# Patient Record
Sex: MALE | Race: WHITE | NOT HISPANIC OR LATINO | Employment: FULL TIME | URBAN - METROPOLITAN AREA
[De-identification: names, ages, dates, MRNs, and addresses within clinical notes are randomized per-mention and may not be internally consistent; named-entity substitution may affect disease eponyms.]

---

## 2017-01-03 RX ORDER — LISINOPRIL 40 MG/1
TABLET ORAL
Qty: 14 TABLET | Refills: 0 | Status: SHIPPED | OUTPATIENT
Start: 2017-01-03 | End: 2017-01-17 | Stop reason: SDUPTHER

## 2017-01-10 ENCOUNTER — OFFICE VISIT (OUTPATIENT)
Dept: FAMILY MEDICINE CLINIC | Facility: CLINIC | Age: 47
End: 2017-01-10

## 2017-01-10 VITALS
HEART RATE: 89 BPM | TEMPERATURE: 98.3 F | SYSTOLIC BLOOD PRESSURE: 96 MMHG | HEIGHT: 70 IN | DIASTOLIC BLOOD PRESSURE: 67 MMHG | WEIGHT: 236 LBS | BODY MASS INDEX: 33.79 KG/M2

## 2017-01-10 DIAGNOSIS — R73.9 HYPERGLYCEMIA: Primary | ICD-10-CM

## 2017-01-10 DIAGNOSIS — I10 ESSENTIAL HYPERTENSION: ICD-10-CM

## 2017-01-10 DIAGNOSIS — N40.0 BENIGN PROSTATIC HYPERPLASIA, PRESENCE OF LOWER URINARY TRACT SYMPTOMS UNSPECIFIED, UNSPECIFIED MORPHOLOGY: ICD-10-CM

## 2017-01-10 DIAGNOSIS — R81 GLYCOSURIA: ICD-10-CM

## 2017-01-10 DIAGNOSIS — F32.9 REACTIVE DEPRESSION: ICD-10-CM

## 2017-01-10 PROCEDURE — 99213 OFFICE O/P EST LOW 20 MIN: CPT | Performed by: FAMILY MEDICINE

## 2017-01-10 RX ORDER — FLUOXETINE HYDROCHLORIDE 40 MG/1
40 CAPSULE ORAL DAILY
COMMUNITY
End: 2018-07-20 | Stop reason: ALTCHOICE

## 2017-01-10 NOTE — PROGRESS NOTES
Chief Complaint   Patient presents with   • Diabetes     was diagnosed from an exam he had at work       Subjective.../HPI  Patient present today with glucose of 240 and +2 glycosuria in urine. Had gatoraid before test was done. Father with dm.  No polyuria / polydyspnea / polyphagia    I have reviewed the patient's medical history in detail and updated the computerized patient record.    Family History   Problem Relation Age of Onset   • Diabetes Father    • Diabetes Brother        Social History     Social History   • Marital status:      Spouse name: N/A   • Number of children: N/A   • Years of education: N/A     Occupational History   • Not on file.     Social History Main Topics   • Smoking status: Current Every Day Smoker   • Smokeless tobacco: Not on file   • Alcohol use Not on file   • Drug use: Not on file   • Sexual activity: Not on file     Other Topics Concern   • Not on file     Social History Narrative       Review of Systems:   Review of Systems   Constitutional: Negative for chills, fatigue, fever and unexpected weight change.   HENT: Negative for ear pain, hearing loss, sinus pressure, sore throat and tinnitus.    Eyes: Negative for pain, discharge and redness.   Respiratory: Negative for cough, shortness of breath and wheezing.    Cardiovascular: Negative for chest pain, palpitations and leg swelling.   Gastrointestinal: Negative for abdominal pain, constipation, diarrhea and nausea.   Endocrine: Negative for cold intolerance and heat intolerance.   Genitourinary: Negative for difficulty urinating, flank pain and urgency.   Musculoskeletal: Negative for back pain, joint swelling and myalgias.   Skin: Negative for rash and wound.   Allergic/Immunologic: Negative for environmental allergies and food allergies.   Neurological: Negative for dizziness, seizures, numbness and headaches.   Hematological: Negative for adenopathy. Does not bruise/bleed easily.   Psychiatric/Behavioral: Negative for  "decreased concentration, dysphoric mood and sleep disturbance. The patient is not nervous/anxious.    All other systems reviewed and are negative.      Objective:  Vital Signs     Vitals:    01/10/17 1127   BP: 96/67   BP Location: Right arm   Patient Position: Sitting   Pulse: 89   Temp: 98.3 °F (36.8 °C)   TempSrc: Oral   Weight: 236 lb (107 kg)   Height: 70\" (177.8 cm)     Physical Exam   Constitutional: He is oriented to person, place, and time. He appears well-developed and well-nourished.   HENT:   Head: Normocephalic.   Eyes: Pupils are equal, round, and reactive to light.   Neck: Normal range of motion.   Cardiovascular: Normal rate, regular rhythm and normal heart sounds.    Pulmonary/Chest: Effort normal and breath sounds normal.   Abdominal: Soft.   Musculoskeletal: Normal range of motion.   Neurological: He is alert and oriented to person, place, and time.   Skin: Skin is warm and dry.        Results Review:      REVIEWED AND DISCUSSED LAB RESULTS WITH PATIENT and REVIEWED AND DISCUSSED CLINICAL RESULTS WITH PATIENT                          Current Outpatient Prescriptions:   •  FLUoxetine (PROzac) 40 MG capsule, Take 40 mg by mouth Daily., Disp: , Rfl:   •  lisinopril (PRINIVIL,ZESTRIL) 40 MG tablet, TAKE ONE TABLET BY MOUTH ONCE A DAY, Disp: 14 tablet, Rfl: 0    Procedures    Assessment/Plan     Diagnoses and all orders for this visit:    Hyperglycemia  -     Comprehensive Metabolic Panel  -     Hemoglobin A1c  -     Lipid Panel With LDL / HDL Ratio    Glycosuria  -     Comprehensive Metabolic Panel  -     Hemoglobin A1c  -     Lipid Panel With LDL / HDL Ratio    Benign prostatic hyperplasia, presence of lower urinary tract symptoms unspecified, unspecified morphology  -     PSA    Essential hypertension  -     Lipid Panel With LDL / HDL Ratio    Reactive depression  -     CBC & Differential  -     Thyroid Panel With TSH    Other orders  -     FLUoxetine (PROzac) 40 MG capsule; Take 40 mg by mouth " Daily.         Woodrow Dash Jr., MD  01/10/17  1:08 PM

## 2017-01-10 NOTE — MR AVS SNAPSHOT
Simon NUÑEZ Lorraine   1/10/2017 11:30 AM   Office Visit    Dept Phone:  [phone]   Encounter #:  [id number]    Provider:  Woodrow Dash Jr., MD   Department:  [facility]                Your Full Care Plan              Today's Medication Changes          These changes are accurate as of: 1/10/17  1:11 PM.  If you have any questions, ask your nurse or doctor.               Medication(s)that have changed:     FLUoxetine 40 MG capsule   Commonly known as:  PROzac   Take 40 mg by mouth Daily.   What changed:  Another medication with the same name was removed. Continue taking this medication, and follow the directions you see here.   Changed by:  Woodrow Dash Jr., MD         Stop taking medication(s)listed here:     FLEXERIL 10 MG tablet   Generic drug:  cyclobenzaprine   Stopped by:  Woodrow Dash Jr., MD                      Your Updated Medication List          This list is accurate as of: 1/10/17  1:11 PM.  Always use your most recent med list.                FLUoxetine 40 MG capsule   Commonly known as:  PROzac       lisinopril 40 MG tablet   Commonly known as:  PRINIVIL,ZESTRIL   TAKE ONE TABLET BY MOUTH ONCE A DAY               We Performed the Following     CBC & Differential     Comprehensive Metabolic Panel     Hemoglobin A1c     Lipid Panel With LDL / HDL Ratio     PSA     Thyroid Panel With TSH       You Were Diagnosed With        Codes Comments    Hyperglycemia    -  Primary ICD-10-CM: R73.9  ICD-9-CM: 790.29     Glycosuria     ICD-10-CM: R81  ICD-9-CM: 791.5     Benign prostatic hyperplasia, presence of lower urinary tract symptoms unspecified, unspecified morphology     ICD-10-CM: N40.0  ICD-9-CM: 600.00     Essential hypertension     ICD-10-CM: I10  ICD-9-CM: 401.9     Reactive depression     ICD-10-CM: F32.9  ICD-9-CM: 300.4       Instructions     None    Patient Instructions History      Upcoming Appointments     Visit  "Type Date Time Department    ACUTE           1/10/2017 11:30 AM AMANDA BARKER SUZANNE      [employer] Signup     [facility] [employer] allows you to send messages to your doctor, view your test results, renew your prescriptions, schedule appointments, and more. To sign up, go to [employer] and click on the Sign Up Now link in the New User? box. Enter your [employer] Activation Code exactly as it appears below along with the last four digits of your Social Security Number and your Date of Birth () to complete the sign-up process. If you do not sign up before the expiration date, you must request a new code.    [employer] Activation Code: [email or web address]  Expires: 2017  1:11 PM    If you have questions, you can email [email or web address] or call [phone] to talk to our [employer] staff. Remember, [employer] is NOT to be used for urgent needs. For medical emergencies, dial 911.               Other Info from Your Visit           Allergies     No Known Allergies      Reason for Visit     Diabetes was diagnosed from an exam he had at work      Vital Signs     Blood Pressure Pulse Temperature Height Weight Body Mass Index    96/67 (BP Location: Right arm, Patient Position: Sitting) 89 98.3 °F (36.8 °C) (Oral) 70\" (177.8 cm) 236 lb (107 kg) 33.86 kg/m2    Smoking Status                   Current Every Day Smoker           Problems and Diagnoses Noted     Hyperglycemia    -  Primary    Sugar in urine        Benign enlargement of prostate        High blood pressure        Reactive depression            "

## 2017-01-12 ENCOUNTER — TELEPHONE (OUTPATIENT)
Dept: FAMILY MEDICINE CLINIC | Facility: CLINIC | Age: 47
End: 2017-01-12

## 2017-01-12 DIAGNOSIS — E11.9 DIABETES MELLITUS WITHOUT COMPLICATION (HCC): Primary | ICD-10-CM

## 2017-01-12 DIAGNOSIS — Z79.899 ENCOUNTER FOR LONG-TERM (CURRENT) USE OF MEDICATIONS: ICD-10-CM

## 2017-01-12 LAB
ALBUMIN SERPL-MCNC: 3.9 G/DL (ref 3.5–5.2)
ALBUMIN/GLOB SERPL: 1.3 G/DL
ALP SERPL-CCNC: 147 U/L (ref 39–117)
ALT SERPL-CCNC: 60 U/L (ref 1–41)
AST SERPL-CCNC: 74 U/L (ref 1–40)
BASOPHILS # BLD AUTO: 0.02 10*3/MM3 (ref 0–0.2)
BASOPHILS NFR BLD AUTO: 0.4 % (ref 0–1.5)
BILIRUB SERPL-MCNC: 1.3 MG/DL (ref 0.1–1.2)
BUN SERPL-MCNC: 9 MG/DL (ref 6–20)
BUN/CREAT SERPL: 10.6 (ref 7–25)
CALCIUM SERPL-MCNC: 8.8 MG/DL (ref 8.6–10.5)
CHLORIDE SERPL-SCNC: 103 MMOL/L (ref 98–107)
CHOLEST SERPL-MCNC: 139 MG/DL (ref 0–200)
CO2 SERPL-SCNC: 25.9 MMOL/L (ref 22–29)
CREAT SERPL-MCNC: 0.85 MG/DL (ref 0.76–1.27)
EOSINOPHIL # BLD AUTO: 0.12 10*3/MM3 (ref 0–0.7)
EOSINOPHIL NFR BLD AUTO: 2.6 % (ref 0.3–6.2)
ERYTHROCYTE [DISTWIDTH] IN BLOOD BY AUTOMATED COUNT: 13.8 % (ref 11.5–14.5)
FT4I SERPL CALC-MCNC: 1.6 (ref 1.2–4.9)
GLOBULIN SER CALC-MCNC: 2.9 GM/DL
GLUCOSE SERPL-MCNC: 136 MG/DL (ref 65–99)
HBA1C MFR BLD: 6.04 % (ref 4.8–5.6)
HCT VFR BLD AUTO: 46.9 % (ref 40.4–52.2)
HDLC SERPL-MCNC: 31 MG/DL (ref 40–60)
HGB BLD-MCNC: 16 G/DL (ref 13.7–17.6)
IMM GRANULOCYTES # BLD: 0.02 10*3/MM3 (ref 0–0.03)
IMM GRANULOCYTES NFR BLD: 0.4 % (ref 0–0.5)
LDLC SERPL CALC-MCNC: 83 MG/DL (ref 0–100)
LDLC/HDLC SERPL: 2.68 {RATIO}
LYMPHOCYTES # BLD AUTO: 1.35 10*3/MM3 (ref 0.9–4.8)
LYMPHOCYTES NFR BLD AUTO: 29.7 % (ref 19.6–45.3)
MCH RBC QN AUTO: 34.3 PG (ref 27–32.7)
MCHC RBC AUTO-ENTMCNC: 34.1 G/DL (ref 32.6–36.4)
MCV RBC AUTO: 100.4 FL (ref 79.8–96.2)
MONOCYTES # BLD AUTO: 0.44 10*3/MM3 (ref 0.2–1.2)
MONOCYTES NFR BLD AUTO: 9.7 % (ref 5–12)
NEUTROPHILS # BLD AUTO: 2.6 10*3/MM3 (ref 1.9–8.1)
NEUTROPHILS NFR BLD AUTO: 57.2 % (ref 42.7–76)
PLATELET # BLD AUTO: 80 10*3/MM3 (ref 140–500)
POTASSIUM SERPL-SCNC: 4.5 MMOL/L (ref 3.5–5.2)
PROT SERPL-MCNC: 6.8 G/DL (ref 6–8.5)
PSA SERPL-MCNC: 0.28 NG/ML (ref 0–4)
RBC # BLD AUTO: 4.67 10*6/MM3 (ref 4.6–6)
SODIUM SERPL-SCNC: 143 MMOL/L (ref 136–145)
T3RU NFR SERPL: 29 % (ref 24–39)
T4 SERPL-MCNC: 5.6 UG/DL (ref 4.5–12)
TRIGL SERPL-MCNC: 125 MG/DL (ref 0–150)
TSH SERPL DL<=0.005 MIU/L-ACNC: 2.73 UIU/ML (ref 0.45–4.5)
VLDLC SERPL CALC-MCNC: 25 MG/DL (ref 5–40)
WBC # BLD AUTO: 4.55 10*3/MM3 (ref 4.5–10.7)

## 2017-01-12 NOTE — TELEPHONE ENCOUNTER
Pt needs to have a treatment plan for his diabetes for a doctor for work, [phone]. Pt needs this done and sent to the doctor that does his work physical states that forms are here in office

## 2017-01-17 RX ORDER — FLUOXETINE HYDROCHLORIDE 20 MG/1
CAPSULE ORAL
Qty: 90 CAPSULE | Refills: 1 | Status: SHIPPED | OUTPATIENT
Start: 2017-01-17 | End: 2017-08-18 | Stop reason: SDUPTHER

## 2017-01-17 RX ORDER — LISINOPRIL 40 MG/1
TABLET ORAL
Qty: 90 TABLET | Refills: 1 | Status: SHIPPED | OUTPATIENT
Start: 2017-01-17 | End: 2017-08-18 | Stop reason: SDUPTHER

## 2017-02-01 ENCOUNTER — RESULTS ENCOUNTER (OUTPATIENT)
Dept: FAMILY MEDICINE CLINIC | Facility: CLINIC | Age: 47
End: 2017-02-01

## 2017-02-01 DIAGNOSIS — Z79.899 ENCOUNTER FOR LONG-TERM (CURRENT) USE OF MEDICATIONS: ICD-10-CM

## 2017-02-01 DIAGNOSIS — E11.9 DIABETES MELLITUS WITHOUT COMPLICATION (HCC): ICD-10-CM

## 2017-06-30 ENCOUNTER — OFFICE VISIT (OUTPATIENT)
Dept: FAMILY MEDICINE CLINIC | Facility: CLINIC | Age: 47
End: 2017-06-30

## 2017-06-30 VITALS
OXYGEN SATURATION: 98 % | SYSTOLIC BLOOD PRESSURE: 126 MMHG | WEIGHT: 226 LBS | BODY MASS INDEX: 32.35 KG/M2 | DIASTOLIC BLOOD PRESSURE: 84 MMHG | TEMPERATURE: 97.8 F | HEIGHT: 70 IN | HEART RATE: 72 BPM

## 2017-06-30 DIAGNOSIS — E11.9 TYPE 2 DIABETES MELLITUS WITHOUT COMPLICATION, WITHOUT LONG-TERM CURRENT USE OF INSULIN (HCC): Primary | ICD-10-CM

## 2017-06-30 PROCEDURE — 99213 OFFICE O/P EST LOW 20 MIN: CPT | Performed by: FAMILY MEDICINE

## 2017-06-30 RX ORDER — LANCETS 28 GAUGE
1 EACH MISCELLANEOUS DAILY
Qty: 100 EACH | Refills: 12 | Status: SHIPPED | OUTPATIENT
Start: 2017-06-30

## 2017-06-30 RX ORDER — SILDENAFIL CITRATE 20 MG/1
20 TABLET ORAL TAKE AS DIRECTED
Qty: 60 TABLET | Refills: 5 | Status: SHIPPED | OUTPATIENT
Start: 2017-06-30 | End: 2018-07-27

## 2017-06-30 NOTE — PROGRESS NOTES
"  Chief Complaint   Patient presents with   • Hyperglycemia     follow up ,pt is concerned about blood sugar        Subjective.../HPI  Patient present today with dm and working on dot    I have reviewed the patient's medical history in detail and updated the computerized patient record.    Family History   Problem Relation Age of Onset   • Diabetes Father    • Diabetes Brother        Social History     Social History   • Marital status:      Spouse name: N/A   • Number of children: N/A   • Years of education: N/A     Occupational History   • Not on file.     Social History Main Topics   • Smoking status: Current Every Day Smoker   • Smokeless tobacco: Not on file   • Alcohol use Not on file   • Drug use: Not on file   • Sexual activity: Not on file     Other Topics Concern   • Not on file     Social History Narrative       Review of Systems:   Review of Systems   Eyes: Negative.    Respiratory: Negative.    Cardiovascular: Negative.    Gastrointestinal: Negative.    Endocrine: Negative.    Genitourinary: Negative.    Skin: Negative.    Allergic/Immunologic: Positive for environmental allergies.   Neurological: Negative.    Hematological: Negative.    Psychiatric/Behavioral: Negative.        Objective:  Vital Signs     Vitals:    06/30/17 1013   BP: 126/84   BP Location: Left arm   Patient Position: Sitting   Pulse: 72   Temp: 97.8 °F (36.6 °C)   TempSrc: Oral   SpO2: 98%   Weight: 226 lb (103 kg)   Height: 70\" (177.8 cm)     Physical Exam   Constitutional: He is oriented to person, place, and time. He appears well-developed and well-nourished.   HENT:   Head: Normocephalic.   Eyes: Pupils are equal, round, and reactive to light.   Neck: Normal range of motion.   Cardiovascular: Normal rate, regular rhythm and normal heart sounds.    Pulmonary/Chest: Effort normal.   Abdominal: Soft.   Musculoskeletal: Normal range of motion.   Neurological: He is alert and oriented to person, place, and time.   Skin: Skin is " warm and dry.        Results Review:      REVIEWED AND DISCUSSED CLINICAL RESULTS WITH PATIENT                          Current Outpatient Prescriptions:   •  FLUoxetine (PROzac) 20 MG capsule, TAKE ONE CAPSULE BY MOUTH DAILY, Disp: 90 capsule, Rfl: 1  •  FLUoxetine (PROzac) 40 MG capsule, Take 40 mg by mouth Daily., Disp: , Rfl:   •  lisinopril (PRINIVIL,ZESTRIL) 40 MG tablet, TAKE ONE TABLET BY MOUTH DAILY, Disp: 90 tablet, Rfl: 1  •  glucose blood test strip, 1 each by Other route Daily. Use as instructed, Disp: 100 each, Rfl: 12  •  Lancets (FREESTYLE) lancets, 1 each by Other route Daily., Disp: 100 each, Rfl: 12  •  sildenafil (REVATIO) 20 MG tablet, Take 1 tablet by mouth Take As Directed. 1-5 tabs before active, Disp: 60 tablet, Rfl: 5    Procedures    Assessment/Plan     Diagnoses and all orders for this visit:    Type 2 diabetes mellitus without complication, without long-term current use of insulin    Other orders  -     sildenafil (REVATIO) 20 MG tablet; Take 1 tablet by mouth Take As Directed. 1-5 tabs before active       freestyle monitor and check glucose qd    Woodrow Dash Jr., MD  06/30/17  12:19 PM

## 2017-07-01 LAB
ALBUMIN SERPL-MCNC: 4.3 G/DL (ref 3.5–5.2)
ALBUMIN/GLOB SERPL: 1.6 G/DL
ALP SERPL-CCNC: 110 U/L (ref 39–117)
ALT SERPL-CCNC: 50 U/L (ref 1–41)
AST SERPL-CCNC: 75 U/L (ref 1–40)
BILIRUB SERPL-MCNC: 3 MG/DL (ref 0.1–1.2)
BUN SERPL-MCNC: 10 MG/DL (ref 6–20)
BUN/CREAT SERPL: 13.3 (ref 7–25)
CALCIUM SERPL-MCNC: 8.5 MG/DL (ref 8.6–10.5)
CHLORIDE SERPL-SCNC: 97 MMOL/L (ref 98–107)
CO2 SERPL-SCNC: 23 MMOL/L (ref 22–29)
CREAT SERPL-MCNC: 0.75 MG/DL (ref 0.76–1.27)
GLOBULIN SER CALC-MCNC: 2.7 GM/DL
GLUCOSE SERPL-MCNC: 131 MG/DL (ref 65–99)
HBA1C MFR BLD: 5.9 % (ref 4.8–5.6)
POTASSIUM SERPL-SCNC: 4.1 MMOL/L (ref 3.5–5.2)
PROT SERPL-MCNC: 7 G/DL (ref 6–8.5)
SODIUM SERPL-SCNC: 135 MMOL/L (ref 136–145)

## 2017-08-18 RX ORDER — FLUOXETINE HYDROCHLORIDE 20 MG/1
20 CAPSULE ORAL DAILY
Qty: 90 CAPSULE | Refills: 1 | Status: SHIPPED | OUTPATIENT
Start: 2017-08-18 | End: 2018-07-20

## 2017-08-18 RX ORDER — LISINOPRIL 40 MG/1
40 TABLET ORAL DAILY
Qty: 90 TABLET | Refills: 1 | Status: SHIPPED | OUTPATIENT
Start: 2017-08-18 | End: 2018-07-20

## 2018-03-08 ENCOUNTER — APPOINTMENT (OUTPATIENT)
Dept: GENERAL RADIOLOGY | Facility: HOSPITAL | Age: 48
End: 2018-03-08

## 2018-03-08 ENCOUNTER — HOSPITAL ENCOUNTER (EMERGENCY)
Facility: HOSPITAL | Age: 48
Discharge: HOME | End: 2018-03-08
Attending: EMERGENCY MEDICINE | Admitting: EMERGENCY MEDICINE

## 2018-03-08 VITALS
RESPIRATION RATE: 14 BRPM | SYSTOLIC BLOOD PRESSURE: 131 MMHG | BODY MASS INDEX: 32.58 KG/M2 | OXYGEN SATURATION: 96 % | DIASTOLIC BLOOD PRESSURE: 82 MMHG | HEART RATE: 76 BPM | TEMPERATURE: 97.7 F | HEIGHT: 69 IN | WEIGHT: 220 LBS

## 2018-03-08 DIAGNOSIS — R07.9 CHEST PAIN, UNSPECIFIED TYPE: Primary | ICD-10-CM

## 2018-03-08 LAB
ALBUMIN SERPL-MCNC: 3.7 G/DL (ref 3.5–5.2)
ALBUMIN/GLOB SERPL: 1.1 G/DL
ALP SERPL-CCNC: 145 U/L (ref 39–117)
ALT SERPL W P-5'-P-CCNC: 38 U/L (ref 1–41)
ANION GAP SERPL CALCULATED.3IONS-SCNC: 10.9 MMOL/L
AST SERPL-CCNC: 54 U/L (ref 1–40)
BASOPHILS # BLD AUTO: 0.04 10*3/MM3 (ref 0–0.2)
BASOPHILS NFR BLD AUTO: 0.7 % (ref 0–1.5)
BILIRUB SERPL-MCNC: 2.4 MG/DL (ref 0.1–1.2)
BUN BLD-MCNC: 8 MG/DL (ref 6–20)
BUN/CREAT SERPL: 10.3 (ref 7–25)
CALCIUM SPEC-SCNC: 9.2 MG/DL (ref 8.6–10.5)
CHLORIDE SERPL-SCNC: 99 MMOL/L (ref 98–107)
CO2 SERPL-SCNC: 27.1 MMOL/L (ref 22–29)
CREAT BLD-MCNC: 0.78 MG/DL (ref 0.76–1.27)
DEPRECATED RDW RBC AUTO: 47.1 FL (ref 37–54)
EOSINOPHIL # BLD AUTO: 0.15 10*3/MM3 (ref 0–0.7)
EOSINOPHIL NFR BLD AUTO: 2.7 % (ref 0.3–6.2)
ERYTHROCYTE [DISTWIDTH] IN BLOOD BY AUTOMATED COUNT: 13.7 % (ref 11.5–14.5)
GFR SERPL CREATININE-BSD FRML MDRD: 107 ML/MIN/1.73
GLOBULIN UR ELPH-MCNC: 3.5 GM/DL
GLUCOSE BLD-MCNC: 184 MG/DL (ref 65–99)
HCT VFR BLD AUTO: 44.7 % (ref 40.4–52.2)
HGB BLD-MCNC: 15.9 G/DL (ref 13.7–17.6)
HOLD SPECIMEN: NORMAL
IMM GRANULOCYTES # BLD: 0 10*3/MM3 (ref 0–0.03)
IMM GRANULOCYTES NFR BLD: 0 % (ref 0–0.5)
LYMPHOCYTES # BLD AUTO: 1.4 10*3/MM3 (ref 0.9–4.8)
LYMPHOCYTES NFR BLD AUTO: 25.4 % (ref 19.6–45.3)
MCH RBC QN AUTO: 33.3 PG (ref 27–32.7)
MCHC RBC AUTO-ENTMCNC: 35.6 G/DL (ref 32.6–36.4)
MCV RBC AUTO: 93.5 FL (ref 79.8–96.2)
MONOCYTES # BLD AUTO: 0.6 10*3/MM3 (ref 0.2–1.2)
MONOCYTES NFR BLD AUTO: 10.9 % (ref 5–12)
NEUTROPHILS # BLD AUTO: 3.33 10*3/MM3 (ref 1.9–8.1)
NEUTROPHILS NFR BLD AUTO: 60.3 % (ref 42.7–76)
PLATELET # BLD AUTO: 68 10*3/MM3 (ref 140–500)
PMV BLD AUTO: 11.3 FL (ref 6–12)
POTASSIUM BLD-SCNC: 4.2 MMOL/L (ref 3.5–5.2)
PROT SERPL-MCNC: 7.2 G/DL (ref 6–8.5)
RBC # BLD AUTO: 4.78 10*6/MM3 (ref 4.6–6)
SODIUM BLD-SCNC: 137 MMOL/L (ref 136–145)
TROPONIN T SERPL-MCNC: <0.01 NG/ML (ref 0–0.03)
TROPONIN T SERPL-MCNC: <0.01 NG/ML (ref 0–0.03)
WBC NRBC COR # BLD: 5.52 10*3/MM3 (ref 4.5–10.7)
WHOLE BLOOD HOLD SPECIMEN: NORMAL

## 2018-03-08 PROCEDURE — 36415 COLL VENOUS BLD VENIPUNCTURE: CPT | Performed by: EMERGENCY MEDICINE

## 2018-03-08 PROCEDURE — 71046 X-RAY EXAM CHEST 2 VIEWS: CPT

## 2018-03-08 PROCEDURE — 93010 ELECTROCARDIOGRAM REPORT: CPT | Performed by: INTERNAL MEDICINE

## 2018-03-08 PROCEDURE — 93005 ELECTROCARDIOGRAM TRACING: CPT

## 2018-03-08 PROCEDURE — 84484 ASSAY OF TROPONIN QUANT: CPT | Performed by: NURSE PRACTITIONER

## 2018-03-08 PROCEDURE — 84484 ASSAY OF TROPONIN QUANT: CPT | Performed by: EMERGENCY MEDICINE

## 2018-03-08 PROCEDURE — 80053 COMPREHEN METABOLIC PANEL: CPT | Performed by: EMERGENCY MEDICINE

## 2018-03-08 PROCEDURE — 85025 COMPLETE CBC W/AUTO DIFF WBC: CPT | Performed by: EMERGENCY MEDICINE

## 2018-03-08 PROCEDURE — 99284 EMERGENCY DEPT VISIT MOD MDM: CPT

## 2018-03-08 RX ORDER — FAMOTIDINE 20 MG/1
20 TABLET, FILM COATED ORAL 2 TIMES DAILY
Qty: 24 TABLET | Refills: 0 | Status: SHIPPED | OUTPATIENT
Start: 2018-03-08 | End: 2018-03-08 | Stop reason: HOSPADM

## 2018-03-08 RX ORDER — SODIUM CHLORIDE 0.9 % (FLUSH) 0.9 %
10 SYRINGE (ML) INJECTION AS NEEDED
Status: DISCONTINUED | OUTPATIENT
Start: 2018-03-08 | End: 2018-03-08 | Stop reason: HOSPADM

## 2018-03-08 NOTE — DISCHARGE INSTRUCTIONS
Pt instructions:  Rest  Follow up with Primary Care Doctor for further management and to have your blood pressure rechecked.   Return to ER with pain, swelling, numbness/tingling, fever, chills, weakness, nausea, vomiting, diarrhea, abdominal pain, back pain, urinary concerns, chest pain, shortness of breath, dizziness, headache, worsening of symptoms or other concerns.

## 2018-03-08 NOTE — ED PROVIDER NOTES
EMERGENCY DEPARTMENT ENCOUNTER    CHIEF COMPLAINT  Chief Complaint: chest pain  History given by: patient  History limited by: N/A   Room Number: 18/18  PMD: No Known Provider      HPI:  Pt is a 47 y.o. male who states that he has hx of HTN and diet controlled diabetes. He presents with intermittent nonradiating left chest pain (described as sharp and squeezing) that started about 1 week ago. It lasts for 1 to 2 minutes at a time, resolves spontaneously, and occurs several times a day. It has no aggravating and no alleviating factors. He denies nausea, vomiting, sweating, dyspnea, weakness, dizziness, fevers, chills, abd pain, pain and difficulty with urination, BLE swelling, recent travel, personal hx of heart disease/hyperlipidemia, and family hx of heart disease. He reports that he is a  (not ), smokes 0.5ppd, and drinks approximately 90oz of caffeineated soda daily. Pt has no other complaints at this time.     Duration: started about 1 week ago  Timing: intermittent   Location: left chest  Radiation: none  Quality: sharp and squeezing  Intensity/Severity: moderate  Progression: unchanged  Associated Symptoms: none  Aggravating Factors: none  Alleviating Factors: none  Previous Episodes: none  Treatment before arrival: none mentioned     PAST MEDICAL HISTORY  Active Ambulatory Problems     Diagnosis Date Noted   • No Active Ambulatory Problems     Resolved Ambulatory Problems     Diagnosis Date Noted   • No Resolved Ambulatory Problems     Past Medical History:   Diagnosis Date   • Back pain    • Hypertension    • Rash        PAST SURGICAL HISTORY  History reviewed. No pertinent surgical history.    FAMILY HISTORY  Family History   Problem Relation Age of Onset   • Diabetes Father    • Diabetes Brother        SOCIAL HISTORY  Social History     Social History   • Marital status:      Spouse name: N/A   • Number of children: N/A   • Years of education: N/A  "    Occupational History   • Not on file.     Social History Main Topics   • Smoking status: Current Every Day Smoker     Packs/day: 0.50     Types: Cigarettes   • Smokeless tobacco: Not on file   • Alcohol use Yes      Comment: \"probably every other day\"   • Drug use: Not on file   • Sexual activity: Not on file     Other Topics Concern   • Not on file     Social History Narrative   • No narrative on file         ALLERGIES  Review of patient's allergies indicates no known allergies.    REVIEW OF SYSTEMS  Review of Systems   Constitutional: Negative for chills and fever.   HENT: Negative for congestion, rhinorrhea and sore throat.    Eyes: Negative for pain.   Respiratory: Negative for cough and shortness of breath.    Cardiovascular: Positive for chest pain (left chest pain). Negative for palpitations.   Gastrointestinal: Negative for abdominal pain, diarrhea, nausea and vomiting.   Endocrine: Negative.    Genitourinary: Negative for difficulty urinating.   Musculoskeletal: Negative for myalgias.   Skin: Negative.    Neurological: Negative for speech difficulty, weakness, numbness and headaches.   Psychiatric/Behavioral: Negative.    All other systems reviewed and are negative.      PHYSICAL EXAM  ED Triage Vitals   Temp Heart Rate Resp BP SpO2   03/08/18 1055 03/08/18 1055 03/08/18 1055 03/08/18 1055 03/08/18 1055   97.7 °F (36.5 °C) 74 16 122/85 98 % WNL      Temp src Heart Rate Source Patient Position BP Location FiO2 (%)   03/08/18 1055 -- 03/08/18 1055 03/08/18 1055 --   Tympanic  Sitting Right arm        Physical Exam   Constitutional: He is oriented to person, place, and time and well-developed, well-nourished, and in no distress. No distress.   HENT:   Head: Normocephalic.   Mouth/Throat: Oropharynx is clear and moist and mucous membranes are normal.   Eyes: Pupils are equal, round, and reactive to light.   Neck: Normal range of motion.   Cardiovascular: Normal rate, regular rhythm and normal heart sounds.  "   Pulmonary/Chest: Effort normal and breath sounds normal. No respiratory distress. He has no wheezes. He exhibits no tenderness.   Abdominal: Soft. Bowel sounds are normal. There is no tenderness. There is no rebound and no guarding.   Musculoskeletal: Normal range of motion. He exhibits no edema.   Neurological: He is alert and oriented to person, place, and time. He has normal motor skills and normal sensation.   Skin: Skin is warm and dry. No rash noted.   Psychiatric: Mood, memory, affect and judgment normal.   Nursing note and vitals reviewed.      LAB RESULTS  Recent Results (from the past 24 hour(s))   Comprehensive Metabolic Panel    Collection Time: 03/08/18 11:07 AM   Result Value Ref Range    Glucose 184 (H) 65 - 99 mg/dL    BUN 8 6 - 20 mg/dL    Creatinine 0.78 0.76 - 1.27 mg/dL    Sodium 137 136 - 145 mmol/L    Potassium 4.2 3.5 - 5.2 mmol/L    Chloride 99 98 - 107 mmol/L    CO2 27.1 22.0 - 29.0 mmol/L    Calcium 9.2 8.6 - 10.5 mg/dL    Total Protein 7.2 6.0 - 8.5 g/dL    Albumin 3.70 3.50 - 5.20 g/dL    ALT (SGPT) 38 1 - 41 U/L    AST (SGOT) 54 (H) 1 - 40 U/L    Alkaline Phosphatase 145 (H) 39 - 117 U/L    Total Bilirubin 2.4 (H) 0.1 - 1.2 mg/dL    eGFR Non African Amer 107 >60 mL/min/1.73    Globulin 3.5 gm/dL    A/G Ratio 1.1 g/dL    BUN/Creatinine Ratio 10.3 7.0 - 25.0    Anion Gap 10.9 mmol/L   Troponin    Collection Time: 03/08/18 11:07 AM   Result Value Ref Range    Troponin T <0.010 0.000 - 0.030 ng/mL   Light Blue Top    Collection Time: 03/08/18 11:07 AM   Result Value Ref Range    Extra Tube hold for add-on    Gold Top - SST    Collection Time: 03/08/18 11:07 AM   Result Value Ref Range    Extra Tube Hold for add-ons.    CBC Auto Differential    Collection Time: 03/08/18 11:07 AM   Result Value Ref Range    WBC 5.52 4.50 - 10.70 10*3/mm3    RBC 4.78 4.60 - 6.00 10*6/mm3    Hemoglobin 15.9 13.7 - 17.6 g/dL    Hematocrit 44.7 40.4 - 52.2 %    MCV 93.5 79.8 - 96.2 fL    MCH 33.3 (H) 27.0 - 32.7  pg    MCHC 35.6 32.6 - 36.4 g/dL    RDW 13.7 11.5 - 14.5 %    RDW-SD 47.1 37.0 - 54.0 fl    MPV 11.3 6.0 - 12.0 fL    Platelets 68 (L) 140 - 500 10*3/mm3    Neutrophil % 60.3 42.7 - 76.0 %    Lymphocyte % 25.4 19.6 - 45.3 %    Monocyte % 10.9 5.0 - 12.0 %    Eosinophil % 2.7 0.3 - 6.2 %    Basophil % 0.7 0.0 - 1.5 %    Immature Grans % 0.0 0.0 - 0.5 %    Neutrophils, Absolute 3.33 1.90 - 8.10 10*3/mm3    Lymphocytes, Absolute 1.40 0.90 - 4.80 10*3/mm3    Monocytes, Absolute 0.60 0.20 - 1.20 10*3/mm3    Eosinophils, Absolute 0.15 0.00 - 0.70 10*3/mm3    Basophils, Absolute 0.04 0.00 - 0.20 10*3/mm3    Immature Grans, Absolute 0.00 0.00 - 0.03 10*3/mm3   Troponin    Collection Time: 03/08/18  1:26 PM   Result Value Ref Range    Troponin T <0.010 0.000 - 0.030 ng/mL       I ordered the above labs and reviewed the results    RADIOLOGY         XR Chest 2 View (Preliminary result) Result time: 03/08/18 12:35:23     Preliminary result by Interface, [employer] Results [place] In (03/08/18 12:35:23)     Impression:     Atelectasis/infiltrate involving the right lung base which  is more prominent as compared to the examination of 07/05/2013 with no  evidence of consolidation or of effusion.           Narrative:     2 VIEWS CHEST     HISTORY: Chest pain.     FINDINGS:  Two views of the chest demonstrate the heart to be within  normal limits in size. There is atelectasis/infiltrate appreciated at  the right middle lobe which is increased versus the prior examination.  There is mild elevation of the right hemidiaphragm, unchanged. There is  no evidence of consolidation or of effusion.          I ordered the above noted radiological studies and reviewed the images on the PACS system.         EKG    EKG was interpreted by Dr. Alexis. See Dr Alexis's note for EKG interpretation.       MEDICAL RECORD REVIEW    PROCEDURES      PROGRESS AND CONSULTS    1125- Ordered CXR, blood work, troponin, and EKG for further evaluation.     1219-  "Reviewed pt's history and workup with Dr. Alexis.  After a bedside evaluation; Dr Alexis agrees with the plan of care.     1316- Initial troponin is negative. Ordered repeat troponin for further evaluation.     1413- Rechecked pt. He is resting comfortably and is in no acute distress. Discussed with pt about all pertinent results including stable EKG findings, negative troponin x2, normal WBC count, and CXR findings (no obvious acute process). Instructed to f/u with referred cardiologist closely for recheck and for further testing/treatment as needed. RTER warnings given. Pt understands and agrees with plan. Addressed all questions.        COURSE & MEDICAL DECISION MAKING  Pertinent Labs and Imaging studies that were ordered and reviewed are noted above.  Results were reviewed/discussed with the patient and they were also made aware of online assess.   Pt also made aware that some labs, such as cultures, will not be resulted during ER visit and follow up with PMD is necessary.     MEDICATIONS GIVEN IN ER  Medications   sodium chloride 0.9 % flush 10 mL (not administered)       /84  Pulse 68  Temp 97.7 °F (36.5 °C) (Tympanic)   Resp 14  Ht 175.3 cm (69\")  Wt 99.8 kg (220 lb)  SpO2 95%  BMI 32.49 kg/m2    Discussed all results and noted any abnormalities with patient.  Discussed absoute need to recheck abnormalities with referred cardiologist.     Reviewed implications of results, diagnosis, meds, responsibility to follow up, warning signs and symptoms of possible worsening, potential complications and reasons to return to ER with patient    Discussed plan for discharge, as there is no emergent indication for admission.  Pt is agreeable and understands need for follow up and repeat testing.  Pt is aware that discharge does not mean that nothing is wrong but it indicates no emergency is present and they must continue care with referred cardiologist.  Pt is discharged with instructions to follow up with " primary care doctor to have their blood pressure rechecked.       DIAGNOSIS  Final diagnoses:   Chest pain, unspecified type       FOLLOW UP   Raad Pérez MD  [address]  [place] [address]  [phone]            I personally reviewed the past medical history, past surgical history, social history, family history, current medications and allergies as they appear in this chart.  The scribe's note accurately reflects the work and decisions made by me.         Documentation assistance provided by princess Breen for ANTOINETTE Interiano.  Information recorded by the scribe was done at my direction and has been verified and validated by me.     Rosario Breen  03/08/18 1412       ANTOINETTE Webb  03/08/18 2633

## 2018-03-08 NOTE — ED PROVIDER NOTES
Pt is a 47 y.o. Male reporting to the ED complaining of intermittent L sided chest pain onset 6 days ago. He reports his pain lasts for seconds before resolving. He reports that his episodes are gradually becoming more frequent. Pt denies cough, fever, or a hx of heart problems. On exam, mild tenderness to L sternal border, HRRR, lungs CTAB, abd benign, no pedal edema.     EKG           EKG time: 1031  Rhythm/Rate: nsr, 65  P waves and [place]: normal  QRS, axis: normal   ST and T waves: normal     Interpreted Contemporaneously by me, independently viewed  No prior records for comparison    Plan: Acquire labs and imaging    I supervised care provided by the midlevel provider.    We have discussed this patient's history, physical exam, and treatment plan.   I have reviewed the note and personally saw and examined the patient and agree with the plan of care.    Documentation assistance provided by princess Mojica for Dr. Alexis.  Information recorded by the scribe was done at my direction and has been verified and validated by me.       Herbert Mojica  03/08/18 9680       Derrick Alexis MD  03/08/18 6390

## 2018-03-08 NOTE — ED TRIAGE NOTES
Pt c/o chest pain that has worsened over the last three days. Pain described as squeezing left side of chest. Pt has hx of HTN but no other heart problems.

## 2018-07-27 ENCOUNTER — OFFICE VISIT (OUTPATIENT)
Dept: INTERNAL MEDICINE | Facility: CLINIC | Age: 48
End: 2018-07-27

## 2018-07-27 VITALS
BODY MASS INDEX: 33.83 KG/M2 | OXYGEN SATURATION: 97 % | HEIGHT: 69 IN | HEART RATE: 80 BPM | SYSTOLIC BLOOD PRESSURE: 118 MMHG | DIASTOLIC BLOOD PRESSURE: 82 MMHG | WEIGHT: 228.4 LBS

## 2018-07-27 DIAGNOSIS — I10 ESSENTIAL HYPERTENSION: Primary | ICD-10-CM

## 2018-07-27 DIAGNOSIS — R73.9 ELEVATED SERUM GLUCOSE: ICD-10-CM

## 2018-07-27 DIAGNOSIS — F41.9 ANXIETY: ICD-10-CM

## 2018-07-27 LAB
ALBUMIN SERPL-MCNC: 3.9 G/DL (ref 3.5–5.2)
ALBUMIN/GLOB SERPL: 1.1 G/DL
ALP SERPL-CCNC: 161 U/L (ref 39–117)
ALT SERPL-CCNC: 66 U/L (ref 1–41)
AST SERPL-CCNC: 70 U/L (ref 1–40)
BILIRUB SERPL-MCNC: 3.5 MG/DL (ref 0.1–1.2)
BUN SERPL-MCNC: 9 MG/DL (ref 6–20)
BUN/CREAT SERPL: 11.8 (ref 7–25)
CALCIUM SERPL-MCNC: 8.7 MG/DL (ref 8.6–10.5)
CHLORIDE SERPL-SCNC: 98 MMOL/L (ref 98–107)
CO2 SERPL-SCNC: 24.9 MMOL/L (ref 22–29)
CREAT SERPL-MCNC: 0.76 MG/DL (ref 0.76–1.27)
GLOBULIN SER CALC-MCNC: 3.6 GM/DL
GLUCOSE SERPL-MCNC: 222 MG/DL (ref 65–99)
HBA1C MFR BLD: 8.94 % (ref 4.8–5.6)
POTASSIUM SERPL-SCNC: 4.1 MMOL/L (ref 3.5–5.2)
PROT SERPL-MCNC: 7.5 G/DL (ref 6–8.5)
SODIUM SERPL-SCNC: 138 MMOL/L (ref 136–145)

## 2018-07-27 PROCEDURE — 99214 OFFICE O/P EST MOD 30 MIN: CPT | Performed by: FAMILY MEDICINE

## 2018-07-27 RX ORDER — LISINOPRIL 40 MG/1
40 TABLET ORAL DAILY
Qty: 30 TABLET | Refills: 0 | Status: SHIPPED | OUTPATIENT
Start: 2018-07-27 | End: 2018-09-12 | Stop reason: SDUPTHER

## 2018-07-27 RX ORDER — FLUOXETINE HYDROCHLORIDE 20 MG/1
20 CAPSULE ORAL DAILY
Qty: 30 CAPSULE | Refills: 6 | Status: SHIPPED | OUTPATIENT
Start: 2018-07-27 | End: 2018-12-19 | Stop reason: SDUPTHER

## 2018-07-27 NOTE — PROGRESS NOTES
Subjective   Simon Peters is a 47 y.o. male.     Chief Complaint   Patient presents with   • New Patient Visit   • Hypertension   • Diabetes   • Anxiety         History of Present Illness     Patient presents today for new patient.  Patient states that she got past medical history for essential hypertension.  He notes that he's currently taking lisinopril 40 mg daily.  Denies any side effects of medication.  Patient states that he was out of his medication for almost 3 months, and stated that when he went to the urgent care last week he had his blood pressure medication refilled.  At today's office visit patient's blood pressures 118/82.  He notes that the lisinopril 40 mg seems to work well for him.    Patient Notes that he has underlying anxiety.  Patient states that he takes Prozac 20 mg daily.  Patient notes that medication seems to work well for him.  He notes that when he was not taken medication for 3 months when he was out of his medication, he notices a significant difference.    When patient went to the urgent care, was found to have an elevated serum glucose in the 200s.  The patient was believed to have underlying type 2 diabetes.  Patient notes that he's never had diagnosis of diabetes type 2 before.  I discussed with patient at today's office visit that we will check a hemoglobin A1c.    The following portions of the patient's history were reviewed and updated as appropriate: allergies, current medications, past family history, past medical history, past social history, past surgical history and problem list.    Review of Systems   Constitutional: Negative for chills and fever.   HENT: Negative for congestion, rhinorrhea, sinus pain and sore throat.    Eyes: Negative for photophobia and visual disturbance.   Respiratory: Negative for cough, chest tightness and shortness of breath.    Cardiovascular: Negative for chest pain and palpitations.   Gastrointestinal: Negative for diarrhea, nausea and  vomiting.   Genitourinary: Negative for dysuria, frequency and urgency.   Skin: Negative for rash and wound.   Neurological: Negative for dizziness and syncope.   Psychiatric/Behavioral: Negative for behavioral problems and confusion.       Objective   Physical Exam   Constitutional: He is oriented to person, place, and time. He appears well-developed and well-nourished.   HENT:   Head: Normocephalic and atraumatic.   Right Ear: External ear normal.   Left Ear: External ear normal.   Mouth/Throat: Oropharynx is clear and moist.   Eyes: EOM are normal.   Neck: Normal range of motion. Neck supple.   Cardiovascular: Normal rate, regular rhythm and normal heart sounds.    Pulmonary/Chest: Effort normal and breath sounds normal. No respiratory distress.   Musculoskeletal: Normal range of motion.   Lymphadenopathy:     He has no cervical adenopathy.   Neurological: He is alert and oriented to person, place, and time.   Skin: Skin is warm.   Psychiatric: He has a normal mood and affect. His behavior is normal.   Nursing note and vitals reviewed.      Assessment/Plan   Simon was seen today for new patient visit, hypertension, diabetes and anxiety.    Diagnoses and all orders for this visit:    Essential hypertension  -     Comprehensive Metabolic Panel  -     Hemoglobin A1c  -     lisinopril (PRINIVIL,ZESTRIL) 40 MG tablet; Take 1 tablet by mouth Daily.    Anxiety  -     Comprehensive Metabolic Panel  -     FLUoxetine (PROzac) 20 MG capsule; Take 1 capsule by mouth Daily.    Elevated serum glucose  -     Hemoglobin A1c          No Follow-up on file.    Dictated utilizing Dragon Voice Recognition Software

## 2018-07-30 ENCOUNTER — TELEPHONE (OUTPATIENT)
Dept: INTERNAL MEDICINE | Facility: CLINIC | Age: 48
End: 2018-07-30

## 2018-07-30 DIAGNOSIS — R79.89 ELEVATED LFTS: Primary | ICD-10-CM

## 2018-07-30 NOTE — TELEPHONE ENCOUNTER
----- Message from Shadi Barrett MD sent at 7/30/2018  1:06 PM EDT -----  Please inform the patient of the following abnormal results.  Patients glucose elevated, and has hba1c of 8.94.  Bring patient back in for office visit this week to give dm2 education and start patient on new medication.   Liver enzymes elevated.  Recheck CMP in 2 weeks.

## 2018-07-30 NOTE — PROGRESS NOTES
Please inform the patient of the following abnormal results.  Patients glucose elevated, and has hba1c of 8.94.  Bring patient back in for office visit this week to give dm2 education and start patient on new medication.   Liver enzymes elevated.  Recheck CMP in 2 weeks.

## 2018-07-30 NOTE — TELEPHONE ENCOUNTER
LVM- patient notified. Patient advised to contact office if they have any questions. Patient advised to contact office to set up lab appointment for two weeks. Lab order put into [facility] per Dr. Barrett. Patient has an appointment scheduled for 8/2/18.

## 2018-08-02 ENCOUNTER — OFFICE VISIT (OUTPATIENT)
Dept: INTERNAL MEDICINE | Facility: CLINIC | Age: 48
End: 2018-08-02

## 2018-08-02 VITALS
SYSTOLIC BLOOD PRESSURE: 124 MMHG | OXYGEN SATURATION: 96 % | HEART RATE: 85 BPM | HEIGHT: 69 IN | BODY MASS INDEX: 33.47 KG/M2 | WEIGHT: 226 LBS | DIASTOLIC BLOOD PRESSURE: 84 MMHG

## 2018-08-02 DIAGNOSIS — E11.9 TYPE 2 DIABETES MELLITUS WITHOUT COMPLICATION, WITHOUT LONG-TERM CURRENT USE OF INSULIN (HCC): Primary | ICD-10-CM

## 2018-08-02 PROCEDURE — 99213 OFFICE O/P EST LOW 20 MIN: CPT | Performed by: FAMILY MEDICINE

## 2018-08-09 NOTE — PROGRESS NOTES
Subjective   Simon Peters is a 47 y.o. male.     Chief Complaint   Patient presents with   • Diabetes         History of Present Illness     Patient presents today for follow up on his labs. Patient hba1c was 8.94. Patient has never been diagnosed with dm2 before. Patient does work as a . Patient notes that his diet isnt the best and notes that he eats only twice a day. Patient states that he does not eat healthy options.     The following portions of the patient's history were reviewed and updated as appropriate: allergies, current medications, past family history, past medical history, past social history, past surgical history and problem list.    Review of Systems   Constitutional: Negative for chills and fever.   HENT: Negative for congestion, rhinorrhea, sinus pain and sore throat.    Eyes: Negative for photophobia and visual disturbance.   Respiratory: Negative for cough, chest tightness and shortness of breath.    Cardiovascular: Negative for chest pain and palpitations.   Gastrointestinal: Negative for diarrhea, nausea and vomiting.   Genitourinary: Negative for dysuria, frequency and urgency.   Skin: Negative for rash and wound.   Neurological: Negative for dizziness and syncope.   Psychiatric/Behavioral: Negative for behavioral problems and confusion.       Objective   Physical Exam   Constitutional: He is oriented to person, place, and time. He appears well-developed and well-nourished.   HENT:   Head: Normocephalic and atraumatic.   Right Ear: External ear normal.   Left Ear: External ear normal.   Mouth/Throat: Oropharynx is clear and moist.   Eyes: EOM are normal.   Neck: Normal range of motion. Neck supple.   Cardiovascular: Normal rate, regular rhythm and normal heart sounds.    Pulmonary/Chest: Effort normal and breath sounds normal. No respiratory distress.   Musculoskeletal: Normal range of motion.   Lymphadenopathy:     He has no cervical adenopathy.   Neurological: He is alert and  oriented to person, place, and time.   Skin: Skin is warm.   Psychiatric: He has a normal mood and affect. His behavior is normal.   Nursing note and vitals reviewed.      Assessment/Plan   Simon was seen today for diabetes.    Diagnoses and all orders for this visit:    Type 2 diabetes mellitus without complication, without long-term current use of insulin (CMS/[facility])  -     SITagliptin-MetFORMIN HCl ER (JANUMET XR) 100-1000 MG tablet; Take 1 tablet by mouth Daily.  -     Ertugliflozin L-PyroglutamicAc (STEGLATRO) 5 MG tablet; Take 1 tablet by mouth Every Morning.  -     Ambulatory Referral to Diabetic Education  -     Counseled patient on diet and exercise.           No Follow-up on file.    Dictated utilizing Dragon Voice Recognition Software

## 2018-08-10 ENCOUNTER — RESULTS ENCOUNTER (OUTPATIENT)
Dept: INTERNAL MEDICINE | Facility: CLINIC | Age: 48
End: 2018-08-10

## 2018-08-10 DIAGNOSIS — R79.89 ELEVATED LFTS: ICD-10-CM

## 2018-09-12 DIAGNOSIS — I10 ESSENTIAL HYPERTENSION: ICD-10-CM

## 2018-09-12 RX ORDER — LISINOPRIL 40 MG/1
TABLET ORAL
Qty: 90 TABLET | Refills: 1 | Status: SHIPPED | OUTPATIENT
Start: 2018-09-12 | End: 2019-06-12 | Stop reason: SDUPTHER

## 2018-09-25 ENCOUNTER — OFFICE VISIT (OUTPATIENT)
Dept: INTERNAL MEDICINE | Facility: CLINIC | Age: 48
End: 2018-09-25

## 2018-09-25 VITALS
OXYGEN SATURATION: 98 % | HEART RATE: 68 BPM | SYSTOLIC BLOOD PRESSURE: 120 MMHG | BODY MASS INDEX: 31.8 KG/M2 | HEIGHT: 69 IN | WEIGHT: 214.7 LBS | DIASTOLIC BLOOD PRESSURE: 80 MMHG

## 2018-09-25 DIAGNOSIS — E11.9 TYPE 2 DIABETES MELLITUS WITHOUT COMPLICATION, WITHOUT LONG-TERM CURRENT USE OF INSULIN (HCC): ICD-10-CM

## 2018-09-25 DIAGNOSIS — I10 ESSENTIAL HYPERTENSION: Primary | ICD-10-CM

## 2018-09-25 LAB — GLUCOSE BLDC GLUCOMTR-MCNC: 118 MG/DL (ref 70–130)

## 2018-09-25 PROCEDURE — 82962 GLUCOSE BLOOD TEST: CPT | Performed by: FAMILY MEDICINE

## 2018-09-25 PROCEDURE — 99214 OFFICE O/P EST MOD 30 MIN: CPT | Performed by: FAMILY MEDICINE

## 2018-09-25 NOTE — PROGRESS NOTES
Subjective   Simon Peters is a 47 y.o. male.     Chief Complaint   Patient presents with   • Hypertension   • Diabetes         History of Present Illness     Patient notes that he is following up on his HTN. Patient bp is 120/80. Patient is currently on lisinopril 40mg daily. Patient denies any side effects of the medicine.    Patient notes that he is currently taking janumet 100-1000mg daily and steglatro 5mg daily. Patient states that the medication he has not been taking it appropriately no for no reason. Patient is trying to eat healthier. He states that he has been alternating the medications every day.     The following portions of the patient's history were reviewed and updated as appropriate: allergies, current medications, past family history, past medical history, past social history, past surgical history and problem list.    Review of Systems   Constitutional: Negative for chills and fever.   HENT: Negative for congestion, rhinorrhea, sinus pain and sore throat.    Eyes: Negative for photophobia and visual disturbance.   Respiratory: Negative for cough, chest tightness and shortness of breath.    Cardiovascular: Negative for chest pain and palpitations.   Gastrointestinal: Negative for diarrhea, nausea and vomiting.   Genitourinary: Negative for dysuria, frequency and urgency.   Skin: Negative for rash and wound.   Neurological: Negative for dizziness and syncope.   Psychiatric/Behavioral: Negative for behavioral problems and confusion.       Objective   Physical Exam   Constitutional: He is oriented to person, place, and time. He appears well-developed and well-nourished.   HENT:   Head: Normocephalic and atraumatic.   Right Ear: External ear normal.   Left Ear: External ear normal.   Mouth/Throat: Oropharynx is clear and moist.   Eyes: EOM are normal.   Neck: Normal range of motion. Neck supple.   Cardiovascular: Normal rate, regular rhythm and normal heart sounds.    Pulmonary/Chest: Effort normal  and breath sounds normal. No respiratory distress.   Musculoskeletal: Normal range of motion.   Lymphadenopathy:     He has no cervical adenopathy.   Neurological: He is alert and oriented to person, place, and time.   Skin: Skin is warm.   Psychiatric: He has a normal mood and affect. His behavior is normal.   Nursing note and vitals reviewed.      Assessment/Plan   Simon was seen today for hypertension and diabetes.    Diagnoses and all orders for this visit:    Essential hypertension  -     Comprehensive Metabolic Panel  -     Continue lisinopril.     Type 2 diabetes mellitus without complication, without long-term current use of insulin (CMS/[facility])  -     Microalbumin / Creatinine Urine Ratio - Urine, Clean Catch  -     Hemoglobin A1c  -     Comprehensive Metabolic Panel  -     POCT Glucose  -     Continue steglatro 5mg and janumet xr 100-1000mg.           No Follow-up on file.    Dictated utilizing Dragon Voice Recognition Software

## 2018-09-26 LAB
ALBUMIN SERPL-MCNC: 3.9 G/DL (ref 3.5–5.2)
ALBUMIN/CREAT UR: 2.5 MG/G CREAT (ref 0–30)
ALBUMIN/GLOB SERPL: 1.3 G/DL
ALP SERPL-CCNC: 138 U/L (ref 39–117)
ALT SERPL-CCNC: 69 U/L (ref 1–41)
AST SERPL-CCNC: 67 U/L (ref 1–40)
BILIRUB SERPL-MCNC: 1.8 MG/DL (ref 0.1–1.2)
BUN SERPL-MCNC: 11 MG/DL (ref 6–20)
BUN/CREAT SERPL: 15.7 (ref 7–25)
CALCIUM SERPL-MCNC: 8.9 MG/DL (ref 8.6–10.5)
CHLORIDE SERPL-SCNC: 102 MMOL/L (ref 98–107)
CO2 SERPL-SCNC: 24.1 MMOL/L (ref 22–29)
CREAT SERPL-MCNC: 0.7 MG/DL (ref 0.76–1.27)
CREAT UR-MCNC: 145.6 MG/DL
GLOBULIN SER CALC-MCNC: 2.9 GM/DL
GLUCOSE SERPL-MCNC: 114 MG/DL (ref 65–99)
HBA1C MFR BLD: 5.73 % (ref 4.8–5.6)
MICROALBUMIN UR-MCNC: 3.6 UG/ML
POTASSIUM SERPL-SCNC: 4 MMOL/L (ref 3.5–5.2)
PROT SERPL-MCNC: 6.8 G/DL (ref 6–8.5)
SODIUM SERPL-SCNC: 140 MMOL/L (ref 136–145)

## 2018-09-28 ENCOUNTER — TELEPHONE (OUTPATIENT)
Dept: INTERNAL MEDICINE | Facility: CLINIC | Age: 48
End: 2018-09-28

## 2018-09-28 NOTE — TELEPHONE ENCOUNTER
----- Message from Shadi Barrett MD sent at 9/26/2018  3:54 PM EDT -----  Please inform the patient of the following abnormal results.  Continue current medication regimen.

## 2018-12-19 DIAGNOSIS — F41.9 ANXIETY: ICD-10-CM

## 2018-12-19 DIAGNOSIS — E11.9 TYPE 2 DIABETES MELLITUS WITHOUT COMPLICATION, WITHOUT LONG-TERM CURRENT USE OF INSULIN (HCC): ICD-10-CM

## 2018-12-19 RX ORDER — FLUOXETINE HYDROCHLORIDE 20 MG/1
20 CAPSULE ORAL DAILY
Qty: 30 CAPSULE | Refills: 5 | Status: SHIPPED | OUTPATIENT
Start: 2018-12-19 | End: 2019-06-12 | Stop reason: SDUPTHER

## 2019-04-18 DIAGNOSIS — I10 ESSENTIAL HYPERTENSION: ICD-10-CM

## 2019-04-18 RX ORDER — LISINOPRIL 40 MG/1
TABLET ORAL
Qty: 30 TABLET | Refills: 0 | OUTPATIENT
Start: 2019-04-18

## 2019-06-10 DIAGNOSIS — I10 ESSENTIAL HYPERTENSION: ICD-10-CM

## 2019-06-10 RX ORDER — LISINOPRIL 40 MG/1
TABLET ORAL
Qty: 30 TABLET | Refills: 0 | OUTPATIENT
Start: 2019-06-10

## 2019-06-12 DIAGNOSIS — F41.9 ANXIETY: ICD-10-CM

## 2019-06-12 DIAGNOSIS — I10 ESSENTIAL HYPERTENSION: ICD-10-CM

## 2019-06-12 RX ORDER — FLUOXETINE HYDROCHLORIDE 20 MG/1
20 CAPSULE ORAL DAILY
Qty: 90 CAPSULE | Refills: 0 | Status: SHIPPED | OUTPATIENT
Start: 2019-06-12

## 2019-06-12 RX ORDER — LISINOPRIL 40 MG/1
40 TABLET ORAL DAILY
Qty: 90 TABLET | Refills: 0 | Status: SHIPPED | OUTPATIENT
Start: 2019-06-12

## 2019-07-08 ENCOUNTER — HOSPITAL ENCOUNTER (EMERGENCY)
Facility: HOSPITAL | Age: 49
Discharge: HOME | End: 2019-07-08
Attending: EMERGENCY MEDICINE | Admitting: EMERGENCY MEDICINE

## 2019-07-08 VITALS
SYSTOLIC BLOOD PRESSURE: 142 MMHG | WEIGHT: 220 LBS | HEART RATE: 115 BPM | HEIGHT: 70 IN | OXYGEN SATURATION: 96 % | RESPIRATION RATE: 20 BRPM | DIASTOLIC BLOOD PRESSURE: 97 MMHG | TEMPERATURE: 97.5 F | BODY MASS INDEX: 31.5 KG/M2

## 2019-07-08 DIAGNOSIS — L02.212 ABSCESS OF BACK: Primary | ICD-10-CM

## 2019-07-08 PROCEDURE — 99282 EMERGENCY DEPT VISIT SF MDM: CPT

## 2019-07-08 RX ORDER — SULFAMETHOXAZOLE AND TRIMETHOPRIM 800; 160 MG/1; MG/1
1 TABLET ORAL 2 TIMES DAILY
Qty: 14 TABLET | Refills: 0 | Status: SHIPPED | OUTPATIENT
Start: 2019-07-08 | End: 2020-11-03

## 2019-07-08 RX ORDER — LIDOCAINE HYDROCHLORIDE AND EPINEPHRINE 10; 10 MG/ML; UG/ML
10 INJECTION, SOLUTION INFILTRATION; PERINEURAL ONCE
Status: DISCONTINUED | OUTPATIENT
Start: 2019-07-08 | End: 2019-07-08 | Stop reason: HOSPADM

## 2019-07-08 NOTE — ED PROVIDER NOTES
"EMERGENCY DEPARTMENT ENCOUNTER    Room Number:  01/01  PCP: Shadi Barrett MD  Historian: Pt  History Limited By: None      HPI  Chief Complaint: Abscess   Context: Simon Peters is a 48 y.o. male who presents to the ED c/o an abscess to his left upper back that developed 1 week ago and worsened 2 days ago. He c/o increasing pain at the abscess site. Pt states his wife tried to squeeze the abscess but it only seemed to worsen it. Pt has no other complaints at this time and denies fever. Pt reports a h/o abscesses. He states he is \"borderline\" Type II diabetic but denies being on medication.       Location: left upper back  Character: painful abscess  Duration: 1 week  Severity: moderate   Progression: worsening   Aggravating Factors: palpation of area        PAST MEDICAL HISTORY  Active Ambulatory Problems     Diagnosis Date Noted   • Hypertension 07/27/2018   • Anxiety 07/27/2018   • Elevated serum glucose 07/27/2018   • Diabetes mellitus (CMS/HCC) 08/02/2018     Resolved Ambulatory Problems     Diagnosis Date Noted   • No Resolved Ambulatory Problems     Past Medical History:   Diagnosis Date   • Anxiety    • Back pain    • Diabetes mellitus (CMS/HCC)    • Hypertension    • Rash          PAST SURGICAL HISTORY  No past surgical history on file.      FAMILY HISTORY  Family History   Problem Relation Age of Onset   • Diabetes Father    • Heart disease Father    • Hypertension Father    • Diabetes type II Father    • Stroke Father    • Diabetes Brother          SOCIAL HISTORY  Social History     Socioeconomic History   • Marital status:      Spouse name: Sara   • Number of children: 0   • Years of education: Not on file   • Highest education level: Not on file   Occupational History     Employer: [employer]   Tobacco Use   • Smoking status: Current Every Day Smoker     Packs/day: 0.50     Types: Cigarettes   • Smokeless tobacco: Never Used   • Tobacco comment: since age 16   Substance and " "Sexual Activity   • Alcohol use: Yes     Comment: \"probably every other day\"   • Drug use: No   • Sexual activity: No         ALLERGIES  Patient has no known allergies.        REVIEW OF SYSTEMS  Review of Systems   Constitutional: Negative for fever.   Respiratory: Negative for shortness of breath.    Cardiovascular: Negative for chest pain.   Skin: Positive for wound (abscess to left upper back).            PHYSICAL EXAM  ED Triage Vitals [07/08/19 0844]   Temp Heart Rate Resp BP SpO2   97.5 °F (36.4 °C) 115 20 -- 96 %      Temp src Heart Rate Source Patient Position BP Location FiO2 (%)   Tympanic Monitor -- -- --       Physical Exam   Constitutional: No distress.   HENT:   Head: Normocephalic and atraumatic.   Eyes: EOM are normal.   Neck: Normal range of motion.   Pulmonary/Chest: No respiratory distress.   Abdominal: There is no tenderness.   Musculoskeletal: He exhibits no edema.   Neurological: He is alert.   Skin: Skin is warm and dry.   Upper back-  2 lower areas of abscess that are not fluctuant or tender  1 superior moderate sized abscess that feels tense with surrounding cellulitis, it is tender to palpation   Nursing note and vitals reviewed.      PROCEDURES  Incision & Drainage  Date/Time: 7/8/2019 9:18 AM  Performed by: Eran Muniz MD  Authorized by: Eran Muniz MD     Consent:     Consent obtained:  Verbal    Consent given by:  Patient    Risks discussed:  Bleeding, incomplete drainage and pain  Location:     Type:  Abscess    Location:  Trunk    Trunk location:  Back  Pre-procedure details:     Skin preparation:  Betadine  Anesthesia (see MAR for exact dosages):     Anesthesia method:  Local infiltration    Local anesthetic:  Lidocaine 1% WITH epi  Procedure type:     Complexity:  Complex (2 abscesses present)  Procedure details:     Needle aspiration: no      Incision types:  Single straight    Incision depth:  Subcutaneous    Scalpel blade:  11    Wound management:  Probed and " deloculated    Drainage:  Bloody and purulent    Drainage amount:  Scant    Wound treatment:  Wound left open    Packing materials:  None  Post-procedure details:     Patient tolerance of procedure:  Tolerated well, no immediate complications  Comments:      Lower abscess had no drainage, superior abscess was probed and had small amount of purulent drainage             MEDICATIONS GIVEN IN ER  Medications   lidocaine-EPINEPHrine (XYLOCAINE W/EPI) 1 %-1:758469 injection 10 mL (not administered)             PROGRESS AND CONSULTS  ED Course as of Jul 08 0930 Mon Jul 08, 2019 0926 9:26 AM  Patient here for abscess to left upper back.  Has three areas, one had no fluctuance. Attempted I and D of the lower one and had no purulence.  Upper area had small amount of purulence but was not very fluctuant.  Not enough pus to culture. Will start on Bactrim.  Told he may need to return for further drainage.  Warm compresses.  Referral to surgeon.  [SL]      ED Course User Index  [SL] Eran Muniz MD   9:07 AM  Lidocaine and wound culture ordered for I&D.     9:20 AM  Rechecked pt who is resting in NAD. I&D performed. There was not enough drainage to obtain a wound culture. See procedure note. Plan to discharge pt with abx. Advised pt to use warm compresses and return to ER or urgent care center is abscess worsens. Pt understands and agrees with the plan, all questions answered.        MEDICAL DECISION MAKING      MDM  Number of Diagnoses or Management Options     Amount and/or Complexity of Data Reviewed  Clinical lab tests: ordered and reviewed  Decide to obtain previous medical records or to obtain history from someone other than the patient: yes  Review and summarize past medical records: yes               DIAGNOSIS  Final diagnoses:   Abscess of back           DISPOSITION  DISCHARGE    Patient discharged in stable condition.    Reviewed implications of results, diagnosis, meds, responsibility to follow up, warning  signs and symptoms of possible worsening, potential complications and reasons to return to ER.    Patient/Family voiced understanding of above instructions.    Discussed plan for discharge, as there is no emergent indication for admission. Patient referred to primary care provider for BP management due to today's BP. Pt/family is agreeable and understands need for follow up and repeat testing.  Pt is aware that discharge does not mean that nothing is wrong but it indicates no emergency is present that requires admission and they must continue care with follow-up as given below or physician of their choice.     FOLLOW-UP  Shadi Barrett MD  [address] [facility] [address]  [facility] [id number] [phone]    Schedule an appointment as soon as possible for a visit       Charles Pizarro MD  [address] [facility] [address]  [facility] [id number] [phone]    Schedule an appointment as soon as possible for a visit            Medication List      New Prescriptions    sulfamethoxazole-trimethoprim 800-160 MG per tablet  Commonly known as:  BACTRIM DS,SEPTRA DS  Take 1 tablet by mouth 2 (Two) Times a Day.              Latest Documented Vital Signs:  As of 9:30 AM  BP- 142/97 HR- 115 Temp- 97.5 °F (36.4 °C) (Tympanic) O2 sat- 96%        --  Documentation assistance provided by princess Aleman for Dr. Cristy MD.  Information recorded by the scribe was done at my direction and has been verified and validated by me.                   Gemma Aleman  07/08/19 1609       Eran Muniz MD  07/08/19 6429

## 2019-09-15 DIAGNOSIS — I10 ESSENTIAL HYPERTENSION: ICD-10-CM

## 2019-09-16 RX ORDER — LISINOPRIL 40 MG/1
TABLET ORAL
Qty: 90 TABLET | Refills: 0 | OUTPATIENT
Start: 2019-09-16

## 2020-11-03 ENCOUNTER — OFFICE VISIT (OUTPATIENT)
Dept: GASTROENTEROLOGY | Facility: CLINIC | Age: 50
End: 2020-11-03

## 2020-11-03 ENCOUNTER — TELEPHONE (OUTPATIENT)
Dept: GASTROENTEROLOGY | Facility: CLINIC | Age: 50
End: 2020-11-03

## 2020-11-03 VITALS — BODY MASS INDEX: 29.03 KG/M2 | HEIGHT: 70 IN | WEIGHT: 202.8 LBS

## 2020-11-03 DIAGNOSIS — R74.8 ELEVATED LIVER ENZYMES: Primary | ICD-10-CM

## 2020-11-03 DIAGNOSIS — Z12.11 ENCOUNTER FOR SCREENING FOR MALIGNANT NEOPLASM OF COLON: ICD-10-CM

## 2020-11-03 PROCEDURE — 99204 OFFICE O/P NEW MOD 45 MIN: CPT | Performed by: INTERNAL MEDICINE

## 2020-11-03 RX ORDER — SODIUM CHLORIDE, SODIUM LACTATE, POTASSIUM CHLORIDE, CALCIUM CHLORIDE 600; 310; 30; 20 MG/100ML; MG/100ML; MG/100ML; MG/100ML
30 INJECTION, SOLUTION INTRAVENOUS CONTINUOUS
Status: CANCELLED | OUTPATIENT
Start: 2020-12-10

## 2020-11-03 NOTE — PROGRESS NOTES
Chief Complaint   Patient presents with   • ABn LFT's     Subjective   HPI  Simon Peters is a 49 y.o. male who presents for new patient evaluation.    He has been referred for evaluation of elevated liver enzymes.  Unfortunately I do not have any records from his PCP available for review.    He recently underwent u/s at [facility] about 1 mos ago.  He thinks he was told he had some fluid around his liver.     He has hx of heavy EtOH use, up until mid October was drinking 1/2 pint of bourbon plus 6+ beers most days of the week.  He has been abstinent since that time.  No EtOH withdrawal symptoms.  He generally feels better since stopping EtOH.  At one point felt he was a little jaundiced but this has resolved.  No prior EGD or colonoscopy.           Past Medical History:   Diagnosis Date   • Abnormal LFTs    • Anxiety    • Back pain    • Diabetes mellitus (CMS/HCC)    • Hypertension    • Rash        Current Outpatient Medications:   •  FLUoxetine (PROzac) 20 MG capsule, Take 1 capsule by mouth Daily., Disp: 90 capsule, Rfl: 0  •  glucose blood test strip, 1 each by Other route Daily. Use as instructed, Disp: 100 each, Rfl: 12  •  Lancets (FREESTYLE) lancets, 1 each by Other route Daily., Disp: 100 each, Rfl: 12  •  lisinopril (PRINIVIL,ZESTRIL) 40 MG tablet, Take 1 tablet by mouth Daily., Disp: 90 tablet, Rfl: 0  •  Ertugliflozin L-PyroglutamicAc (STEGLATRO) 5 MG tablet, Take 1 tablet by mouth Every Morning., Disp: 30 tablet, Rfl: 6  •  SITagliptin-MetFORMIN HCl ER (JANUMET XR) 100-1000 MG tablet, Take 1 tablet by mouth Daily., Disp: 30 tablet, Rfl: 6  No Known Allergies     Social History     Socioeconomic History   • Marital status:      Spouse name: Sara   • Number of children: 0   • Years of education: Not on file   • Highest education level: Not on file   Occupational History     Employer: [employer]   Tobacco Use   • Smoking status: Current Every Day Smoker     Packs/day: 0.25      Types: Cigarettes     Start date: 1985   • Smokeless tobacco: Never Used   Substance and Sexual Activity   • Alcohol use: Not Currently     Comment: daily          sober since 10/19/2020   • Drug use: No   • Sexual activity: Never     Family History   Problem Relation Age of Onset   • Diabetes Father    • Heart disease Father    • Hypertension Father    • Diabetes type II Father    • Stroke Father    • Diabetes Brother      Review of Systems   Constitutional: Positive for fatigue.   Skin: Positive for color change.   All other systems reviewed and are negative.    Objective   There were no vitals filed for this visit.  Physical Exam  Vitals signs reviewed.   Constitutional:       Appearance: He is well-developed.   HENT:      Head: Normocephalic and atraumatic.   Eyes:      General: No scleral icterus.     Conjunctiva/sclera: Conjunctivae normal.   Neck:      Musculoskeletal: Normal range of motion and neck supple.      Thyroid: No thyromegaly.   Cardiovascular:      Rate and Rhythm: Normal rate and regular rhythm.      Heart sounds: No murmur. No friction rub.   Pulmonary:      Effort: Pulmonary effort is normal. No respiratory distress.      Breath sounds: Normal breath sounds. No wheezing or rales.   Chest:      Chest wall: No tenderness.   Abdominal:      General: Bowel sounds are normal. There is no distension.      Palpations: Abdomen is soft. There is no mass.      Tenderness: There is no abdominal tenderness. There is no guarding or rebound.      Hernia: No hernia is present.   Lymphadenopathy:      Cervical: No cervical adenopathy.   Skin:     General: Skin is warm and dry.      Findings: No rash.   Neurological:      Mental Status: He is alert and oriented to person, place, and time.   Psychiatric:         Behavior: Behavior normal.         Thought Content: Thought content normal.         Judgment: Judgment normal.       Assessment/Plan   Assessment:     1. Elevated liver enzymes    2. Encounter for  screening for malignant neoplasm of colon      Plan:   I suspect this gentleman has had an element of alcoholic hepatitis can certainly cannot rule out some underlying chronic liver disease given his significant alcohol history.  I am going to update his labs today and check meld score.  I have requested the ultrasound report from [facility].  He will be scheduled for EGD to assess for possible esophageal varices or other signs of portal hypertension.  We will perform average rescreening colonoscopy at the same time.  I encouraged him to continue with complete abstinence of alcohol going forward.          Rah Latif M.D.  [facility]  [address] [place] [address]  Office: [phone]

## 2020-11-04 LAB
ALBUMIN SERPL-MCNC: 3 G/DL (ref 3.5–5.2)
ALBUMIN/GLOB SERPL: 0.9 G/DL
ALP SERPL-CCNC: 386 U/L (ref 39–117)
ALT SERPL-CCNC: 48 U/L (ref 1–41)
AST SERPL-CCNC: 61 U/L (ref 1–40)
BASOPHILS # BLD AUTO: ABNORMAL 10*3/UL
BASOPHILS # BLD MANUAL: 0.09 10*3/MM3 (ref 0–0.2)
BASOPHILS NFR BLD MANUAL: 2 % (ref 0–1.5)
BILIRUB SERPL-MCNC: 3.8 MG/DL (ref 0–1.2)
BUN SERPL-MCNC: 5 MG/DL (ref 6–20)
BUN/CREAT SERPL: 6.9 (ref 7–25)
CALCIUM SERPL-MCNC: 8.3 MG/DL (ref 8.6–10.5)
CHLORIDE SERPL-SCNC: 100 MMOL/L (ref 98–107)
CO2 SERPL-SCNC: 26.4 MMOL/L (ref 22–29)
CREAT SERPL-MCNC: 0.72 MG/DL (ref 0.76–1.27)
DIFFERENTIAL COMMENT: ABNORMAL
EOSINOPHIL # BLD AUTO: ABNORMAL 10*3/UL
EOSINOPHIL # BLD MANUAL: 0.04 10*3/MM3 (ref 0–0.4)
EOSINOPHIL NFR BLD AUTO: ABNORMAL %
EOSINOPHIL NFR BLD MANUAL: 1 % (ref 0.3–6.2)
ERYTHROCYTE [DISTWIDTH] IN BLOOD BY AUTOMATED COUNT: 14.2 % (ref 12.3–15.4)
GLOBULIN SER CALC-MCNC: 3.3 GM/DL
GLUCOSE SERPL-MCNC: 373 MG/DL (ref 65–99)
HCT VFR BLD AUTO: 40.8 % (ref 37.5–51)
HGB BLD-MCNC: 14.7 G/DL (ref 13–17.7)
INR PPP: 1.45 (ref 0.9–1.1)
LYMPHOCYTES # BLD AUTO: ABNORMAL 10*3/UL
LYMPHOCYTES # BLD MANUAL: 0.52 10*3/MM3 (ref 0.7–3.1)
LYMPHOCYTES NFR BLD AUTO: ABNORMAL %
LYMPHOCYTES NFR BLD MANUAL: 12.2 % (ref 19.6–45.3)
MCH RBC QN AUTO: 33.9 PG (ref 26.6–33)
MCHC RBC AUTO-ENTMCNC: 36 G/DL (ref 31.5–35.7)
MCV RBC AUTO: 94.2 FL (ref 79–97)
MONOCYTES # BLD MANUAL: 0.22 10*3/MM3 (ref 0.1–0.9)
MONOCYTES NFR BLD AUTO: ABNORMAL %
MONOCYTES NFR BLD MANUAL: 5.1 % (ref 5–12)
NEUTROPHILS # BLD MANUAL: 3.4 10*3/MM3 (ref 1.7–7)
NEUTROPHILS NFR BLD AUTO: ABNORMAL %
NEUTROPHILS NFR BLD MANUAL: 79.6 % (ref 42.7–76)
PLATELET # BLD AUTO: 47 10*3/MM3 (ref 140–450)
PLATELET BLD QL SMEAR: ABNORMAL
POTASSIUM SERPL-SCNC: 4 MMOL/L (ref 3.5–5.2)
PROT SERPL-MCNC: 6.3 G/DL (ref 6–8.5)
PROTHROMBIN TIME: 17.4 SECONDS (ref 11.7–14.2)
RBC # BLD AUTO: 4.33 10*6/MM3 (ref 4.14–5.8)
RBC MORPH BLD: ABNORMAL
SODIUM SERPL-SCNC: 134 MMOL/L (ref 136–145)
WBC # BLD AUTO: 4.27 10*3/MM3 (ref 3.4–10.8)

## 2020-11-11 ENCOUNTER — TELEPHONE (OUTPATIENT)
Dept: GASTROENTEROLOGY | Facility: CLINIC | Age: 50
End: 2020-11-11

## 2020-11-11 DIAGNOSIS — R74.8 ELEVATED LIVER ENZYMES: Primary | ICD-10-CM

## 2020-11-11 NOTE — TELEPHONE ENCOUNTER
Call to pt.  Advise per Dr Latif note.  Verb understanding.     Lab appt scheduled for 12/7 @ 1pm.  Order placed for cbc - message to DR Latif.

## 2020-11-11 NOTE — TELEPHONE ENCOUNTER
----- Message from Rah Latif MD sent at 11/11/2020  3:43 PM EST -----  Labs do suggest he probably has an element of cirrhosis.  His platelets are low, please have him come in for CBC 3 days prior to his endoscopy next month

## 2020-11-11 NOTE — PROGRESS NOTES
Labs do suggest he probably has an element of cirrhosis.  His platelets are low, please have him come in for CBC 3 days prior to his endoscopy next month

## 2020-11-19 ENCOUNTER — TRANSCRIBE ORDERS (OUTPATIENT)
Dept: SLEEP MEDICINE | Facility: HOSPITAL | Age: 50
End: 2020-11-19

## 2020-11-19 DIAGNOSIS — Z01.818 PRE-OP EXAMINATION: Primary | ICD-10-CM

## 2020-11-30 ENCOUNTER — RESULTS ENCOUNTER (OUTPATIENT)
Dept: GASTROENTEROLOGY | Facility: CLINIC | Age: 50
End: 2020-11-30

## 2020-11-30 DIAGNOSIS — R74.8 ELEVATED LIVER ENZYMES: ICD-10-CM

## 2020-12-08 ENCOUNTER — LAB (OUTPATIENT)
Dept: GASTROENTEROLOGY | Facility: CLINIC | Age: 50
End: 2020-12-08

## 2020-12-08 ENCOUNTER — LAB (OUTPATIENT)
Dept: LAB | Facility: HOSPITAL | Age: 50
End: 2020-12-08

## 2020-12-08 DIAGNOSIS — Z01.818 PRE-OP EXAMINATION: ICD-10-CM

## 2020-12-08 LAB
BASOPHILS # BLD AUTO: ABNORMAL 10*3/UL
BASOPHILS # BLD MANUAL: 0.03 10*3/MM3 (ref 0–0.2)
BASOPHILS NFR BLD MANUAL: 1 % (ref 0–1.5)
DIFFERENTIAL COMMENT: ABNORMAL
EOSINOPHIL # BLD AUTO: ABNORMAL 10*3/UL
EOSINOPHIL # BLD MANUAL: 0.03 10*3/MM3 (ref 0–0.4)
EOSINOPHIL NFR BLD AUTO: ABNORMAL %
EOSINOPHIL NFR BLD MANUAL: 1 % (ref 0.3–6.2)
ERYTHROCYTE [DISTWIDTH] IN BLOOD BY AUTOMATED COUNT: 14.4 % (ref 12.3–15.4)
HCT VFR BLD AUTO: 42.5 % (ref 37.5–51)
HGB BLD-MCNC: 15.1 G/DL (ref 13–17.7)
LYMPHOCYTES # BLD AUTO: ABNORMAL 10*3/UL
LYMPHOCYTES # BLD MANUAL: 0.59 10*3/MM3 (ref 0.7–3.1)
LYMPHOCYTES NFR BLD AUTO: ABNORMAL %
LYMPHOCYTES NFR BLD MANUAL: 21 % (ref 19.6–45.3)
MCH RBC QN AUTO: 34.5 PG (ref 26.6–33)
MCHC RBC AUTO-ENTMCNC: 35.5 G/DL (ref 31.5–35.7)
MCV RBC AUTO: 97 FL (ref 79–97)
MONOCYTES # BLD MANUAL: 0.56 10*3/MM3 (ref 0.1–0.9)
MONOCYTES NFR BLD AUTO: ABNORMAL %
MONOCYTES NFR BLD MANUAL: 20 % (ref 5–12)
NEUTROPHILS # BLD MANUAL: 1.61 10*3/MM3 (ref 1.7–7)
NEUTROPHILS NFR BLD AUTO: ABNORMAL %
NEUTROPHILS NFR BLD MANUAL: 57 % (ref 42.7–76)
PLATELET # BLD AUTO: 35 10*3/MM3 (ref 140–450)
PLATELET BLD QL SMEAR: ABNORMAL
RBC # BLD AUTO: 4.38 10*6/MM3 (ref 4.14–5.8)
RBC MORPH BLD: ABNORMAL
WBC # BLD AUTO: 2.82 10*3/MM3 (ref 3.4–10.8)

## 2020-12-08 PROCEDURE — C9803 HOPD COVID-19 SPEC COLLECT: HCPCS

## 2020-12-08 PROCEDURE — U0004 COV-19 TEST NON-CDC HGH THRU: HCPCS

## 2020-12-09 ENCOUNTER — TELEPHONE (OUTPATIENT)
Dept: GASTROENTEROLOGY | Facility: CLINIC | Age: 50
End: 2020-12-09

## 2020-12-09 LAB — SARS-COV-2 RNA RESP QL NAA+PROBE: DETECTED

## 2020-12-09 NOTE — PROGRESS NOTES
Very low platelets.  Will need to delay his scheduled EGD tomorrow and try to arrange for pre-procedure medication to increase his platelets.

## 2020-12-09 NOTE — TELEPHONE ENCOUNTER
spoke with pt rescheduled due to platlet values per Dr Latif.  rescheduled from 12-10 to 1-11 arrive at 0900 am edel colin mailed pt new paper work

## 2020-12-10 ENCOUNTER — TELEPHONE (OUTPATIENT)
Dept: GASTROENTEROLOGY | Facility: CLINIC | Age: 50
End: 2020-12-10

## 2020-12-10 NOTE — TELEPHONE ENCOUNTER
----- Message from Rah Latif MD sent at 12/9/2020 12:18 PM EST -----  Very low platelets.  Will need to delay his scheduled EGD tomorrow and try to arrange for pre-procedure medication to increase his platelets.

## 2021-01-06 ENCOUNTER — TELEPHONE (OUTPATIENT)
Dept: GASTROENTEROLOGY | Facility: CLINIC | Age: 51
End: 2021-01-06

## 2021-01-06 NOTE — TELEPHONE ENCOUNTER
returned pt call to cancel/ reschedule colonoscopy on 1-11 no answer lm on vm put in depot with dixie

## 2021-01-25 NOTE — TELEPHONE ENCOUNTER
colonoscopy reschedule  Received: 3 days ago  Message Contents   Shannan Valdez, Cuca Rep sent to [facility] Referral Coordinators             To previous message please call wife ( Sara ) [phone]      Returned pt's wife call no answer lm on

## 2021-01-27 ENCOUNTER — TELEPHONE (OUTPATIENT)
Dept: GASTROENTEROLOGY | Facility: CLINIC | Age: 51
End: 2021-01-27

## 2021-02-11 ENCOUNTER — TELEPHONE (OUTPATIENT)
Dept: GASTROENTEROLOGY | Facility: CLINIC | Age: 51
End: 2021-02-11

## 2021-02-11 NOTE — TELEPHONE ENCOUNTER
----- Message from Cuca Wilder sent at 2/11/2021 10:12 AM EST -----  Regarding: reschedule procedure  Contact: [phone]  Pt needs to reschedule his procedure

## 2021-02-22 ENCOUNTER — TELEPHONE (OUTPATIENT)
Dept: GASTROENTEROLOGY | Facility: CLINIC | Age: 51
End: 2021-02-22

## 2021-02-22 NOTE — TELEPHONE ENCOUNTER
----- Message from Rah Latif MD sent at 2/18/2021  7:50 AM EST -----  Regarding: Labs  Can we bring Mr Peters in for CBC, CMP and PT INR.  If his platelets are still low we will need to try to get Doptolet prior to EGD      ----- Message -----  From: Interface, Reflab Results In  Sent: 12/8/2020   8:08 PM EST  To: Rah Latif MD

## 2021-06-18 ENCOUNTER — TELEPHONE (OUTPATIENT)
Dept: GASTROENTEROLOGY | Facility: CLINIC | Age: 51
End: 2021-06-18

## 2021-06-18 NOTE — TELEPHONE ENCOUNTER
----- Message from Rah Latif MD sent at 11/4/2020 12:14 PM EST -----  Please request recent liver u/s report from [facility]. Thanks

## 2021-06-18 NOTE — TELEPHONE ENCOUNTER
Called [facility]. Spoke with Collette. Requested abdominal u/s results.   She states she will fax the results today.

## 2023-05-18 ENCOUNTER — TRANSCRIBE ORDERS (OUTPATIENT)
Dept: ADMINISTRATIVE | Facility: HOSPITAL | Age: 53
End: 2023-05-18
Payer: COMMERCIAL

## 2023-05-19 ENCOUNTER — TRANSCRIBE ORDERS (OUTPATIENT)
Dept: ADMINISTRATIVE | Facility: HOSPITAL | Age: 53
End: 2023-05-19
Payer: COMMERCIAL

## 2023-05-19 DIAGNOSIS — K74.60 CIRRHOSIS OF LIVER WITH ASCITES, UNSPECIFIED HEPATIC CIRRHOSIS TYPE: ICD-10-CM

## 2023-05-19 DIAGNOSIS — R18.8 CIRRHOSIS OF LIVER WITH ASCITES, UNSPECIFIED HEPATIC CIRRHOSIS TYPE: ICD-10-CM

## 2023-05-19 DIAGNOSIS — R18.8 PELVIC ASCITES: ICD-10-CM

## 2023-05-19 DIAGNOSIS — K74.60 HEPATIC CIRRHOSIS, UNSPECIFIED HEPATIC CIRRHOSIS TYPE, UNSPECIFIED WHETHER ASCITES PRESENT: ICD-10-CM

## 2023-05-19 DIAGNOSIS — K74.69 FLORID CIRRHOSIS: Primary | ICD-10-CM

## 2023-05-23 ENCOUNTER — HOSPITAL ENCOUNTER (OUTPATIENT)
Dept: INTERVENTIONAL RADIOLOGY/VASCULAR | Facility: HOSPITAL | Age: 53
Discharge: HOME | End: 2023-05-23
Payer: COMMERCIAL

## 2023-05-23 ENCOUNTER — LAB (OUTPATIENT)
Dept: LAB | Facility: HOSPITAL | Age: 53
End: 2023-05-23
Payer: COMMERCIAL

## 2023-05-23 DIAGNOSIS — K74.60 CIRRHOSIS OF LIVER WITH ASCITES, UNSPECIFIED HEPATIC CIRRHOSIS TYPE: ICD-10-CM

## 2023-05-23 DIAGNOSIS — R18.8 CIRRHOSIS OF LIVER WITH ASCITES, UNSPECIFIED HEPATIC CIRRHOSIS TYPE: ICD-10-CM

## 2023-05-23 DIAGNOSIS — K74.60 HEPATIC CIRRHOSIS, UNSPECIFIED HEPATIC CIRRHOSIS TYPE, UNSPECIFIED WHETHER ASCITES PRESENT: ICD-10-CM

## 2023-05-23 LAB
APPEARANCE FLD: ABNORMAL
APTT PPP: 35.7 SECONDS (ref 24.2–34.2)
COLOR FLD: YELLOW
INR PPP: 1.46 (ref 0.86–1.15)
LYMPHOCYTES NFR FLD MANUAL: 5 %
MACROPHAGE FLUID: 81 %
MONOCYTES NFR FLD: 14 %
NUC CELL # FLD: 347 /MM3
PLATELET # BLD AUTO: 69 10*3/MM3 (ref 140–450)
PROTHROMBIN TIME: 17.7 SECONDS (ref 11.8–14.9)
RBC # FLD AUTO: 2000 /MM3

## 2023-05-23 PROCEDURE — 85730 THROMBOPLASTIN TIME PARTIAL: CPT | Performed by: INTERNAL MEDICINE

## 2023-05-23 PROCEDURE — 85049 AUTOMATED PLATELET COUNT: CPT | Performed by: INTERNAL MEDICINE

## 2023-05-23 PROCEDURE — 85610 PROTHROMBIN TIME: CPT | Performed by: INTERNAL MEDICINE

## 2023-05-23 PROCEDURE — C1729 CATH, DRAINAGE: HCPCS

## 2023-05-23 PROCEDURE — 89051 BODY FLUID CELL COUNT: CPT | Performed by: INTERNAL MEDICINE

## 2023-05-23 PROCEDURE — 76942 ECHO GUIDE FOR BIOPSY: CPT

## 2023-05-23 RX ORDER — LIDOCAINE HYDROCHLORIDE 20 MG/ML
20 INJECTION, SOLUTION INFILTRATION; PERINEURAL ONCE
Status: COMPLETED | OUTPATIENT
Start: 2023-05-23 | End: 2023-05-23

## 2023-05-23 RX ADMIN — SODIUM BICARBONATE 1 MEQ: 84 INJECTION, SOLUTION INTRAVENOUS at 13:20

## 2023-05-23 RX ADMIN — LIDOCAINE HYDROCHLORIDE 9 ML: 20 INJECTION, SOLUTION INFILTRATION; PERINEURAL at 13:20

## 2023-05-30 ENCOUNTER — HOSPITAL ENCOUNTER (OUTPATIENT)
Dept: ULTRASOUND IMAGING | Facility: HOSPITAL | Age: 53
Discharge: HOME | End: 2023-05-30

## 2023-05-30 ENCOUNTER — HOSPITAL ENCOUNTER (OUTPATIENT)
Dept: INTERVENTIONAL RADIOLOGY/VASCULAR | Facility: HOSPITAL | Age: 53
Discharge: HOME | End: 2023-05-30

## 2023-05-30 VITALS — DIASTOLIC BLOOD PRESSURE: 79 MMHG | SYSTOLIC BLOOD PRESSURE: 153 MMHG | HEART RATE: 85 BPM | OXYGEN SATURATION: 100 %

## 2023-05-30 DIAGNOSIS — K74.60 HEPATIC CIRRHOSIS, UNSPECIFIED HEPATIC CIRRHOSIS TYPE, UNSPECIFIED WHETHER ASCITES PRESENT: ICD-10-CM

## 2023-05-30 DIAGNOSIS — R18.8 CIRRHOSIS OF LIVER WITH ASCITES, UNSPECIFIED HEPATIC CIRRHOSIS TYPE: ICD-10-CM

## 2023-05-30 DIAGNOSIS — K74.60 CIRRHOSIS OF LIVER WITH ASCITES, UNSPECIFIED HEPATIC CIRRHOSIS TYPE: ICD-10-CM

## 2023-05-30 PROCEDURE — 76705 ECHO EXAM OF ABDOMEN: CPT

## 2023-06-19 NOTE — TELEPHONE ENCOUNTER
----- Message from Paty Smith CMA sent at 1/26/2021  4:24 PM EST -----  Regarding: Calling to re/schedule  Contact: [phone]  Had tested positive in December and wants to schedule scope     
Returned pt call to reschedule no answer lm on vm   
without difficulty

## 2023-07-24 ENCOUNTER — TRANSCRIBE ORDERS (OUTPATIENT)
Dept: ADMINISTRATIVE | Facility: HOSPITAL | Age: 53
End: 2023-07-24
Payer: COMMERCIAL

## 2023-07-24 ENCOUNTER — HOSPITAL ENCOUNTER (OUTPATIENT)
Dept: INTERVENTIONAL RADIOLOGY/VASCULAR | Facility: HOSPITAL | Age: 53
Discharge: HOME | End: 2023-07-24
Payer: COMMERCIAL

## 2023-07-24 ENCOUNTER — LAB (OUTPATIENT)
Dept: LAB | Facility: HOSPITAL | Age: 53
End: 2023-07-24
Payer: COMMERCIAL

## 2023-07-24 VITALS
DIASTOLIC BLOOD PRESSURE: 91 MMHG | HEART RATE: 84 BPM | SYSTOLIC BLOOD PRESSURE: 152 MMHG | OXYGEN SATURATION: 98 % | RESPIRATION RATE: 20 BRPM

## 2023-07-24 LAB
APPEARANCE FLD: CLEAR
APTT PPP: 25.5 SECONDS (ref 24.2–34.2)
COLOR FLD: YELLOW
INR PPP: 1.4 (ref 0.86–1.15)
LYMPHOCYTES NFR FLD MANUAL: 23 %
MACROPHAGE FLUID: 23 %
MESOTHL CELL NFR FLD MANUAL: 23 %
MONOCYTES NFR FLD: 23 %
NEUTROPHILS NFR FLD MANUAL: 8 %
NUC CELL # FLD: 123 /MM3
PLATELET # BLD AUTO: 92 10*3/MM3 (ref 140–450)
PROTHROMBIN TIME: 17.2 SECONDS (ref 11.8–14.9)
RBC # FLD AUTO: <2000 /MM3

## 2023-07-24 PROCEDURE — 85610 PROTHROMBIN TIME: CPT | Performed by: INTERNAL MEDICINE

## 2023-07-24 PROCEDURE — 89051 BODY FLUID CELL COUNT: CPT | Performed by: INTERNAL MEDICINE

## 2023-07-24 PROCEDURE — 85730 THROMBOPLASTIN TIME PARTIAL: CPT | Performed by: INTERNAL MEDICINE

## 2023-07-24 PROCEDURE — C1729 CATH, DRAINAGE: HCPCS

## 2023-07-24 PROCEDURE — 76942 ECHO GUIDE FOR BIOPSY: CPT

## 2023-07-24 PROCEDURE — 85049 AUTOMATED PLATELET COUNT: CPT | Performed by: INTERNAL MEDICINE

## 2023-07-24 PROCEDURE — 25010000002 ALBUMIN HUMAN 25% PER 50 ML: Performed by: INTERNAL MEDICINE

## 2023-07-24 PROCEDURE — P9047 ALBUMIN (HUMAN), 25%, 50ML: HCPCS | Performed by: INTERNAL MEDICINE

## 2023-07-24 RX ORDER — ALBUMIN (HUMAN) 12.5 G/50ML
25 SOLUTION INTRAVENOUS ONCE
Status: COMPLETED | OUTPATIENT
Start: 2023-07-24 | End: 2023-07-24

## 2023-07-24 RX ORDER — LIDOCAINE HYDROCHLORIDE 20 MG/ML
20 INJECTION, SOLUTION INFILTRATION; PERINEURAL ONCE
Status: COMPLETED | OUTPATIENT
Start: 2023-07-24 | End: 2023-07-24

## 2023-07-24 RX ORDER — ALBUMIN (HUMAN) 12.5 G/50ML
12.5 SOLUTION INTRAVENOUS ONCE
Status: COMPLETED | OUTPATIENT
Start: 2023-07-24 | End: 2023-07-24

## 2023-07-24 RX ORDER — LIDOCAINE HYDROCHLORIDE 20 MG/ML
INJECTION, SOLUTION INFILTRATION; PERINEURAL
Status: COMPLETED
Start: 2023-07-24 | End: 2023-07-24

## 2023-07-24 RX ADMIN — ALBUMIN HUMAN 25 G: 0.25 SOLUTION INTRAVENOUS at 11:38

## 2023-07-24 RX ADMIN — SODIUM BICARBONATE 1 MEQ: 84 INJECTION, SOLUTION INTRAVENOUS at 10:15

## 2023-07-24 RX ADMIN — ALBUMIN HUMAN 12.5 G: 0.25 SOLUTION INTRAVENOUS at 12:10

## 2023-07-24 RX ADMIN — LIDOCAINE HYDROCHLORIDE 15 ML: 20 INJECTION, SOLUTION INFILTRATION; PERINEURAL at 10:15

## 2023-07-29 ENCOUNTER — HOSPITAL ENCOUNTER (EMERGENCY)
Facility: HOSPITAL | Age: 53
Discharge: OTHER INSTITUTION | End: 2023-07-29
Attending: EMERGENCY MEDICINE
Payer: COMMERCIAL

## 2023-07-29 ENCOUNTER — APPOINTMENT (OUTPATIENT)
Dept: GENERAL RADIOLOGY | Facility: HOSPITAL | Age: 53
End: 2023-07-29
Payer: COMMERCIAL

## 2023-07-29 VITALS
SYSTOLIC BLOOD PRESSURE: 164 MMHG | DIASTOLIC BLOOD PRESSURE: 93 MMHG | RESPIRATION RATE: 16 BRPM | HEART RATE: 92 BPM | HEIGHT: 69 IN | BODY MASS INDEX: 30.04 KG/M2 | OXYGEN SATURATION: 99 % | WEIGHT: 202.82 LBS | TEMPERATURE: 98.6 F

## 2023-07-29 DIAGNOSIS — R73.9 HYPERGLYCEMIA: Primary | ICD-10-CM

## 2023-07-29 DIAGNOSIS — K72.10 END STAGE LIVER DISEASE: ICD-10-CM

## 2023-07-29 LAB
ABO GROUP BLD: NORMAL
ABO GROUP BLD: NORMAL
ACETONE BLD QL: ABNORMAL
ALBUMIN SERPL-MCNC: 2.4 G/DL (ref 3.5–5.2)
ALBUMIN/GLOB SERPL: 0.8 G/DL
ALP SERPL-CCNC: 311 U/L (ref 39–117)
ALT SERPL W P-5'-P-CCNC: 21 U/L (ref 1–41)
AMMONIA BLD-SCNC: 64 UMOL/L (ref 16–60)
ANION GAP SERPL CALCULATED.3IONS-SCNC: 11.1 MMOL/L (ref 5–15)
AST SERPL-CCNC: 59 U/L (ref 1–40)
BACTERIA UR QL AUTO: ABNORMAL /HPF
BASE EXCESS BLDV CALC-SCNC: -3.7 MMOL/L (ref -2–2)
BASOPHILS # BLD AUTO: 0.06 10*3/MM3 (ref 0–0.2)
BASOPHILS NFR BLD AUTO: 0.9 % (ref 0–1.5)
BDY SITE: ABNORMAL
BILIRUB SERPL-MCNC: 3 MG/DL (ref 0–1.2)
BILIRUB UR QL STRIP: NEGATIVE
BLD GP AB SCN SERPL QL: NEGATIVE
BUN SERPL-MCNC: 8 MG/DL (ref 6–20)
BUN/CREAT SERPL: 4.6 (ref 7–25)
CA-I BLDA-SCNC: 1.06 MMOL/L (ref 1.13–1.32)
CALCIUM SPEC-SCNC: 7.8 MG/DL (ref 8.6–10.5)
CHLORIDE BLDV-SCNC: 94 MMOL/L (ref 98–106)
CHLORIDE SERPL-SCNC: 93 MMOL/L (ref 98–107)
CLARITY UR: CLEAR
CO2 SERPL-SCNC: 20.9 MMOL/L (ref 22–29)
COHGB MFR BLD: 3.9 % (ref 0–1.5)
COLOR UR: YELLOW
CREAT SERPL-MCNC: 1.73 MG/DL (ref 0.76–1.27)
DEPRECATED RDW RBC AUTO: 55.2 FL (ref 37–54)
EGFRCR SERPLBLD CKD-EPI 2021: 46.9 ML/MIN/1.73
EOSINOPHIL # BLD AUTO: 0.17 10*3/MM3 (ref 0–0.4)
EOSINOPHIL NFR BLD AUTO: 2.6 % (ref 0.3–6.2)
ERYTHROCYTE [DISTWIDTH] IN BLOOD BY AUTOMATED COUNT: 16.5 % (ref 12.3–15.4)
FHHB: 29.9 % (ref 0–5)
GLOBULIN UR ELPH-MCNC: 3 GM/DL
GLUCOSE BLDA-MCNC: ABNORMAL MG/DL
GLUCOSE BLDC GLUCOMTR-MCNC: 449 MG/DL (ref 70–99)
GLUCOSE BLDC GLUCOMTR-MCNC: 479 MG/DL (ref 70–99)
GLUCOSE BLDC GLUCOMTR-MCNC: >600 MG/DL (ref 70–99)
GLUCOSE SERPL-MCNC: 932 MG/DL (ref 65–99)
GLUCOSE UR STRIP-MCNC: ABNORMAL MG/DL
HCO3 BLDV-SCNC: 22.2 MMOL/L (ref 22–26)
HCT VFR BLD AUTO: 29.9 % (ref 37.5–51)
HGB BLD-MCNC: 10.4 G/DL (ref 13–17.7)
HGB BLDA-MCNC: 11.2 G/DL (ref 13.8–16.4)
HGB UR QL STRIP.AUTO: ABNORMAL
HYALINE CASTS UR QL AUTO: ABNORMAL /LPF
IMM GRANULOCYTES # BLD AUTO: 0.04 10*3/MM3 (ref 0–0.05)
IMM GRANULOCYTES NFR BLD AUTO: 0.6 % (ref 0–0.5)
INHALED O2 CONCENTRATION: 21 %
INR PPP: 1.91 (ref 0.86–1.15)
KETONES UR QL STRIP: NEGATIVE
LACTATE BLDA-SCNC: 4.01 MMOL/L (ref 0.5–2)
LEUKOCYTE ESTERASE UR QL STRIP.AUTO: NEGATIVE
LYMPHOCYTES # BLD AUTO: 0.62 10*3/MM3 (ref 0.7–3.1)
LYMPHOCYTES NFR BLD AUTO: 9.6 % (ref 19.6–45.3)
MAGNESIUM SERPL-MCNC: 1.8 MG/DL (ref 1.6–2.6)
MCH RBC QN AUTO: 31.8 PG (ref 26.6–33)
MCHC RBC AUTO-ENTMCNC: 34.8 G/DL (ref 31.5–35.7)
MCV RBC AUTO: 91.4 FL (ref 79–97)
METHGB BLD QL: 0.1 % (ref 0–1.5)
MODALITY: ABNORMAL
MONOCYTES # BLD AUTO: 0.53 10*3/MM3 (ref 0.1–0.9)
MONOCYTES NFR BLD AUTO: 8.2 % (ref 5–12)
NEUTROPHILS NFR BLD AUTO: 5.02 10*3/MM3 (ref 1.7–7)
NEUTROPHILS NFR BLD AUTO: 78.1 % (ref 42.7–76)
NITRITE UR QL STRIP: NEGATIVE
NRBC BLD AUTO-RTO: 0 /100 WBC (ref 0–0.2)
OSMOLALITY SERPL: 320 MOSM/KG (ref 275–300)
OXYHGB MFR BLDV: 66.1 % (ref 94–99)
PCO2 BLDV: 43.3 MM HG (ref 41–51)
PH BLDV: 7.33 PH UNITS (ref 7.31–7.41)
PH UR STRIP.AUTO: 6 [PH] (ref 5–8)
PHOSPHATE SERPL-MCNC: 2.4 MG/DL (ref 2.5–4.5)
PLATELET # BLD AUTO: 72 10*3/MM3 (ref 140–450)
PMV BLD AUTO: 10.4 FL (ref 6–12)
PO2 BLDV: 32.5 MM HG (ref 35–42)
POTASSIUM BLDV-SCNC: 3.6 MMOL/L (ref 3.5–5)
POTASSIUM SERPL-SCNC: 4.1 MMOL/L (ref 3.5–5.2)
PROT SERPL-MCNC: 5.4 G/DL (ref 6–8.5)
PROT UR QL STRIP: ABNORMAL
PROTHROMBIN TIME: 21.7 SECONDS (ref 11.8–14.9)
RBC # BLD AUTO: 3.27 10*6/MM3 (ref 4.14–5.8)
RBC # UR STRIP: ABNORMAL /HPF
REF LAB TEST METHOD: ABNORMAL
RH BLD: NEGATIVE
RH BLD: NEGATIVE
SAO2 % BLDCOV: 68.9 % (ref 45–75)
SODIUM BLDV-SCNC: 126.9 MMOL/L (ref 136–146)
SODIUM SERPL-SCNC: 125 MMOL/L (ref 136–145)
SP GR UR STRIP: 1.02 (ref 1–1.03)
SQUAMOUS #/AREA URNS HPF: ABNORMAL /HPF
T&S EXPIRATION DATE: NORMAL
TROPONIN T SERPL HS-MCNC: 102 NG/L
UROBILINOGEN UR QL STRIP: ABNORMAL
WBC # UR STRIP: ABNORMAL /HPF
WBC NRBC COR # BLD: 6.44 10*3/MM3 (ref 3.4–10.8)
WHOLE BLOOD HOLD COAG: NORMAL

## 2023-07-29 PROCEDURE — 82140 ASSAY OF AMMONIA: CPT | Performed by: PHYSICIAN ASSISTANT

## 2023-07-29 PROCEDURE — 71045 X-RAY EXAM CHEST 1 VIEW: CPT

## 2023-07-29 PROCEDURE — 86900 BLOOD TYPING SEROLOGIC ABO: CPT

## 2023-07-29 PROCEDURE — 96361 HYDRATE IV INFUSION ADD-ON: CPT

## 2023-07-29 PROCEDURE — 81001 URINALYSIS AUTO W/SCOPE: CPT | Performed by: PHYSICIAN ASSISTANT

## 2023-07-29 PROCEDURE — 82805 BLOOD GASES W/O2 SATURATION: CPT | Performed by: PHYSICIAN ASSISTANT

## 2023-07-29 PROCEDURE — 86850 RBC ANTIBODY SCREEN: CPT | Performed by: PHYSICIAN ASSISTANT

## 2023-07-29 PROCEDURE — 93005 ELECTROCARDIOGRAM TRACING: CPT | Performed by: PHYSICIAN ASSISTANT

## 2023-07-29 PROCEDURE — 85025 COMPLETE CBC W/AUTO DIFF WBC: CPT | Performed by: PHYSICIAN ASSISTANT

## 2023-07-29 PROCEDURE — 86901 BLOOD TYPING SEROLOGIC RH(D): CPT

## 2023-07-29 PROCEDURE — 83930 ASSAY OF BLOOD OSMOLALITY: CPT | Performed by: PHYSICIAN ASSISTANT

## 2023-07-29 PROCEDURE — 80053 COMPREHEN METABOLIC PANEL: CPT | Performed by: PHYSICIAN ASSISTANT

## 2023-07-29 PROCEDURE — 96365 THER/PROPH/DIAG IV INF INIT: CPT

## 2023-07-29 PROCEDURE — 85610 PROTHROMBIN TIME: CPT | Performed by: PHYSICIAN ASSISTANT

## 2023-07-29 PROCEDURE — 82009 KETONE BODYS QUAL: CPT | Performed by: PHYSICIAN ASSISTANT

## 2023-07-29 PROCEDURE — 82820 HEMOGLOBIN-OXYGEN AFFINITY: CPT | Performed by: PHYSICIAN ASSISTANT

## 2023-07-29 PROCEDURE — 73130 X-RAY EXAM OF HAND: CPT

## 2023-07-29 PROCEDURE — 82948 REAGENT STRIP/BLOOD GLUCOSE: CPT

## 2023-07-29 PROCEDURE — 36415 COLL VENOUS BLD VENIPUNCTURE: CPT

## 2023-07-29 PROCEDURE — 86901 BLOOD TYPING SEROLOGIC RH(D): CPT | Performed by: PHYSICIAN ASSISTANT

## 2023-07-29 PROCEDURE — 83735 ASSAY OF MAGNESIUM: CPT | Performed by: PHYSICIAN ASSISTANT

## 2023-07-29 PROCEDURE — 99284 EMERGENCY DEPT VISIT MOD MDM: CPT

## 2023-07-29 PROCEDURE — 84100 ASSAY OF PHOSPHORUS: CPT | Performed by: PHYSICIAN ASSISTANT

## 2023-07-29 PROCEDURE — 86900 BLOOD TYPING SEROLOGIC ABO: CPT | Performed by: PHYSICIAN ASSISTANT

## 2023-07-29 PROCEDURE — 96366 THER/PROPH/DIAG IV INF ADDON: CPT

## 2023-07-29 PROCEDURE — 84484 ASSAY OF TROPONIN QUANT: CPT | Performed by: PHYSICIAN ASSISTANT

## 2023-07-29 RX ORDER — DEXTROSE MONOHYDRATE 25 G/50ML
10-50 INJECTION, SOLUTION INTRAVENOUS
Status: DISCONTINUED | OUTPATIENT
Start: 2023-07-29 | End: 2023-07-29 | Stop reason: HOSPADM

## 2023-07-29 RX ORDER — SODIUM CHLORIDE 9 MG/ML
250 INJECTION, SOLUTION INTRAVENOUS CONTINUOUS PRN
Status: DISCONTINUED | OUTPATIENT
Start: 2023-07-29 | End: 2023-07-29 | Stop reason: HOSPADM

## 2023-07-29 RX ORDER — DEXTROSE AND SODIUM CHLORIDE 5; .9 G/100ML; G/100ML
150 INJECTION, SOLUTION INTRAVENOUS CONTINUOUS PRN
Status: DISCONTINUED | OUTPATIENT
Start: 2023-07-29 | End: 2023-07-29 | Stop reason: HOSPADM

## 2023-07-29 RX ORDER — SODIUM CHLORIDE 0.9 % (FLUSH) 0.9 %
10 SYRINGE (ML) INJECTION AS NEEDED
Status: DISCONTINUED | OUTPATIENT
Start: 2023-07-29 | End: 2023-07-29 | Stop reason: HOSPADM

## 2023-07-29 RX ORDER — SODIUM CHLORIDE 450 MG/100ML
250 INJECTION, SOLUTION INTRAVENOUS CONTINUOUS PRN
Status: DISCONTINUED | OUTPATIENT
Start: 2023-07-29 | End: 2023-07-29 | Stop reason: HOSPADM

## 2023-07-29 RX ORDER — DEXTROSE AND SODIUM CHLORIDE 5; .45 G/100ML; G/100ML
150 INJECTION, SOLUTION INTRAVENOUS CONTINUOUS PRN
Status: DISCONTINUED | OUTPATIENT
Start: 2023-07-29 | End: 2023-07-29 | Stop reason: HOSPADM

## 2023-07-29 RX ORDER — DEXTROSE MONOHYDRATE, SODIUM CHLORIDE, AND POTASSIUM CHLORIDE 50; 2.98; 4.5 G/1000ML; G/1000ML; G/1000ML
150 INJECTION, SOLUTION INTRAVENOUS CONTINUOUS PRN
Status: DISCONTINUED | OUTPATIENT
Start: 2023-07-29 | End: 2023-07-29 | Stop reason: HOSPADM

## 2023-07-29 RX ORDER — SODIUM CHLORIDE AND POTASSIUM CHLORIDE 150; 450 MG/100ML; MG/100ML
250 INJECTION, SOLUTION INTRAVENOUS CONTINUOUS PRN
Status: DISCONTINUED | OUTPATIENT
Start: 2023-07-29 | End: 2023-07-29 | Stop reason: HOSPADM

## 2023-07-29 RX ORDER — SODIUM CHLORIDE AND POTASSIUM CHLORIDE 150; 900 MG/100ML; MG/100ML
250 INJECTION, SOLUTION INTRAVENOUS CONTINUOUS PRN
Status: DISCONTINUED | OUTPATIENT
Start: 2023-07-29 | End: 2023-07-29 | Stop reason: HOSPADM

## 2023-07-29 RX ORDER — SODIUM CHLORIDE 9 MG/ML
40 INJECTION, SOLUTION INTRAVENOUS AS NEEDED
Status: DISCONTINUED | OUTPATIENT
Start: 2023-07-29 | End: 2023-07-29 | Stop reason: HOSPADM

## 2023-07-29 RX ORDER — NICOTINE POLACRILEX 4 MG
15 LOZENGE BUCCAL
Status: DISCONTINUED | OUTPATIENT
Start: 2023-07-29 | End: 2023-07-29 | Stop reason: HOSPADM

## 2023-07-29 RX ORDER — DEXTROSE MONOHYDRATE, SODIUM CHLORIDE, AND POTASSIUM CHLORIDE 50; 1.49; 9 G/1000ML; G/1000ML; G/1000ML
150 INJECTION, SOLUTION INTRAVENOUS CONTINUOUS PRN
Status: DISCONTINUED | OUTPATIENT
Start: 2023-07-29 | End: 2023-07-29 | Stop reason: HOSPADM

## 2023-07-29 RX ORDER — SODIUM CHLORIDE 0.9 % (FLUSH) 0.9 %
10 SYRINGE (ML) INJECTION EVERY 12 HOURS SCHEDULED
Status: DISCONTINUED | OUTPATIENT
Start: 2023-07-29 | End: 2023-07-29 | Stop reason: HOSPADM

## 2023-07-29 RX ORDER — DEXTROSE MONOHYDRATE, SODIUM CHLORIDE, AND POTASSIUM CHLORIDE 50; 1.49; 4.5 G/1000ML; G/1000ML; G/1000ML
150 INJECTION, SOLUTION INTRAVENOUS CONTINUOUS PRN
Status: DISCONTINUED | OUTPATIENT
Start: 2023-07-29 | End: 2023-07-29 | Stop reason: HOSPADM

## 2023-07-29 RX ORDER — DEXTROSE MONOHYDRATE, SODIUM CHLORIDE, AND POTASSIUM CHLORIDE 50; 2.98; 9 G/1000ML; G/1000ML; G/1000ML
150 INJECTION, SOLUTION INTRAVENOUS CONTINUOUS PRN
Status: DISCONTINUED | OUTPATIENT
Start: 2023-07-29 | End: 2023-07-29 | Stop reason: HOSPADM

## 2023-07-29 RX ORDER — SODIUM CHLORIDE AND POTASSIUM CHLORIDE 300; 900 MG/100ML; MG/100ML
250 INJECTION, SOLUTION INTRAVENOUS CONTINUOUS PRN
Status: DISCONTINUED | OUTPATIENT
Start: 2023-07-29 | End: 2023-07-29 | Stop reason: HOSPADM

## 2023-07-29 RX ADMIN — SODIUM CHLORIDE 1000 ML/HR: 9 INJECTION, SOLUTION INTRAVENOUS at 13:38

## 2023-07-29 RX ADMIN — INSULIN HUMAN 10.8 UNITS/HR: 1 INJECTION, SOLUTION INTRAVENOUS at 16:08

## 2023-07-29 NOTE — ED PROVIDER NOTES
"Time: 12:10 PM EDT  Date of encounter:  7/29/2023  Independent Historian/Clinical History and Information was obtained by:   Patient    History is limited by: N/A    Chief Complaint: abnormal lab      History of Present Illness:  Patient is a 52 y.o. year old male who presents to the emergency department for evaluation of abnormal lab. States he had labs drawn here recently and sent to . States got a call from  saying he needs a transfusion. States he had a paracentesis last week. Has hx of TIPS procedure at .     The patient reports that he has been feeling very weak and fatigued.  States that he has not been checking his blood sugar, not been taking his insulin, not been taking any of his medications that he supposed be on for the past month.  States he is unsure why he has not been taking any of his medications but states that he was not doing his insulin because \"his diabetes doctor told him that he is supposed to take 5 daggone shots\".  He denies any fever, ingestion, sore throat, cough, shortness breath, abdominal pain, dysuria.  States he has been urinating more than normal.    HPI    Patient Care Team  Primary Care Provider: Provider, No Known    Past Medical History:     No Known Allergies  Past Medical History:   Diagnosis Date    Abnormal LFTs     Anxiety     Back pain     Diabetes mellitus     Hypertension     Rash      History reviewed. No pertinent surgical history.  Family History   Problem Relation Age of Onset    Diabetes Father     Heart disease Father     Hypertension Father     Diabetes type II Father     Stroke Father     Diabetes Brother        Home Medications:  Prior to Admission medications    Medication Sig Start Date End Date Taking? Authorizing Provider   Ertugliflozin L-PyroglutamicAc (STEGLATRO) 5 MG tablet Take 1 tablet by mouth Every Morning. 8/2/18   Shadi Barrett MD   FLUoxetine (PROzac) 20 MG capsule Take 1 capsule by mouth Daily. 6/12/19   Shadi Barrett MD   glucose " "blood test strip 1 each by Other route Daily. Use as instructed 6/30/17   Woodrow Dash Jr., MD   Lancets (FREESTYLE) lancets 1 each by Other route Daily. 6/30/17   Woodrow Dash Jr., MD   lisinopril (PRINIVIL,ZESTRIL) 40 MG tablet Take 1 tablet by mouth Daily. 6/12/19   Shadi Barrett MD   SITagliptin-MetFORMIN HCl ER (JANUMET XR) 100-1000 MG tablet Take 1 tablet by mouth Daily. 8/2/18   Shadi Barrett MD        Social History:   Social History     Tobacco Use    Smoking status: Every Day     Packs/day: 0.25     Types: Cigarettes     Start date: 1985    Smokeless tobacco: Never   Substance Use Topics    Alcohol use: Not Currently     Comment: daily          sober since 10/19/2020    Drug use: No         Review of Systems:  Review of Systems   Constitutional:  Positive for fatigue. Negative for chills and fever.   HENT:  Negative for congestion and sore throat.    Respiratory:  Negative for cough and shortness of breath.    Cardiovascular:  Positive for leg swelling. Negative for chest pain and palpitations.   Gastrointestinal:  Positive for abdominal distention. Negative for abdominal pain, diarrhea, nausea and vomiting.   Genitourinary:  Positive for frequency. Negative for dysuria.   Musculoskeletal:  Positive for arthralgias (right hand).   Neurological:  Positive for weakness. Negative for headaches.   All other systems reviewed and are negative.     Physical Exam:  /93   Pulse 112   Temp 97.9 °F (36.6 °C) (Oral)   Resp 16   Ht 176.5 cm (69.49\")   Wt 92 kg (202 lb 13.2 oz)   SpO2 99%   BMI 29.53 kg/m²     Physical Exam  Vitals and nursing note reviewed.   Constitutional:       Appearance: He is ill-appearing. He is not toxic-appearing.   HENT:      Head: Normocephalic.      Nose: Nose normal.      Mouth/Throat:      Mouth: Mucous membranes are moist.   Eyes:      Conjunctiva/sclera: Conjunctivae normal.   Cardiovascular:      Rate and Rhythm: Normal rate and regular rhythm. "   Pulmonary:      Effort: Pulmonary effort is normal.   Abdominal:      General: There is distension.      Tenderness: There is no abdominal tenderness. There is no guarding.   Musculoskeletal:         General: Normal range of motion.      Cervical back: Normal range of motion.      Right lower leg: Edema present.      Left lower leg: Edema present.   Skin:     General: Skin is warm and dry.      Capillary Refill: Capillary refill takes less than 2 seconds.      Coloration: Skin is pale.   Neurological:      Mental Status: He is alert.                Procedures:  Procedures      Medical Decision Making:      Comorbidities that affect care:    Hypertension, diabetes, cirrhosis    External Notes reviewed:    Previous Admission Note: Chart reviewed and the patient had a ultrasound paracentesis performed yesterday for hepatic cirrhosis with gastroenterology.  Last hospital admission was 5-23 for decompensation of cirrhosis of liver and end-stage liver disease.  The patient got a telephone call from [facility] to review labs stating that on 7/24/2023 his hemoglobin was 6.7.  The patient advised him that he would try to be seen this weekend and they recommended him going to the emergency department.      The following orders were placed and all results were independently analyzed by me:  Orders Placed This Encounter   Procedures    XR Hand 3+ View Left    XR Chest 1 View    Comprehensive Metabolic Panel    Protime-INR    CBC Auto Differential    Ammonia    VBG with Co-Ox and Electrolytes    Magnesium    Phosphorus    Acetone    Osmolality, Serum    High Sensitivity Troponin T    Urinalysis With Microscopic If Indicated (No Culture) - Urine, Clean Catch    Lactic Acid, Plasma    High Sensitivity Troponin T 2Hr    Glucose, Random    NPO Diet NPO Type: Strict NPO    Vital Signs    Strict Intake & Output    Daily Weights    Continuous Pulse Oximetry    Saline Lock & Maintain IV Access    Corrected Serum Sodium = Measured  Sodium + [1.6 x ((Glucose - 100)/100)]    Do NOT Discontinue Insulin Infusion Until 2 Hours After First Dose of Basal SQ Insulin    Prior to Initiating Glucommander™, Ensure All Prior Insulin Orders Are Discontinued    Do Not Start Insulin Infusion if Potassium < 3.3    Use a Dedicated Line for Insulin Infusion (If Possible).  May Use a Carrier Fluid of NS at KVO Rate if Insulin Rate is Insufficient to Maintain IV Patency.  Prime IV Line With Insulin Infusion    Glucommander Must Be Discontinued if Insulin Infusion is Discontinued.  If Insulin Infusion is Restarted, Previous Glucommander Settings Must Be Discontinued and Re-Entered From New Order    Once [facility] Meets Resolution Criteria - Call Provider for Transition Orders From IV to SQ Insulin    Utilize the Start Meal Feature / Meal Bolus Feature in Glucommander if Patient Starts a Diet or Bolus Tube Feedings    Notify Provider - Insulin Infusion    RN to Release PRN POC Glucose Orders Per Glucommander    RN to Order STAT Glucose For Any POC Glucose <10 or >600    If Insulin Infusion is Paused - Follow Glucommander Instructions    Inpatient Diabetes Educator Consult    IP General Consult (Use specialty-specific consult if known)    Oxygen Therapy- Nasal Cannula; Titrate 1-6 LPM Per SpO2; 90 - 95%    POC Glucose PRN    POC Glucose Once    ECG 12 Lead Electrolyte Imbalance    Type & Screen    ABO RH Specimen Verification    Insert Peripheral IV x2    CBC & Differential    Extra Tubes    Light Blue Top       Medications Given in the Emergency Department:  Medications   sodium chloride 0.9 % flush 10 mL (has no administration in time range)   sodium chloride 0.9 % flush 10 mL (has no administration in time range)   sodium chloride 0.9 % infusion 40 mL (has no administration in time range)   dextrose (GLUTOSE) oral gel 15 g (has no administration in time range)   dextrose (D50W) (25 g/50 mL) IV injection 10-50 mL (has no administration in time range)   glucagon  (GLUCAGEN) injection 1 mg (has no administration in time range)   sodium chloride 0.9 % bolus (0 mL/hr Intravenous Stopped 7/29/23 1532)   sodium chloride 0.9 % infusion (has no administration in time range)   sodium chloride 0.9 % with KCl 20 mEq/L infusion (has no administration in time range)   sodium chloride 0.9 % with KCl 40 mEq/L infusion (has no administration in time range)   dextrose 5 % and sodium chloride 0.9 % infusion (has no administration in time range)   dextrose 5 % and sodium chloride 0.9 % with KCl 20 mEq/L infusion (has no administration in time range)   dextrose 5 % and sodium chloride 0.9 % with KCl 40 mEq/L infusion (has no administration in time range)   sodium chloride 0.45 % infusion (has no administration in time range)   sodium chloride 0.45 % with KCl 20 mEq/L infusion (has no administration in time range)   sodium chloride 0.45 % 1,000 mL with potassium chloride 40 mEq infusion (has no administration in time range)   dextrose 5 % and sodium chloride 0.45 % infusion (has no administration in time range)   dextrose 5 % and sodium chloride 0.45 % with KCl 20 mEq/L infusion (has no administration in time range)   dextrose 5 % and sodium chloride 0.45 % with KCl 40 mEq/L infusion (has no administration in time range)   insulin regular 1 unit/mL in 0.9% sodium chloride (Glucommander) (10.8 Units/hr Intravenous New Bag 7/29/23 1608)   Potassium Replacement - Follow Nurse / BPA Driven Protocol (has no administration in time range)   Magnesium Standard Dose Replacement - Follow Nurse / BPA Driven Protocol (has no administration in time range)   Phosphorus Replacement - Follow Nurse / BPA Driven Protocol (has no administration in time range)   Calcium Replacement - Follow Nurse / BPA Driven Protocol (has no administration in time range)        ED Course:    ED Course as of 07/29/23 1645   Sat Jul 29, 2023   1358 Hemoglobin is 10.4 today.  Transfusion not indicated at this time. [KM]      ED  Course User Index  [KM] Giselle Wolff PA-C       Labs:    Lab Results (last 24 hours)       Procedure Component Value Units Date/Time    CBC & Differential [[id number]]  (Abnormal) Collected: 07/29/23 1233    Specimen: Blood Updated: 07/29/23 1725    Narrative:      The following orders were created for panel order CBC & Differential.  Procedure                               Abnormality         Status                     ---------                               -----------         ------                     CBC Auto Differential[[id number]]        Abnormal            Final result               Scan Slide[[id number]]                                                                    Please view results for these tests on the individual orders.    Comprehensive Metabolic Panel [[id number]]  (Abnormal) Collected: 07/29/23 1233    Specimen: Blood Updated: 07/29/23 1323     Glucose 932 mg/dL      BUN 8 mg/dL      Creatinine 1.73 mg/dL      Sodium 125 mmol/L      Potassium 4.1 mmol/L      Comment: Specimen hemolyzed.  Results may be affected.        Chloride 93 mmol/L      CO2 20.9 mmol/L      Calcium 7.8 mg/dL      Total Protein 5.4 g/dL      Albumin 2.4 g/dL      ALT (SGPT) 21 U/L      Comment: Specimen hemolyzed.  Results may be affected.        AST (SGOT) 59 U/L      Comment: Specimen hemolyzed.  Results may be affected.        Alkaline Phosphatase 311 U/L      Total Bilirubin 3.0 mg/dL      Globulin 3.0 gm/dL      A/G Ratio 0.8 g/dL      BUN/Creatinine Ratio 4.6     Anion Gap 11.1 mmol/L      eGFR 46.9 mL/min/1.73     Narrative:      GFR Normal >60  Chronic Kidney Disease <60  Kidney Failure <15      Protime-INR [[id number]]  (Abnormal) Collected: 07/29/23 1233    Specimen: Blood Updated: 07/29/23 1255     Protime 21.7 Seconds      INR 1.91    Narrative:      Suggested Therapeutic Ranges For Oral Anticoagulant Therapy:  Level of Therapy                      INR Target Range  Standard Dose                             2.0-3.0  High Dose                                2.5-3.5  Patients not receiving anticoagulant  Therapy Normal Range                     0.86-1.15    Magnesium [[id number]]  (Normal) Collected: 07/29/23 1233    Specimen: Blood Updated: 07/29/23 1341     Magnesium 1.8 mg/dL     Phosphorus [[id number]]  (Abnormal) Collected: 07/29/23 1233    Specimen: Blood Updated: 07/29/23 1341     Phosphorus 2.4 mg/dL     CBC Auto Differential [[id number]]  (Abnormal) Collected: 07/29/23 1321    Specimen: Blood Updated: 07/29/23 1335     WBC 6.44 10*3/mm3      RBC 3.27 10*6/mm3      Hemoglobin 10.4 g/dL      Hematocrit 29.9 %      MCV 91.4 fL      MCH 31.8 pg      MCHC 34.8 g/dL      RDW 16.5 %      RDW-SD 55.2 fl      MPV 10.4 fL      Platelets 72 10*3/mm3      Neutrophil % 78.1 %      Lymphocyte % 9.6 %      Monocyte % 8.2 %      Eosinophil % 2.6 %      Basophil % 0.9 %      Immature Grans % 0.6 %      Neutrophils, Absolute 5.02 10*3/mm3      Lymphocytes, Absolute 0.62 10*3/mm3      Monocytes, Absolute 0.53 10*3/mm3      Eosinophils, Absolute 0.17 10*3/mm3      Basophils, Absolute 0.06 10*3/mm3      Immature Grans, Absolute 0.04 10*3/mm3      nRBC 0.0 /100 WBC     Ammonia [[id number]]  (Abnormal) Collected: 07/29/23 1341    Specimen: Blood from Arm, Right Updated: 07/29/23 1412     Ammonia 64 umol/L     VBG with Co-Ox and Electrolytes [[id number]]  (Abnormal) Collected: 07/29/23 1341    Specimen: Venous Blood from Arm, Right Updated: 07/29/23 1355     pH, Venous 7.327 pH Units      pCO2, Venous 43.3 mm Hg      pO2, Venous 32.5 mm Hg      HCO3, Venous 22.2 mmol/L      Base Excess, Venous -3.7 mmol/L      O2 Saturation, Venous 68.9 %      Hemoglobin, Blood Gas 11.2 g/dL      Carboxyhemoglobin 3.9 %      Methemoglobin 0.10 %      Oxyhemoglobin 66.1 %      FHHB 29.9 %      Site Venous     Modality Room Air     FIO2 21 %      Sodium, Venous 126.9 mmol/L      Potassium, Venous 3.6 mmol/L      Ionized Calcium, Arterial 1.06 mmol/L       Chloride, Venous  94 mmol/L      Glucose, Arterial --     Comment: Out of reportable range        Lactate, Arterial 4.01 mmol/L     Acetone [[id number]]  (Abnormal) Collected: 07/29/23 1341    Specimen: Blood from Arm, Right Updated: 07/29/23 1419     Acetone Small    Osmolality, Serum [[id number]] Collected: 07/29/23 1341    Specimen: Blood from Arm, Right Updated: 07/29/23 1348    High Sensitivity Troponin T [[id number]]  (Abnormal) Collected: 07/29/23 1341    Specimen: Blood from Arm, Right Updated: 07/29/23 1528     HS Troponin T 102 ng/L     Narrative:      High Sensitive Troponin T Reference Range:  <10.0 ng/L- Negative Female for AMI  <15.0 ng/L- Negative Male for AMI  >=10 - Abnormal Female indicating possible myocardial injury.  >=15 - Abnormal Male indicating possible myocardial injury.   Clinicians would have to utilize clinical acumen, EKG, Troponin, and serial changes to determine if it is an Acute Myocardial Infarction or myocardial injury due to an underlying chronic condition.         POC Glucose Once [[id number]]  (Abnormal) Collected: 07/29/23 1604    Specimen: Blood Updated: 07/29/23 1607     Glucose >600 mg/dL      Comment: Serial Number: [id number]:  [id number]                Imaging:    XR Hand 3+ View Left    Result Date: 7/29/2023  PROCEDURE: XR HAND 3+ VW LEFT  COMPARISON: None  INDICATIONS: fall PAIN IN RIGHT PINKY FINGER  FINDINGS:  BONES:  SOFT TISSUES: Negative.  No visible soft tissue swelling.  EFFUSION: None visible.  OTHER: Negative.        Normal examination for a patient of this age.       LACEY ADDISON MD       Electronically Signed and Approved By: LACEY ADDISON MD on 7/29/2023 at 12:29             XR Chest 1 View    Result Date: 7/29/2023  PROCEDURE: XR CHEST 1 VW  COMPARISON: None  INDICATIONS: WEAKNESS  FINDINGS:   The lungs are well-expanded. The heart and pulmonary vasculature are within normal limits. No pleural effusions are identified. There are no active  appearing infiltrates.  IMPRESSION: No active disease.  LACEY ADDISON MD       Electronically Signed and Approved By: LACEY ADDISON MD on 7/29/2023 at 13:58                Differential Diagnosis and Discussion:    Cirrhosis, ESLD, ascites, SBP, anemia  Metabolic: Differential diagnosis includes but is not limited to hypertension, hyperglycemia, hyperkalemia, hypocalcemia, metabolic acidosis, hypokalemia, hypoglycemia, malnutrition, hypothyroidism, hyperthyroidism, and adrenal insufficiency.     All labs were reviewed and interpreted by me.  EKG was interpreted by supervising attending.    MDM     Amount and/or Complexity of Data Reviewed  Clinical lab tests: reviewed  Tests in the radiology section of CPT®: reviewed  Tests in the medicine section of CPT®: reviewed  Decide to obtain previous medical records or to obtain history from someone other than the patient: yes         Critical Care Note: Total Critical Care time of 47 minutes. Total critical care time documented does not include time spent on separately billed procedures for services of nurses or physician assistants. I personally saw and examined the patient. I have reviewed all diagnostic interpretations and treatment plans as written. I was present for the key portions of any procedures performed and the inclusive time noted in any critical care statement. Critical care time includes patient management by me, time spent at the patients bedside,  time to review lab and imaging results, discussing patient care, documentation in the medical record, and time spent with family or caregiver.    Patient Care Considerations:    CT ABDOMEN AND PELVIS: I considered ordering a CT scan of the abdomen and pelvis however no significant abdominal tenderness at this time and transfer to  where he will have continuity of care.       Consultants/Shared Management Plan:    Consult with Dr. Vieira with [facility] transfer.  In agreement with patient transfer  for hyperglycemia and in stage liver disease.  Patient currently on insulin drip.  He is not encephalopathic at this time.    Accepted to [facility] in [place] by Dr. Vieira. Waiting for bed after discharges today.     Social Determinants of Health:    Patient is independent, reliable, and has access to care.       Disposition and Care Coordination:    CMP resulted with a glucose of 932, creatinine is 1.73 which is at patient's baseline, albumin 2.4, total protein 5.4, AST 59, alk phos 30 total bili is 3, BUN to creatinine ratio 4.6  Does not appear to be in DKA.  VBG is not acidotic, no anion gap.  Acetone is small positive.  Ammonia is minimally elevated but he is not encephalopathic.  INR elevated 1.91, increased from 1.4  5 days ago  EKG shows sinus rhythm with a rate of 88 with a long QT at 432.  LVH by voltage.  Trop 102, likely renal, denies chest pain, no ischemic findings on EKG.     CXR and hand XR unremarkable, no acute fracture, no acute cardiopulmonary abnormality    --> Placed on insulin drip protocol    MELD-Na 24      Transferred: Through independent evaluation of the patient's history, physical, and imperical data, the patient meets criteria to be transferred to another hospital for evaluation/admission.        Final diagnoses:   Hyperglycemia   End stage liver disease        ED Disposition       ED Disposition   Transfer to Another Facility     Condition   --    Comment   --               This medical record created using voice recognition software.             Giselle Wolff PA-C  07/29/23 8534

## 2023-07-30 LAB — QT INTERVAL: 432 MS

## 2023-11-13 ENCOUNTER — HOSPITAL ENCOUNTER (OUTPATIENT)
Dept: INTERVENTIONAL RADIOLOGY/VASCULAR | Facility: HOSPITAL | Age: 53
Discharge: HOME | End: 2023-11-13
Payer: COMMERCIAL

## 2023-11-13 ENCOUNTER — LAB (OUTPATIENT)
Dept: LAB | Facility: HOSPITAL | Age: 53
End: 2023-11-13
Payer: COMMERCIAL

## 2023-11-13 VITALS
SYSTOLIC BLOOD PRESSURE: 154 MMHG | RESPIRATION RATE: 16 BRPM | OXYGEN SATURATION: 98 % | HEART RATE: 92 BPM | DIASTOLIC BLOOD PRESSURE: 87 MMHG

## 2023-11-13 LAB
APPEARANCE FLD: CLEAR
APTT PPP: 31.6 SECONDS (ref 24.2–34.2)
COLOR FLD: YELLOW
INR PPP: 1.28 (ref 0.86–1.15)
LYMPHOCYTES NFR FLD MANUAL: 23 %
MACROPHAGE FLUID: 53 %
MONOCYTES NFR FLD: 15 %
NEUTROPHILS NFR FLD MANUAL: 9 %
NUC CELL # FLD: 194 /MM3
PLATELET # BLD AUTO: 85 10*3/MM3 (ref 140–450)
PROTHROMBIN TIME: 16.3 SECONDS (ref 11.8–14.9)
RBC # FLD AUTO: <2000 /MM3

## 2023-11-13 PROCEDURE — 25010000002 ALBUMIN HUMAN 25% PER 50 ML: Performed by: INTERNAL MEDICINE

## 2023-11-13 PROCEDURE — 85049 AUTOMATED PLATELET COUNT: CPT | Performed by: INTERNAL MEDICINE

## 2023-11-13 PROCEDURE — 85730 THROMBOPLASTIN TIME PARTIAL: CPT | Performed by: INTERNAL MEDICINE

## 2023-11-13 PROCEDURE — 89051 BODY FLUID CELL COUNT: CPT | Performed by: INTERNAL MEDICINE

## 2023-11-13 PROCEDURE — 76942 ECHO GUIDE FOR BIOPSY: CPT

## 2023-11-13 PROCEDURE — P9047 ALBUMIN (HUMAN), 25%, 50ML: HCPCS | Performed by: INTERNAL MEDICINE

## 2023-11-13 PROCEDURE — 85610 PROTHROMBIN TIME: CPT | Performed by: INTERNAL MEDICINE

## 2023-11-13 RX ORDER — ALBUMIN (HUMAN) 12.5 G/50ML
25 SOLUTION INTRAVENOUS ONCE
Status: COMPLETED | OUTPATIENT
Start: 2023-11-13 | End: 2023-11-13

## 2023-11-13 RX ORDER — LIDOCAINE HYDROCHLORIDE 20 MG/ML
10 INJECTION, SOLUTION INFILTRATION; PERINEURAL ONCE
Status: COMPLETED | OUTPATIENT
Start: 2023-11-13 | End: 2023-11-13

## 2023-11-13 RX ADMIN — SODIUM BICARBONATE 1 ML: 84 INJECTION, SOLUTION INTRAVENOUS at 11:33

## 2023-11-13 RX ADMIN — LIDOCAINE HYDROCHLORIDE 10 ML: 20 INJECTION, SOLUTION INFILTRATION; PERINEURAL at 11:33

## 2023-11-13 RX ADMIN — ALBUMIN (HUMAN) 25 G: 0.25 INJECTION, SOLUTION INTRAVENOUS at 12:10

## 2023-11-13 NOTE — NURSING NOTE
Infusion complete. IV discontinued. Right lateral abdominal dressing clean, dry, and intact. Site soft and nontender. No visible drainage. Vitals stable. Denies pain/discomfort. Patient escorted to gift shop entrance ambulatory accompanied by RN.

## 2024-01-01 ENCOUNTER — HOSPITAL ENCOUNTER (EMERGENCY)
Facility: HOSPITAL | Age: 54
Discharge: HOME | End: 2024-01-01
Attending: EMERGENCY MEDICINE | Admitting: EMERGENCY MEDICINE
Payer: COMMERCIAL

## 2024-01-01 ENCOUNTER — APPOINTMENT (OUTPATIENT)
Dept: GENERAL RADIOLOGY | Facility: HOSPITAL | Age: 54
End: 2024-01-01
Payer: COMMERCIAL

## 2024-01-01 ENCOUNTER — APPOINTMENT (OUTPATIENT)
Dept: INTERVENTIONAL RADIOLOGY/VASCULAR | Facility: HOSPITAL | Age: 54
End: 2024-01-01
Payer: COMMERCIAL

## 2024-01-01 VITALS
HEART RATE: 92 BPM | SYSTOLIC BLOOD PRESSURE: 154 MMHG | WEIGHT: 232.14 LBS | BODY MASS INDEX: 34.38 KG/M2 | TEMPERATURE: 98.1 F | DIASTOLIC BLOOD PRESSURE: 90 MMHG | OXYGEN SATURATION: 94 % | RESPIRATION RATE: 20 BRPM | HEIGHT: 69 IN

## 2024-01-01 DIAGNOSIS — E11.65 UNCONTROLLED TYPE 2 DIABETES MELLITUS WITH HYPERGLYCEMIA: ICD-10-CM

## 2024-01-01 DIAGNOSIS — R60.1 ANASARCA: ICD-10-CM

## 2024-01-01 DIAGNOSIS — K70.31 ALCOHOLIC CIRRHOSIS OF LIVER WITH ASCITES: Primary | ICD-10-CM

## 2024-01-01 LAB
ALBUMIN SERPL-MCNC: 2.1 G/DL (ref 3.5–5.2)
ALBUMIN/GLOB SERPL: 0.7 G/DL
ALP SERPL-CCNC: 317 U/L (ref 39–117)
ALT SERPL W P-5'-P-CCNC: 15 U/L (ref 1–41)
ANION GAP SERPL CALCULATED.3IONS-SCNC: 9.4 MMOL/L (ref 5–15)
APPEARANCE FLD: CLEAR
AST SERPL-CCNC: 29 U/L (ref 1–40)
BACTERIA UR QL AUTO: ABNORMAL /HPF
BASOPHILS # BLD AUTO: 0.07 10*3/MM3 (ref 0–0.2)
BASOPHILS NFR BLD AUTO: 1.2 % (ref 0–1.5)
BILIRUB SERPL-MCNC: 2.1 MG/DL (ref 0–1.2)
BILIRUB UR QL STRIP: NEGATIVE
BUN SERPL-MCNC: 20 MG/DL (ref 6–20)
BUN/CREAT SERPL: 10.3 (ref 7–25)
CALCIUM SPEC-SCNC: 7.9 MG/DL (ref 8.6–10.5)
CHLORIDE SERPL-SCNC: 98 MMOL/L (ref 98–107)
CLARITY UR: CLEAR
CO2 SERPL-SCNC: 21.6 MMOL/L (ref 22–29)
COLOR FLD: YELLOW
COLOR UR: YELLOW
CREAT SERPL-MCNC: 1.94 MG/DL (ref 0.76–1.27)
DEPRECATED RDW RBC AUTO: 50.8 FL (ref 37–54)
EGFRCR SERPLBLD CKD-EPI 2021: 40.6 ML/MIN/1.73
EOSINOPHIL # BLD AUTO: 0.16 10*3/MM3 (ref 0–0.4)
EOSINOPHIL NFR BLD AUTO: 2.8 % (ref 0.3–6.2)
ERYTHROCYTE [DISTWIDTH] IN BLOOD BY AUTOMATED COUNT: 14.8 % (ref 12.3–15.4)
GLOBULIN UR ELPH-MCNC: 2.9 GM/DL
GLUCOSE BLDC GLUCOMTR-MCNC: 416 MG/DL (ref 70–99)
GLUCOSE BLDC GLUCOMTR-MCNC: 507 MG/DL (ref 70–99)
GLUCOSE BLDC GLUCOMTR-MCNC: 527 MG/DL (ref 70–99)
GLUCOSE BLDC GLUCOMTR-MCNC: >600 MG/DL (ref 70–99)
GLUCOSE SERPL-MCNC: 721 MG/DL (ref 65–99)
GLUCOSE UR STRIP-MCNC: ABNORMAL MG/DL
HCT VFR BLD AUTO: 28.1 % (ref 37.5–51)
HGB BLD-MCNC: 9.5 G/DL (ref 13–17.7)
HGB UR QL STRIP.AUTO: ABNORMAL
HOLD SPECIMEN: NORMAL
HYALINE CASTS UR QL AUTO: ABNORMAL /LPF
IMM GRANULOCYTES # BLD AUTO: 0.02 10*3/MM3 (ref 0–0.05)
IMM GRANULOCYTES NFR BLD AUTO: 0.3 % (ref 0–0.5)
INR PPP: 1.28 (ref 0.86–1.15)
KETONES UR QL STRIP: NEGATIVE
LEUKOCYTE ESTERASE UR QL STRIP.AUTO: ABNORMAL
LIPASE SERPL-CCNC: 74 U/L (ref 13–60)
LYMPHOCYTES # BLD AUTO: 0.82 10*3/MM3 (ref 0.7–3.1)
LYMPHOCYTES NFR BLD AUTO: 14.1 % (ref 19.6–45.3)
LYMPHOCYTES NFR FLD MANUAL: 34 %
MACROPHAGE FLUID: 53 %
MCH RBC QN AUTO: 31.6 PG (ref 26.6–33)
MCHC RBC AUTO-ENTMCNC: 33.8 G/DL (ref 31.5–35.7)
MCV RBC AUTO: 93.4 FL (ref 79–97)
MESOTHL CELL NFR FLD MANUAL: 3 %
MONOCYTES # BLD AUTO: 0.52 10*3/MM3 (ref 0.1–0.9)
MONOCYTES NFR BLD AUTO: 9 % (ref 5–12)
MONOCYTES NFR FLD: 6 %
NEUTROPHILS NFR BLD AUTO: 4.21 10*3/MM3 (ref 1.7–7)
NEUTROPHILS NFR BLD AUTO: 72.6 % (ref 42.7–76)
NEUTROPHILS NFR FLD MANUAL: 4 %
NITRITE UR QL STRIP: NEGATIVE
NRBC BLD AUTO-RTO: 0 /100 WBC (ref 0–0.2)
NT-PROBNP SERPL-MCNC: 2204 PG/ML (ref 0–900)
NUC CELL # FLD: 141 /MM3
PH UR STRIP.AUTO: 6 [PH] (ref 5–8)
PLATELET # BLD AUTO: 93 10*3/MM3 (ref 140–450)
PMV BLD AUTO: 10.6 FL (ref 6–12)
POTASSIUM SERPL-SCNC: 4.3 MMOL/L (ref 3.5–5.2)
PROT SERPL-MCNC: 5 G/DL (ref 6–8.5)
PROT UR QL STRIP: ABNORMAL
PROTHROMBIN TIME: 16.3 SECONDS (ref 11.8–14.9)
RBC # BLD AUTO: 3.01 10*6/MM3 (ref 4.14–5.8)
RBC # FLD AUTO: <2000 /MM3
RBC # UR STRIP: ABNORMAL /HPF
REF LAB TEST METHOD: ABNORMAL
SODIUM SERPL-SCNC: 129 MMOL/L (ref 136–145)
SP GR UR STRIP: 1.02 (ref 1–1.03)
SQUAMOUS #/AREA URNS HPF: ABNORMAL /HPF
UROBILINOGEN UR QL STRIP: ABNORMAL
WBC # UR STRIP: ABNORMAL /HPF
WBC NRBC COR # BLD AUTO: 5.8 10*3/MM3 (ref 3.4–10.8)
YEAST URNS QL MICRO: ABNORMAL /HPF

## 2024-01-01 PROCEDURE — 96375 TX/PRO/DX INJ NEW DRUG ADDON: CPT

## 2024-01-01 PROCEDURE — 87015 SPECIMEN INFECT AGNT CONCNTJ: CPT | Performed by: EMERGENCY MEDICINE

## 2024-01-01 PROCEDURE — 25010000002 ONDANSETRON PER 1 MG: Performed by: EMERGENCY MEDICINE

## 2024-01-01 PROCEDURE — 63710000001 INSULIN LISPRO (HUMAN) PER 5 UNITS: Performed by: EMERGENCY MEDICINE

## 2024-01-01 PROCEDURE — 80053 COMPREHEN METABOLIC PANEL: CPT

## 2024-01-01 PROCEDURE — 81001 URINALYSIS AUTO W/SCOPE: CPT

## 2024-01-01 PROCEDURE — 82948 REAGENT STRIP/BLOOD GLUCOSE: CPT

## 2024-01-01 PROCEDURE — 25010000002 MORPHINE PER 10 MG: Performed by: EMERGENCY MEDICINE

## 2024-01-01 PROCEDURE — 83690 ASSAY OF LIPASE: CPT

## 2024-01-01 PROCEDURE — 71045 X-RAY EXAM CHEST 1 VIEW: CPT

## 2024-01-01 PROCEDURE — 99284 EMERGENCY DEPT VISIT MOD MDM: CPT

## 2024-01-01 PROCEDURE — 89051 BODY FLUID CELL COUNT: CPT | Performed by: EMERGENCY MEDICINE

## 2024-01-01 PROCEDURE — 85025 COMPLETE CBC W/AUTO DIFF WBC: CPT

## 2024-01-01 PROCEDURE — 87075 CULTR BACTERIA EXCEPT BLOOD: CPT | Performed by: EMERGENCY MEDICINE

## 2024-01-01 PROCEDURE — 76942 ECHO GUIDE FOR BIOPSY: CPT

## 2024-01-01 PROCEDURE — 96374 THER/PROPH/DIAG INJ IV PUSH: CPT

## 2024-01-01 PROCEDURE — 83880 ASSAY OF NATRIURETIC PEPTIDE: CPT

## 2024-01-01 PROCEDURE — 87070 CULTURE OTHR SPECIMN AEROBIC: CPT | Performed by: EMERGENCY MEDICINE

## 2024-01-01 PROCEDURE — 87205 SMEAR GRAM STAIN: CPT | Performed by: EMERGENCY MEDICINE

## 2024-01-01 PROCEDURE — 85610 PROTHROMBIN TIME: CPT | Performed by: EMERGENCY MEDICINE

## 2024-01-01 RX ORDER — FUROSEMIDE 40 MG/1
40 TABLET ORAL DAILY
COMMUNITY
Start: 2023-08-05 | End: 2024-08-04

## 2024-01-01 RX ORDER — ONDANSETRON 2 MG/ML
4 INJECTION INTRAMUSCULAR; INTRAVENOUS ONCE
Status: COMPLETED | OUTPATIENT
Start: 2024-01-01 | End: 2024-01-01

## 2024-01-01 RX ORDER — POTASSIUM CHLORIDE 750 MG/1
10 CAPSULE, EXTENDED RELEASE ORAL
COMMUNITY
Start: 2023-12-29

## 2024-01-01 RX ORDER — LACTULOSE 10 G/15ML
20 SOLUTION ORAL 3 TIMES DAILY
COMMUNITY
Start: 2023-09-07

## 2024-01-01 RX ORDER — LIDOCAINE HYDROCHLORIDE 20 MG/ML
20 INJECTION, SOLUTION INFILTRATION; PERINEURAL ONCE
Qty: 20 ML | Refills: 0 | Status: COMPLETED | OUTPATIENT
Start: 2024-01-01 | End: 2024-01-01

## 2024-01-01 RX ORDER — INSULIN LISPRO 100 [IU]/ML
10 INJECTION, SOLUTION INTRAVENOUS; SUBCUTANEOUS ONCE
Status: COMPLETED | OUTPATIENT
Start: 2024-01-01 | End: 2024-01-01

## 2024-01-01 RX ORDER — ALBUMIN (HUMAN) 12.5 G/50ML
12.5 SOLUTION INTRAVENOUS ONCE
Status: DISCONTINUED | OUTPATIENT
Start: 2024-01-01 | End: 2024-01-01

## 2024-01-01 RX ORDER — SODIUM CHLORIDE 0.9 % (FLUSH) 0.9 %
10 SYRINGE (ML) INJECTION AS NEEDED
Status: DISCONTINUED | OUTPATIENT
Start: 2024-01-01 | End: 2024-01-01 | Stop reason: HOSPADM

## 2024-01-01 RX ADMIN — INSULIN LISPRO 10 UNITS: 100 INJECTION, SOLUTION INTRAVENOUS; SUBCUTANEOUS at 15:04

## 2024-01-01 RX ADMIN — MORPHINE SULFATE 4 MG: 4 INJECTION, SOLUTION INTRAMUSCULAR; INTRAVENOUS at 13:38

## 2024-01-01 RX ADMIN — LIDOCAINE HYDROCHLORIDE 15 ML: 20 INJECTION, SOLUTION INFILTRATION; PERINEURAL at 13:59

## 2024-01-01 RX ADMIN — ONDANSETRON 4 MG: 2 INJECTION INTRAMUSCULAR; INTRAVENOUS at 13:32

## 2024-01-01 RX ADMIN — SODIUM BICARBONATE 1 ML: 84 INJECTION INTRAVENOUS at 13:59

## 2024-01-01 NOTE — ED PROVIDER NOTES
Time: 12:31 PM EST  Date of encounter:  1/1/2024  Independent Historian/Clinical History and Information was obtained by:   Patient  Chief Complaint: Testicular swelling and abdominal distention    History is limited by: N/A    History of Present Illness:  Patient is a 53 y.o. year old male who presents to the emergency department for evaluation of testicular swelling and abdominal distention.  Patient notes that he has a long history of cirrhosis.  The patient states he typically would get paracentesis about once a week before having a TIPS procedure last January.  He states now he typically gets paracentesis about once a month.  The patient's last paracentesis was November.  Patient notes he has had increasing abdominal distention since that time of testicular swelling.  Patient also notes some shortness of breath and leg swelling.  The patient admits to being noncompliant with his diuretics as well as his diabetic medication.    HPI    Patient Care Team  Primary Care Provider: Provider, No Known    Past Medical History:     No Known Allergies  Past Medical History:   Diagnosis Date    Abnormal LFTs     Anxiety     Back pain     Diabetes mellitus     Hypertension     Kidney failure     Liver failure     Rash      Past Surgical History:   Procedure Laterality Date    TIPS PROCEDURE       Family History   Problem Relation Age of Onset    Diabetes Father     Heart disease Father     Hypertension Father     Diabetes type II Father     Stroke Father     Diabetes Brother        Home Medications:  Prior to Admission medications    Medication Sig Start Date End Date Taking? Authorizing Provider   furosemide (LASIX) 40 MG tablet Take 1 tablet by mouth Daily. 8/5/23 8/4/24 Yes Yareli Sanchez MD   lactulose (CHRONULAC) 10 GM/15ML solution Take 30 mL by mouth 3 (Three) Times a Day. Pt doesn't take as ordered 9/7/23  Yes Yareli Sanchez MD   potassium chloride (MICRO-K) 10 MEQ CR capsule Take 1 capsule by mouth.  12/29/23  Yes Provider, MD Yareli   Ertugliflozin L-PyroglutamicAc (STEGLATRO) 5 MG tablet Take 1 tablet by mouth Every Morning. 8/2/18   Shadi Barrett MD   FLUoxetine (PROzac) 20 MG capsule Take 1 capsule by mouth Daily. 6/12/19   Shadi Barrett MD   glucose blood test strip 1 each by Other route Daily. Use as instructed 6/30/17   Woodrow Dash Jr., MD   Lancets (FREESTYLE) lancets 1 each by Other route Daily. 6/30/17   Woodrow Dash Jr., MD   lisinopril (PRINIVIL,ZESTRIL) 40 MG tablet Take 1 tablet by mouth Daily. 6/12/19   Shadi Barrett MD   SITagliptin-MetFORMIN HCl ER (JANUMET XR) 100-1000 MG tablet Take 1 tablet by mouth Daily. 8/2/18   Shadi Barrett MD        Social History:   Social History     Tobacco Use    Smoking status: Every Day     Packs/day: .5     Types: Cigarettes     Start date: 1985    Smokeless tobacco: Never   Substance Use Topics    Alcohol use: Not Currently     Comment: daily          sober since 10/19/2020    Drug use: No         Review of Systems:  Review of Systems   Constitutional:  Negative for chills, diaphoresis and fever.   HENT:  Negative for congestion, postnasal drip, rhinorrhea and sore throat.    Eyes:  Negative for photophobia.   Respiratory:  Negative for cough, chest tightness and shortness of breath.    Cardiovascular:  Positive for leg swelling. Negative for chest pain and palpitations.   Gastrointestinal:  Positive for abdominal distention. Negative for abdominal pain, diarrhea, nausea and vomiting.   Genitourinary:  Positive for scrotal swelling. Negative for difficulty urinating, dysuria, flank pain, frequency, hematuria and urgency.   Musculoskeletal:  Negative for neck pain and neck stiffness.   Skin:  Negative for pallor and rash.   Neurological:  Negative for dizziness, syncope, weakness, numbness and headaches.   Hematological:  Negative for adenopathy. Does not bruise/bleed easily.   Psychiatric/Behavioral: Negative.       "    Physical Exam:  /90 (BP Location: Left arm)   Pulse 91   Temp 98.1 °F (36.7 °C) (Oral)   Resp 20   Ht 175.3 cm (69\")   Wt 105 kg (232 lb 2.3 oz)   SpO2 98%   BMI 34.28 kg/m²     Physical Exam  Vitals and nursing note reviewed.   Constitutional:       General: He is not in acute distress.     Appearance: Normal appearance. He is not ill-appearing, toxic-appearing or diaphoretic.   HENT:      Head: Normocephalic and atraumatic.      Mouth/Throat:      Mouth: Mucous membranes are moist.   Eyes:      Pupils: Pupils are equal, round, and reactive to light.   Cardiovascular:      Rate and Rhythm: Normal rate and regular rhythm.      Pulses: Normal pulses.           Carotid pulses are 2+ on the right side and 2+ on the left side.       Radial pulses are 2+ on the right side and 2+ on the left side.        Femoral pulses are 2+ on the right side and 2+ on the left side.       Popliteal pulses are 2+ on the right side and 2+ on the left side.        Dorsalis pedis pulses are 2+ on the right side and 2+ on the left side.        Posterior tibial pulses are 2+ on the right side and 2+ on the left side.      Heart sounds: Normal heart sounds. No murmur heard.  Pulmonary:      Effort: Pulmonary effort is normal. No accessory muscle usage, respiratory distress or retractions.      Breath sounds: Normal breath sounds. No wheezing, rhonchi or rales.   Abdominal:      General: Abdomen is flat. There is distension.      Palpations: Abdomen is soft. There is fluid wave. There is no mass or pulsatile mass.      Tenderness: There is no abdominal tenderness. There is no right CVA tenderness, left CVA tenderness, guarding or rebound.      Comments: No rigidity   Genitourinary:     Testes:         Right: Swelling present.         Left: Swelling present.   Musculoskeletal:         General: No swelling, tenderness or deformity.      Cervical back: Neck supple. No tenderness.      Right lower leg: No edema.      Left lower " leg: No edema.   Skin:     General: Skin is warm and dry.      Capillary Refill: Capillary refill takes less than 2 seconds.      Coloration: Skin is not jaundiced or pale.      Findings: No erythema or rash.   Neurological:      General: No focal deficit present.      Mental Status: He is alert and oriented to person, place, and time. Mental status is at baseline.      Cranial Nerves: Cranial nerves 2-12 are intact. No cranial nerve deficit.      Sensory: Sensation is intact. No sensory deficit.      Motor: Motor function is intact. No weakness or pronator drift.      Coordination: Coordination is intact. Coordination normal.   Psychiatric:         Mood and Affect: Mood normal.         Behavior: Behavior normal.                  Procedures:  Procedures      Medical Decision Making:      Comorbidities that affect care:    Hypertension, cirrhosis, diabetes    External Notes reviewed:    None      The following orders were placed and all results were independently analyzed by me:  Orders Placed This Encounter   Procedures    Body Fluid Culture - Body Fluid, Peritoneum    Anaerobic Culture - Body Fluid, Peritoneum    XR Chest 1 View    US Paracentesis    Comprehensive Metabolic Panel    Lipase    Urinalysis With Microscopic If Indicated (No Culture) - Urine, Clean Catch    BNP    CBC Auto Differential    Protime-INR    Body Fluid Cell Count With Differential - Body Fluid, Peritoneum    Body fluid cell count - Body Fluid, Peritoneum    Urinalysis, Microscopic Only - Urine, Clean Catch    Body fluid differential - Body Fluid, Peritoneum    Obtain Informed Consent    POC Glucose Q1H    POC Glucose Once    POC Glucose Once    Insert peripheral IV    CBC & Differential    Extra Tubes    Gold Top - SST       Medications Given in the Emergency Department:  Medications   sodium chloride 0.9 % flush 10 mL (has no administration in time range)   morphine injection 4 mg (4 mg Intravenous Given 1/1/24 4573)   ondansetron (ZOFRAN)  injection 4 mg (4 mg Intravenous Given 1/1/24 1332)   lidocaine (XYLOCAINE) 2% injection 20 mL (15 mL Injection Given 1/1/24 1359)   sodium bicarbonate injection 8.4% 10 mL (1 mL Injection Given 1/1/24 1359)   Insulin Lispro (humaLOG) injection 10 Units (10 Units Subcutaneous Given 1/1/24 1504)        ED Course:         Labs:    Lab Results (last 24 hours)       Procedure Component Value Units Date/Time    CBC & Differential [[id number]]  (Abnormal) Collected: 01/01/24 1302    Specimen: Blood from Arm, Left Updated: 01/01/24 1318    Narrative:      The following orders were created for panel order CBC & Differential.  Procedure                               Abnormality         Status                     ---------                               -----------         ------                     CBC Auto Differential[[id number]]        Abnormal            Final result                 Please view results for these tests on the individual orders.    Comprehensive Metabolic Panel [[id number]]  (Abnormal) Collected: 01/01/24 1302    Specimen: Blood from Arm, Left Updated: 01/01/24 1352     Glucose 721 mg/dL      BUN 20 mg/dL      Creatinine 1.94 mg/dL      Sodium 129 mmol/L      Potassium 4.3 mmol/L      Chloride 98 mmol/L      CO2 21.6 mmol/L      Calcium 7.9 mg/dL      Total Protein 5.0 g/dL      Albumin 2.1 g/dL      ALT (SGPT) 15 U/L      AST (SGOT) 29 U/L      Alkaline Phosphatase 317 U/L      Total Bilirubin 2.1 mg/dL      Globulin 2.9 gm/dL      A/G Ratio 0.7 g/dL      BUN/Creatinine Ratio 10.3     Anion Gap 9.4 mmol/L      eGFR 40.6 mL/min/1.73     Narrative:      GFR Normal >60  Chronic Kidney Disease <60  Kidney Failure <15      Lipase [[id number]]  (Abnormal) Collected: 01/01/24 1302    Specimen: Blood from Arm, Left Updated: 01/01/24 1339     Lipase 74 U/L     BNP [[id number]]  (Abnormal) Collected: 01/01/24 1302    Specimen: Blood from Arm, Left Updated: 01/01/24 1337     proBNP 2,204.0 pg/mL     Narrative:      This  assay is used as an aid in the diagnosis of individuals suspected of having heart failure. It can be used as an aid in the diagnosis of acute decompensated heart failure (ADHF) in patients presenting with signs and symptoms of ADHF to the emergency department (ED). In addition, NT-proBNP of <300 pg/mL indicates ADHF is not likely.    Age Range Result Interpretation  NT-proBNP Concentration (pg/mL:      <50             Positive            >450                   Gray                 300-450                    Negative             <300    50-75           Positive            >900                  Gray                300-900                  Negative            <300      >75             Positive            >1800                  Gray                300-1800                  Negative            <300    CBC Auto Differential [[id number]]  (Abnormal) Collected: 01/01/24 1302    Specimen: Blood from Arm, Left Updated: 01/01/24 1318     WBC 5.80 10*3/mm3      RBC 3.01 10*6/mm3      Hemoglobin 9.5 g/dL      Hematocrit 28.1 %      MCV 93.4 fL      MCH 31.6 pg      MCHC 33.8 g/dL      RDW 14.8 %      RDW-SD 50.8 fl      MPV 10.6 fL      Platelets 93 10*3/mm3      Neutrophil % 72.6 %      Lymphocyte % 14.1 %      Monocyte % 9.0 %      Eosinophil % 2.8 %      Basophil % 1.2 %      Immature Grans % 0.3 %      Neutrophils, Absolute 4.21 10*3/mm3      Lymphocytes, Absolute 0.82 10*3/mm3      Monocytes, Absolute 0.52 10*3/mm3      Eosinophils, Absolute 0.16 10*3/mm3      Basophils, Absolute 0.07 10*3/mm3      Immature Grans, Absolute 0.02 10*3/mm3      nRBC 0.0 /100 WBC     Urinalysis With Microscopic If Indicated (No Culture) - Urine, Clean Catch [[id number]]  (Abnormal) Collected: 01/01/24 1303    Specimen: Urine, Clean Catch Updated: 01/01/24 1320     Color, UA Yellow     Appearance, UA Clear     pH, UA 6.0     Specific Gravity, UA 1.021     Glucose, UA >=1000 mg/dL (3+)     Ketones, UA Negative     Bilirubin, UA Negative     Blood,  UA Large (3+)     Protein,  mg/dL (2+)     Leuk Esterase, UA Trace     Nitrite, UA Negative     Urobilinogen, UA 1.0 E.U./dL    Protime-INR [[id number]]  (Abnormal) Collected: 01/01/24 1303    Specimen: Blood from Arm, Left Updated: 01/01/24 1328     Protime 16.3 Seconds      INR 1.28    Narrative:      Suggested Therapeutic Ranges For Oral Anticoagulant Therapy:  Level of Therapy                      INR Target Range  Standard Dose                            2.0-3.0  High Dose                                2.5-3.5  Patients not receiving anticoagulant  Therapy Normal Range                     0.86-1.15    Urinalysis, Microscopic Only - Urine, Clean Catch [[id number]]  (Abnormal) Collected: 01/01/24 1303    Specimen: Urine, Clean Catch Updated: 01/01/24 1332     RBC, UA 21-50 /HPF      WBC, UA 3-5 /HPF      Bacteria, UA 1+ /HPF      Squamous Epithelial Cells, UA 3-6 /HPF      Yeast, UA Small/1+ Budding Yeast /HPF      Hyaline Casts, UA 0-2 /LPF      Methodology Manual Light Microscopy    Body Fluid Cell Count With Differential - Body Fluid, Peritoneum [[id number]] Collected: 01/01/24 1403    Specimen: Body Fluid from Peritoneum Updated: 01/01/24 1539    Narrative:      The following orders were created for panel order Body Fluid Cell Count With Differential - Body Fluid, Peritoneum.  Procedure                               Abnormality         Status                     ---------                               -----------         ------                     Body fluid cell count - ...[[id number]]                      Final result               Body fluid differential ...[[id number]]                      Final result                 Please view results for these tests on the individual orders.    Body Fluid Culture - Body Fluid, Peritoneum [[id number]] Collected: 01/01/24 1403    Specimen: Body Fluid from Peritoneum Updated: 01/01/24 1803     Body Fluid Culture Abnormal Stain     Gram Stain Rare (1+) WBCs seen       Rare (1+) Gram negative bacilli    Anaerobic Culture - Body Fluid, Peritoneum [[id number]] Collected: 01/01/24 1403    Specimen: Body Fluid from Peritoneum Updated: 01/01/24 1441    Body fluid cell count - Body Fluid, Peritoneum [[id number]] Collected: 01/01/24 1403    Specimen: Body Fluid from Peritoneum Updated: 01/01/24 1447     Color, Fluid Yellow     Appearance, Fluid Clear     Nucleated Cells, Fluid 141 /mm3      RBC, Fluid <2,000 /mm3     Narrative:      No reference range established. Physician to interpret results with clinical findings.    Body fluid differential - Body Fluid, Peritoneum [[id number]] Collected: 01/01/24 1403    Specimen: Body Fluid from Peritoneum Updated: 01/01/24 1539     Neutrophils, Fluid 4 %      Lymphocytes, Fluid 34 %      Monocytes, Fluid 6 %      Mesothelial Cells, Fluid 3 %      Macrophage, Fluid 53 %     POC Glucose Once [[id number]]  (Abnormal) Collected: 01/01/24 1626    Specimen: Blood Updated: 01/01/24 1628     Glucose >600 mg/dL      Comment: Serial Number: [id number]:  [id number]       POC Glucose Once [[id number]]  (Abnormal) Collected: 01/01/24 1715    Specimen: Blood Updated: 01/01/24 1718     Glucose 527 mg/dL      Comment: Serial Number: [id number]:  [id number]                Imaging:    XR Chest 1 View    Result Date: 1/1/2024  PROCEDURE: XR CHEST 1 VW  COMPARISON: [facility], CR, XR CHEST 1 VW, 7/29/2023, 13:48.  INDICATIONS: SHORTNESS OF BREATH  FINDINGS:  The heart is normal in size.  The lungs are well-expanded and free of infiltrates.  Bony structures appear intact.       No active disease is seen.       KENNETH CLEMONS MD       Electronically Signed and Approved By: KENNETH CLEMONS MD on 1/01/2024 at 12:47                Differential Diagnosis and Discussion:    Edema: Based on the patient's history, signs, and symptoms, the differential diagnosis includes but is not limited to heart failure, kidney disease, liver disease, venous  insufficiency, lymphedema, pregnancy, medications, allergic reactions.    All labs were reviewed and interpreted by me.  All X-rays impressions were independently interpreted by me.  Ultrasound impression was interpreted by me.     MDM  Number of Diagnoses or Management Options  Alcoholic cirrhosis of liver with ascites  Anasarca  Uncontrolled type 2 diabetes mellitus with hyperglycemia  Diagnosis management comments: Patient's vital signs were stable in the emergency room.    The patient's CBC was reviewed and shows no abnormalities of critical concern.  Of note, there is no anemia requiring a blood transfusion and the platelet count is acceptable    Patient's chemistry demonstrates a glucose of 71, BUN 20, creatinine 1.94, sodium 129.  Patient's bicarb is 21.6.  Patient's total protein is 5.0.  The patient's albumin was 2.1.  The patient's alk phos was 317.  The patient's total bili is 2.1.  The patient had a normal anion gap.  The patient has a known diagnosis of cirrhosis.    Patient's urinalysis was contaminated with 3-6 Remus epithelial cells.  I feel the patient's urine is contaminated and does not have a urinary tract infection.    The patient had an ultrasound-guided paracentesis.  Fluid was drawn from the abdomen.  Subjectively, the patient states that he feels much better.  The preliminary cell count demonstrates no evidence of peritonitis.    The patient was given 10 units of Humalog for his hyperglycemia.  Do not feel the patient has DKA or nonketotic hyperosmolar syndrome.  Patient was not given IV fluids due to his fluid overload from his ascites.  The patient's blood glucose was monitored in the emergency room to his blood glucose was 416.    The patient admits to noncompliance.  Patient states he does not take any of his medication.  Had a long conversation with the patient in regards to his diuretics and insulin medication.  The patient voiced understanding.  At this time I do not feel that I can  change his medication if he is not compliant with his current regimen.  I requested that the patient take his medication appropriately and follow-up with his doctor for evaluation.    I have spoken with this patient.  I have explained the patient's condition, diagnosis and treatment plan based on the information available to me at this time.  I have answered the patient's questions and addressed any concerns.  The patient has a good understanding of the patient's diagnosis condition and treatment plan as can be expected at this point.  The vital signs have been stable.  The patient's condition is stable and appropriate for discharge from the emergency department.     Patient will pursue further outpatient evaluation with the primary care physician or other designated or consulting physicians as outlined in the discharge instruction.  The patient is agreeable to this plan of care and follow-up instructions have been explained in detail.  The patient has received these instructions in written format and has expressed understanding of the discharge instructions.  The patient is aware that any significant change in condition or worsening of symptoms should prompt immediate return to this or the closest emergency department or call 911       Amount and/or Complexity of Data Reviewed  Clinical lab tests: reviewed  Tests in the radiology section of CPT®: reviewed           Social Determinants of Health:    Patient is independent, reliable, and has access to care.       Disposition and Care Coordination:    Discharged: The patient is suitable and stable for discharge with no need for consideration of observation or admission.    I have explained discharge medications and the need for follow up with the patient/caretakers. This was also printed in the discharge instructions. Patient was discharged with the following medications and follow up:      Medication List      No changes were made to your prescriptions during this  visit.      Provider, No Known  [facility]  [place] [id number]    On 1/2/2024  Uncontrolled diabetes, ascites, anasarca       Final diagnoses:   Alcoholic cirrhosis of liver with ascites   Anasarca   Uncontrolled type 2 diabetes mellitus with hyperglycemia        ED Disposition       ED Disposition   Discharge    Condition   Stable    Comment   --               This medical record created using voice recognition software.             Ken Naik DO  01/02/24 [id number]

## 2024-01-02 NOTE — DISCHARGE INSTRUCTIONS
Please increase your Lasix from 40 mg once a day to 40 mg twice a day for the next 7 days.    Please start taking your diabetic medication as prescribed    Please return to emergency for intractable pain, intractable vomiting, fever, near passing out, passing out or any new symptoms you are concerned with

## 2024-01-05 LAB
BACTERIA FLD CULT: NORMAL
GRAM STN SPEC: NORMAL
GRAM STN SPEC: NORMAL

## 2024-01-06 ENCOUNTER — APPOINTMENT (OUTPATIENT)
Dept: GENERAL RADIOLOGY | Facility: HOSPITAL | Age: 54
End: 2024-01-06
Payer: COMMERCIAL

## 2024-01-06 ENCOUNTER — HOSPITAL ENCOUNTER (EMERGENCY)
Facility: HOSPITAL | Age: 54
Discharge: HOME | End: 2024-01-06
Attending: EMERGENCY MEDICINE
Payer: COMMERCIAL

## 2024-01-06 VITALS
DIASTOLIC BLOOD PRESSURE: 89 MMHG | BODY MASS INDEX: 33.2 KG/M2 | RESPIRATION RATE: 16 BRPM | OXYGEN SATURATION: 100 % | WEIGHT: 231.92 LBS | HEIGHT: 70 IN | SYSTOLIC BLOOD PRESSURE: 165 MMHG | TEMPERATURE: 98.3 F | HEART RATE: 70 BPM
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
## 2024-01-06 DIAGNOSIS — M25.552 HIP PAIN, LEFT: Primary | ICD-10-CM

## 2024-01-06 LAB — BACTERIA SPEC ANAEROBE CULT: NORMAL

## 2024-01-06 PROCEDURE — 73502 X-RAY EXAM HIP UNI 2-3 VIEWS: CPT

## 2024-01-06 PROCEDURE — 99283 EMERGENCY DEPT VISIT LOW MDM: CPT

## 2024-01-06 RX ORDER — TRAMADOL HYDROCHLORIDE 50 MG/1
50 TABLET ORAL ONCE
Status: COMPLETED | OUTPATIENT
Start: 2024-01-06 | End: 2024-01-06

## 2024-01-06 RX ORDER — LIDOCAINE 50 MG/G
1 PATCH TOPICAL EVERY 24 HOURS
Qty: 15 EACH | Refills: 0 | Status: SHIPPED | OUTPATIENT
Start: 2024-01-06

## 2024-01-06 RX ORDER — ACETAMINOPHEN 500 MG
1000 TABLET ORAL ONCE
Status: COMPLETED | OUTPATIENT
Start: 2024-01-06 | End: 2024-01-06

## 2024-01-06 RX ADMIN — ACETAMINOPHEN 1000 MG: 500 TABLET ORAL at 15:29

## 2024-01-06 RX ADMIN — TRAMADOL HYDROCHLORIDE 50 MG: 50 TABLET ORAL at 14:16

## 2024-01-06 NOTE — ED PROVIDER NOTES
Time: 2:11 PM EST  Date of encounter:  1/6/2024  Independent Historian/Clinical History and Information was obtained by:   Patient and Family    History is limited by: N/A    Chief Complaint   Patient presents with    Hip Pain     Left hip pain since a fall on Tuesday.         History of Present Illness:  Patient is a 53 y.o. year old male who presents to the emergency department for evaluation of left hip pain due to a fall that occurred 2 days ago.  Patient states he is not sure why he fell but denies dizziness or loss of balance.  Landed on concrete on the left hip.  Has a had a prior fracture to the left hip.  Denies previous hip replacement.  Has been taking Tylenol with no relief.  Denies hitting his head, LOC, nausea or vomiting.    Patient Care Team  Primary Care Provider: Provider, Cori Known    Past Medical History:     No Known Allergies  Past Medical History:   Diagnosis Date    Abnormal LFTs     Anxiety     Back pain     Diabetes mellitus     Hypertension     Kidney failure     Liver failure     Rash      Past Surgical History:   Procedure Laterality Date    TIPS PROCEDURE       Family History   Problem Relation Age of Onset    Diabetes Father     Heart disease Father     Hypertension Father     Diabetes type II Father     Stroke Father     Diabetes Brother        Home Medications:  Prior to Admission medications    Medication Sig Start Date End Date Taking? Authorizing Provider   Ertugliflozin L-PyroglutamicAc (STEGLATRO) 5 MG tablet Take 1 tablet by mouth Every Morning. 8/2/18   Shadi Barrett MD   FLUoxetine (PROzac) 20 MG capsule Take 1 capsule by mouth Daily. 6/12/19   Shadi Barrett MD   furosemide (LASIX) 40 MG tablet Take 1 tablet by mouth Daily. 8/5/23 8/4/24  Provider, MD Yareli   glucose blood test strip 1 each by Other route Daily. Use as instructed 6/30/17   Woodrow Dash Jr., MD   lactulose (CHRONULAC) 10 GM/15ML solution Take 30 mL by mouth 3 (Three) Times a Day. Pt doesn't  "take as ordered 9/7/23   Yareli Sanchez MD   Lancets (FREESTYLE) lancets 1 each by Other route Daily. 6/30/17   Woodrow Dash Jr., MD   lisinopril (PRINIVIL,ZESTRIL) 40 MG tablet Take 1 tablet by mouth Daily. 6/12/19   Shadi Barrett MD   potassium chloride (MICRO-K) 10 MEQ CR capsule Take 1 capsule by mouth. 12/29/23   Yareli Sanchez MD   SITagliptin-MetFORMIN HCl ER (JANUMET XR) 100-1000 MG tablet Take 1 tablet by mouth Daily. 8/2/18   Shadi Barrett MD        Social History:   Social History     Tobacco Use    Smoking status: Every Day     Packs/day: .5     Types: Cigarettes     Start date: 1985    Smokeless tobacco: Never   Substance Use Topics    Alcohol use: Not Currently     Comment: daily          sober since 10/19/2020    Drug use: No         Review of Systems:  Review of Systems   Constitutional: Negative.    HENT: Negative.     Eyes: Negative.    Respiratory: Negative.     Cardiovascular: Negative.    Gastrointestinal: Negative.    Endocrine: Negative.    Genitourinary: Negative.    Musculoskeletal:  Positive for arthralgias and gait problem.   Skin: Negative.  Negative for color change.   Allergic/Immunologic: Negative.    Hematological: Negative.    Psychiatric/Behavioral: Negative.          Physical Exam:  /89 (BP Location: Right arm, Patient Position: Sitting)   Pulse 70   Temp 98.3 °F (36.8 °C) (Oral)   Resp 16   Ht 177.8 cm (70\")   Wt 105 kg (231 lb 14.8 oz)   SpO2 100%   BMI 33.28 kg/m²         Physical Exam  Vitals and nursing note reviewed.   Constitutional:       Appearance: Normal appearance.   HENT:      Head: Normocephalic and atraumatic.      Nose: Nose normal.      Mouth/Throat:      Mouth: Mucous membranes are moist.   Eyes:      Extraocular Movements: Extraocular movements intact.      Conjunctiva/sclera: Conjunctivae normal.      Pupils: Pupils are equal, round, and reactive to light.   Cardiovascular:      Rate and Rhythm: Normal rate and regular " rhythm.      Heart sounds: Normal heart sounds.   Pulmonary:      Effort: Pulmonary effort is normal.      Breath sounds: Normal breath sounds.   Musculoskeletal:         General: Tenderness present. No signs of injury. Normal range of motion.      Cervical back: Normal range of motion and neck supple.   Skin:     General: Skin is warm and dry.      Findings: No bruising or erythema.   Neurological:      General: No focal deficit present.      Mental Status: He is alert and oriented to person, place, and time.   Psychiatric:         Mood and Affect: Mood normal.         Behavior: Behavior normal.                Procedures:  Procedures      Medical Decision Making:      Comorbidities that affect care:    Diabetes, Hypertension    External Notes reviewed:    Previous ED Note: [facility] ED visit from January 1, 2024 for alcoholic cirrhosis      The following orders were placed and all results were independently analyzed by me:  Orders Placed This Encounter   Procedures    XR Hip With or Without Pelvis 2 - 3 View Left       Medications Given in the Emergency Department:  Medications   acetaminophen (TYLENOL) tablet 1,000 mg (has no administration in time range)   traMADol (ULTRAM) tablet 50 mg (50 mg Oral Given 1/6/24 1416)        ED Course:    The patient was initially evaluated in the triage area where orders were placed. The patient was later dispositioned by Raymond Peters PA-C.      The patient was advised to stay for completion of workup which includes but is not limited to communication of labs and radiological results, reassessment and plan. The patient was advised that leaving prior to disposition by a provider could result in critical findings that are not communicated to the patient.     ED Course as of 01/06/24 1527   Sat Jan 06, 2024   1526 Wife is asking for tylenol prior to DC.  [AJ]      ED Course User Index  [AJ] Raymond Peters PA-C       Labs:    Lab Results (last 24 hours)       ** No results  found for the last 24 hours. **             Imaging:    XR Hip With or Without Pelvis 2 - 3 View Left    Result Date: 1/6/2024  PROCEDURE: XR HIP W OR WO PELVIS 2-3 VIEW LEFT  COMPARISON: None  INDICATIONS: LEFT HIP PAIN POST FALL X 2 DAYS AGO  FINDINGS:  No acute fracture or dislocation is identified.  Hip joints appear intact.         No acute fracture or dislocation is identified of the left hip.      VERA GUNN MD       Electronically Signed and Approved By: VERA GUNN MD on 1/06/2024 at 14:59                Differential Diagnosis and Discussion:      Joint Pain: Differential diagnosis includes but is not limited to polyarticular arthritis, gout, tendinitis, hemarthrosis, septic arthritis, rheumatoid arthritis, bursitis, degenerative joint disease, joint effusion, autoimmune disorder, trauma, and occult neoplasm.    All X-rays impressions were independently interpreted by me.    MDM     Amount and/or Complexity of Data Reviewed  Tests in the radiology section of CPT®: reviewed                 Patient Care Considerations:    NARCOTICS: I considered prescribing opiate pain medication as an outpatient, however x-ray negative for fractures or dislocations, conservative treatment warranted.  I was considering prescribing patient NSAIDs however he has CKD and wife states that they are following up to see if he needs dialysis.  Patient also has liver disease due to alcohol.  He can take 2000 mg of Tylenol per day per wife.  Patient was not asking for discussed with patient that if he feels he needs narcotics for pain he needs to follow-up with his PCP.    Consultants/Shared Management Plan:    None    Social Determinants of Health:    Patient has presented with family members who are responsible, reliable and will ensure follow up care.      Disposition and Care Coordination:    Discharged: The patient is suitable and stable for discharge with no need for consideration of observation or admission.    I have  explained the patient´s condition, diagnoses and treatment plan based on the information available to me at this time. I have answered questions and addressed any concerns. The patient has a good  understanding of the patient´s diagnosis, condition, and treatment plan as can be expected at this point. The vital signs have been stable. The patient´s condition is stable and appropriate for discharge from the emergency department.      The patient will pursue further outpatient evaluation with the primary care physician or other designated or consulting physician as outlined in the discharge instructions. They are agreeable to this plan of care and follow-up instructions have been explained in detail. The patient has received these instructions in written format and have expressed an understanding of the discharge instructions. The patient is aware that any significant change in condition or worsening of symptoms should prompt an immediate return to this or the closest emergency department or call to 911.  I have explained discharge medications and the need for follow up with the patient/caretakers. This was also printed in the discharge instructions. Patient was discharged with the following medications and follow up:      Medication List        New Prescriptions      lidocaine 5 %  Commonly known as: LIDODERM  Place 1 patch on the skin as directed by provider Daily. Remove & Discard patch within 12 hours or as directed by MD               Where to Get Your Medications        These medications were sent to [id number] - [place], [place] - [address] AT [facility] - [phone]  - [phone] FX  [address] [place] [address]      Phone: [phone]   lidocaine 5 %      Provider, No Known  [facility]  [place] [address]             Final diagnoses:   Hip pain, left   Fall in home, initial encounter        ED Disposition       ED Disposition    Discharge    Condition   Stable    Comment   --               This medical record created using voice recognition software.             Raymond Peters PA-C  01/06/24 1522       Raymond Peters PA-C  01/06/24 1522

## 2024-01-06 NOTE — DISCHARGE INSTRUCTIONS
Your x-ray was negative for any fractures or dislocations  Please follow-up with your PCP if you need narcotics for pain management.  I have sent lidocaine patches to the pharmacy, continue Tylenol as needed

## 2024-03-04 ENCOUNTER — HOSPITAL ENCOUNTER (INPATIENT)
Facility: HOSPITAL | Age: 54
LOS: 2 days | Discharge: HOME | DRG: 074 | End: 2024-03-11
Attending: EMERGENCY MEDICINE | Admitting: INTERNAL MEDICINE
Payer: COMMERCIAL

## 2024-03-04 ENCOUNTER — APPOINTMENT (OUTPATIENT)
Dept: CT IMAGING | Facility: HOSPITAL | Age: 54
DRG: 074 | End: 2024-03-04
Payer: COMMERCIAL

## 2024-03-04 ENCOUNTER — APPOINTMENT (OUTPATIENT)
Dept: GENERAL RADIOLOGY | Facility: HOSPITAL | Age: 54
DRG: 074 | End: 2024-03-04
Payer: COMMERCIAL

## 2024-03-04 DIAGNOSIS — K74.60 CIRRHOSIS OF LIVER WITHOUT ASCITES, UNSPECIFIED HEPATIC CIRRHOSIS TYPE: ICD-10-CM

## 2024-03-04 DIAGNOSIS — Z78.9 DECREASED ACTIVITIES OF DAILY LIVING (ADL): ICD-10-CM

## 2024-03-04 DIAGNOSIS — R53.1 WEAKNESS GENERALIZED: Primary | ICD-10-CM

## 2024-03-04 DIAGNOSIS — E11.9 TYPE 2 DIABETES MELLITUS WITHOUT COMPLICATION, WITHOUT LONG-TERM CURRENT USE OF INSULIN: ICD-10-CM

## 2024-03-04 DIAGNOSIS — R26.2 DIFFICULTY IN WALKING: ICD-10-CM

## 2024-03-04 LAB
ALBUMIN SERPL-MCNC: 1.9 G/DL (ref 3.5–5.2)
ALBUMIN/GLOB SERPL: 0.7 G/DL
ALP SERPL-CCNC: 351 U/L (ref 39–117)
ALT SERPL W P-5'-P-CCNC: 22 U/L (ref 1–41)
AMMONIA BLD-SCNC: 64 UMOL/L (ref 16–60)
ANION GAP SERPL CALCULATED.3IONS-SCNC: 10.2 MMOL/L (ref 5–15)
AST SERPL-CCNC: 47 U/L (ref 1–40)
BACTERIA UR QL AUTO: ABNORMAL /HPF
BASE EXCESS BLDV CALC-SCNC: -2.1 MMOL/L (ref -2–2)
BASOPHILS # BLD AUTO: 0.07 10*3/MM3 (ref 0–0.2)
BASOPHILS NFR BLD AUTO: 1 % (ref 0–1.5)
BDY SITE: ABNORMAL
BILIRUB SERPL-MCNC: 1.9 MG/DL (ref 0–1.2)
BILIRUB UR QL STRIP: NEGATIVE
BUN SERPL-MCNC: 24 MG/DL (ref 6–20)
BUN/CREAT SERPL: 13 (ref 7–25)
CA-I BLDA-SCNC: 1.1 MMOL/L (ref 1.13–1.32)
CALCIUM SPEC-SCNC: 7.8 MG/DL (ref 8.6–10.5)
CHLORIDE BLDV-SCNC: 102 MMOL/L (ref 98–106)
CHLORIDE SERPL-SCNC: 99 MMOL/L (ref 98–107)
CLARITY UR: CLEAR
CO2 SERPL-SCNC: 21.8 MMOL/L (ref 22–29)
COHGB MFR BLD: 3.1 % (ref 0–1.5)
COLOR UR: YELLOW
CREAT SERPL-MCNC: 1.85 MG/DL (ref 0.76–1.27)
DEPRECATED RDW RBC AUTO: 45.9 FL (ref 37–54)
EGFRCR SERPLBLD CKD-EPI 2021: 43 ML/MIN/1.73
EOSINOPHIL # BLD AUTO: 0.37 10*3/MM3 (ref 0–0.4)
EOSINOPHIL NFR BLD AUTO: 5.4 % (ref 0.3–6.2)
ERYTHROCYTE [DISTWIDTH] IN BLOOD BY AUTOMATED COUNT: 15.3 % (ref 12.3–15.4)
FHHB: 29.9 % (ref 0–5)
GEN 5 2HR TROPONIN T REFLEX: 164 NG/L
GLOBULIN UR ELPH-MCNC: 2.8 GM/DL
GLUCOSE BLDA-MCNC: 649 MG/DL (ref 70–99)
GLUCOSE BLDC GLUCOMTR-MCNC: 325 MG/DL (ref 70–99)
GLUCOSE BLDC GLUCOMTR-MCNC: 356 MG/DL (ref 70–99)
GLUCOSE BLDC GLUCOMTR-MCNC: 396 MG/DL (ref 70–99)
GLUCOSE BLDC GLUCOMTR-MCNC: 404 MG/DL (ref 70–99)
GLUCOSE BLDC GLUCOMTR-MCNC: 424 MG/DL (ref 70–99)
GLUCOSE BLDC GLUCOMTR-MCNC: 480 MG/DL (ref 70–99)
GLUCOSE BLDC GLUCOMTR-MCNC: 482 MG/DL (ref 70–99)
GLUCOSE BLDC GLUCOMTR-MCNC: 488 MG/DL (ref 70–99)
GLUCOSE BLDC GLUCOMTR-MCNC: 490 MG/DL (ref 70–99)
GLUCOSE BLDC GLUCOMTR-MCNC: 530 MG/DL (ref 70–99)
GLUCOSE BLDC GLUCOMTR-MCNC: 581 MG/DL (ref 70–99)
GLUCOSE BLDC GLUCOMTR-MCNC: >600 MG/DL (ref 70–99)
GLUCOSE SERPL-MCNC: 682 MG/DL (ref 65–99)
GLUCOSE UR STRIP-MCNC: ABNORMAL MG/DL
HCO3 BLDV-SCNC: 21.6 MMOL/L (ref 22–26)
HCT VFR BLD AUTO: 27.9 % (ref 37.5–51)
HGB BLD-MCNC: 10 G/DL (ref 13–17.7)
HGB BLDA-MCNC: 11.2 G/DL (ref 13.8–16.4)
HGB UR QL STRIP.AUTO: ABNORMAL
HOLD SPECIMEN: NORMAL
HYALINE CASTS UR QL AUTO: ABNORMAL /LPF
IMM GRANULOCYTES # BLD AUTO: 0.03 10*3/MM3 (ref 0–0.05)
IMM GRANULOCYTES NFR BLD AUTO: 0.4 % (ref 0–0.5)
INHALED O2 CONCENTRATION: 21 %
KETONES UR QL STRIP: NEGATIVE
LACTATE BLDA-SCNC: 2.75 MMOL/L (ref 0.5–2)
LEUKOCYTE ESTERASE UR QL STRIP.AUTO: NEGATIVE
LYMPHOCYTES # BLD AUTO: 1.18 10*3/MM3 (ref 0.7–3.1)
LYMPHOCYTES NFR BLD AUTO: 17.3 % (ref 19.6–45.3)
MAGNESIUM SERPL-MCNC: 2 MG/DL (ref 1.6–2.6)
MCH RBC QN AUTO: 30 PG (ref 26.6–33)
MCHC RBC AUTO-ENTMCNC: 35.8 G/DL (ref 31.5–35.7)
MCV RBC AUTO: 83.8 FL (ref 79–97)
METHGB BLD QL: 0.3 % (ref 0–1.5)
MODALITY: ABNORMAL
MONOCYTES # BLD AUTO: 0.55 10*3/MM3 (ref 0.1–0.9)
MONOCYTES NFR BLD AUTO: 8.1 % (ref 5–12)
NEUTROPHILS NFR BLD AUTO: 4.62 10*3/MM3 (ref 1.7–7)
NEUTROPHILS NFR BLD AUTO: 67.8 % (ref 42.7–76)
NITRITE UR QL STRIP: NEGATIVE
NRBC BLD AUTO-RTO: 0 /100 WBC (ref 0–0.2)
OXYHGB MFR BLDV: 66.7 % (ref 94–99)
PCO2 BLDV: 33.3 MM HG (ref 41–51)
PH BLDV: 7.43 PH UNITS (ref 7.31–7.41)
PH UR STRIP.AUTO: 6 [PH] (ref 5–8)
PLATELET # BLD AUTO: 112 10*3/MM3 (ref 140–450)
PMV BLD AUTO: 10.7 FL (ref 6–12)
PO2 BLDV: 33.3 MM HG (ref 35–42)
POTASSIUM BLDV-SCNC: 3.1 MMOL/L (ref 3.5–5)
POTASSIUM SERPL-SCNC: 3.3 MMOL/L (ref 3.5–5.2)
PROT SERPL-MCNC: 4.7 G/DL (ref 6–8.5)
PROT UR QL STRIP: ABNORMAL
RBC # BLD AUTO: 3.33 10*6/MM3 (ref 4.14–5.8)
RBC # UR STRIP: ABNORMAL /HPF
REF LAB TEST METHOD: ABNORMAL
SAO2 % BLDCOV: 69 % (ref 45–75)
SODIUM BLDV-SCNC: 129.3 MMOL/L (ref 136–146)
SODIUM SERPL-SCNC: 131 MMOL/L (ref 136–145)
SP GR UR STRIP: 1.02 (ref 1–1.03)
SQUAMOUS #/AREA URNS HPF: ABNORMAL /HPF
TROPONIN T DELTA: -2 NG/L
TROPONIN T SERPL HS-MCNC: 166 NG/L
UROBILINOGEN UR QL STRIP: ABNORMAL
WBC # UR STRIP: ABNORMAL /HPF
WBC NRBC COR # BLD AUTO: 6.82 10*3/MM3 (ref 3.4–10.8)
WHOLE BLOOD HOLD COAG: NORMAL
WHOLE BLOOD HOLD SPECIMEN: NORMAL
YEAST URNS QL MICRO: ABNORMAL /HPF

## 2024-03-04 PROCEDURE — 82820 HEMOGLOBIN-OXYGEN AFFINITY: CPT | Performed by: EMERGENCY MEDICINE

## 2024-03-04 PROCEDURE — 99223 1ST HOSP IP/OBS HIGH 75: CPT | Performed by: INTERNAL MEDICINE

## 2024-03-04 PROCEDURE — 63710000001 INSULIN LISPRO (HUMAN) PER 5 UNITS: Performed by: INTERNAL MEDICINE

## 2024-03-04 PROCEDURE — 84484 ASSAY OF TROPONIN QUANT: CPT | Performed by: EMERGENCY MEDICINE

## 2024-03-04 PROCEDURE — G0378 HOSPITAL OBSERVATION PER HR: HCPCS

## 2024-03-04 PROCEDURE — 25810000003 SODIUM CHLORIDE 0.9 % SOLUTION: Performed by: EMERGENCY MEDICINE

## 2024-03-04 PROCEDURE — 99285 EMERGENCY DEPT VISIT HI MDM: CPT

## 2024-03-04 PROCEDURE — 63710000001 INSULIN LISPRO (HUMAN) PER 5 UNITS: Performed by: STUDENT IN AN ORGANIZED HEALTH CARE EDUCATION/TRAINING PROGRAM

## 2024-03-04 PROCEDURE — 82948 REAGENT STRIP/BLOOD GLUCOSE: CPT | Performed by: INTERNAL MEDICINE

## 2024-03-04 PROCEDURE — 63710000001 INSULIN REGULAR HUMAN PER 5 UNITS: Performed by: EMERGENCY MEDICINE

## 2024-03-04 PROCEDURE — 82948 REAGENT STRIP/BLOOD GLUCOSE: CPT

## 2024-03-04 PROCEDURE — 63710000001 INSULIN DETEMIR PER 5 UNITS: Performed by: INTERNAL MEDICINE

## 2024-03-04 PROCEDURE — 70450 CT HEAD/BRAIN W/O DYE: CPT

## 2024-03-04 PROCEDURE — 93005 ELECTROCARDIOGRAM TRACING: CPT | Performed by: EMERGENCY MEDICINE

## 2024-03-04 PROCEDURE — 81001 URINALYSIS AUTO W/SCOPE: CPT | Performed by: EMERGENCY MEDICINE

## 2024-03-04 PROCEDURE — 93010 ELECTROCARDIOGRAM REPORT: CPT | Performed by: INTERNAL MEDICINE

## 2024-03-04 PROCEDURE — 82948 REAGENT STRIP/BLOOD GLUCOSE: CPT | Performed by: EMERGENCY MEDICINE

## 2024-03-04 PROCEDURE — 82140 ASSAY OF AMMONIA: CPT | Performed by: EMERGENCY MEDICINE

## 2024-03-04 PROCEDURE — 83735 ASSAY OF MAGNESIUM: CPT | Performed by: EMERGENCY MEDICINE

## 2024-03-04 PROCEDURE — 36415 COLL VENOUS BLD VENIPUNCTURE: CPT

## 2024-03-04 PROCEDURE — 82805 BLOOD GASES W/O2 SATURATION: CPT | Performed by: EMERGENCY MEDICINE

## 2024-03-04 PROCEDURE — 82948 REAGENT STRIP/BLOOD GLUCOSE: CPT | Performed by: STUDENT IN AN ORGANIZED HEALTH CARE EDUCATION/TRAINING PROGRAM

## 2024-03-04 PROCEDURE — 25510000001 IOPAMIDOL PER 1 ML: Performed by: EMERGENCY MEDICINE

## 2024-03-04 PROCEDURE — 85025 COMPLETE CBC W/AUTO DIFF WBC: CPT | Performed by: EMERGENCY MEDICINE

## 2024-03-04 PROCEDURE — 80053 COMPREHEN METABOLIC PANEL: CPT | Performed by: EMERGENCY MEDICINE

## 2024-03-04 PROCEDURE — 74177 CT ABD & PELVIS W/CONTRAST: CPT

## 2024-03-04 PROCEDURE — 71045 X-RAY EXAM CHEST 1 VIEW: CPT

## 2024-03-04 RX ORDER — SODIUM CHLORIDE 9 MG/ML
40 INJECTION, SOLUTION INTRAVENOUS AS NEEDED
Status: DISCONTINUED | OUTPATIENT
Start: 2024-03-04 | End: 2024-03-11 | Stop reason: HOSPADM

## 2024-03-04 RX ORDER — GABAPENTIN 100 MG/1
100 CAPSULE ORAL 3 TIMES DAILY
Status: DISCONTINUED | OUTPATIENT
Start: 2024-03-04 | End: 2024-03-11 | Stop reason: HOSPADM

## 2024-03-04 RX ORDER — SODIUM CHLORIDE 0.9 % (FLUSH) 0.9 %
10 SYRINGE (ML) INJECTION EVERY 12 HOURS SCHEDULED
Status: DISCONTINUED | OUTPATIENT
Start: 2024-03-04 | End: 2024-03-11 | Stop reason: HOSPADM

## 2024-03-04 RX ORDER — INSULIN LISPRO 100 [IU]/ML
2-7 INJECTION, SOLUTION INTRAVENOUS; SUBCUTANEOUS
Status: DISCONTINUED | OUTPATIENT
Start: 2024-03-04 | End: 2024-03-11 | Stop reason: HOSPADM

## 2024-03-04 RX ORDER — OXYCODONE HYDROCHLORIDE 5 MG/1
5 TABLET ORAL EVERY 8 HOURS PRN
Status: DISPENSED | OUTPATIENT
Start: 2024-03-04 | End: 2024-03-11

## 2024-03-04 RX ORDER — FAMOTIDINE 20 MG/1
20 TABLET, FILM COATED ORAL
Status: DISCONTINUED | OUTPATIENT
Start: 2024-03-04 | End: 2024-03-11 | Stop reason: HOSPADM

## 2024-03-04 RX ORDER — INSULIN LISPRO 100 [IU]/ML
5 INJECTION, SOLUTION INTRAVENOUS; SUBCUTANEOUS ONCE
Status: COMPLETED | OUTPATIENT
Start: 2024-03-04 | End: 2024-03-04

## 2024-03-04 RX ORDER — IBUPROFEN 600 MG/1
1 TABLET ORAL
Status: DISCONTINUED | OUTPATIENT
Start: 2024-03-04 | End: 2024-03-11 | Stop reason: HOSPADM

## 2024-03-04 RX ORDER — SPIRONOLACTONE 25 MG/1
25 TABLET ORAL DAILY
Status: DISCONTINUED | OUTPATIENT
Start: 2024-03-04 | End: 2024-03-10

## 2024-03-04 RX ORDER — NITROGLYCERIN 0.4 MG/1
0.4 TABLET SUBLINGUAL
Status: DISCONTINUED | OUTPATIENT
Start: 2024-03-04 | End: 2024-03-05

## 2024-03-04 RX ORDER — SODIUM CHLORIDE 0.9 % (FLUSH) 0.9 %
10 SYRINGE (ML) INJECTION AS NEEDED
Status: DISCONTINUED | OUTPATIENT
Start: 2024-03-04 | End: 2024-03-11 | Stop reason: HOSPADM

## 2024-03-04 RX ORDER — FOLIC ACID 1 MG/1
1 TABLET ORAL DAILY
Status: DISCONTINUED | OUTPATIENT
Start: 2024-03-04 | End: 2024-03-11 | Stop reason: HOSPADM

## 2024-03-04 RX ORDER — FUROSEMIDE 40 MG/1
40 TABLET ORAL DAILY
Status: DISCONTINUED | OUTPATIENT
Start: 2024-03-04 | End: 2024-03-10

## 2024-03-04 RX ORDER — ONDANSETRON 2 MG/ML
4 INJECTION INTRAMUSCULAR; INTRAVENOUS EVERY 6 HOURS PRN
Status: DISCONTINUED | OUTPATIENT
Start: 2024-03-04 | End: 2024-03-11 | Stop reason: HOSPADM

## 2024-03-04 RX ORDER — ACETAMINOPHEN 325 MG/1
650 TABLET ORAL EVERY 6 HOURS PRN
Status: DISCONTINUED | OUTPATIENT
Start: 2024-03-04 | End: 2024-03-11 | Stop reason: HOSPADM

## 2024-03-04 RX ORDER — NALOXONE HCL 0.4 MG/ML
0.4 VIAL (ML) INJECTION
Status: DISCONTINUED | OUTPATIENT
Start: 2024-03-04 | End: 2024-03-11 | Stop reason: HOSPADM

## 2024-03-04 RX ORDER — POLYETHYLENE GLYCOL 3350 17 G/17G
17 POWDER, FOR SOLUTION ORAL DAILY PRN
Status: DISCONTINUED | OUTPATIENT
Start: 2024-03-04 | End: 2024-03-11 | Stop reason: HOSPADM

## 2024-03-04 RX ORDER — POTASSIUM CHLORIDE 750 MG/1
40 CAPSULE, EXTENDED RELEASE ORAL ONCE
Status: COMPLETED | OUTPATIENT
Start: 2024-03-04 | End: 2024-03-04

## 2024-03-04 RX ORDER — ALBUMIN (HUMAN) 12.5 G/50ML
12.5 SOLUTION INTRAVENOUS ONCE
Status: COMPLETED | OUTPATIENT
Start: 2024-03-05 | End: 2024-03-05

## 2024-03-04 RX ORDER — BISACODYL 10 MG
10 SUPPOSITORY, RECTAL RECTAL DAILY PRN
Status: DISCONTINUED | OUTPATIENT
Start: 2024-03-04 | End: 2024-03-11 | Stop reason: HOSPADM

## 2024-03-04 RX ORDER — DEXTROSE MONOHYDRATE 25 G/50ML
25 INJECTION, SOLUTION INTRAVENOUS
Status: DISCONTINUED | OUTPATIENT
Start: 2024-03-04 | End: 2024-03-11 | Stop reason: HOSPADM

## 2024-03-04 RX ORDER — ALUMINA, MAGNESIA, AND SIMETHICONE 2400; 2400; 240 MG/30ML; MG/30ML; MG/30ML
15 SUSPENSION ORAL EVERY 6 HOURS PRN
Status: DISCONTINUED | OUTPATIENT
Start: 2024-03-04 | End: 2024-03-11 | Stop reason: HOSPADM

## 2024-03-04 RX ORDER — FLUOXETINE HYDROCHLORIDE 20 MG/1
20 CAPSULE ORAL DAILY
Status: DISCONTINUED | OUTPATIENT
Start: 2024-03-04 | End: 2024-03-04

## 2024-03-04 RX ORDER — NICOTINE POLACRILEX 4 MG
15 LOZENGE BUCCAL
Status: DISCONTINUED | OUTPATIENT
Start: 2024-03-04 | End: 2024-03-11 | Stop reason: HOSPADM

## 2024-03-04 RX ORDER — AMOXICILLIN 250 MG
2 CAPSULE ORAL 2 TIMES DAILY PRN
Status: DISCONTINUED | OUTPATIENT
Start: 2024-03-04 | End: 2024-03-11 | Stop reason: HOSPADM

## 2024-03-04 RX ORDER — CALCIUM CARBONATE 500 MG/1
2 TABLET, CHEWABLE ORAL 3 TIMES DAILY PRN
Status: DISCONTINUED | OUTPATIENT
Start: 2024-03-04 | End: 2024-03-11 | Stop reason: HOSPADM

## 2024-03-04 RX ORDER — OXYCODONE HYDROCHLORIDE 5 MG/1
2.5 TABLET ORAL EVERY 8 HOURS PRN
Status: DISCONTINUED | OUTPATIENT
Start: 2024-03-04 | End: 2024-03-11 | Stop reason: HOSPADM

## 2024-03-04 RX ORDER — INSULIN LISPRO 100 [IU]/ML
10 INJECTION, SOLUTION INTRAVENOUS; SUBCUTANEOUS ONCE
Status: COMPLETED | OUTPATIENT
Start: 2024-03-04 | End: 2024-03-04

## 2024-03-04 RX ORDER — MULTIPLE VITAMINS W/ MINERALS TAB 9MG-400MCG
1 TAB ORAL DAILY
Status: DISCONTINUED | OUTPATIENT
Start: 2024-03-04 | End: 2024-03-11 | Stop reason: HOSPADM

## 2024-03-04 RX ORDER — NICOTINE 21 MG/24HR
1 PATCH, TRANSDERMAL 24 HOURS TRANSDERMAL
Status: DISCONTINUED | OUTPATIENT
Start: 2024-03-04 | End: 2024-03-11 | Stop reason: HOSPADM

## 2024-03-04 RX ORDER — CHOLECALCIFEROL (VITAMIN D3) 125 MCG
5 CAPSULE ORAL NIGHTLY PRN
Status: DISCONTINUED | OUTPATIENT
Start: 2024-03-04 | End: 2024-03-11 | Stop reason: HOSPADM

## 2024-03-04 RX ORDER — BISACODYL 5 MG/1
5 TABLET, DELAYED RELEASE ORAL DAILY PRN
Status: DISCONTINUED | OUTPATIENT
Start: 2024-03-04 | End: 2024-03-11 | Stop reason: HOSPADM

## 2024-03-04 RX ORDER — LACTULOSE 10 G/15ML
20 SOLUTION ORAL 3 TIMES DAILY
Status: DISCONTINUED | OUTPATIENT
Start: 2024-03-04 | End: 2024-03-04

## 2024-03-04 RX ORDER — LACTULOSE 10 G/15ML
10 SOLUTION ORAL 3 TIMES DAILY
Status: DISCONTINUED | OUTPATIENT
Start: 2024-03-04 | End: 2024-03-08

## 2024-03-04 RX ADMIN — INSULIN DETEMIR 10 UNITS: 100 INJECTION, SOLUTION SUBCUTANEOUS at 20:10

## 2024-03-04 RX ADMIN — Medication 100 MG: at 17:41

## 2024-03-04 RX ADMIN — Medication 10 ML: at 11:21

## 2024-03-04 RX ADMIN — FOLIC ACID 1 MG: 1 TABLET ORAL at 17:41

## 2024-03-04 RX ADMIN — FAMOTIDINE 20 MG: 20 TABLET ORAL at 17:21

## 2024-03-04 RX ADMIN — INSULIN LISPRO 10 UNITS: 100 INJECTION, SOLUTION INTRAVENOUS; SUBCUTANEOUS at 15:17

## 2024-03-04 RX ADMIN — INSULIN LISPRO 5 UNITS: 100 INJECTION, SOLUTION INTRAVENOUS; SUBCUTANEOUS at 17:22

## 2024-03-04 RX ADMIN — GABAPENTIN 100 MG: 100 CAPSULE ORAL at 11:07

## 2024-03-04 RX ADMIN — LACTULOSE 10 G: 20 SOLUTION ORAL at 15:17

## 2024-03-04 RX ADMIN — Medication 1 TABLET: at 17:21

## 2024-03-04 RX ADMIN — OXYCODONE 5 MG: 5 TABLET ORAL at 20:10

## 2024-03-04 RX ADMIN — Medication 10 ML: at 20:10

## 2024-03-04 RX ADMIN — SODIUM CHLORIDE 1000 ML: 9 INJECTION, SOLUTION INTRAVENOUS at 02:47

## 2024-03-04 RX ADMIN — GABAPENTIN 100 MG: 100 CAPSULE ORAL at 20:10

## 2024-03-04 RX ADMIN — INSULIN LISPRO 7 UNITS: 100 INJECTION, SOLUTION INTRAVENOUS; SUBCUTANEOUS at 12:31

## 2024-03-04 RX ADMIN — INSULIN LISPRO 5 UNITS: 100 INJECTION, SOLUTION INTRAVENOUS; SUBCUTANEOUS at 20:10

## 2024-03-04 RX ADMIN — POTASSIUM CHLORIDE 40 MEQ: 10 CAPSULE, COATED, EXTENDED RELEASE ORAL at 11:07

## 2024-03-04 RX ADMIN — Medication 5 MG: at 20:10

## 2024-03-04 RX ADMIN — RIFAXIMIN 550 MG: 550 TABLET ORAL at 20:10

## 2024-03-04 RX ADMIN — RIFAXIMIN 550 MG: 550 TABLET ORAL at 11:20

## 2024-03-04 RX ADMIN — LACTULOSE 10 G: 20 SOLUTION ORAL at 20:09

## 2024-03-04 RX ADMIN — FUROSEMIDE 40 MG: 40 TABLET ORAL at 11:07

## 2024-03-04 RX ADMIN — INSULIN LISPRO 5 UNITS: 100 INJECTION, SOLUTION INTRAVENOUS; SUBCUTANEOUS at 13:28

## 2024-03-04 RX ADMIN — INSULIN DETEMIR 10 UNITS: 100 INJECTION, SOLUTION SUBCUTANEOUS at 11:07

## 2024-03-04 RX ADMIN — LACTULOSE 10 G: 20 SOLUTION ORAL at 11:20

## 2024-03-04 RX ADMIN — INSULIN LISPRO 6 UNITS: 100 INJECTION, SOLUTION INTRAVENOUS; SUBCUTANEOUS at 17:21

## 2024-03-04 RX ADMIN — INSULIN HUMAN 7 UNITS: 100 INJECTION, SOLUTION PARENTERAL at 04:55

## 2024-03-04 RX ADMIN — SPIRONOLACTONE 25 MG: 25 TABLET ORAL at 11:07

## 2024-03-04 RX ADMIN — GABAPENTIN 100 MG: 100 CAPSULE ORAL at 15:17

## 2024-03-04 RX ADMIN — IOPAMIDOL 100 ML: 755 INJECTION, SOLUTION INTRAVENOUS at 04:43

## 2024-03-04 NOTE — ED PROVIDER NOTES
Time: 4:24 AM EST  Date of encounter:  3/4/2024  Independent Historian/Clinical History and Information was obtained by:   Patient    History is limited by: N/A    Chief Complaint: weakness      History of Present Illness:  Patient is a 53 y.o. year old male who presents to the emergency department for evaluation of Generalized weakness.  Patient stated he had multiple falls over the last 2 days due to generalized weakness.  He also reports worsening neuropathy in bilateral lower extremities.  He also has a history of cirrhosis.  Patient states has not been taking his medication as prescribed.  He especially has not been taking his insulin.  Denies any chest pain, shortness of breath, fever, chills, cough, congestion.  Does report diffuse abdominal pain.  He does not believe he hit his head with his frequent falls.    HPI    Patient Care Team  Primary Care Provider: Provider, Cori Known    Past Medical History:     No Known Allergies  Past Medical History:   Diagnosis Date    Abnormal LFTs     Anxiety     Back pain     Diabetes mellitus     Hypertension     Kidney failure     Liver failure     Rash      Past Surgical History:   Procedure Laterality Date    TIPS PROCEDURE       Family History   Problem Relation Age of Onset    Diabetes Father     Heart disease Father     Hypertension Father     Diabetes type II Father     Stroke Father     Diabetes Brother        Home Medications:  Prior to Admission medications    Medication Sig Start Date End Date Taking? Authorizing Provider   Ertugliflozin L-PyroglutamicAc (STEGLATRO) 5 MG tablet Take 1 tablet by mouth Every Morning. 8/2/18   Shadi Barrett MD   FLUoxetine (PROzac) 20 MG capsule Take 1 capsule by mouth Daily. 6/12/19   Shadi Barrett MD   furosemide (LASIX) 40 MG tablet Take 1 tablet by mouth Daily. 8/5/23 8/4/24  Provider, MD Yareli   glucose blood test strip 1 each by Other route Daily. Use as instructed 6/30/17   Woodrow Dash Jr., MD  "  lactulose (CHRONULAC) 10 GM/15ML solution Take 30 mL by mouth 3 (Three) Times a Day. Pt doesn't take as ordered 9/7/23   Yareli Sanchez MD   Lancets (FREESTYLE) lancets 1 each by Other route Daily. 6/30/17   Woodrow Dash Jr., MD   lidocaine (LIDODERM) 5 % Place 1 patch on the skin as directed by provider Daily. Remove & Discard patch within 12 hours or as directed by MD 1/6/24   Raymond Peters PA-C   lisinopril (PRINIVIL,ZESTRIL) 40 MG tablet Take 1 tablet by mouth Daily. 6/12/19   Shadi Barrett MD   potassium chloride (MICRO-K) 10 MEQ CR capsule Take 1 capsule by mouth. 12/29/23   ProviderYareli MD   SITagliptin-MetFORMIN HCl ER (JANUMET XR) 100-1000 MG tablet Take 1 tablet by mouth Daily. 8/2/18   Shadi Barrett MD        Social History:   Social History     Tobacco Use    Smoking status: Every Day     Current packs/day: 0.50     Average packs/day: 0.5 packs/day for 39.2 years (19.6 ttl pk-yrs)     Types: Cigarettes     Start date: 1985    Smokeless tobacco: Never   Substance Use Topics    Alcohol use: Not Currently     Comment: daily          sober since 10/19/2020    Drug use: No         Review of Systems:  Review of Systems   Constitutional:  Negative for chills and fever.   HENT:  Negative for congestion, ear pain and sore throat.    Eyes:  Negative for pain.   Respiratory:  Negative for cough, chest tightness and shortness of breath.    Cardiovascular:  Negative for chest pain.   Gastrointestinal:  Positive for abdominal pain. Negative for diarrhea, nausea and vomiting.   Genitourinary:  Negative for flank pain and hematuria.   Musculoskeletal:  Negative for joint swelling.   Skin:  Negative for pallor.   Neurological:  Positive for weakness. Negative for seizures and headaches.   All other systems reviewed and are negative.       Physical Exam:  /97   Pulse 87   Temp 98.7 °F (37.1 °C) (Oral)   Resp 12   Ht 175.3 cm (69\")   Wt 98.8 kg (217 lb 13 oz)   SpO2 100%  "  BMI 32.17 kg/m²     Physical Exam  Constitutional:       Appearance: Normal appearance.   HENT:      Head: Normocephalic and atraumatic.      Nose: Nose normal.      Mouth/Throat:      Mouth: Mucous membranes are moist.   Eyes:      Extraocular Movements: Extraocular movements intact.      Conjunctiva/sclera: Conjunctivae normal.      Pupils: Pupils are equal, round, and reactive to light.   Cardiovascular:      Rate and Rhythm: Normal rate and regular rhythm.      Pulses: Normal pulses.      Heart sounds: Normal heart sounds.   Pulmonary:      Effort: Pulmonary effort is normal.      Breath sounds: Normal breath sounds.   Abdominal:      General: There is no distension.      Palpations: Abdomen is soft.      Tenderness: There is abdominal tenderness.   Musculoskeletal:         General: Normal range of motion.      Cervical back: Normal range of motion.      Right lower leg: Edema present.      Left lower leg: Edema present.   Skin:     General: Skin is warm and dry.      Capillary Refill: Capillary refill takes less than 2 seconds.   Neurological:      General: No focal deficit present.      Mental Status: He is alert and oriented to person, place, and time. Mental status is at baseline.   Psychiatric:         Mood and Affect: Mood normal.         Behavior: Behavior normal.                  Procedures:  Procedures      Medical Decision Making:      Comorbidities that affect care:    cirrhosis, Chronic Kidney Disease, Diabetes, Hypertension, Smoking    External Notes reviewed:    Previous Clinic Note: Patient was seen by gastroenterologist on 11/30/2020 for elevated liver enzymes and encounter for screening for malignant neoplasm of colon      The following orders were placed and all results were independently analyzed by me:  Orders Placed This Encounter   Procedures    XR Chest 1 View    CT Head Without Contrast    CT Abdomen Pelvis With Contrast    [place] Draw    Comprehensive Metabolic Panel    Single High  Sensitivity Troponin T    Magnesium    Urinalysis With Microscopic If Indicated (No Culture) - Urine, Clean Catch    CBC Auto Differential    Ammonia    VBG with Co-Ox and Electrolytes    Urinalysis, Microscopic Only - Urine, Clean Catch    High Sensitivity Troponin T 2Hr    NPO Diet NPO Type: Strict NPO    Undress & Gown    Continuous Pulse Oximetry    Vital Signs    Orthostatic Blood Pressure    Ambulate patient    Code Status and Medical Interventions:    Inpatient Hospitalist Consult    Oxygen Therapy- Nasal Cannula; Titrate 1-6 LPM Per SpO2; 90 - 95%    POC Glucose Once    POC Glucose Once    POC Glucose STAT    POC Glucose Q1H    POC Glucose Once    POC Glucose Once    ECG 12 Lead ED Triage Standing Order; Weak / Dizzy / AMS    Insert Peripheral IV    Initiate Observation Status    Fall Precautions    CBC & Differential    Green Top (Gel)    Lavender Top    Gold Top - SST    Light Blue Top    Extra Tubes    Gray Top       Medications Given in the Emergency Department:  Medications   sodium chloride 0.9 % flush 10 mL (has no administration in time range)   sodium chloride 0.9 % bolus 1,000 mL (0 mL Intravenous Stopped 3/4/24 0637)   insulin regular (humuLIN R,novoLIN R) injection 7 Units (7 Units Intravenous Given 3/4/24 0455)   iopamidol (ISOVUE-370) 76 % injection 100 mL (100 mL Intravenous Given 3/4/24 0443)        ED Course:    ED Course as of 03/04/24 0702   Mon Mar 04, 2024   0429 ECG 12 Lead ED Triage Standing Order; Weak / Dizzy / AMS  .  Sinus rhythm with rate of 88.  No acute ST elevation.  Normal CO interval.  Prolonged QTc.  Nonspecific T wave abnormality.  EKG interpreted by me [LD]      ED Course User Index  [LD] Roseline Blair MD       Labs:    Lab Results (last 24 hours)       Procedure Component Value Units Date/Time    CBC & Differential [[id number]]  (Abnormal) Collected: 03/04/24 0229    Specimen: Blood from Arm, Right Updated: 03/04/24 0303    Narrative:      The following orders were  created for panel order CBC & Differential.  Procedure                               Abnormality         Status                     ---------                               -----------         ------                     CBC Auto Differential[[id number]]        Abnormal            Final result                 Please view results for these tests on the individual orders.    Comprehensive Metabolic Panel [[id number]]  (Abnormal) Collected: 03/04/24 0229    Specimen: Blood from Arm, Right Updated: 03/04/24 0331     Glucose 682 mg/dL      BUN 24 mg/dL      Creatinine 1.85 mg/dL      Sodium 131 mmol/L      Potassium 3.3 mmol/L      Comment: Slight hemolysis detected by analyzer. Result may be falsely elevated.        Chloride 99 mmol/L      CO2 21.8 mmol/L      Calcium 7.8 mg/dL      Total Protein 4.7 g/dL      Albumin 1.9 g/dL      ALT (SGPT) 22 U/L      AST (SGOT) 47 U/L      Alkaline Phosphatase 351 U/L      Total Bilirubin 1.9 mg/dL      Globulin 2.8 gm/dL      A/G Ratio 0.7 g/dL      BUN/Creatinine Ratio 13.0     Anion Gap 10.2 mmol/L      eGFR 43.0 mL/min/1.73     Narrative:      GFR Normal >60  Chronic Kidney Disease <60  Kidney Failure <15      Single High Sensitivity Troponin T [[id number]]  (Abnormal) Collected: 03/04/24 0229    Specimen: Blood from Arm, Right Updated: 03/04/24 0330     HS Troponin T 166 ng/L     Narrative:      High Sensitive Troponin T Reference Range:  <14.0 ng/L- Negative Female for AMI  <22.0 ng/L- Negative Male for AMI  >=14 - Abnormal Female indicating possible myocardial injury.  >=22 - Abnormal Male indicating possible myocardial injury.   Clinicians would have to utilize clinical acumen, EKG, Troponin, and serial changes to determine if it is an Acute Myocardial Infarction or myocardial injury due to an underlying chronic condition.         Magnesium [[id number]]  (Normal) Collected: 03/04/24 0229    Specimen: Blood from Arm, Right Updated: 03/04/24 0321     Magnesium 2.0 mg/dL     CBC Auto  Differential [[id number]]  (Abnormal) Collected: 03/04/24 0229    Specimen: Blood from Arm, Right Updated: 03/04/24 0303     WBC 6.82 10*3/mm3      RBC 3.33 10*6/mm3      Hemoglobin 10.0 g/dL      Hematocrit 27.9 %      MCV 83.8 fL      MCH 30.0 pg      MCHC 35.8 g/dL      RDW 15.3 %      RDW-SD 45.9 fl      MPV 10.7 fL      Platelets 112 10*3/mm3      Neutrophil % 67.8 %      Lymphocyte % 17.3 %      Monocyte % 8.1 %      Eosinophil % 5.4 %      Basophil % 1.0 %      Immature Grans % 0.4 %      Neutrophils, Absolute 4.62 10*3/mm3      Lymphocytes, Absolute 1.18 10*3/mm3      Monocytes, Absolute 0.55 10*3/mm3      Eosinophils, Absolute 0.37 10*3/mm3      Basophils, Absolute 0.07 10*3/mm3      Immature Grans, Absolute 0.03 10*3/mm3      nRBC 0.0 /100 WBC     POC Glucose Once [[id number]]  (Abnormal) Collected: 03/04/24 0233    Specimen: Blood Updated: 03/04/24 0235     Glucose >600 mg/dL      Comment: Serial Number: [id number]:  [id number]       Ammonia [[id number]]  (Abnormal) Collected: 03/04/24 0244    Specimen: Blood Updated: 03/04/24 0319     Ammonia 64 umol/L     VBG with Co-Ox and Electrolytes [[id number]]  (Abnormal) Collected: 03/04/24 0245    Specimen: Venous Blood Updated: 03/04/24 0308     pH, Venous 7.430 pH Units      pCO2, Venous 33.3 mm Hg      pO2, Venous 33.3 mm Hg      HCO3, Venous 21.6 mmol/L      Base Excess, Venous -2.1 mmol/L      O2 Saturation, Venous 69.0 %      Hemoglobin, Blood Gas 11.2 g/dL      Carboxyhemoglobin 3.1 %      Methemoglobin 0.30 %      Oxyhemoglobin 66.7 %      FHHB 29.9 %      Site Drawn by RN     Modality Room Air     FIO2 21 %      Sodium, Venous 129.3 mmol/L      Potassium, Venous 3.1 mmol/L      Ionized Calcium, Arterial 1.10 mmol/L      Chloride, Venous  102 mmol/L      Glucose, Arterial 649 mg/dL      Lactate, Arterial 2.75 mmol/L     Urinalysis With Microscopic If Indicated (No Culture) - Urine, Clean Catch [[id number]]  (Abnormal) Collected: 03/04/24 0640     Specimen: Urine, Clean Catch Updated: 03/04/24 0323     Color, UA Yellow     Appearance, UA Clear     pH, UA 6.0     Specific Gravity, UA 1.022     Glucose, UA >=1000 mg/dL (3+)     Ketones, UA Negative     Bilirubin, UA Negative     Blood, UA Large (3+)     Protein,  mg/dL (2+)     Leuk Esterase, UA Negative     Nitrite, UA Negative     Urobilinogen, UA 1.0 E.U./dL    Urinalysis, Microscopic Only - Urine, Clean Catch [[id number]]  (Abnormal) Collected: 03/04/24 0307    Specimen: Urine, Clean Catch Updated: 03/04/24 0336     RBC, UA 21-50 /HPF      WBC, UA 3-5 /HPF      Bacteria, UA None Seen /HPF      Squamous Epithelial Cells, UA 0-2 /HPF      Yeast, UA Small/1+ Budding Yeast /HPF      Hyaline Casts, UA None Seen /LPF      Methodology Manual Light Microscopy    POC Glucose Once [[id number]]  (Abnormal) Collected: 03/04/24 0453    Specimen: Blood Updated: 03/04/24 0615     Glucose 581 mg/dL      Comment: Serial Number: [id number]:  [id number]       High Sensitivity Troponin T 2Hr [[id number]]  (Abnormal) Collected: 03/04/24 0457    Specimen: Blood Updated: 03/04/24 0535     HS Troponin T 164 ng/L      Troponin T Delta -2 ng/L     Narrative:      High Sensitive Troponin T Reference Range:  <14.0 ng/L- Negative Female for AMI  <22.0 ng/L- Negative Male for AMI  >=14 - Abnormal Female indicating possible myocardial injury.  >=22 - Abnormal Male indicating possible myocardial injury.   Clinicians would have to utilize clinical acumen, EKG, Troponin, and serial changes to determine if it is an Acute Myocardial Infarction or myocardial injury due to an underlying chronic condition.         POC Glucose Once [[id number]]  (Abnormal) Collected: 03/04/24 0557    Specimen: Blood Updated: 03/04/24 0615     Glucose 424 mg/dL      Comment: Serial Number: [id number]:  [id number]                Imaging:    CT Abdomen Pelvis With Contrast    Result Date: 3/4/2024  PROCEDURE: CT ABDOMEN PELVIS W CONTRAST   COMPARISON: None  INDICATIONS: abdominal pain.  Chronic liver disease.  Confusion.  TECHNIQUE: After obtaining the patient's consent, CT images were created with non-ionic intravenous contrast material.   PROTOCOL:   Standard imaging protocol performed    RADIATION:   DLP: 1050.7 mGy*cm   Automated exposure control was utilized to minimize radiation dose. CONTRAST: 75 cc Isovue 370 I.V.  FINDINGS:  There is streak artifact from the patient's arms.  There is mild atelectasis in the lower lobes and lingula.  There is a trace amount of left pleural fluid.  There is gynecomastia bilaterally.  Generalized anasarca is present as well.  There is a moderate amount of ascites.  The aorta is normal in caliber.  Gallstone is present in the gallbladder.  No biliary obstruction.  The liver is frankly cirrhotic.  A TIPS is present.  Patency is difficult to assess due to timing of the contrast bolus.  No definite liver mass.  The solid abdominal organs otherwise appear within normal limits.  Splenorenal shunts in the left upper quadrant are noted compatible with portal hypertension.  Urinary bladder and prostate gland are within normal limits.  Large bowel appears normal without evidence of colitis.  The appendix is normal.  There is no small bowel obstruction.  Mildly thickened small bowel loops may reflect enteritis, although this could also be the sequela of chronic liver disease and ascites.  Stomach is grossly normal.  No adenopathy.        1. Cirrhosis and portal hypertension.  A TIPS is present. 2. Moderate ascites with generalized anasarca and a trace left pleural effusion. 3. Cholelithiasis without biliary obstruction. 4. Small bowel wall thickening without obstruction.  This may reflect enteritis or could be the sequela of chronic liver disease and ascites. 5. Normal large bowel and appendix. 6. Nonobstructed kidneys.      ADIS RICHARDSON MD       Electronically Signed and Approved By: ADIS RICHARDSON MD on 3/04/2024 at  5:14             CT Head Without Contrast    Result Date: 3/4/2024  PROCEDURE: CT HEAD WO CONTRAST  COMPARISON:  None INDICATIONS: headache and confusion.  PROTOCOL:   Standard imaging protocol performed    RADIATION:   DLP: 1144.2 mGy*cm   MA and/or KV was adjusted to minimize radiation dose.     TECHNIQUE: After obtaining the patient's consent, CT images were obtained without non-ionic intravenous contrast material.  FINDINGS:  There is generalized atrophy.  Ventricular size and configuration are within normal limits.  No acute infarct or hemorrhage is seen.  There are no masses.  There is no skull fracture.  Mild chronic mucosal thickening is present in the maxillary sinuses.       Generalized atrophy.  No acute findings.     ADIS RICHARDSON MD       Electronically Signed and Approved By: ADIS RICHARDSON MD on 3/04/2024 at 4:54             XR Chest 1 View    Result Date: 3/4/2024  PROCEDURE: XR CHEST 1 VW  COMPARISON: [facility], , XR CHEST 1 VW, 1/01/2024, 12:33.  INDICATIONS: Weak/Dizzy/AMS triage protocol  FINDINGS:  Cardiac and mediastinal contours are normal.  Lung volumes are low, but the lungs are clear.  Vascularity is normal.  No pneumothorax.  There is mild elevation of the right hemidiaphragm.       No acute cardiopulmonary findings.       ADIS RICHARDSON MD       Electronically Signed and Approved By: ADIS RICHARDSON MD on 3/04/2024 at 2:44                Differential Diagnosis and Discussion:    Weakness: Based on the patient's history, signs, and symptoms, the diffential diagnosis includes but is not limited to meningitis, stroke, sepsis, subarachnoid hemorrhage, intracranial bleeding, encephalitis, acute uti, dehydration, MS, myasthenia gravis, Guillan [place], migraine variant, neuromuscular disorders vertigo, electrolyte imbalance, and metabolic disorders.    All labs were reviewed and interpreted by me.  All X-rays impressions were independently interpreted by me.  EKG was interpreted  by me.  CT scan radiology impression was interpreted by me.    MDM  Number of Diagnoses or Management Options  Diagnosis management comments: Patient is afebrile nontoxic-appearing.  Vital signs are stable.  Patient reports generalized weakness.  Attempted get patient up to ambulate but he is too weak to stand.  He currently lives alone.  Blood glucose was elevated.  He was given insulin and IV fluids blood glucose improved.  Creatinine elevated to 1.85 this is similar to previous.  Troponin is also elevated.  With generalized weakness discussed patient with hospitalist and will admit for further care         Amount and/or Complexity of Data Reviewed  Decide to obtain previous medical records or to obtain history from someone other than the patient: yes                 Patient Care Considerations:    SEPSIS was considered but is NOT present in the emergency department as SIRS criteria is not present.      Consultants/Shared Management Plan:    Hospitalist: I have discussed the case with Dr Cm who agrees to accept the patient for admission.    Social Determinants of Health:    Patient is unable to carry out activities of daily life. Escalation of care is necessary.       Disposition and Care Coordination:    Admit:   Through independent evaluation of the patient's history, physical, and imperical data, the patient meets criteria for inpatient admission to the hospital.        Final diagnoses:   Weakness generalized   Cirrhosis of liver without ascites, unspecified hepatic cirrhosis type        ED Disposition       ED Disposition   Decision to Admit    Condition   --    Comment   Level of Care: Telemetry [5]   Diagnosis: Weakness [[id number]]   Admitting Physician: HORACIO GUDINO [[id number]]   Attending Physician: HORACIO GUDINO [[id number]]                 This medical record created using voice recognition software.             Roseline Blair MD  03/04/24 0702

## 2024-03-04 NOTE — ED NOTES
Ambulated patient and he has extreme difficulty ambulating without assistance and experiences a lot of pain

## 2024-03-04 NOTE — H&P
[facility]  Date: 3/4/2024   Patient Name: Simon Peters  : 1970  MRN: [id number]  Primary Care Physician:  Provider, No Known  Date of admission: 3/4/2024    Subjective   Subjective     Chief Complaint: Generalized weakness.  Legs giving out and frequent falls for past few days    HPI:    Simon Peters is a 53 y.o. male with past ministry of alcoholic cirrhosis s/p TIPS in May 23.  Patient has been seen at  transplant center on  and has been in [place] from  to  for hepatic encephalopathy, CKD 3B, diabetes mellitus with nephropathy seen by  endocrinology, noncompliant, hypertension, tobacco use disorder, pleural effusion and ascites, chronic scrotal swelling and chronic anemia thrombocytopenia due to cirrhosis.  Patient has been generally getting more weak with increasing numbness tingling in lower extremities.  Does have a history of neuropathy in bilateral lower extremities.  Patient has not taken any of his medications including insulin for some time.  Patient has fallen 3-4 times in the past few weeks.  There is no loss of consciousness.  His leg just gives out.  No major injury from the fall.  Apparently he has been kicked out by his mother due to drinking.  Per patient his last drink was on  last year.  Denies any chest pain, shortness of air, vomiting diarrhea or blood in the stools or black-colored stools.  No cough or phlegm.  Does have lower abdominal pain chronic in nature.  No urinary complaints.  Workup in the ED showed elevated blood pressure with good oxygen saturation on room air.  Delta troponin is -2.  Venous pH is 7.4.  Creatinine 1.8.  Baseline creatinine is 2-2.3.  No anion gap.  Blood glucose of 682.  Albumin of 1.9.  Ammonia of 64 with a total bili of 1.9.  Hemoglobin of 12.  Platelets 112.  UA shows RBCs and WBCs with no bacteria or ketones.  EKG is normal sinus rhythm.  Chest x-ray, CT head  negative.  CT abdomen pelvis shows gallstones with moderate ascites.  Because of uncontrolled hyperglycemia hospitalist has been called to admit him for further evaluation    Personal History     Past Medical History:  Past Medical History:   Diagnosis Date    Abnormal LFTs     Anxiety     Back pain     Diabetes mellitus     Hypertension     Kidney failure     Liver failure     Rash        Past Surgical History:  Past Surgical History:   Procedure Laterality Date    TIPS PROCEDURE         Family History:   family history includes Diabetes in his brother and father; Diabetes type II in his father; Heart disease in his father; Hypertension in his father; Stroke in his father.    Social History:    reports that he has been smoking cigarettes. He started smoking about 39 years ago. He has a 19.6 pack-year smoking history. He has never used smokeless tobacco. He reports that he does not currently use alcohol. He reports that he does not use drugs.    Home Medications:   Patient has not been taking his insulin or other medications for ascites.    Allergies:  No Known Allergies    Review of Systems   All systems were reviewed and negative except for: H&P    Objective   Objective     Vitals:   Temp:  [97.7 °F (36.5 °C)-98.7 °F (37.1 °C)] 98.1 °F (36.7 °C)  Heart Rate:  [82-89] 86  Resp:  [12-20] 20  BP: (140-171)/(87-97) 166/93    Physical Exam    Constitutional: Awake, alert, no acute distress   Eyes: Pupils equal, sclerae anicteric, no conjunctival injection   HENT: NCAT, mucous membranes moist   Neck: Supple, no thyromegaly, no lymphadenopathy, trachea midline   Respiratory: Clear to auscultation bilaterally, nonlabored respirations    Cardiovascular: RRR, no murmurs, rubs, or gallops, palpable pedal pulses bilaterally   Gastrointestinal: Positive bowel sounds, soft, nontender, nondistended   Musculoskeletal: +2 bilateral ankle edema, no clubbing or cyanosis to extremities   Psychiatric: Appropriate affect,  cooperative   Neurologic: Oriented x 3, strength symmetric in all extremities, patient has hard time raising both legs straight up, Cranial Nerves grossly intact to confrontation, speech clear.  Decreased sensation to touch bilateral lower extremities distally.   Skin: No rashes     Result Review    Result Review:  I have personally reviewed the results from the time of this admission to 3/4/2024 16:51 EST and agree with these findings:  [x]  Laboratory  []  Microbiology  [x]  Radiology  [x]  EKG/Telemetry normal sinus rhythm 88.  QT interval 0.56  []  Cardiology/Vascular   []  Pathology  [x]  Old records  [x]  Other: Medications      Assessment & Plan   Assessment / Plan     Assessment:  Frequent falls and generalized weakness.  Peripheral neuropathy likely diabetic and alcoholic causing above.  Noncompliance with medication including insulin.  Diabetes mellitus uncontrolled hyperglycemia due to above.  Hemoglobin A1c of 8.8% on January 31st.  Alcoholic cirrhosis s/p TIPS on May 2023.  Anasarca with ascites and chronic scrotal swelling.  CKD 3B.  Baseline creatinine 2-2.3.  Chronic anemia and thrombocytopenia from cirrhosis.  Hematuria and pyuria without bacteriuria.  Asymptomatic gallstones.  Hypoalbuminemia from liver disease.  Hypertension.  Tobacco use disorder.      Plan:   Admit to monitored bed.  Low-salt diet, fluid restriction high-protein, carb consistent diet.  Lactulose and Xifaxan.  Sliding-scale  and basal insulin.  Titrate to control blood sugars.  Lasix and Aldactone.  Nicotine patch.  Check INR.  Paracentesis in the morning by IR.  No need of CIWA protocol as patient has been sober since December last year.  Thiamine and folic acid.  Resume appropriate home medications.  Protonix.  Dietitian consulted  Diabetic nurse educator  PT OT.  Discussed with ED physician and nursing staff  Needs rehab placement.  Patient agreeable         DVT prophylaxis:  Mechanical DVT prophylaxis orders are present.   SCD    CODE STATUS:    Code Status (Patient has no pulse and is not breathing): CPR (Attempt to Resuscitate)  Medical Interventions (Patient has pulse or is breathing): Full Support      Admission Status:  I believe this patient meets inpatient status.    Part of this note may be an electronic transcription/translation of spoken language to printed text using the Dragon Dictation System.     Electronically signed by Kosta Sampson MD, 03/04/24, 4:51 PM EST.

## 2024-03-04 NOTE — SIGNIFICANT NOTE
Patient is a 53-year-old male past medical history of type 2 diabetes with poor medication adherence, CKD 3, hypertension, alcoholic cirrhosis with continued alcohol use who presented to the ER after his mother kicked him out of his home for drinking with complaints of generalized weakness.  He has been having multiple falls due to his weakness as well as pain in his legs that prompted him to come into the ER.  He has not been taking any of his meds either.  Upon arrival he was found to be hemodynamically stable but with significantly poor control of his diabetes with sugars in the 600s without an anion gap acidosis.  Ammonia mildly elevated.  Urinalysis remarkable for hematuria and troponin chronically elevated at 164.  Patient's abnormal labs are stable from prior levels and attempt was made to ambulate the patient however he was unable to ambulate and as result the hospitalist was contacted for admission.  Full H&P to follow.

## 2024-03-05 ENCOUNTER — APPOINTMENT (OUTPATIENT)
Dept: INTERVENTIONAL RADIOLOGY/VASCULAR | Facility: HOSPITAL | Age: 54
DRG: 074 | End: 2024-03-05
Payer: COMMERCIAL

## 2024-03-05 ENCOUNTER — APPOINTMENT (OUTPATIENT)
Dept: GENERAL RADIOLOGY | Facility: HOSPITAL | Age: 54
DRG: 074 | End: 2024-03-05
Payer: COMMERCIAL

## 2024-03-05 LAB
ALBUMIN SERPL-MCNC: 1.7 G/DL (ref 3.5–5.2)
ALBUMIN/GLOB SERPL: 0.7 G/DL
ALP SERPL-CCNC: 260 U/L (ref 39–117)
ALT SERPL W P-5'-P-CCNC: 21 U/L (ref 1–41)
ANION GAP SERPL CALCULATED.3IONS-SCNC: 7 MMOL/L (ref 5–15)
APTT PPP: 33.2 SECONDS (ref 24.2–34.2)
AST SERPL-CCNC: 46 U/L (ref 1–40)
BASOPHILS # BLD AUTO: 0.1 10*3/MM3 (ref 0–0.2)
BASOPHILS NFR BLD AUTO: 1.3 % (ref 0–1.5)
BILIRUB SERPL-MCNC: 1.4 MG/DL (ref 0–1.2)
BUN SERPL-MCNC: 23 MG/DL (ref 6–20)
BUN/CREAT SERPL: 12.3 (ref 7–25)
CALCIUM SPEC-SCNC: 7.5 MG/DL (ref 8.6–10.5)
CHLORIDE SERPL-SCNC: 106 MMOL/L (ref 98–107)
CO2 SERPL-SCNC: 21 MMOL/L (ref 22–29)
CREAT SERPL-MCNC: 1.87 MG/DL (ref 0.76–1.27)
DEPRECATED RDW RBC AUTO: 47.7 FL (ref 37–54)
EGFRCR SERPLBLD CKD-EPI 2021: 42.5 ML/MIN/1.73
EOSINOPHIL # BLD AUTO: 0.58 10*3/MM3 (ref 0–0.4)
EOSINOPHIL NFR BLD AUTO: 7.6 % (ref 0.3–6.2)
ERYTHROCYTE [DISTWIDTH] IN BLOOD BY AUTOMATED COUNT: 15.9 % (ref 12.3–15.4)
GLOBULIN UR ELPH-MCNC: 2.6 GM/DL
GLUCOSE BLDC GLUCOMTR-MCNC: 286 MG/DL (ref 70–99)
GLUCOSE BLDC GLUCOMTR-MCNC: 290 MG/DL (ref 70–99)
GLUCOSE BLDC GLUCOMTR-MCNC: 315 MG/DL (ref 70–99)
GLUCOSE BLDC GLUCOMTR-MCNC: 352 MG/DL (ref 70–99)
GLUCOSE SERPL-MCNC: 278 MG/DL (ref 65–99)
HCT VFR BLD AUTO: 26.4 % (ref 37.5–51)
HGB BLD-MCNC: 9.5 G/DL (ref 13–17.7)
IMM GRANULOCYTES # BLD AUTO: 0.03 10*3/MM3 (ref 0–0.05)
IMM GRANULOCYTES NFR BLD AUTO: 0.4 % (ref 0–0.5)
INR PPP: 1.34 (ref 0.86–1.15)
LDH SERPL-CCNC: 248 U/L (ref 135–225)
LYMPHOCYTES # BLD AUTO: 1.3 10*3/MM3 (ref 0.7–3.1)
LYMPHOCYTES NFR BLD AUTO: 17 % (ref 19.6–45.3)
MAGNESIUM SERPL-MCNC: 1.7 MG/DL (ref 1.6–2.6)
MCH RBC QN AUTO: 30.4 PG (ref 26.6–33)
MCHC RBC AUTO-ENTMCNC: 36 G/DL (ref 31.5–35.7)
MCV RBC AUTO: 84.6 FL (ref 79–97)
MONOCYTES # BLD AUTO: 0.63 10*3/MM3 (ref 0.1–0.9)
MONOCYTES NFR BLD AUTO: 8.2 % (ref 5–12)
NEUTROPHILS NFR BLD AUTO: 5.02 10*3/MM3 (ref 1.7–7)
NEUTROPHILS NFR BLD AUTO: 65.5 % (ref 42.7–76)
NRBC BLD AUTO-RTO: 0 /100 WBC (ref 0–0.2)
PHOSPHATE SERPL-MCNC: 2.3 MG/DL (ref 2.5–4.5)
PLATELET # BLD AUTO: 114 10*3/MM3 (ref 140–450)
PMV BLD AUTO: 10 FL (ref 6–12)
POTASSIUM SERPL-SCNC: 3.4 MMOL/L (ref 3.5–5.2)
PROT SERPL-MCNC: 4.3 G/DL (ref 6–8.5)
PROTHROMBIN TIME: 16.9 SECONDS (ref 11.8–14.9)
RBC # BLD AUTO: 3.12 10*6/MM3 (ref 4.14–5.8)
SODIUM SERPL-SCNC: 134 MMOL/L (ref 136–145)
WBC NRBC COR # BLD AUTO: 7.66 10*3/MM3 (ref 3.4–10.8)

## 2024-03-05 PROCEDURE — 83735 ASSAY OF MAGNESIUM: CPT | Performed by: STUDENT IN AN ORGANIZED HEALTH CARE EDUCATION/TRAINING PROGRAM

## 2024-03-05 PROCEDURE — P9047 ALBUMIN (HUMAN), 25%, 50ML: HCPCS | Performed by: INTERNAL MEDICINE

## 2024-03-05 PROCEDURE — 99233 SBSQ HOSP IP/OBS HIGH 50: CPT | Performed by: INTERNAL MEDICINE

## 2024-03-05 PROCEDURE — G0378 HOSPITAL OBSERVATION PER HR: HCPCS

## 2024-03-05 PROCEDURE — 85025 COMPLETE CBC W/AUTO DIFF WBC: CPT | Performed by: STUDENT IN AN ORGANIZED HEALTH CARE EDUCATION/TRAINING PROGRAM

## 2024-03-05 PROCEDURE — 80053 COMPREHEN METABOLIC PANEL: CPT | Performed by: INTERNAL MEDICINE

## 2024-03-05 PROCEDURE — 82948 REAGENT STRIP/BLOOD GLUCOSE: CPT | Performed by: STUDENT IN AN ORGANIZED HEALTH CARE EDUCATION/TRAINING PROGRAM

## 2024-03-05 PROCEDURE — 84100 ASSAY OF PHOSPHORUS: CPT | Performed by: INTERNAL MEDICINE

## 2024-03-05 PROCEDURE — 83615 LACTATE (LD) (LDH) ENZYME: CPT | Performed by: INTERNAL MEDICINE

## 2024-03-05 PROCEDURE — 85610 PROTHROMBIN TIME: CPT | Performed by: INTERNAL MEDICINE

## 2024-03-05 PROCEDURE — 85730 THROMBOPLASTIN TIME PARTIAL: CPT | Performed by: INTERNAL MEDICINE

## 2024-03-05 PROCEDURE — 97165 OT EVAL LOW COMPLEX 30 MIN: CPT

## 2024-03-05 PROCEDURE — 73560 X-RAY EXAM OF KNEE 1 OR 2: CPT

## 2024-03-05 PROCEDURE — 82948 REAGENT STRIP/BLOOD GLUCOSE: CPT

## 2024-03-05 PROCEDURE — 63710000001 INSULIN DETEMIR PER 5 UNITS: Performed by: INTERNAL MEDICINE

## 2024-03-05 PROCEDURE — 97161 PT EVAL LOW COMPLEX 20 MIN: CPT

## 2024-03-05 PROCEDURE — 63710000001 INSULIN LISPRO (HUMAN) PER 5 UNITS: Performed by: STUDENT IN AN ORGANIZED HEALTH CARE EDUCATION/TRAINING PROGRAM

## 2024-03-05 PROCEDURE — 25810000003 SODIUM CHLORIDE 0.9 % SOLUTION: Performed by: INTERNAL MEDICINE

## 2024-03-05 PROCEDURE — 76705 ECHO EXAM OF ABDOMEN: CPT

## 2024-03-05 PROCEDURE — 25010000002 ALBUMIN HUMAN 25% PER 50 ML: Performed by: INTERNAL MEDICINE

## 2024-03-05 RX ORDER — FENTANYL/ROPIVACAINE/NS/PF 2-625MCG/1
15 PLASTIC BAG, INJECTION (ML) EPIDURAL ONCE
Status: COMPLETED | OUTPATIENT
Start: 2024-03-05 | End: 2024-03-05

## 2024-03-05 RX ORDER — NADOLOL 20 MG/1
20 TABLET ORAL
Status: DISCONTINUED | OUTPATIENT
Start: 2024-03-05 | End: 2024-03-11 | Stop reason: HOSPADM

## 2024-03-05 RX ORDER — HYDROXYZINE PAMOATE 25 MG/1
25 CAPSULE ORAL 4 TIMES DAILY PRN
Status: DISCONTINUED | OUTPATIENT
Start: 2024-03-05 | End: 2024-03-11 | Stop reason: HOSPADM

## 2024-03-05 RX ADMIN — Medication 100 MG: at 08:15

## 2024-03-05 RX ADMIN — Medication 10 ML: at 20:58

## 2024-03-05 RX ADMIN — Medication 1 TABLET: at 08:15

## 2024-03-05 RX ADMIN — INSULIN LISPRO 4 UNITS: 100 INJECTION, SOLUTION INTRAVENOUS; SUBCUTANEOUS at 17:31

## 2024-03-05 RX ADMIN — INSULIN DETEMIR 15 UNITS: 100 INJECTION, SOLUTION SUBCUTANEOUS at 20:57

## 2024-03-05 RX ADMIN — Medication 5 MG: at 21:00

## 2024-03-05 RX ADMIN — RIFAXIMIN 550 MG: 550 TABLET ORAL at 20:57

## 2024-03-05 RX ADMIN — GABAPENTIN 100 MG: 100 CAPSULE ORAL at 08:15

## 2024-03-05 RX ADMIN — RIFAXIMIN 550 MG: 550 TABLET ORAL at 08:15

## 2024-03-05 RX ADMIN — INSULIN LISPRO 5 UNITS: 100 INJECTION, SOLUTION INTRAVENOUS; SUBCUTANEOUS at 20:57

## 2024-03-05 RX ADMIN — INSULIN DETEMIR 10 UNITS: 100 INJECTION, SOLUTION SUBCUTANEOUS at 08:15

## 2024-03-05 RX ADMIN — FUROSEMIDE 40 MG: 40 TABLET ORAL at 08:15

## 2024-03-05 RX ADMIN — GABAPENTIN 100 MG: 100 CAPSULE ORAL at 17:31

## 2024-03-05 RX ADMIN — ACETAMINOPHEN 650 MG: 325 TABLET ORAL at 07:54

## 2024-03-05 RX ADMIN — Medication 10 ML: at 08:16

## 2024-03-05 RX ADMIN — INSULIN LISPRO 4 UNITS: 100 INJECTION, SOLUTION INTRAVENOUS; SUBCUTANEOUS at 07:54

## 2024-03-05 RX ADMIN — DICLOFENAC SODIUM 2 G: 10 GEL TOPICAL at 17:32

## 2024-03-05 RX ADMIN — GABAPENTIN 100 MG: 100 CAPSULE ORAL at 20:57

## 2024-03-05 RX ADMIN — FAMOTIDINE 20 MG: 20 TABLET ORAL at 17:32

## 2024-03-05 RX ADMIN — POTASSIUM PHOSPHATE, MONOBASIC POTASSIUM PHOSPHATE, DIBASIC 15 MMOL: 224; 236 INJECTION, SOLUTION, CONCENTRATE INTRAVENOUS at 10:07

## 2024-03-05 RX ADMIN — NADOLOL 20 MG: 20 TABLET ORAL at 10:07

## 2024-03-05 RX ADMIN — SPIRONOLACTONE 25 MG: 25 TABLET ORAL at 08:15

## 2024-03-05 RX ADMIN — HYDROXYZINE PAMOATE 25 MG: 25 CAPSULE ORAL at 22:12

## 2024-03-05 RX ADMIN — LACTULOSE 10 G: 20 SOLUTION ORAL at 20:57

## 2024-03-05 RX ADMIN — LACTULOSE 10 G: 20 SOLUTION ORAL at 08:16

## 2024-03-05 RX ADMIN — FAMOTIDINE 20 MG: 20 TABLET ORAL at 07:54

## 2024-03-05 RX ADMIN — FOLIC ACID 1 MG: 1 TABLET ORAL at 08:15

## 2024-03-05 RX ADMIN — LACTULOSE 10 G: 20 SOLUTION ORAL at 17:31

## 2024-03-05 RX ADMIN — INSULIN LISPRO 6 UNITS: 100 INJECTION, SOLUTION INTRAVENOUS; SUBCUTANEOUS at 12:38

## 2024-03-05 RX ADMIN — ALBUMIN (HUMAN) 12.5 G: 0.25 INJECTION, SOLUTION INTRAVENOUS at 08:16

## 2024-03-05 RX ADMIN — DICLOFENAC SODIUM 2 G: 10 GEL TOPICAL at 20:58

## 2024-03-05 NOTE — CONSULTS
Met with patient at bedside. Patient adamant he has insurance. States he takes his insulin at home 1-2x a day. Discussed long term consequences of high blood sugars, but patient more interested in talking about insurance. Will continue to support and assist as needed during patient's hospital stay.

## 2024-03-05 NOTE — PROGRESS NOTES
[facility]   Hospitalist Progress Note  Date: 3/5/2024  Patient Name: Simon Peters  : 1970  MRN: [id number]  Date of admission: 3/4/2024      Subjective   Subjective     Chief Complaint: Generalized weakness.  Legs giving out and frequent falls for past few days    Summary:   Simon Peters is a 53 y.o. male with past ministry of alcoholic cirrhosis s/p TIPS in May 23.  Patient has been seen at  transplant center on  and has been in [place] from  to  for hepatic encephalopathy, CKD 3B, diabetes mellitus with nephropathy seen by  endocrinology, noncompliant, hypertension, tobacco use disorder, pleural effusion and ascites, chronic scrotal swelling and chronic anemia thrombocytopenia due to cirrhosis.  Patient has been generally getting more weak with increasing numbness tingling in lower extremities.  Does have a history of neuropathy in bilateral lower extremities.  Patient has not taken any of his medications including insulin for some time.  Patient has fallen 3-4 times in the past few weeks.  There is no loss of consciousness.  His leg just gives out.  No major injury from the fall.  Apparently he has been kicked out by his mother due to drinking.  Per patient his last drink was on  last year.  Denies any chest pain, shortness of air, vomiting diarrhea or blood in the stools or black-colored stools.  No cough or phlegm.  Does have lower abdominal pain chronic in nature.  No urinary complaints.  Workup in the ED showed elevated blood pressure with good oxygen saturation on room air.  Delta troponin is -2.  Venous pH is 7.4.  Creatinine 1.8.  Baseline creatinine is 2-2.3.  No anion gap.  Blood glucose of 682.  Albumin of 1.9.  Ammonia of 64 with a total bili of 1.9.  Hemoglobin of 12.  Platelets 112.  UA shows RBCs and WBCs with no bacteria or ketones.  EKG is normal sinus rhythm.  Chest x-ray, CT head negative.  CT abdomen pelvis shows gallstones with  moderate ascites.  Because of uncontrolled hyperglycemia hospitalist has been called to admit him for further evaluation  Patient went down for paracentesis by IR.  Ultrasound did not show much fluid.  Patient did not want paracentesis.  MELD score of 19 during this admission.    Interval Followup:   Blood pressure up.  Remains on room air.  Negative orthostatic vital signs.  No more falls while in the hospital.  Complaining of pain in bilateral knees for some time and wants some pain medication.  Wants medicine for itching as soon as possible.  Denies abdominal pain.  Weakness improving.    Review of Systems   All systems were reviewed and negative except for: Summary interval follow-up    Objective   Objective     Vitals:   Temp:  [97.9 °F (36.6 °C)-98.2 °F (36.8 °C)] 98.1 °F (36.7 °C)  Heart Rate:  [78-94] 78  Resp:  [20] 20  BP: (139-163)/(86-99) 146/86  Physical Exam       Constitutional: Awake, alert, no acute distress              Eyes: Pupils equal, sclerae anicteric, no conjunctival injection              HENT: NCAT, mucous membranes moist              Neck: Supple, no thyromegaly, no lymphadenopathy, trachea midline              Respiratory: Clear to auscultation bilaterally, nonlabored respirations               Cardiovascular: RRR, no murmurs, rubs, or gallops, palpable pedal pulses bilaterally              Gastrointestinal: Positive bowel sounds, soft, nontender, nondistended              Musculoskeletal: +2 bilateral ankle edema, no clubbing or cyanosis to extremities              Psychiatric: Appropriate affect, cooperative              Neurologic: Oriented x 3, strength symmetric in all extremities, patient has hard time raising both legs straight up, Cranial Nerves grossly intact to confrontation, speech clear.  Decreased sensation to touch bilateral lower extremities distally.              Skin: No rashes     Result Review    Result Review:  I have personally reviewed the results for the past 24  hours and agree with these findings:  [x]  Laboratory  [x]  Microbiology  [x]  Radiology  [x]  EKG/Telemetry   []  Cardiology/Vascular   []  Pathology  [x]  Old records  [x]  Other: Medications    Assessment & Plan   Assessment / Plan     Assessment:  Frequent falls and generalized weakness.  Improving  Peripheral neuropathy likely diabetic and alcoholic causing above.  Noncompliance with medication including insulin.  Diabetes mellitus uncontrolled hyperglycemia due to above.  Hemoglobin A1c of 8.8% on January 31st.  Alcoholic cirrhosis s/p TIPS on May 2023.  Anasarca with ascites and chronic scrotal swelling.  CKD 3B.  Baseline creatinine 2-2.3.  Stable  Chronic anemia and thrombocytopenia from cirrhosis.  Hematuria and pyuria without bacteriuria.  Asymptomatic gallstones.  Hypoalbuminemia from liver disease.  Hypertension.  Tobacco use disorder.      Plan:   X-ray of right knee  Topical Voltaren for both knees  Nadolol for elevated blood pressure  Low-salt diet, fluid restriction high-protein, carb consistent diet.  Lactulose and Xifaxan.  Ammonia level noted at 64  Sliding-scale  and basal insulin.  Titrate to control blood sugars.  Continue oral Lasix and Aldactone.  Nicotine patch.   INR.  Noted  DC paracentesis by IR..  Discussed with IR not enough fluid and patient refusing anyway as he is not feeling bloated or distended  No need of CIWA protocol as patient has been sober since December last year.  Thiamine and folic acid.  Check B12  Vistaril for itching.  Delta troponin -2  Protonix.  Dietitian consulted  Diabetic nurse educator consulted  PT OT.  Needs rehab placement.  Patient agreeable.  DC telemetry    Discussed plan with RN.  Discharge to rehab when bed available    DVT prophylaxis:  Mechanical DVT prophylaxis orders are present.        CODE STATUS:   Code Status (Patient has no pulse and is not breathing): CPR (Attempt to Resuscitate)  Medical Interventions (Patient has pulse or is breathing): Full  Support      Part of this note may be an electronic transcription/translation of spoken language to printed text using the Dragon Dictation System.     Electronically signed by Kosta Sampson MD, 03/05/24, 4:24 PM EST.

## 2024-03-05 NOTE — PLAN OF CARE
Goal Outcome Evaluation:  Plan of Care Reviewed With: patient        Progress: no change  Outcome Evaluation: Patient had reports of right hand cramping at the beginning of shift, medicated per mar. Patient noted improvement in pain and rested throughout the night with eyes closed and visible respirations. Call light within reach. No complaints at this time.

## 2024-03-05 NOTE — CASE MANAGEMENT/SOCIAL WORK
Discharge Planning Assessment  PERLITA Funk     Patient Name: Simon Peters  MRN: [id number]  Today's Date: 3/5/2024    Admit Date: 3/4/2024    Plan: Pt states he lives home alone. Pt reports he does have PCP and Insurance. Pt unable to remember PCP name at this time. Pt does not have his Ins. card at this time. Per RN, pharmacy states he does  is insulin. Pt states he is interested in InPt rehab at discharge. SW will continue to follow for needs. Possible need for PCP setup and InPt rehab if pt does have insurance.   Discharge Needs Assessment       Row Name 03/05/24 1157       Living Environment    People in Home alone    Current Living Arrangements home    Potentially Unsafe Housing Conditions none    In the past 12 months has the electric, gas, oil, or water company threatened to shut off services in your home? No    Primary Care Provided by self    Provides Primary Care For no one    Family Caregiver if Needed none    Quality of Family Relationships disruptive    Able to Return to Prior Arrangements yes       Resource/Environmental Concerns    Resource/Environmental Concerns none    Transportation Concerns none       Transportation Needs    In the past 12 months, has lack of transportation kept you from medical appointments or from getting medications? no    In the past 12 months, has lack of transportation kept you from meetings, work, or from getting things needed for daily living? No       Food Insecurity    Within the past 12 months, you worried that your food would run out before you got the money to buy more. Never true    Within the past 12 months, the food you bought just didn't last and you didn't have money to get more. Never true       Transition Planning    Patient/Family Anticipates Transition to home    Patient/Family Anticipated Services at Transition     Transportation Anticipated family or friend will provide       Discharge Needs Assessment    Equipment Currently Used at Home  walker, rolling;cane, straight;wheelchair    Concerns to be Addressed discharge planning    Anticipated Changes Related to Illness none    Equipment Needed After Discharge none    Discharge Facility/Level of Care Needs acute rehab;substance abuse facility;nursing facility, skilled    Discharge Coordination/Progress Pt states he lives home alone. Pt reports he does have PCP and Insurance. Pt unable to remember PCP name at this time. Pt does not have his Ins. card at this time. Per RN, pharmacy states he does  is insulin. Pt states he is interested in InPt rehab at discharge. SW will continue to follow for needs. Possible need for PCP setup and InPt rehab if pt does have insurance.                   Discharge Plan       Row Name 03/05/24 1200       Plan    Plan Pt states he lives home alone. Pt reports he does have PCP and Insurance. Pt unable to remember PCP name at this time. Pt does not have his Ins. card at this time. Per RN, pharmacy states he does  is insulin. Pt states he is interested in InPt rehab at discharge. SW will continue to follow for needs. Possible need for PCP setup and InPt rehab if pt does have insurance.                  Continued Care and Services - Admitted Since 3/4/2024    No active coordination exists for this encounter.          Demographic Summary       Row Name 03/05/24 1155       General Information    Admission Type observation    Arrived From emergency department    Referral Source admission list    Reason for Consult discharge planning    Preferred Language English       Contact Information    Permission Granted to Share Info With permission denied                   Functional Status       Row Name 03/05/24 1156       Functional Status    Usual Activity Tolerance good    Current Activity Tolerance good       Physical Activity    On average, how many days per week do you engage in moderate to strenuous exercise (like a brisk walk)? 0 days    On average, how many minutes  do you engage in exercise at this level? 0 min    Number of minutes of exercise per week 0       Assessment of Health Literacy    How often do you have someone help you read hospital materials? Never    How often do you have problems learning about your medical condition because of difficulty understanding written information? Never    How often do you have a problem understanding what is told to you about your medical condition? Never    How confident are you filling out medical forms by yourself? Quite a bit    Health Literacy Good       Functional Status, IADL    Medications independent    Meal Preparation independent    Housekeeping independent    Laundry independent    Shopping independent       Mental Status    General Appearance WDL WDL       Mental Status Summary    Recent Changes in Mental Status/Cognitive Functioning no changes       Employment/    Employment Status unemployed                   Psychosocial    No documentation.                  Abuse/Neglect    No documentation.                  Legal       Row Name 03/05/24 1156       Financial Resource Strain    How hard is it for you to pay for the very basics like food, housing, medical care, and heating? Somewhat       Financial/Legal    Source of Income social security    Application for Public Assistance not applied       Legal    Criminal Activity/Legal Involvement none                   Substance Abuse    No documentation.                  Patient Forms    No documentation.                     Jana Bang

## 2024-03-05 NOTE — PLAN OF CARE
Goal Outcome Evaluation:  Plan of Care Reviewed With: patient        Progress: improving  Outcome Evaluation: Pt presents to evaluation with deficits in LE awareness and difficulty with gait. Pt reports non compliance with medical managment for diabetes. Skilled services needed for addressing gait and strength deficits. Pt recommended discharge to IRF to address remaining deficits.      Anticipated Discharge Disposition (PT): inpatient rehabilitation facility

## 2024-03-05 NOTE — THERAPY EVALUATION
Acute Care - Physical Therapy Initial Evaluation   [facility]     Patient Name: Simon Peters  : 1970  MRN: [id number]  Today's Date: 3/5/2024      Visit Dx:     ICD-10-CM ICD-9-CM   1. Weakness generalized  R53.1 780.79   2. Cirrhosis of liver without ascites, unspecified hepatic cirrhosis type  K74.60 571.5   3. Difficulty in walking  R26.2 719.7     Patient Active Problem List   Diagnosis    Hypertension    Anxiety    Elevated serum glucose    Diabetes mellitus    Elevated liver enzymes    Encounter for screening for malignant neoplasm of colon    Weakness     Past Medical History:   Diagnosis Date    Abnormal LFTs     Anxiety     Back pain     Diabetes mellitus     Hypertension     Kidney failure     Liver failure     Rash      Past Surgical History:   Procedure Laterality Date    TIPS PROCEDURE       PT Assessment (Last 12 Hours)       PT Evaluation and Treatment       Row Name 24 1126          Physical Therapy Time and Intention    Subjective Information complains of;weakness;dizziness  -ITALO     Document Type evaluation  -ITALO     Mode of Treatment individual therapy;physical therapy  -ITALO     Patient Effort good  -ITALO       Row Name 24 1126          General Information    Patient Profile Reviewed yes  -ITALO     Patient Observations alert;cooperative;agree to therapy  -ITALO     Prior Level of Function independent:;all household mobility;community mobility  -ITALO     Equipment Currently Used at Home walker, rolling;cane, straight  walker at home, cane in community  -ITALO     Limitations/Impairments insensate body part  pt complains of diabetic neuropathy in LE  -ITALO     Benefits Reviewed patient:;improve function;increase independence;increase balance  -ITALO       Row Name 24 1126          Living Environment    Current Living Arrangements home  -ITALO     Home Accessibility stairs to enter home  -ITALO     People in Home alone  -ITALO     Primary Care Provided by self  -ITALO       Row Name 24 1126           Home Main Entrance    Number of Stairs, Main Entrance one  -ITALO       Row Name 03/05/24 1126          Home Use of Assistive/Adaptive Equipment    Equipment Currently Used at Home cane, straight;walker, rolling  -ITALO       Row Name 03/05/24 1126          Range of Motion (ROM)    Range of Motion ROM is WFL  -ITALO       Row Name 03/05/24 1126          Strength (Manual Muscle Testing)    Strength (Manual Muscle Testing) bilateral lower extremities  4/5 B for all muscles, decreased response time to resistance  -ITALO       Row Name 03/05/24 1126          Mobility    Extremity Weight-bearing Status --  FWB on B LE  -ITALO       Row Name 03/05/24 1126          Transfers    Transfers sit-stand transfer;stand-sit transfer  -ITALO     Maintains Weight-bearing Status (Transfers) able to maintain  -ITALO       Row Name 03/05/24 1126          Sit-Stand Transfer    Sit-Stand [place] (Transfers) standby assist  -ITALO     Assistive Device (Sit-Stand Transfers) walker, front-wheeled  -ITALO       Row Name 03/05/24 1126          Stand-Sit Transfer    Stand-Sit [place] (Transfers) standby assist  -ITALO     Assistive Device (Stand-Sit Transfers) walker, front-wheeled  -ITALO       Row Name 03/05/24 1126          Gait/Stairs (Locomotion)    Gait/Stairs Locomotion gait/ambulation assistive device  -ITALO     [place] Level (Gait) contact guard  -ITALO     Assistive Device (Gait) walker, front-wheeled  -ITALO     Patient was able to Ambulate yes  -ITALO     Distance in Feet (Gait) 250  -ITALO     Pattern (Gait) 3-point  -ITALO     Deviations/Abnormal Patterns (Gait) gait speed decreased;stride length decreased;base of support, narrow  -ITALO     Comment, (Gait/Stairs) --  -ITALO       Row Name 03/05/24 1126          Safety Issues, Functional Mobility    Safety Issues Affecting Function (Mobility) judgment;safety precaution awareness  -ITALO     Impairments Affecting Function (Mobility) endurance/activity tolerance;balance  -ITALO     Comment, Safety Issues/Impairments (Mobility)  Pt reports mild dizziness and light headedness part way through bout of therapy  -ITALO       Row Name 03/05/24 1126          Balance    Balance Assessment standing dynamic balance  -ITALO     Dynamic Standing Balance contact guard  -ITALO     Position/Device Used, Standing Balance supported;walker, front-wheeled  -ITALO       Row Name 03/05/24 1126          Plan of Care Review    Plan of Care Reviewed With patient  -ITALO     Progress improving  -ITALO     Outcome Evaluation Pt presents to evaluation with deficits in LE awareness and difficulty with gait. Pt reports non compliance with medical managment for diabetes. Skilled services needed for addressing gait and strength deficits. Pt recommended discharge to IRF to address remaining deficits.  -ITALO       Row Name 03/05/24 1126          Therapy Assessment/Plan (PT)    Patient/Family Therapy Goals Statement (PT) go home and be safe  -ITALO     Rehab Potential (PT) good, to achieve stated therapy goals  -ITALO     Criteria for Skilled Interventions Met (PT) meets criteria  -ITALO     Therapy Frequency (PT) daily  -ITALO     Predicted Duration of Therapy Intervention (PT) 10 days  -ITALO     Problem List (PT) problems related to;balance;strength;sensation  -ITALO     Activity Limitations Related to Problem List (PT) unable to ambulate safely  -ITALO       Row Name 03/05/24 1126          PT Evaluation Complexity    History, PT Evaluation Complexity no personal factors and/or comorbidities  -ITALO     Examination of Body Systems (PT Eval Complexity) 1-2 elements  -ITALO     Clinical Presentation (PT Evaluation Complexity) stable  -ITALO     Clinical Decision Making (PT Evaluation Complexity) low complexity  -ITALO     Overall Complexity (PT Evaluation Complexity) low complexity  -ITALO       Row Name 03/05/24 1126          Progress Summary (PT)    Progress Toward Functional Goals (PT) --  -ITALO       Row Name 03/05/24 1126          Therapy Plan Review/Discharge Plan (PT)    Therapy Plan Review (PT) evaluation/treatment  results reviewed  -ITALO       Row Name 03/05/24 1126          Physical Therapy Goals    Transfer Goal Selection (PT) transfer, PT goal 1  -ITALO     Gait Training Goal Selection (PT) gait training, PT goal 1  -ITALO     Strength Goal Selection (PT) strength, PT goal 1  -ITALO     Balance Goal Selection (PT) balance, PT goal 1  -ITALO       Row Name 03/05/24 1126          Transfer Goal 1 (PT)    Activity/Assistive Device (Transfer Goal 1, PT) sit-to-stand/stand-to-sit  -ITALO     [place] Level/Cues Needed (Transfer Goal 1, PT) independent  -ITALO     Time Frame (Transfer Goal 1, PT) 10 days  -ITALO     Progress/Outcome (Transfer Goal 1, PT) new goal  -ITALO       Row Name 03/05/24 1126          Gait Training Goal 1 (PT)    Activity/Assistive Device (Gait Training Goal 1, PT) gait (walking locomotion)  -ITALO     [place] Level (Gait Training Goal 1, PT) independent  -ITALO     Distance (Gait Training Goal 1, PT) 250  -ITALO     Time Frame (Gait Training Goal 1, PT) 10 days  -ITALO     Progress/Outcome (Gait Training Goal 1, PT) new goal  -ITALO       Row Name 03/05/24 1126          Strength Goal 1 (PT)    Strength Goal 1 (PT) B LE 4+/5  -ITALO     Time Frame (Strength Goal 1, PT) 10 days  -ITALO     Progress/Outcome (Strength Goal 1, PT) new goal  -ITALO       Row Name 03/05/24 1126          Balance Goal 1 (PT)    Activity/Assistive Device (Balance Goal 1, PT) walker, rolling  -ITALO     [place] Level/Cues Needed (Balance Goal 1, PT) independent  -ITALO     Time Frame (Balance Goal 1, PT) 10 days  -ITALO     Progress/Outcomes (Balance Goal 1, PT) new goal  -ITALO               User Key  (r) = Recorded By, (t) = Taken By, (c) = Cosigned By      Initials Name Provider Type    Aleksey Franks PT Physical Therapist                    Physical Therapy Education       Title: PT OT SLP Therapies (Done)       Topic: Physical Therapy (Done)       Point: Mobility training (Done)       Learning Progress Summary             Patient Acceptance, E,TB, VU by ITALO at 3/5/2024  1144                         Point: Precautions (Done)       Learning Progress Summary             Patient Acceptance, E,TB, VU by ITALO at 3/5/2024 1144                                         User Key       Initials Effective Dates Name Provider Type Discipline    ITALO 06/03/21 -  Aleksey Marino PT Physical Therapist PT                  PT Recommendation and Plan  Anticipated Discharge Disposition (PT): inpatient rehabilitation facility  Planned Therapy Interventions (PT): balance training, bed mobility training, gait training, home exercise program, transfer training, stair training, strengthening  Therapy Frequency (PT): daily  Plan of Care Reviewed With: patient  Progress: improving  Outcome Evaluation: Pt presents to evaluation with deficits in LE awareness and difficulty with gait. Pt reports non compliance with medical managment for diabetes. Skilled services needed for addressing gait and strength deficits. Pt recommended discharge to IRF to address remaining deficits.   Outcome Measures       Row Name 03/05/24 1100             How much help from another person do you currently need...    Turning from your back to your side while in flat bed without using bedrails? 3  -ITALO      Moving from lying on back to sitting on the side of a flat bed without bedrails? 3  -ITALO      Moving to and from a bed to a chair (including a wheelchair)? 2  -ITALO      Standing up from a chair using your arms (e.g., wheelchair, bedside chair)? 2  -ITALO      Climbing 3-5 steps with a railing? 2  -ITALO      To walk in hospital room? 3  -ITALO      AM-PAC 6 Clicks Score (PT) 15  -ITALO      Highest Level of Mobility Goal 4 --> Transfer to chair/commode  -ITALO                User Key  (r) = Recorded By, (t) = Taken By, (c) = Cosigned By      Initials Name Provider Type    Aleksey Franks PT Physical Therapist                     Time Calculation:    PT Charges       Row Name 03/05/24 1124             Time Calculation    PT Received On 03/05/24  -ITALO       PT Goal Re-Cert Due Date 03/14/24  -ITALO         Untimed Charges    PT Eval/Re-eval Minutes 30  -ITALO         Total Minutes    Untimed Charges Total Minutes 30  -ITALO       Total Minutes 30  -ITALO                User Key  (r) = Recorded By, (t) = Taken By, (c) = Cosigned By      Initials Name Provider Type    Aleksey Franks, PT Physical Therapist                  Therapy Charges for Today       Code Description Service Date Service Provider Modifiers Qty    57980877265 HC PT EVAL LOW COMPLEXITY 3 3/5/2024 Aleksey Marino, PT GP 1            PT G-Codes  AM-PAC 6 Clicks Score (PT): 15    Aleksey Marino, PT  3/5/2024

## 2024-03-05 NOTE — THERAPY EVALUATION
Patient Name: Simon Peters  : 1970    MRN: [id number]                              Today's Date: 3/5/2024       Admit Date: 3/4/2024    Visit Dx:     ICD-10-CM ICD-9-CM   1. Weakness generalized  R53.1 780.79   2. Cirrhosis of liver without ascites, unspecified hepatic cirrhosis type  K74.60 571.5   3. Difficulty in walking  R26.2 719.7   4. Decreased activities of daily living (ADL)  Z78.9 V49.89     Patient Active Problem List   Diagnosis    Hypertension    Anxiety    Elevated serum glucose    Diabetes mellitus    Elevated liver enzymes    Encounter for screening for malignant neoplasm of colon    Weakness     Past Medical History:   Diagnosis Date    Abnormal LFTs     Anxiety     Back pain     Diabetes mellitus     Hypertension     Kidney failure     Liver failure     Rash      Past Surgical History:   Procedure Laterality Date    TIPS PROCEDURE        General Information       Row Name 24 1436          OT Time and Intention    Document Type evaluation  -ES     Mode of Treatment individual therapy;occupational therapy  -ES       Row Name 24 1436          General Information    Patient Profile Reviewed yes  -ES     Prior Level of Function independent:;ADL's;all household mobility;community mobility  Patient independent with BADLs at baseline. RW or cane for functional mobility. Tub/shower, standing shower completion. [place] in stance. No home O2 use.  -ES     Existing Precautions/Restrictions fall  -ES     Barriers to Rehab none identified  -ES       Row Name 24 1436          Occupational Profile    Reason for Services/Referral (Occupational Profile) Patient is 53 yr old male admitted to [facility] on 3/4/2024 with reports of frequent falls. OT evaluation and treatment ordered d/t recent decline in ADLs/transfer ability and discharge planning recommendations. No previous OT services for current condition.  -ES       Row Name 24 1436          Living Environment    People  in Home alone  -ES       Row Name 03/05/24 1436          Cognition    Orientation Status (Cognition) oriented x 3  patient cooperative, agreeable to therapy evaluation.  -ES               User Key  (r) = Recorded By, (t) = Taken By, (c) = Cosigned By      Initials Name Provider Type    Ellen Rainey, OTR/L, CSRS Occupational Therapist                     Mobility/ADL's       Row Name 03/05/24 1439          Bed Mobility    Bed Mobility supine-sit;sit-supine  -ES     Supine-Sit [place] (Bed Mobility) supervision  -ES     Sit-Supine [place] (Bed Mobility) supervision  -ES       Row Name 03/05/24 1439          Transfers    Transfers sit-stand transfer;stand-sit transfer  -ES       Row Name 03/05/24 1439          Sit-Stand Transfer    Sit-Stand [place] (Transfers) standby assist  -ES     Assistive Device (Sit-Stand Transfers) walker, front-wheeled  -ES       Row Name 03/05/24 1439          Stand-Sit Transfer    Stand-Sit [place] (Transfers) standby assist  -ES     Assistive Device (Stand-Sit Transfers) walker, front-wheeled  -ES       Row Name 03/05/24 1439          Functional Mobility    Functional Mobility- Ind. Level standby assist  -ES     Functional Mobility- Device walker, front-wheeled  -ES       Row Name 03/05/24 1439          Activities of Daily Living    BADL Assessment/Intervention bathing;upper body dressing;lower body dressing;grooming;feeding;toileting  -ES       Row Name 03/05/24 1439          Bathing Assessment/Intervention    [place] Level (Bathing) bathing skills;set up  -ES       Row Name 03/05/24 1439          Upper Body Dressing Assessment/Training    [place] Level (Upper Body Dressing) upper body dressing skills;set up  -ES       Row Name 03/05/24 1439          Lower Body Dressing Assessment/Training    [place] Level (Lower Body Dressing) lower body dressing skills;set up  -ES       Row Name 03/05/24 1439          Grooming Assessment/Training    [place]  Level (Grooming) grooming skills;set up  -ES       Row Name 03/05/24 1439          Self-Feeding Assessment/Training    [place] Level (Feeding) feeding skills;set up  -ES       Row Name 03/05/24 1439          Toileting Assessment/Training    [place] Level (Toileting) toileting skills;standby assist  -ES               User Key  (r) = Recorded By, (t) = Taken By, (c) = Cosigned By      Initials Name Provider Type    Ellen Rainey OTR/L, CSRS Occupational Therapist                   Obj/Interventions       Row Name 03/05/24 1440          Range of Motion Comprehensive    General Range of Motion bilateral upper extremity ROM WNL  -ES       Row Name 03/05/24 1440          Strength Comprehensive (MMT)    Comment, General Manual Muscle Testing (MMT) Assessment BUEs 4/5  -ES       Row Name 03/05/24 1440          Motor Skills    Motor Skills functional endurance  -ES     Functional Endurance good  -ES       Row Name 03/05/24 1440          Balance    Balance Assessment sitting dynamic balance;standing dynamic balance  -ES     Dynamic Sitting Balance independent  -ES     Position, Sitting Balance unsupported  -ES     Dynamic Standing Balance standby assist  -ES     Position/Device Used, Standing Balance supported;walker, front-wheeled  -ES               User Key  (r) = Recorded By, (t) = Taken By, (c) = Cosigned By      Initials Name Provider Type    Ellen Rainey OTR/L, NAKUL Occupational Therapist                   Goals/Plan    No documentation.                  Clinical Impression       Row Name 03/05/24 1440          Plan of Care Review    Plan of Care Reviewed With patient  -ES     Outcome Evaluation Patient presents at or near baseline functional status at time of evaluation with no identified functional deficits that impede patient independence with activities of daily living.  No indicated need for skilled occupational therapy intervention in the acute care setting.  Occupational therapy will sign off  at this time.  -ES       Row Name 03/05/24 1440          Therapy Assessment/Plan (OT)    Criteria for Skilled Therapeutic Interventions Met (OT) no problems identified which require skilled intervention  -ES     Therapy Frequency (OT) evaluation only  -ES       Row Name 03/05/24 1440          Therapy Plan Review/Discharge Plan (OT)    Anticipated Discharge Disposition (OT) home  -ES               User Key  (r) = Recorded By, (t) = Taken By, (c) = Cosigned By      Initials Name Provider Type    Ellen Rainey, OTR/L, CSRS Occupational Therapist                   Outcome Measures       Row Name 03/05/24 1442          How much help from another is currently needed...    Putting on and taking off regular lower body clothing? 4  -ES     Bathing (including washing, rinsing, and drying) 4  -ES     Toileting (which includes using toilet bed pan or urinal) 4  -ES     Putting on and taking off regular upper body clothing 4  -ES     Taking care of personal grooming (such as brushing teeth) 4  -ES     Eating meals 4  -ES     AM-PAC 6 Clicks Score (OT) 24  -ES       Row Name 03/05/24 1100 03/05/24 0720       How much help from another person do you currently need...    Turning from your back to your side while in flat bed without using bedrails? 3  -ITALO 3  -HW    Moving from lying on back to sitting on the side of a flat bed without bedrails? 3  -ITALO 3  -HW    Moving to and from a bed to a chair (including a wheelchair)? 2  -ITALO 2  -HW    Standing up from a chair using your arms (e.g., wheelchair, bedside chair)? 2  -ITALO 2  -HW    Climbing 3-5 steps with a railing? 2  -ITALO 2  -HW    To walk in hospital room? 3  -ITALO 2  -HW    AM-PAC 6 Clicks Score (PT) 15  -ITALO 14  -HW    Highest Level of Mobility Goal 4 --> Transfer to chair/commode  -ITALO 4 --> Transfer to chair/commode  -HW      Row Name 03/05/24 1442          Functional Assessment    Outcome Measure Options AM-PAC 6 Clicks Daily Activity (OT)  -ES               User Key  (r) =  Recorded By, (t) = Taken By, (c) = Cosigned By      Initials Name Provider Type    Aleksey Franks PT Physical Therapist    Gertrudis Head RN Registered Nurse    Ellen Rainey OTR/L, CSRS Occupational Therapist                      OT Recommendation and Plan  Therapy Frequency (OT): evaluation only  Plan of Care Review  Plan of Care Reviewed With: patient  Outcome Evaluation: Patient presents at or near baseline functional status at time of evaluation with no identified functional deficits that impede patient independence with activities of daily living.  No indicated need for skilled occupational therapy intervention in the acute care setting.  Occupational therapy will sign off at this time.     Time Calculation:   Evaluation Complexity (OT)  Review Occupational Profile/Medical/Therapy History Complexity: brief/low complexity  Assessment, Occupational Performance/Identification of Deficit Complexity: 1-3 performance deficits  Clinical Decision Making Complexity (OT): problem focused assessment/low complexity  Overall Complexity of Evaluation (OT): low complexity     Time Calculation- OT       Row Name 03/05/24 1442             Time Calculation- OT    OT Received On 03/05/24  -ES         Untimed Charges    OT Eval/Re-eval Minutes 31  -ES         Total Minutes    Untimed Charges Total Minutes 31  -ES       Total Minutes 31  -ES                User Key  (r) = Recorded By, (t) = Taken By, (c) = Cosigned By      Initials Name Provider Type    Ellen Rainey OTR/L, CSRS Occupational Therapist                  Therapy Charges for Today       Code Description Service Date Service Provider Modifiers Qty    32759864930 HC OT EVAL LOW COMPLEXITY 3 3/5/2024 Ellen Suh OTR/L, NAKUL GO 1                 LASHELL Alicia/L, CSRS  3/5/2024

## 2024-03-06 LAB
ALBUMIN SERPL-MCNC: 1.6 G/DL (ref 3.5–5.2)
ALBUMIN/GLOB SERPL: 0.7 G/DL
ALP SERPL-CCNC: 254 U/L (ref 39–117)
ALT SERPL W P-5'-P-CCNC: 17 U/L (ref 1–41)
ANION GAP SERPL CALCULATED.3IONS-SCNC: 6.7 MMOL/L (ref 5–15)
AST SERPL-CCNC: 37 U/L (ref 1–40)
BASOPHILS # BLD AUTO: 0.1 10*3/MM3 (ref 0–0.2)
BASOPHILS NFR BLD AUTO: 1.1 % (ref 0–1.5)
BILIRUB SERPL-MCNC: 1.3 MG/DL (ref 0–1.2)
BUN SERPL-MCNC: 27 MG/DL (ref 6–20)
BUN/CREAT SERPL: 13.5 (ref 7–25)
CALCIUM SPEC-SCNC: 7.6 MG/DL (ref 8.6–10.5)
CHLORIDE SERPL-SCNC: 105 MMOL/L (ref 98–107)
CO2 SERPL-SCNC: 22.3 MMOL/L (ref 22–29)
CREAT SERPL-MCNC: 2 MG/DL (ref 0.76–1.27)
DEPRECATED RDW RBC AUTO: 48.9 FL (ref 37–54)
EGFRCR SERPLBLD CKD-EPI 2021: 39.2 ML/MIN/1.73
EOSINOPHIL # BLD AUTO: 0.62 10*3/MM3 (ref 0–0.4)
EOSINOPHIL NFR BLD AUTO: 6.9 % (ref 0.3–6.2)
ERYTHROCYTE [DISTWIDTH] IN BLOOD BY AUTOMATED COUNT: 15.7 % (ref 12.3–15.4)
GLOBULIN UR ELPH-MCNC: 2.4 GM/DL
GLUCOSE BLDC GLUCOMTR-MCNC: 270 MG/DL (ref 70–99)
GLUCOSE BLDC GLUCOMTR-MCNC: 295 MG/DL (ref 70–99)
GLUCOSE BLDC GLUCOMTR-MCNC: 318 MG/DL (ref 70–99)
GLUCOSE BLDC GLUCOMTR-MCNC: 345 MG/DL (ref 70–99)
GLUCOSE SERPL-MCNC: 332 MG/DL (ref 65–99)
HCT VFR BLD AUTO: 25.7 % (ref 37.5–51)
HGB BLD-MCNC: 9 G/DL (ref 13–17.7)
IMM GRANULOCYTES # BLD AUTO: 0.03 10*3/MM3 (ref 0–0.05)
IMM GRANULOCYTES NFR BLD AUTO: 0.3 % (ref 0–0.5)
LYMPHOCYTES # BLD AUTO: 1.34 10*3/MM3 (ref 0.7–3.1)
LYMPHOCYTES NFR BLD AUTO: 14.8 % (ref 19.6–45.3)
MAGNESIUM SERPL-MCNC: 1.7 MG/DL (ref 1.6–2.6)
MCH RBC QN AUTO: 29.9 PG (ref 26.6–33)
MCHC RBC AUTO-ENTMCNC: 35 G/DL (ref 31.5–35.7)
MCV RBC AUTO: 85.4 FL (ref 79–97)
MONOCYTES # BLD AUTO: 0.77 10*3/MM3 (ref 0.1–0.9)
MONOCYTES NFR BLD AUTO: 8.5 % (ref 5–12)
NEUTROPHILS NFR BLD AUTO: 6.19 10*3/MM3 (ref 1.7–7)
NEUTROPHILS NFR BLD AUTO: 68.4 % (ref 42.7–76)
NRBC BLD AUTO-RTO: 0 /100 WBC (ref 0–0.2)
PHOSPHATE SERPL-MCNC: 2.8 MG/DL (ref 2.5–4.5)
PLATELET # BLD AUTO: 111 10*3/MM3 (ref 140–450)
PMV BLD AUTO: 10.5 FL (ref 6–12)
POTASSIUM SERPL-SCNC: 3.6 MMOL/L (ref 3.5–5.2)
POTASSIUM SERPL-SCNC: 4.5 MMOL/L (ref 3.5–5.2)
PROT SERPL-MCNC: 4 G/DL (ref 6–8.5)
RBC # BLD AUTO: 3.01 10*6/MM3 (ref 4.14–5.8)
SODIUM SERPL-SCNC: 134 MMOL/L (ref 136–145)
WBC NRBC COR # BLD AUTO: 9.05 10*3/MM3 (ref 3.4–10.8)

## 2024-03-06 PROCEDURE — 63710000001 INSULIN LISPRO (HUMAN) PER 5 UNITS: Performed by: INTERNAL MEDICINE

## 2024-03-06 PROCEDURE — G0378 HOSPITAL OBSERVATION PER HR: HCPCS

## 2024-03-06 PROCEDURE — 85025 COMPLETE CBC W/AUTO DIFF WBC: CPT | Performed by: STUDENT IN AN ORGANIZED HEALTH CARE EDUCATION/TRAINING PROGRAM

## 2024-03-06 PROCEDURE — 97110 THERAPEUTIC EXERCISES: CPT

## 2024-03-06 PROCEDURE — 63710000001 INSULIN DETEMIR PER 5 UNITS: Performed by: INTERNAL MEDICINE

## 2024-03-06 PROCEDURE — 80053 COMPREHEN METABOLIC PANEL: CPT | Performed by: INTERNAL MEDICINE

## 2024-03-06 PROCEDURE — 82948 REAGENT STRIP/BLOOD GLUCOSE: CPT

## 2024-03-06 PROCEDURE — 84100 ASSAY OF PHOSPHORUS: CPT | Performed by: INTERNAL MEDICINE

## 2024-03-06 PROCEDURE — 97116 GAIT TRAINING THERAPY: CPT

## 2024-03-06 PROCEDURE — 84132 ASSAY OF SERUM POTASSIUM: CPT | Performed by: INTERNAL MEDICINE

## 2024-03-06 PROCEDURE — 83735 ASSAY OF MAGNESIUM: CPT | Performed by: STUDENT IN AN ORGANIZED HEALTH CARE EDUCATION/TRAINING PROGRAM

## 2024-03-06 PROCEDURE — 99233 SBSQ HOSP IP/OBS HIGH 50: CPT | Performed by: INTERNAL MEDICINE

## 2024-03-06 RX ORDER — POTASSIUM CHLORIDE 20 MEQ/1
40 TABLET, EXTENDED RELEASE ORAL EVERY 4 HOURS
Status: COMPLETED | OUTPATIENT
Start: 2024-03-06 | End: 2024-03-06

## 2024-03-06 RX ORDER — INSULIN LISPRO 100 [IU]/ML
5 INJECTION, SOLUTION INTRAVENOUS; SUBCUTANEOUS ONCE
Status: COMPLETED | OUTPATIENT
Start: 2024-03-06 | End: 2024-03-06

## 2024-03-06 RX ORDER — FLUOXETINE HYDROCHLORIDE 20 MG/1
40 CAPSULE ORAL DAILY
Status: DISCONTINUED | OUTPATIENT
Start: 2024-03-06 | End: 2024-03-11 | Stop reason: HOSPADM

## 2024-03-06 RX ADMIN — GABAPENTIN 100 MG: 100 CAPSULE ORAL at 16:31

## 2024-03-06 RX ADMIN — Medication 10 ML: at 08:44

## 2024-03-06 RX ADMIN — INSULIN DETEMIR 20 UNITS: 100 INJECTION, SOLUTION SUBCUTANEOUS at 20:46

## 2024-03-06 RX ADMIN — NADOLOL 20 MG: 20 TABLET ORAL at 08:44

## 2024-03-06 RX ADMIN — LACTULOSE 10 G: 20 SOLUTION ORAL at 20:46

## 2024-03-06 RX ADMIN — INSULIN DETEMIR 15 UNITS: 100 INJECTION, SOLUTION SUBCUTANEOUS at 08:44

## 2024-03-06 RX ADMIN — FUROSEMIDE 40 MG: 40 TABLET ORAL at 08:44

## 2024-03-06 RX ADMIN — SPIRONOLACTONE 25 MG: 25 TABLET ORAL at 08:44

## 2024-03-06 RX ADMIN — Medication 5 MG: at 20:51

## 2024-03-06 RX ADMIN — INSULIN LISPRO 4 UNITS: 100 INJECTION, SOLUTION INTRAVENOUS; SUBCUTANEOUS at 08:44

## 2024-03-06 RX ADMIN — INSULIN LISPRO 5 UNITS: 100 INJECTION, SOLUTION INTRAVENOUS; SUBCUTANEOUS at 17:40

## 2024-03-06 RX ADMIN — FAMOTIDINE 20 MG: 20 TABLET ORAL at 16:31

## 2024-03-06 RX ADMIN — FAMOTIDINE 20 MG: 20 TABLET ORAL at 06:36

## 2024-03-06 RX ADMIN — INSULIN LISPRO 4 UNITS: 100 INJECTION, SOLUTION INTRAVENOUS; SUBCUTANEOUS at 20:46

## 2024-03-06 RX ADMIN — LACTULOSE 10 G: 20 SOLUTION ORAL at 08:44

## 2024-03-06 RX ADMIN — GABAPENTIN 100 MG: 100 CAPSULE ORAL at 08:44

## 2024-03-06 RX ADMIN — HYDROXYZINE PAMOATE 25 MG: 25 CAPSULE ORAL at 16:31

## 2024-03-06 RX ADMIN — Medication 1 TABLET: at 08:44

## 2024-03-06 RX ADMIN — LACTULOSE 10 G: 20 SOLUTION ORAL at 16:31

## 2024-03-06 RX ADMIN — FLUOXETINE HYDROCHLORIDE 40 MG: 20 CAPSULE ORAL at 16:31

## 2024-03-06 RX ADMIN — INSULIN LISPRO 5 UNITS: 100 INJECTION, SOLUTION INTRAVENOUS; SUBCUTANEOUS at 17:39

## 2024-03-06 RX ADMIN — POTASSIUM CHLORIDE 40 MEQ: 1500 TABLET, EXTENDED RELEASE ORAL at 08:44

## 2024-03-06 RX ADMIN — DICLOFENAC SODIUM 2 G: 10 GEL TOPICAL at 20:46

## 2024-03-06 RX ADMIN — HYDROXYZINE PAMOATE 25 MG: 25 CAPSULE ORAL at 09:59

## 2024-03-06 RX ADMIN — DICLOFENAC SODIUM 2 G: 10 GEL TOPICAL at 08:51

## 2024-03-06 RX ADMIN — Medication 100 MG: at 08:44

## 2024-03-06 RX ADMIN — FOLIC ACID 1 MG: 1 TABLET ORAL at 08:44

## 2024-03-06 RX ADMIN — INSULIN LISPRO 4 UNITS: 100 INJECTION, SOLUTION INTRAVENOUS; SUBCUTANEOUS at 12:36

## 2024-03-06 RX ADMIN — Medication 10 ML: at 20:47

## 2024-03-06 RX ADMIN — RIFAXIMIN 550 MG: 550 TABLET ORAL at 20:46

## 2024-03-06 RX ADMIN — POTASSIUM CHLORIDE 40 MEQ: 1500 TABLET, EXTENDED RELEASE ORAL at 12:36

## 2024-03-06 RX ADMIN — RIFAXIMIN 550 MG: 550 TABLET ORAL at 08:44

## 2024-03-06 RX ADMIN — DICLOFENAC SODIUM 2 G: 10 GEL TOPICAL at 16:34

## 2024-03-06 RX ADMIN — GABAPENTIN 100 MG: 100 CAPSULE ORAL at 20:46

## 2024-03-06 NOTE — CONSULTS
"Nutrition Services    Patient Name: Simon Peters  YOB: 1970  MRN: [id number]  Admission date: 3/4/2024      CLINICAL NUTRITION ASSESSMENT      Reason for Assessment  Medical Nutrition Therapy     H&P:  Past Medical History:   Diagnosis Date    Abnormal LFTs     Anxiety     Back pain     Diabetes mellitus     Hypertension     Kidney failure     Liver failure     Rash         Current Problems:   Active Hospital Problems    Diagnosis     **Weakness         Nutrition/Diet History         Narrative   Patient has uncontrolled hyperglycemia due to insulin noncompliance. A1c 8.8% & . RD consulted for patient diabetes education. Nurse reports patient asking for snacks in between meals (peanut butter & crackers). Patient reports at home intake of mostly fast foods due to difficulty walking, numbness in legs, fear of falling, etc. Patient is hoping to go to rehab after d/c. Discussed eating higher protein/lower carb snacks. Did not discuss carb counting but asked patient if we could meet with him again and patient was very agreeable. Snacks will need to be taken to nurses station and given at snack times. Speaking with  to facilitate snacks accordingly.      Anthropometrics        Current Height, Weight Height: 175.3 cm (69\")  Weight: 95.1 kg (209 lb 10.5 oz)   Current BMI Body mass index is 30.96 kg/m².   BMI Classification Obese Class I   % .8%   Adjusted Body Weight (ABW)    Weight Hx  Wt Readings from Last 30 Encounters:   03/06/24 0600 95.1 kg (209 lb 10.5 oz)   03/05/24 0700 95.3 kg (210 lb 1.6 oz)   03/04/24 0220 98.8 kg (217 lb 13 oz)   01/06/24 1353 105 kg (231 lb 14.8 oz)   01/01/24 1124 105 kg (232 lb 2.3 oz)   07/29/23 1140 92 kg (202 lb 13.2 oz)   11/03/20 1437 92 kg (202 lb 12.8 oz)   07/08/19 0901 99.8 kg (220 lb)   09/25/18 0920 97.4 kg (214 lb 11.2 oz)   08/02/18 0802 103 kg (226 lb)   07/27/18 0804 104 kg (228 lb 6.4 oz)   03/08/18 1055 99.8 kg (220 lb)   06/30/17 " 1013 103 kg (226 lb)   01/10/17 1127 107 kg (236 lb)   05/06/16 1609 107 kg (236 lb)          Wt Change Observation Patient has lost 23# (9.6%) in 2 months     Estimated/Assessed Needs  Estimated Needs based on: Omega using CBW adjusted for weight loss (-300)        Energy Requirements 2021   EST Needs (kcal/day)        Protein Requirements 1.0-1.2g/kg   EST Daily Needs (g/day)  g/day        Fluid Requirements 20-25 ml/kg    Estimated Needs (mL/day) 2000 mL/day restriction      Labs/Medications         Pertinent Labs Reviewed.   Results from last 7 days   Lab Units 03/06/24  0516 03/05/24 0425 03/04/24 0245 03/04/24 0229   SODIUM mmol/L 134* 134*  --  131*   SODIUM, VENOUS mmol/L  --   --  129.3*  --    POTASSIUM mmol/L 3.6 3.4*  --  3.3*   POTASSIUM, VENOUS mmol/L  --   --  3.1*  --    CHLORIDE mmol/L 105 106  --  99   CHLORIDE, VENOUS mmol/L  --   --  102  --    CO2 mmol/L 22.3 21.0*  --  21.8*   BUN mg/dL 27* 23*  --  24*   CREATININE mg/dL 2.00* 1.87*  --  1.85*   CALCIUM mg/dL 7.6* 7.5*  --  7.8*   BILIRUBIN mg/dL 1.3* 1.4*  --  1.9*   ALK PHOS U/L 254* 260*  --  351*   ALT (SGPT) U/L 17 21  --  22   AST (SGOT) U/L 37 46*  --  47*   GLUCOSE mg/dL 332* 278*  --  682*   GLUCOSE, ARTERIAL mg/dL  --   --  649*  --      Results from last 7 days   Lab Units 03/06/24  0516 03/05/24 0425 03/04/24 0229 03/04/24 0229   MAGNESIUM mg/dL 1.7 1.7  --  2.0   PHOSPHORUS mg/dL 2.8 2.3*   < >  --    HEMOGLOBIN g/dL 9.0* 9.5*  --  10.0*   HEMATOCRIT % 25.7* 26.4*  --  27.9*    < > = values in this interval not displayed.     SARS-CoV-2, SRINIVASA   Date Value Ref Range Status   04/26/2022 Not Detected Not Detected Final     Lab Results   Component Value Date    HGBA1C 10.3 (H) 07/29/2023         Pertinent Medications Reviewed.     Malnutrition Severity Assessment              Nutrition Diagnosis         Nutrition Dx Problem 1 Limited adherence to diet modifications related to limited adherence to nutrition recommendations  as evidenced by  elevated glucose ( 295) & Hgb A1c (10.3)     Nutrition Intervention           Current Nutrition Orders & Evaluation of Intake       Current PO Diet Diet: Cardiac, Diabetic, High Protein, Fluid Restriction (240 mL/tray); Low Sodium (2g); Consistent Carbohydrate; 2000 mL/day; Fluid Consistency: Thin (IDDSI 0)   Supplement No active supplement orders           Nutrition Intervention/Prescription        Consistent carbohydrate diet with high protein, low carb snacks in between meals (cheese, yogurt, sf jello/pudding, etc)    F/u for NFPE if warranted        Medical Nutrition Therapy/Nutrition Education          Learner     Readiness Patient  Acceptance      Method     Response Explanation  Needs reinforcement     Monitor/Evaluation        Monitor Per protocol, I&O, Weight, Symptoms     Nutrition Discharge Plan         Continue 60g CHO meals      Electronically signed by:  Rachael Roca  03/06/24 11:02 EST

## 2024-03-06 NOTE — PLAN OF CARE
Goal Outcome Evaluation:              Outcome Evaluation: transfer from PCU this shift. patient is alert and oriented x4 and on room air. medicated per mar. blood glucose monitored. patient requested prn sleep and anxiety medication be given with night time medications. belongings listed in chart. no other issues noted at this time.

## 2024-03-06 NOTE — THERAPY TREATMENT NOTE
Acute Care - Physical Therapy Treatment Note   F[facility]     Patient Name: Simon Peters  : 1970  MRN: [id number]  Today's Date: 3/6/2024      Visit Dx:     ICD-10-CM ICD-9-CM   1. Weakness generalized  R53.1 780.79   2. Cirrhosis of liver without ascites, unspecified hepatic cirrhosis type  K74.60 571.5   3. Difficulty in walking  R26.2 719.7   4. Decreased activities of daily living (ADL)  Z78.9 V49.89     Patient Active Problem List   Diagnosis    Hypertension    Anxiety    Elevated serum glucose    Diabetes mellitus    Elevated liver enzymes    Encounter for screening for malignant neoplasm of colon    Weakness     Past Medical History:   Diagnosis Date    Abnormal LFTs     Anxiety     Back pain     Diabetes mellitus     Hypertension     Kidney failure     Liver failure     Rash      Past Surgical History:   Procedure Laterality Date    TIPS PROCEDURE       PT Assessment (Last 12 Hours)       PT Evaluation and Treatment       Row Name 24 1046          Physical Therapy Time and Intention    Subjective Information complains of;weakness;fatigue;pain  -DK     Document Type therapy note (daily note)  -DK     Mode of Treatment individual therapy;physical therapy  -DK     Patient Effort good  -DK     Symptoms Noted During/After Treatment fatigue;increased pain  -DK     Comment Pt requires repeated cues to perform exercises/keep walking during gait.  He was slightly grumpy during treatment.  -DK       Row Name 24 1046          Pain    Pretreatment Pain Rating 3/10  -DK     Posttreatment Pain Rating 3/10  -DK     Pain Location generalized  -DK     Pain Intervention(s) Repositioned;Ambulation/increased activity;Distraction;Therapeutic presence  -DK       Row Name 24 1046          Cognition    Affect/Mental Status (Cognition) confused;low arousal/lethargic;agitated  -DK     Behavioral Issues (Cognition) overwhelmed easily;difficulty managing stress  -DK     Orientation Status (Cognition) oriented  to;person;place;situation  -DK     Follows Commands (Cognition) physical/tactile prompts required;repetition of directions required;verbal cues/prompting required  -DK     Cognitive Function attention deficit  -DK     Attention Deficit (Cognition) minimal deficit;concentration;arousal/alertness;focused/sustained attention  -DK     Personal Safety Interventions gait belt;nonskid shoes/slippers when out of bed;supervised activity  -DK       Row Name 03/06/24 1046          Bed Mobility    Bed Mobility supine-sit-supine  -DK     Supine-Sit J[place] (Bed Mobility) standby assist  -DK     Sit-Supine [place] (Bed Mobility) standby assist  -DK     Supine-Sit-Supine [place] (Bed Mobility) standby assist  -DK     Bed Mobility, Safety Issues decreased use of arms for pushing/pulling;decreased use of legs for bridging/pushing  -DK     Assistive Device (Bed Mobility) bed rails  -DK       Row Name 03/06/24 1046          Transfers    Transfers sit-stand transfer;stand-sit transfer  -DK     Comment, (Transfers) Pt declined assistance, and appeared to struggle with sit to stand transfers.  -       Row Name 03/06/24 1046          Sit-Stand Transfer    Sit-Stand J[place] (Transfers) standby assist  -DK     Assistive Device (Sit-Stand Transfers) walker, front-wheeled  -DK       Row Name 03/06/24 1046          Stand-Sit Transfer    Stand-Sit [place] (Transfers) standby assist;1 person assist  -DK       Row Name 03/06/24 1046          Gait/Stairs (Locomotion)    Gait/Stairs Locomotion gait/ambulation independence;gait/ambulation assistive device;distance ambulated;gait pattern  -DK     J[place]uld Level (Gait) contact guard;1 person assist  -DK     Assistive Device (Gait) walker, front-wheeled  -DK     Distance in Feet (Gait) 200  -DK     Pattern (Gait) step-through  -DK     Deviations/Abnormal Patterns (Gait) javy decreased;festinating/shuffling;gait speed decreased;stride length decreased  -DK     Comment,  (Gait/Stairs) Pt ambulated on room air with a rolling walker. He tends to ambulate with a slow pace. Pt returned to bed on alert post treatment.  -       Row Name 03/06/24 1046          Safety Issues, Functional Mobility    Safety Issues Affecting Function (Mobility) ability to follow commands;awareness of need for assistance;impulsivity;judgment;safety precaution awareness  -     Impairments Affecting Function (Mobility) balance;cognition;endurance/activity tolerance;pain;strength  -     Cognitive Impairments, Mobility Safety/Performance attention;awareness, need for assistance;impulsivity;judgment;safety precaution awareness  -       Row Name 03/06/24 1046          Balance    Balance Assessment sitting static balance;sitting dynamic balance;standing static balance;standing dynamic balance  -     Static Sitting Balance standby assist  -     Dynamic Sitting Balance standby assist  -     Position, Sitting Balance unsupported;sitting edge of bed  -     Static Standing Balance standby assist;contact guard;1-person assist  -     Dynamic Standing Balance contact guard;1-person assist  -     Position/Device Used, Standing Balance walker, front-wheeled  -     Balance Interventions standing;static;dynamic;tandem gait  -       Row Name 03/06/24 1046          Motor Skills    Motor Skills --  therapeutic exercises  -     Therapeutic Exercise hip;knee;ankle  -       Row Name 03/06/24 1046          Hip (Therapeutic Exercise)    Hip (Therapeutic Exercise) AROM (active range of motion)  -     Hip AROM (Therapeutic Exercise) bilateral;flexion;extension;aBduction;aDduction;sitting;20 repititions  -       Row Name 03/06/24 1046          Knee (Therapeutic Exercise)    Knee (Therapeutic Exercise) AROM (active range of motion)  -     Knee AROM (Therapeutic Exercise) bilateral;flexion;extension;LAQ (long arc quad);sitting;20 repititions  -       Row Name 03/06/24 1046          Ankle (Therapeutic  Exercise)    Ankle (Therapeutic Exercise) AROM (active range of motion)  -     Ankle AROM (Therapeutic Exercise) bilateral;dorsiflexion;plantarflexion;sitting;20 repititions  -       Row Name 03/06/24 1046          Plan of Care Review    Plan of Care Reviewed With patient  -     Progress improving  -       Row Name 03/06/24 1046          Positioning and Restraints    Pre-Treatment Position in bed  -DK     Post Treatment Position bed  -DK     In Bed supine;call light within reach;encouraged to call for assist;exit alarm on;side rails up x2;legs elevated  -       Row Name 03/06/24 1046          Therapy Assessment/Plan (PT)    Rehab Potential (PT) good, to achieve stated therapy goals  -     Criteria for Skilled Interventions Met (PT) skilled treatment is necessary  -     Therapy Frequency (PT) daily  -     Problem List (PT) problems related to;balance;cognition;mobility;strength;pain;hearing;communication  -     Activity Limitations Related to Problem List (PT) unable to ambulate safely;unable to transfer safely  -       Row Name 03/06/24 1046          Progress Summary (PT)    Progress Toward Functional Goals (PT) progress toward functional goals is good  -               User Key  (r) = Recorded By, (t) = Taken By, (c) = Cosigned By      Initials Name Provider Type    Lauren Barron PTA Physical Therapist Assistant                    Physical Therapy Education       Title: PT OT SLP Therapies (Done)       Topic: Physical Therapy (Done)       Point: Mobility training (Done)       Learning Progress Summary             Patient Acceptance, E,TB, VU by ITALO at 3/5/2024 1144                         Point: Precautions (Done)       Learning Progress Summary             Patient Acceptance, E,TB, VU by ITALO at 3/5/2024 1144                                         User Key       Initials Effective Dates Name Provider Type Goran PUCKETT 06/03/21 -  Aleksey Marino, PT Physical Therapist PT                   PT Recommendation and Plan  Planned Therapy Interventions (PT): balance training, bed mobility training, gait training, home exercise program, strengthening, transfer training  Therapy Frequency (PT): daily  Progress Summary (PT)  Progress Toward Functional Goals (PT): progress toward functional goals is good  Plan of Care Reviewed With: patient  Progress: improving   Outcome Measures       Row Name 03/06/24 1046 03/05/24 1100          How much help from another person do you currently need...    Turning from your back to your side while in flat bed without using bedrails? 4  -DK 3  -ITALO     Moving from lying on back to sitting on the side of a flat bed without bedrails? 4  -DK 3  -ITALO     Moving to and from a bed to a chair (including a wheelchair)? 3  -DK 2  -ITALO     Standing up from a chair using your arms (e.g., wheelchair, bedside chair)? 3  -DK 2  -ITALO     Climbing 3-5 steps with a railing? 2  -DK 2  -ITALO     To walk in hospital room? 3  -DK 3  -ITALO     AM-PAC 6 Clicks Score (PT) 19  -DK 15  -ITALO     Highest Level of Mobility Goal 6 --> Walk 10 steps or more  -DK 4 --> Transfer to chair/commode  -ITALO        Functional Assessment    Outcome Measure Options AM-PAC 6 Clicks Basic Mobility (PT)  -DK --               User Key  (r) = Recorded By, (t) = Taken By, (c) = Cosigned By      Initials Name Provider Type    Lauren Barron, PTA Physical Therapist Assistant    Aleksey Franks, PT Physical Therapist                     Time Calculation:    PT Charges       Row Name 03/06/24 1052             Time Calculation    PT Received On 03/06/24  -DK      PT Goal Re-Cert Due Date 03/14/24  -DK         Timed Charges    89738 - PT Therapeutic Exercise Minutes 14  -DK      25360 - Gait Training Minutes  10  -DK      85139 - PT Therapeutic Activity Minutes 7  -DK         Total Minutes    Timed Charges Total Minutes 31  -DK       Total Minutes 31  -DK                User Key  (r) = Recorded By, (t) = Taken By, (c) = Cosigned  By      Initials Name Provider Type    Lauren Barron PTA Physical Therapist Assistant                  Therapy Charges for Today       Code Description Service Date Service Provider Modifiers Qty    32669642646 HC PT THER PROC EA 15 MIN 3/6/2024 Lauren Brizuela PTA GP 1    36375654486 HC GAIT TRAINING EA 15 MIN 3/6/2024 Lauren Brizuela PTA GP 1            PT G-Codes  Outcome Measure Options: AM-PAC 6 Clicks Basic Mobility (PT)  AM-PAC 6 Clicks Score (PT): 19  AM-PAC 6 Clicks Score (OT): 24    Lauren Brizuela PTA  3/6/2024

## 2024-03-06 NOTE — PROGRESS NOTES
[facility]   Hospitalist Progress Note  Date: 3/6/2024  Patient Name: Simon Peters  : 1970  MRN: [id number]  Date of admission: 3/4/2024      Subjective   Subjective     Chief Complaint: Generalized weakness.  Legs giving out and frequent falls for past few days    Summary:   Simon Peters is a 53 y.o. male with past ministry of alcoholic cirrhosis s/p TIPS in May 23.  Patient has been seen at  transplant center on  and has been in [facility] from  to  for hepatic encephalopathy, CKD 3B, diabetes mellitus with nephropathy seen by  endocrinology, noncompliant, hypertension, tobacco use disorder, pleural effusion and ascites, chronic scrotal swelling and chronic anemia thrombocytopenia due to cirrhosis.  Patient has been generally getting more weak with increasing numbness tingling in lower extremities.  Does have a history of neuropathy in bilateral lower extremities.  Patient has not taken any of his medications including insulin for some time.  Patient has fallen 3-4 times in the past few weeks.  There is no loss of consciousness.  His leg just gives out.  No major injury from the fall.  Apparently he has been kicked out by his mother due to drinking.  Per patient his last drink was on  last year.  Denies any chest pain, shortness of air, vomiting diarrhea or blood in the stools or black-colored stools.  No cough or phlegm.  Does have lower abdominal pain chronic in nature.  No urinary complaints.  Workup in the ED showed elevated blood pressure with good oxygen saturation on room air.  Delta troponin is -2.  Venous pH is 7.4.  Creatinine 1.8.  Baseline creatinine is 2-2.3.  No anion gap.  Blood glucose of 682.  Albumin of 1.9.  Ammonia of 64 with a total bili of 1.9.  Hemoglobin of 12.  Platelets 112.  UA shows RBCs and WBCs with no bacteria or ketones.  EKG is normal sinus rhythm.  Chest x-ray, CT head negative.  CT abdomen pelvis shows gallstones with  moderate ascites.  Because of uncontrolled hyperglycemia hospitalist has been called to admit him for further evaluation  Patient went down for paracentesis by IR.  Ultrasound did not show much fluid.  Patient did not want paracentesis.  MELD score of 19 during this admission.    Interval Followup:   Vital signs stable.  Remains on room air.  Blood sugars still up  Negative orthostatic vital signs.  No more falls while in the hospital.  Ambulating without much problem  Complaining of pain in bilateral knees for some time and wants some pain medication.  Right knee x-ray negative  Wants medicine for itching as soon as possible.  Has been started on Vistaril  Wants his Prozac to be resumed.  Denies abdominal pain.  Weakness improving.    Review of Systems   All systems were reviewed and negative except for: Summary interval follow-up    Objective   Objective     Vitals:   Temp:  [97.4 °F (36.3 °C)-98.6 °F (37 °C)] 97.4 °F (36.3 °C)  Heart Rate:  [58-73] 58  Resp:  [16-20] 17  BP: (121-151)/(73-87) 122/75  Physical Exam       Constitutional: Awake, alert, no acute distress              Eyes: Pupils equal, sclerae anicteric, no conjunctival injection              HENT: NCAT, mucous membranes moist              Neck: Supple, no thyromegaly, no lymphadenopathy, trachea midline              Respiratory: Clear to auscultation bilaterally, nonlabored respirations               Cardiovascular: RRR, no murmurs, rubs, or gallops, palpable pedal pulses bilaterally              Gastrointestinal: Positive bowel sounds, soft, nontender, nondistended              Musculoskeletal: +2 bilateral ankle edema, no clubbing or cyanosis to extremities              Psychiatric: Appropriate affect, cooperative              Neurologic: Oriented x 3, strength symmetric in all extremities, patient has hard time raising both legs straight up, Cranial Nerves grossly intact to confrontation, speech clear.  Decreased sensation to touch bilateral  lower extremities distally.              Skin: No rashes     Result Review    Result Review:  I have personally reviewed the results for the past 24 hours and agree with these findings:  [x]  Laboratory  [x]  Microbiology  [x]  Radiology  [x]  EKG/Telemetry   []  Cardiology/Vascular   []  Pathology  [x]  Old records  [x]  Other: Medications    Assessment & Plan   Assessment / Plan     Assessment:  Frequent falls and generalized weakness.  Improving  Peripheral neuropathy likely diabetic and alcoholic causing above.  Noncompliance with medication including insulin.  Diabetes mellitus uncontrolled hyperglycemia due to above.  Hemoglobin A1c of 8.8% on January 31st.  Alcoholic cirrhosis s/p TIPS on May 2023.  Anasarca with ascites and chronic scrotal swelling.  CKD 3B.  Baseline creatinine 2-2.3.  Stable  Chronic anemia and thrombocytopenia from cirrhosis.  Hematuria and pyuria without bacteriuria.  Asymptomatic gallstones.  Hypoalbuminemia from liver disease.  Hypertension.  Tobacco use disorder.      Plan:   X-ray of right knee noted negative  Topical Voltaren for both knees  Continue nadolol for elevated blood pressure  Low-salt diet,  high-protein, carb consistent diet.  Lactulose and Xifaxan.  Ammonia level noted at 64  Sliding-scale  and basal insulin.  Titrate to control blood sugars.  Continue oral Lasix and Aldactone.  Nicotine patch.   INR.  Noted  Discussed with IR not enough fluid on ultrasound for paracentesis and patient refusing anyway as he is not feeling bloated or distended.  Although CT scan abdomen showed moderate ascites  No need of CIWA protocol as patient has been sober since December last year.  Thiamine and folic acid.  Check B12  Vistaril for itching.  Started Prozac at 40 mg  Delta troponin -2  Protonix.  Dietitian consulted  Diabetic nurse educator consulted  PT OT.  Patient wants rehab placement.      Discussed plan with RN.  Discharge to rehab when bed available.  Patient has not provided  his health insurance information yet to start the process.  This was discussed with patient.    DVT prophylaxis:  Mechanical DVT prophylaxis orders are present.        CODE STATUS:   Code Status (Patient has no pulse and is not breathing): CPR (Attempt to Resuscitate)  Medical Interventions (Patient has pulse or is breathing): Full Support      Part of this note may be an electronic transcription/translation of spoken language to printed text using the Dragon Dictation System.       Electronically signed by Kosta Sampson MD, 03/06/24, 3:27 PM EST.

## 2024-03-06 NOTE — PLAN OF CARE
Goal Outcome Evaluation:  Plan of Care Reviewed With: patient        Progress: no change  Outcome Evaluation: denies pain/discomfort this shift. c/o not being medicated with home meds. made aware patient had no meds filled since november 2023 per med rec tech (gopal). md made aware of patient wanting prozac (per home regimen according to patient), dose to be given this shift. standby assist to brp, calls for assist when needing assistance, bed alert in use for safety. no new issues/needs noted at this time.

## 2024-03-07 LAB
ALBUMIN SERPL-MCNC: 1.6 G/DL (ref 3.5–5.2)
ANION GAP SERPL CALCULATED.3IONS-SCNC: 6.8 MMOL/L (ref 5–15)
BASOPHILS # BLD AUTO: 0.11 10*3/MM3 (ref 0–0.2)
BASOPHILS NFR BLD AUTO: 1.4 % (ref 0–1.5)
BUN SERPL-MCNC: 31 MG/DL (ref 6–20)
BUN/CREAT SERPL: 14.6 (ref 7–25)
CALCIUM SPEC-SCNC: 7.6 MG/DL (ref 8.6–10.5)
CHLORIDE SERPL-SCNC: 108 MMOL/L (ref 98–107)
CO2 SERPL-SCNC: 20.2 MMOL/L (ref 22–29)
CREAT SERPL-MCNC: 2.12 MG/DL (ref 0.76–1.27)
DEPRECATED RDW RBC AUTO: 50.4 FL (ref 37–54)
EGFRCR SERPLBLD CKD-EPI 2021: 36.5 ML/MIN/1.73
EOSINOPHIL # BLD AUTO: 0.68 10*3/MM3 (ref 0–0.4)
EOSINOPHIL NFR BLD AUTO: 8.4 % (ref 0.3–6.2)
ERYTHROCYTE [DISTWIDTH] IN BLOOD BY AUTOMATED COUNT: 15.9 % (ref 12.3–15.4)
GLUCOSE BLDC GLUCOMTR-MCNC: 172 MG/DL (ref 70–99)
GLUCOSE BLDC GLUCOMTR-MCNC: 194 MG/DL (ref 70–99)
GLUCOSE BLDC GLUCOMTR-MCNC: 202 MG/DL (ref 70–99)
GLUCOSE BLDC GLUCOMTR-MCNC: 243 MG/DL (ref 70–99)
GLUCOSE SERPL-MCNC: 265 MG/DL (ref 65–99)
HCT VFR BLD AUTO: 26.6 % (ref 37.5–51)
HGB BLD-MCNC: 9.1 G/DL (ref 13–17.7)
IMM GRANULOCYTES # BLD AUTO: 0.04 10*3/MM3 (ref 0–0.05)
IMM GRANULOCYTES NFR BLD AUTO: 0.5 % (ref 0–0.5)
LYMPHOCYTES # BLD AUTO: 1.34 10*3/MM3 (ref 0.7–3.1)
LYMPHOCYTES NFR BLD AUTO: 16.5 % (ref 19.6–45.3)
MAGNESIUM SERPL-MCNC: 1.7 MG/DL (ref 1.6–2.6)
MCH RBC QN AUTO: 29.9 PG (ref 26.6–33)
MCHC RBC AUTO-ENTMCNC: 34.2 G/DL (ref 31.5–35.7)
MCV RBC AUTO: 87.5 FL (ref 79–97)
MONOCYTES # BLD AUTO: 0.78 10*3/MM3 (ref 0.1–0.9)
MONOCYTES NFR BLD AUTO: 9.6 % (ref 5–12)
NEUTROPHILS NFR BLD AUTO: 5.17 10*3/MM3 (ref 1.7–7)
NEUTROPHILS NFR BLD AUTO: 63.6 % (ref 42.7–76)
NRBC BLD AUTO-RTO: 0 /100 WBC (ref 0–0.2)
PHOSPHATE SERPL-MCNC: 2.4 MG/DL (ref 2.5–4.5)
PLATELET # BLD AUTO: 106 10*3/MM3 (ref 140–450)
PMV BLD AUTO: 10.7 FL (ref 6–12)
POTASSIUM SERPL-SCNC: 4.2 MMOL/L (ref 3.5–5.2)
RBC # BLD AUTO: 3.04 10*6/MM3 (ref 4.14–5.8)
SODIUM SERPL-SCNC: 135 MMOL/L (ref 136–145)
WBC NRBC COR # BLD AUTO: 8.12 10*3/MM3 (ref 3.4–10.8)

## 2024-03-07 PROCEDURE — G0378 HOSPITAL OBSERVATION PER HR: HCPCS

## 2024-03-07 PROCEDURE — 99232 SBSQ HOSP IP/OBS MODERATE 35: CPT | Performed by: INTERNAL MEDICINE

## 2024-03-07 PROCEDURE — 97110 THERAPEUTIC EXERCISES: CPT

## 2024-03-07 PROCEDURE — 25010000002 ONDANSETRON PER 1 MG: Performed by: INTERNAL MEDICINE

## 2024-03-07 PROCEDURE — 83735 ASSAY OF MAGNESIUM: CPT | Performed by: INTERNAL MEDICINE

## 2024-03-07 PROCEDURE — 63710000001 INSULIN DETEMIR PER 5 UNITS: Performed by: INTERNAL MEDICINE

## 2024-03-07 PROCEDURE — 63710000001 INSULIN LISPRO (HUMAN) PER 5 UNITS: Performed by: INTERNAL MEDICINE

## 2024-03-07 PROCEDURE — 97116 GAIT TRAINING THERAPY: CPT

## 2024-03-07 PROCEDURE — 82948 REAGENT STRIP/BLOOD GLUCOSE: CPT

## 2024-03-07 PROCEDURE — 85025 COMPLETE CBC W/AUTO DIFF WBC: CPT | Performed by: INTERNAL MEDICINE

## 2024-03-07 PROCEDURE — 80069 RENAL FUNCTION PANEL: CPT | Performed by: INTERNAL MEDICINE

## 2024-03-07 RX ADMIN — FAMOTIDINE 20 MG: 20 TABLET ORAL at 08:44

## 2024-03-07 RX ADMIN — Medication 5 MG: at 20:15

## 2024-03-07 RX ADMIN — Medication 10 ML: at 20:16

## 2024-03-07 RX ADMIN — DICLOFENAC SODIUM 2 G: 10 GEL TOPICAL at 08:50

## 2024-03-07 RX ADMIN — INSULIN LISPRO 2 UNITS: 100 INJECTION, SOLUTION INTRAVENOUS; SUBCUTANEOUS at 20:15

## 2024-03-07 RX ADMIN — Medication 1 TABLET: at 08:44

## 2024-03-07 RX ADMIN — FAMOTIDINE 20 MG: 20 TABLET ORAL at 17:04

## 2024-03-07 RX ADMIN — HYDROXYZINE PAMOATE 25 MG: 25 CAPSULE ORAL at 17:04

## 2024-03-07 RX ADMIN — Medication 10 ML: at 08:44

## 2024-03-07 RX ADMIN — LACTULOSE 10 G: 20 SOLUTION ORAL at 17:04

## 2024-03-07 RX ADMIN — INSULIN LISPRO 3 UNITS: 100 INJECTION, SOLUTION INTRAVENOUS; SUBCUTANEOUS at 17:37

## 2024-03-07 RX ADMIN — GABAPENTIN 100 MG: 100 CAPSULE ORAL at 17:04

## 2024-03-07 RX ADMIN — INSULIN DETEMIR 20 UNITS: 100 INJECTION, SOLUTION SUBCUTANEOUS at 20:15

## 2024-03-07 RX ADMIN — INSULIN LISPRO 3 UNITS: 100 INJECTION, SOLUTION INTRAVENOUS; SUBCUTANEOUS at 08:45

## 2024-03-07 RX ADMIN — FUROSEMIDE 40 MG: 40 TABLET ORAL at 08:44

## 2024-03-07 RX ADMIN — SPIRONOLACTONE 25 MG: 25 TABLET ORAL at 08:44

## 2024-03-07 RX ADMIN — GABAPENTIN 100 MG: 100 CAPSULE ORAL at 20:15

## 2024-03-07 RX ADMIN — FLUOXETINE HYDROCHLORIDE 40 MG: 20 CAPSULE ORAL at 08:44

## 2024-03-07 RX ADMIN — RIFAXIMIN 550 MG: 550 TABLET ORAL at 20:15

## 2024-03-07 RX ADMIN — INSULIN DETEMIR 20 UNITS: 100 INJECTION, SOLUTION SUBCUTANEOUS at 08:45

## 2024-03-07 RX ADMIN — INSULIN LISPRO 2 UNITS: 100 INJECTION, SOLUTION INTRAVENOUS; SUBCUTANEOUS at 13:02

## 2024-03-07 RX ADMIN — FOLIC ACID 1 MG: 1 TABLET ORAL at 08:44

## 2024-03-07 RX ADMIN — NADOLOL 20 MG: 20 TABLET ORAL at 08:44

## 2024-03-07 RX ADMIN — LACTULOSE 10 G: 20 SOLUTION ORAL at 20:15

## 2024-03-07 RX ADMIN — GABAPENTIN 100 MG: 100 CAPSULE ORAL at 08:44

## 2024-03-07 RX ADMIN — DICLOFENAC SODIUM 2 G: 10 GEL TOPICAL at 20:16

## 2024-03-07 RX ADMIN — Medication 100 MG: at 08:44

## 2024-03-07 RX ADMIN — ONDANSETRON 4 MG: 2 INJECTION INTRAMUSCULAR; INTRAVENOUS at 20:29

## 2024-03-07 RX ADMIN — HYDROXYZINE PAMOATE 25 MG: 25 CAPSULE ORAL at 04:15

## 2024-03-07 RX ADMIN — LACTULOSE 10 G: 20 SOLUTION ORAL at 08:44

## 2024-03-07 RX ADMIN — RIFAXIMIN 550 MG: 550 TABLET ORAL at 08:44

## 2024-03-07 RX ADMIN — DICLOFENAC SODIUM 2 G: 10 GEL TOPICAL at 17:05

## 2024-03-07 NOTE — THERAPY TREATMENT NOTE
Acute Care - Physical Therapy Treatment Note   Funk     Patient Name: Simon Peters  : 1970  MRN: [id number]  Today's Date: 3/7/2024      Visit Dx:     ICD-10-CM ICD-9-CM   1. Weakness generalized  R53.1 780.79   2. Cirrhosis of liver without ascites, unspecified hepatic cirrhosis type  K74.60 571.5   3. Difficulty in walking  R26.2 719.7   4. Decreased activities of daily living (ADL)  Z78.9 V49.89     Patient Active Problem List   Diagnosis    Hypertension    Anxiety    Elevated serum glucose    Diabetes mellitus    Elevated liver enzymes    Encounter for screening for malignant neoplasm of colon    Weakness     Past Medical History:   Diagnosis Date    Abnormal LFTs     Anxiety     Back pain     Diabetes mellitus     Hypertension     Kidney failure     Liver failure     Rash      Past Surgical History:   Procedure Laterality Date    TIPS PROCEDURE       PT Assessment (Last 12 Hours)       PT Evaluation and Treatment       Row Name 24 1057          Physical Therapy Time and Intention    Subjective Information complains of;weakness;fatigue;pain  -DK     Document Type therapy note (daily note)  -DK     Mode of Treatment individual therapy;physical therapy  -DK     Patient Effort good  -DK     Symptoms Noted During/After Treatment fatigue;increased pain  -DK     Comment Pt was able to participate in exercises, transfers and gait for a longer distance.  He declined using a rolling walker, declines assistance with transfers.  -DK       Row Name 24 1057          Pain    Pretreatment Pain Rating 3/10  -DK     Posttreatment Pain Rating 3/10  -DK     Pain Location generalized  -DK     Pain Intervention(s) Repositioned;Ambulation/increased activity;Distraction;Therapeutic presence  -DK       Row Name 24 1057          Cognition    Affect/Mental Status (Cognition) agitated;confused  -DK     Behavioral Issues (Cognition) overwhelmed easily;difficulty managing stress  -DK     Orientation Status  (Cognition) oriented to;person;place;situation  -DK     Follows Commands (Cognition) physical/tactile prompts required;repetition of directions required;verbal cues/prompting required  -DK     Cognitive Function attention deficit  -DK     Attention Deficit (Cognition) minimal deficit;concentration;arousal/alertness;focused/sustained attention  -DK     Personal Safety Interventions gait belt;nonskid shoes/slippers when out of bed;supervised activity  -       Row Name 03/07/24 1057          Bed Mobility    Bed Mobility supine-sit-supine  -DK     Supine-Sit [place] (Bed Mobility) supervision  -DK     Sit-Supine [place] (Bed Mobility) supervision  -DK     Supine-Sit-Supine [place] (Bed Mobility) supervision  -DK     Bed Mobility, Safety Issues decreased use of arms for pushing/pulling;decreased use of legs for bridging/pushing  -DK     Assistive Device (Bed Mobility) bed rails  -       Row Name 03/07/24 1057          Transfers    Transfers sit-stand transfer;stand-sit transfer  -       Row Name 03/07/24 1057          Sit-Stand Transfer    Sit-Stand [place] (Transfers) standby assist  -DK     Assistive Device (Sit-Stand Transfers) --  no assistive device  -       Row Name 03/07/24 1057          Stand-Sit Transfer    Stand-Sit [place] (Transfers) standby assist  -DK     Assistive Device (Stand-Sit Transfers) --  no assistive device  -       Row Name 03/07/24 1057          Gait/Stairs (Locomotion)    Gait/Stairs Locomotion gait/ambulation independence;gait/ambulation assistive device;distance ambulated;gait pattern  -DK     [place] Level (Gait) contact guard;1 person assist;standby assist  -DK     Assistive Device (Gait) --  no assistive device  -DK     Distance in Feet (Gait) 250  -DK     Pattern (Gait) step-through  -DK     Deviations/Abnormal Patterns (Gait) javy decreased;festinating/shuffling;gait speed decreased;stride length decreased;base of support, wide  -DK     Comment,  (Gait/Stairs) Pt ambulated on room air with no assistive device.  He declined the use of a rolling walker this session.  Pt does tend to waddle/mild stagger/weave noted throughout gait.  Several short standing rest breaks during gait.  Pt reports his knees buckle without warning at times.  Pt did intentionally lean/bump into the doorframe on the right while waiting for PCA to finish linen change, looking back at PTA and smiling, but no loss of balance noted. Pt returned to bed post treatment.  -       Row Name 03/07/24 1057          Safety Issues, Functional Mobility    Safety Issues Affecting Function (Mobility) impulsivity;judgment;safety precaution awareness  -     Impairments Affecting Function (Mobility) balance;cognition;endurance/activity tolerance;pain;strength  -     Cognitive Impairments, Mobility Safety/Performance impulsivity;judgment;safety precaution awareness  -       Row Name 03/07/24 1057          Balance    Balance Assessment sitting static balance;sitting dynamic balance;standing static balance;standing dynamic balance  -     Static Sitting Balance supervision  -     Dynamic Sitting Balance supervision  -     Position, Sitting Balance unsupported;sitting edge of bed  -     Static Standing Balance standby assist;contact guard;1-person assist  -     Dynamic Standing Balance standby assist;contact guard;1-person assist  -     Position/Device Used, Standing Balance --  no assistive device  -     Balance Interventions standing;dynamic;tandem gait  -       Row Name 03/07/24 1057          Motor Skills    Motor Skills --  therapeutic exercises  -     Therapeutic Exercise hip;knee;ankle  -       Row Name 03/07/24 1057          Hip (Therapeutic Exercise)    Hip (Therapeutic Exercise) AROM (active range of motion)  -     Hip AROM (Therapeutic Exercise) bilateral;flexion;extension;aBduction;aDduction;sitting;15 repititions  -       Row Name 03/07/24 1057          Knee  (Therapeutic Exercise)    Knee (Therapeutic Exercise) AROM (active range of motion)  -     Knee AROM (Therapeutic Exercise) bilateral;flexion;extension;LAQ (long arc quad);sitting;15 repititions  -       Row Name 03/07/24 1057          Ankle (Therapeutic Exercise)    Ankle (Therapeutic Exercise) AROM (active range of motion)  -DK     Ankle AROM (Therapeutic Exercise) bilateral;dorsiflexion;plantarflexion;sitting;20 repititions  -       Row Name 03/07/24 1057          Plan of Care Review    Plan of Care Reviewed With patient  -DK     Progress improving  -       Row Name 03/07/24 1057          Positioning and Restraints    Pre-Treatment Position in bed  -DK     Post Treatment Position bed  -DK     In Bed supine;call light within reach;encouraged to call for assist;side rails up x2;legs elevated  -       Row Name 03/07/24 1057          Therapy Assessment/Plan (PT)    Rehab Potential (PT) good, to achieve stated therapy goals  -     Criteria for Skilled Interventions Met (PT) skilled treatment is necessary  -     Therapy Frequency (PT) daily  -     Problem List (PT) problems related to;balance;cognition;mobility;strength;pain;hearing;communication  -     Activity Limitations Related to Problem List (PT) unable to ambulate safely;unable to transfer safely  -       Row Name 03/07/24 1057          Progress Summary (PT)    Progress Toward Functional Goals (PT) progress toward functional goals is good  -               User Key  (r) = Recorded By, (t) = Taken By, (c) = Cosigned By      Initials Name Provider Type    Lauren Barron PTA Physical Therapist Assistant                    Physical Therapy Education       Title: PT OT SLP Therapies (Done)       Topic: Physical Therapy (Done)       Point: Mobility training (Done)       Learning Progress Summary             Patient Acceptance, E,TB, VU by ITALO at 3/5/2024 1144                         Point: Precautions (Done)       Learning Progress Summary              Patient Acceptance, E,TB, VU by ITALO at 3/5/2024 1144                                         User Key       Initials Effective Dates Name Provider Type Discipline    ITALO 06/03/21 -  Aleksey Marino, PT Physical Therapist PT                  PT Recommendation and Plan  Planned Therapy Interventions (PT): balance training, bed mobility training, gait training, home exercise program, strengthening, transfer training  Therapy Frequency (PT): daily  Progress Summary (PT)  Progress Toward Functional Goals (PT): progress toward functional goals is good  Plan of Care Reviewed With: patient  Progress: improving   Outcome Measures       Row Name 03/07/24 1056 03/06/24 1046 03/05/24 1100       How much help from another person do you currently need...    Turning from your back to your side while in flat bed without using bedrails? 4  -DK 4  -DK 3  -ITALO    Moving from lying on back to sitting on the side of a flat bed without bedrails? 4  -DK 4  -DK 3  -ITALO    Moving to and from a bed to a chair (including a wheelchair)? 4  -DK 3  -DK 2  -ITALO    Standing up from a chair using your arms (e.g., wheelchair, bedside chair)? 4  -DK 3  -DK 2  -ITALO    Climbing 3-5 steps with a railing? 3  -DK 2  -DK 2  -ITALO    To walk in hospital room? 3  -DK 3  -DK 3  -ITALO    AM-PAC 6 Clicks Score (PT) 22  -DK 19  -DK 15  -ITALO    Highest Level of Mobility Goal 7 --> Walk 25 feet or more  -DK 6 --> Walk 10 steps or more  -DK 4 --> Transfer to chair/commode  -ITALO       Functional Assessment    Outcome Measure Options AM-PAC 6 Clicks Basic Mobility (PT)  -DK AM-PAC 6 Clicks Basic Mobility (PT)  -DK --              User Key  (r) = Recorded By, (t) = Taken By, (c) = Cosigned By      Initials Name Provider Type    Lauren Barron, HOLLY Physical Therapist Assistant    Aleksey Franks, PT Physical Therapist                     Time Calculation:    PT Charges       Row Name 03/07/24 1105             Time Calculation    PT Received On 03/07/24  -JUNG       PT Goal Re-Cert Due Date 03/14/24  -DK         Timed Charges    29076 - PT Therapeutic Exercise Minutes 12  -DK      26604 - Gait Training Minutes  12  -DK      48542 - PT Therapeutic Activity Minutes 7  -DK         Total Minutes    Timed Charges Total Minutes 31  -DK       Total Minutes 31  -DK                User Key  (r) = Recorded By, (t) = Taken By, (c) = Cosigned By      Initials Name Provider Type    Lauren Barron PTA Physical Therapist Assistant                  Therapy Charges for Today       Code Description Service Date Service Provider Modifiers Qty    16221233018 HC PT THER PROC EA 15 MIN 3/6/2024 Lauren Brizuela, PTA GP 1    36559375733 HC GAIT TRAINING EA 15 MIN 3/6/2024 Lauren Brizuela, PTA GP 1    96976469719 HC PT THER PROC EA 15 MIN 3/7/2024 Lauren Brizuela, PTA GP 1    97451701908 HC GAIT TRAINING EA 15 MIN 3/7/2024 Lauren Brizuela, PTA GP 1            PT G-Codes  Outcome Measure Options: AM-PAC 6 Clicks Basic Mobility (PT)  AM-PAC 6 Clicks Score (PT): 22  AM-PAC 6 Clicks Score (OT): 24    Lauren Brizuela PTA  3/7/2024

## 2024-03-07 NOTE — PLAN OF CARE
Goal Outcome Evaluation:  Plan of Care Reviewed With: patient        Progress: no change  Outcome Evaluation: c/o pain/discomfort in bilat knees and in feet (specifically between the toes), states voltaren gel has helped with those pains and no further med mgmt needed., c/o of itching relieved with prn hydroxyzine. refuses bed alert this shift and is ambulatory to [facility] ad garrick, encouraged to use call light for any assistance and educated on falls risks. no new issues/needs noted at this time.

## 2024-03-07 NOTE — PROGRESS NOTES
[facility]   Hospitalist Progress Note  Date: 3/7/2024  Patient Name: Simon Peters  : 1970  MRN: [id number]  Date of admission: 3/4/2024  Room/Bed: [facility]3/      Subjective   Subjective     Chief Complaint:   Generalized weakness, legs giving out and frequent falls    Summary:  Simon Peters is a 53 y.o. male with past medical history of alcoholic cirrhosis s/p TIPS in May 23.  Patient seen at  transplant center on  and has been in [place] from  to  for hepatic encephalopathy, CKD 3B, diabetes mellitus with nephropathy seen by  endocrinology, noncompliant, hypertension, tobacco use disorder, pleural effusion and ascites, chronic scrotal swelling and chronic anemia thrombocytopenia due to cirrhosis. Patient has been generally getting more weak with increasing numbness tingling in lower extremities.  Does have a history of neuropathy in bilateral lower extremities.  Patient has not taken any of his medications including insulin for some time. Patient has fallen 3-4 times in the past few weeks.  There is no loss of consciousness.  His leg just gives out.  No major injury from the fall.  Apparently he has been kicked out by his mother due to drinking.  Per patient his last drink was on  last year.    Patient went down for paracentesis by IR.  Ultrasound did not show much fluid.  Patient did not want paracentesis.  MELD score of 19 during this admission.    Interval Followup:   No acute events overnight, patient still interested in discharging to SNF if possible.  Discussed with case management, patient's insurance has not been discovered, referrals will be sent      Objective   Objective     Vitals:   Temp:  [97.3 °F (36.3 °C)-98.4 °F (36.9 °C)] 97.3 °F (36.3 °C)  Heart Rate:  [63-70] 65  Resp:  [18] 18  BP: (103-122)/(46-79) 103/55    Physical Exam     Constitutional: Awake, alert, no acute distress, lying in bed comfortably              Eyes: Pupils equal,  sclerae anicteric, no conjunctival injection              HENT: NCAT, mucous membranes moist              Neck: Supple, no thyromegaly, no lymphadenopathy, trachea midline              Respiratory: Clear to auscultation bilaterally, nonlabored respirations               Cardiovascular: RRR, no murmurs, rubs, or gallops, palpable pedal pulses bilaterally              Gastrointestinal: Positive bowel sounds, soft, nontender, nondistended              Musculoskeletal: +2 bilateral ankle edema, no clubbing or cyanosis to extremities              Psychiatric: Appropriate affect, cooperative              Neurologic: Oriented x 3, strength symmetric in all extremities, patient has hard time raising both legs straight up, Cranial Nerves grossly intact to confrontation, speech clear.  Decreased sensation to touch bilateral lower extremities distally.              Skin: No rashes     Result Review    Result Review:  I have personally reviewed these results:  [x]  Laboratory      Lab 03/07/24  0506 03/06/24  0516 03/05/24  0425 03/04/24  0245   WBC 8.12 9.05 7.66  --    HEMOGLOBIN 9.1* 9.0* 9.5*  --    HEMATOCRIT 26.6* 25.7* 26.4*  --    PLATELETS 106* 111* 114*  --    NEUTROS ABS 5.17 6.19 5.02  --    IMMATURE GRANS (ABS) 0.04 0.03 0.03  --    LYMPHS ABS 1.34 1.34 1.30  --    MONOS ABS 0.78 0.77 0.63  --    EOS ABS 0.68* 0.62* 0.58*  --    MCV 87.5 85.4 84.6  --    LACTATE, ARTERIAL  --   --   --  2.75*   LDH  --   --  248*  --    PROTIME  --   --  16.9*  --    APTT  --   --  33.2  --          Lab 03/07/24  0506 03/06/24  1645 03/06/24  0516 03/05/24  0425 03/04/24  0245   SODIUM 135*  --  134* 134*  --    SODIUM, VENOUS  --   --   --   --  129.3*   POTASSIUM 4.2 4.5 3.6 3.4*  --    POTASSIUM, VENOUS  --   --   --   --  3.1*   CHLORIDE 108*  --  105 106  --    CHLORIDE, VENOUS  --   --   --   --  102   CO2 20.2*  --  22.3 21.0*  --    ANION GAP 6.8  --  6.7 7.0  --    BUN 31*  --  27* 23*  --    CREATININE 2.12*  --  2.00*  1.87*  --    EGFR 36.5*  --  39.2* 42.5*  --    GLUCOSE 265*  --  332* 278*  --    GLUCOSE, ARTERIAL  --   --   --   --  649*   CALCIUM 7.6*  --  7.6* 7.5*  --    IONIZED CALCIUM  --   --   --   --  1.10*   MAGNESIUM 1.7  --  1.7 1.7  --    PHOSPHORUS 2.4*  --  2.8 2.3*  --          Lab 03/07/24  0506 03/06/24  0516 03/05/24  0425 03/04/24  0229   TOTAL PROTEIN  --  4.0* 4.3* 4.7*   ALBUMIN 1.6* 1.6* 1.7* 1.9*   GLOBULIN  --  2.4 2.6 2.8   ALT (SGPT)  --  17 21 22   AST (SGOT)  --  37 46* 47*   BILIRUBIN  --  1.3* 1.4* 1.9*   ALK PHOS  --  254* 260* 351*         Lab 03/05/24  0425 03/04/24  0457 03/04/24  0229   HSTROP T  --  164* 166*   PROTIME 16.9*  --   --    INR 1.34*  --   --                  Lab 03/04/24  0245   FIO2 21   CARBOXYHEMOGLOBIN 3.1*     Brief Urine Lab Results  (Last result in the past 365 days)        Color   Clarity   Blood   Leuk Est   Nitrite   Protein   CREAT   Urine HCG        03/04/24 0307 Yellow   Clear   Large (3+)   Negative   Negative   100 mg/dL (2+)                 [x]  Microbiology   Microbiology Results (last 10 days)       ** No results found for the last 240 hours. **          [x]  Radiology  XR Knee 1 or 2 View Right    Result Date: 3/5/2024   Mild joint space narrowing in the medial compartment otherwise negative right knee      Jhonathan Mehta MD       Electronically Signed and Approved By: Jhonathan Mehta MD on 3/05/2024 at 17:25             US Abdomen Limited    Result Date: 3/5/2024    Scant amount of ascitic fluid in right upper quadrant, patient preferred to hold off on procedure at this time    BAYLEE CALDERON       Electronically Signed and Approved By: JESUS ROQUE MD on 3/05/2024 at 13:09             CT Abdomen Pelvis With Contrast    Result Date: 3/4/2024    1. Cirrhosis and portal hypertension.  A TIPS is present. 2. Moderate ascites with generalized anasarca and a trace left pleural effusion. 3. Cholelithiasis without biliary obstruction. 4. Small bowel wall  thickening without obstruction.  This may reflect enteritis or could be the sequela of chronic liver disease and ascites. 5. Normal large bowel and appendix. 6. Nonobstructed kidneys.      ADIS RICHARDSON MD       Electronically Signed and Approved By: ADIS RICHARDSON MD on 3/04/2024 at 5:14             CT Head Without Contrast    Result Date: 3/4/2024   Generalized atrophy.  No acute findings.     ADIS RICHARDSON MD       Electronically Signed and Approved By: ADIS RICHARDSON MD on 3/04/2024 at 4:54             XR Chest 1 View    Result Date: 3/4/2024   No acute cardiopulmonary findings.       ADIS RICHARDSON MD       Electronically Signed and Approved By: ADIS RICHARDSON MD on 3/04/2024 at 2:44            []  EKG/Telemetry   []  Cardiology/Vascular   []  Pathology  []  Old records  []  Other:    Assessment & Plan   Assessment / Plan     Assessment:  Frequent falls and generalized weakness.  Improving  Peripheral neuropathy likely diabetic and alcoholic causing above.  Noncompliance with medication including insulin.  Diabetes mellitus uncontrolled hyperglycemia due to above.  Hemoglobin A1c of 8.8% on January 31st.  Alcoholic cirrhosis s/p TIPS on May 2023.  Anasarca with ascites and chronic scrotal swelling.  CKD 3B.  Baseline creatinine 2-2.3.  Stable  Chronic anemia and thrombocytopenia from cirrhosis.  Hematuria and pyuria without bacteriuria.  Asymptomatic gallstones.  Hypoalbuminemia from liver disease.  Hypertension.  Tobacco use disorder.     Plan:  Patient remains admitted to hospital for further care and management  IR attempted ultrasound-guided paracentesis however minimal fluid seen  Patient remains on thiamine and folic acid  Low-salt diet, high-protein carb consistent  Remains on his lactulose and rifaximin  Sign scale insulin with hypoglycemia protocol  Continue on oral Lasix and Aldactone, still fluid up, evident by edema to bilateral lower extremities  PT and OT consulted, appreciate  assistance  Continue to work towards disposition of rehab, insurance information has been obtained by        Discussed with RN, case management    DVT prophylaxis:  Mechanical DVT prophylaxis orders are present.        CODE STATUS:   Code Status (Patient has no pulse and is not breathing): CPR (Attempt to Resuscitate)  Medical Interventions (Patient has pulse or is breathing): Full Support

## 2024-03-07 NOTE — PLAN OF CARE
Goal Outcome Evaluation:  Plan of Care Reviewed With: patient        Progress: no change  Outcome Evaluation: pt aox4, vs stable. pt c/o pain to bilateral knees, reports voltaren works well for pain. prn vistaril given for itching per mar. standby assist with walker to bathroom

## 2024-03-08 LAB
ALBUMIN SERPL-MCNC: 1.4 G/DL (ref 3.5–5.2)
ALBUMIN/GLOB SERPL: 0.5 G/DL
ALP SERPL-CCNC: 266 U/L (ref 39–117)
ALT SERPL W P-5'-P-CCNC: 16 U/L (ref 1–41)
AMMONIA BLD-SCNC: 192 UMOL/L (ref 16–60)
ANION GAP SERPL CALCULATED.3IONS-SCNC: 5.6 MMOL/L (ref 5–15)
AST SERPL-CCNC: 34 U/L (ref 1–40)
BILIRUB SERPL-MCNC: 1.1 MG/DL (ref 0–1.2)
BUN SERPL-MCNC: 40 MG/DL (ref 6–20)
BUN/CREAT SERPL: 16.1 (ref 7–25)
CALCIUM SPEC-SCNC: 7.6 MG/DL (ref 8.6–10.5)
CHLORIDE SERPL-SCNC: 110 MMOL/L (ref 98–107)
CO2 SERPL-SCNC: 21.4 MMOL/L (ref 22–29)
CREAT SERPL-MCNC: 2.48 MG/DL (ref 0.76–1.27)
DEPRECATED RDW RBC AUTO: 50.5 FL (ref 37–54)
EGFRCR SERPLBLD CKD-EPI 2021: 30.3 ML/MIN/1.73
ERYTHROCYTE [DISTWIDTH] IN BLOOD BY AUTOMATED COUNT: 15.8 % (ref 12.3–15.4)
GLOBULIN UR ELPH-MCNC: 2.6 GM/DL
GLUCOSE BLDC GLUCOMTR-MCNC: 217 MG/DL (ref 70–99)
GLUCOSE BLDC GLUCOMTR-MCNC: 286 MG/DL (ref 70–99)
GLUCOSE BLDC GLUCOMTR-MCNC: 327 MG/DL (ref 70–99)
GLUCOSE BLDC GLUCOMTR-MCNC: 332 MG/DL (ref 70–99)
GLUCOSE SERPL-MCNC: 241 MG/DL (ref 65–99)
HCT VFR BLD AUTO: 26.3 % (ref 37.5–51)
HGB BLD-MCNC: 8.8 G/DL (ref 13–17.7)
MAGNESIUM SERPL-MCNC: 1.8 MG/DL (ref 1.6–2.6)
MCH RBC QN AUTO: 29.8 PG (ref 26.6–33)
MCHC RBC AUTO-ENTMCNC: 33.5 G/DL (ref 31.5–35.7)
MCV RBC AUTO: 89.2 FL (ref 79–97)
PLATELET # BLD AUTO: 106 10*3/MM3 (ref 140–450)
PMV BLD AUTO: 10.4 FL (ref 6–12)
POTASSIUM SERPL-SCNC: 4.4 MMOL/L (ref 3.5–5.2)
PROT SERPL-MCNC: 4 G/DL (ref 6–8.5)
QT INTERVAL: 464 MS
QTC INTERVAL: 561 MS
RBC # BLD AUTO: 2.95 10*6/MM3 (ref 4.14–5.8)
SODIUM SERPL-SCNC: 137 MMOL/L (ref 136–145)
WBC NRBC COR # BLD AUTO: 6.92 10*3/MM3 (ref 3.4–10.8)

## 2024-03-08 PROCEDURE — 97166 OT EVAL MOD COMPLEX 45 MIN: CPT

## 2024-03-08 PROCEDURE — 85027 COMPLETE CBC AUTOMATED: CPT | Performed by: INTERNAL MEDICINE

## 2024-03-08 PROCEDURE — 82948 REAGENT STRIP/BLOOD GLUCOSE: CPT | Performed by: INTERNAL MEDICINE

## 2024-03-08 PROCEDURE — 63710000001 INSULIN LISPRO (HUMAN) PER 5 UNITS: Performed by: INTERNAL MEDICINE

## 2024-03-08 PROCEDURE — G0378 HOSPITAL OBSERVATION PER HR: HCPCS

## 2024-03-08 PROCEDURE — 97116 GAIT TRAINING THERAPY: CPT

## 2024-03-08 PROCEDURE — 82140 ASSAY OF AMMONIA: CPT | Performed by: PHYSICIAN ASSISTANT

## 2024-03-08 PROCEDURE — 97530 THERAPEUTIC ACTIVITIES: CPT

## 2024-03-08 PROCEDURE — 83735 ASSAY OF MAGNESIUM: CPT | Performed by: INTERNAL MEDICINE

## 2024-03-08 PROCEDURE — 63710000001 INSULIN DETEMIR PER 5 UNITS: Performed by: INTERNAL MEDICINE

## 2024-03-08 PROCEDURE — 99232 SBSQ HOSP IP/OBS MODERATE 35: CPT | Performed by: INTERNAL MEDICINE

## 2024-03-08 PROCEDURE — 80053 COMPREHEN METABOLIC PANEL: CPT | Performed by: INTERNAL MEDICINE

## 2024-03-08 PROCEDURE — 82948 REAGENT STRIP/BLOOD GLUCOSE: CPT

## 2024-03-08 RX ORDER — LACTULOSE 10 G/15ML
20 SOLUTION ORAL EVERY 4 HOURS
Status: DISCONTINUED | OUTPATIENT
Start: 2024-03-08 | End: 2024-03-10

## 2024-03-08 RX ADMIN — INSULIN DETEMIR 20 UNITS: 100 INJECTION, SOLUTION SUBCUTANEOUS at 08:43

## 2024-03-08 RX ADMIN — GABAPENTIN 100 MG: 100 CAPSULE ORAL at 16:39

## 2024-03-08 RX ADMIN — Medication 1 TABLET: at 08:44

## 2024-03-08 RX ADMIN — LACTULOSE 20 G: 20 SOLUTION ORAL at 23:05

## 2024-03-08 RX ADMIN — INSULIN LISPRO 3 UNITS: 100 INJECTION, SOLUTION INTRAVENOUS; SUBCUTANEOUS at 08:43

## 2024-03-08 RX ADMIN — RIFAXIMIN 550 MG: 550 TABLET ORAL at 20:16

## 2024-03-08 RX ADMIN — INSULIN LISPRO 4 UNITS: 100 INJECTION, SOLUTION INTRAVENOUS; SUBCUTANEOUS at 20:16

## 2024-03-08 RX ADMIN — DICLOFENAC SODIUM 2 G: 10 GEL TOPICAL at 20:18

## 2024-03-08 RX ADMIN — INSULIN LISPRO 5 UNITS: 100 INJECTION, SOLUTION INTRAVENOUS; SUBCUTANEOUS at 17:40

## 2024-03-08 RX ADMIN — GABAPENTIN 100 MG: 100 CAPSULE ORAL at 08:44

## 2024-03-08 RX ADMIN — RIFAXIMIN 550 MG: 550 TABLET ORAL at 08:45

## 2024-03-08 RX ADMIN — DICLOFENAC SODIUM 2 G: 10 GEL TOPICAL at 08:54

## 2024-03-08 RX ADMIN — FAMOTIDINE 20 MG: 20 TABLET ORAL at 08:44

## 2024-03-08 RX ADMIN — GABAPENTIN 100 MG: 100 CAPSULE ORAL at 20:16

## 2024-03-08 RX ADMIN — INSULIN DETEMIR 20 UNITS: 100 INJECTION, SOLUTION SUBCUTANEOUS at 20:16

## 2024-03-08 RX ADMIN — LACTULOSE 10 G: 20 SOLUTION ORAL at 16:38

## 2024-03-08 RX ADMIN — LACTULOSE 10 G: 20 SOLUTION ORAL at 20:16

## 2024-03-08 RX ADMIN — Medication 10 ML: at 20:17

## 2024-03-08 RX ADMIN — NADOLOL 20 MG: 20 TABLET ORAL at 08:45

## 2024-03-08 RX ADMIN — DICLOFENAC SODIUM 2 G: 10 GEL TOPICAL at 16:41

## 2024-03-08 RX ADMIN — LACTULOSE 10 G: 20 SOLUTION ORAL at 08:47

## 2024-03-08 RX ADMIN — FLUOXETINE HYDROCHLORIDE 40 MG: 20 CAPSULE ORAL at 08:45

## 2024-03-08 RX ADMIN — FOLIC ACID 1 MG: 1 TABLET ORAL at 08:44

## 2024-03-08 RX ADMIN — FAMOTIDINE 20 MG: 20 TABLET ORAL at 16:38

## 2024-03-08 RX ADMIN — HYDROXYZINE PAMOATE 25 MG: 25 CAPSULE ORAL at 01:10

## 2024-03-08 RX ADMIN — Medication 100 MG: at 08:44

## 2024-03-08 RX ADMIN — INSULIN LISPRO 5 UNITS: 100 INJECTION, SOLUTION INTRAVENOUS; SUBCUTANEOUS at 12:31

## 2024-03-08 RX ADMIN — Medication 10 ML: at 08:46

## 2024-03-08 NOTE — THERAPY TREATMENT NOTE
Acute Care - Physical Therapy Treatment Note   [facility]     Patient Name: Simon Peters  : 1970  MRN: [id number]  Today's Date: 3/8/2024      Visit Dx:     ICD-10-CM ICD-9-CM   1. Weakness generalized  R53.1 780.79   2. Cirrhosis of liver without ascites, unspecified hepatic cirrhosis type  K74.60 571.5   3. Difficulty in walking  R26.2 719.7   4. Decreased activities of daily living (ADL)  Z78.9 V49.89     Patient Active Problem List   Diagnosis    Hypertension    Anxiety    Elevated serum glucose    Diabetes mellitus    Elevated liver enzymes    Encounter for screening for malignant neoplasm of colon    Weakness     Past Medical History:   Diagnosis Date    Abnormal LFTs     Anxiety     Back pain     Diabetes mellitus     Hypertension     Kidney failure     Liver failure     Rash      Past Surgical History:   Procedure Laterality Date    TIPS PROCEDURE       PT Assessment (Last 12 Hours)       PT Evaluation and Treatment       Row Name 24 1200          Physical Therapy Time and Intention    Subjective Information complains of;weakness (P)   -DG     Document Type therapy note (daily note) (P)   -DG     Mode of Treatment individual therapy;physical therapy (P)   -DG     Patient Effort good (P)   -DG     Symptoms Noted During/After Treatment none (P)   -DG       Row Name 24 1200          Bed Mobility    Comment, (Bed Mobility) Pt sitting on EOB upon entering (P)   -DG       Row Name 24 1200          Transfers    Transfers sit-stand transfer (P)   -DG       Row Name 24 1200          Sit-Stand Transfer    Sit-Stand [place] (Transfers) standby assist (P)   -DG     Assistive Device (Sit-Stand Transfers) walker, front-wheeled (P)   -DG       Row Name 24 1200          Gait/Stairs (Locomotion)    Gait/Stairs Locomotion gait/ambulation assistive device (P)   -DG     [place] Level (Gait) contact guard (P)   -DG     Assistive Device (Gait) walker, front-wheeled (P)   -DG      Distance in Feet (Gait) 300 (P)   -DG     Pattern (Gait) step-through (P)   -DG     Deviations/Abnormal Patterns (Gait) base of support, narrow;gait speed decreased (P)   -DG     Comment, (Gait/Stairs) Pt took many breaks throughout ambulation. (P)   -DG       Row Name 03/08/24 1200          Progress Summary (PT)    Progress Toward Functional Goals (PT) progress toward functional goals is good (P)   -DG     Daily Progress Summary (PT) Patient continues to improve with functional mobility by increasing distance during ambulation. Pt continues to complain about knee buckling during ambulation. (P)   -DG               User Key  (r) = Recorded By, (t) = Taken By, (c) = Cosigned By      Initials Name Provider Type    Juliana Duran, PT Student PT Student                    Physical Therapy Education       Title: PT OT SLP Therapies (Done)       Topic: Physical Therapy (Done)       Point: Mobility training (Done)       Learning Progress Summary             Patient Acceptance, E, VU,NR by CHERYLE at 3/8/2024 1126    Acceptance, E,TB, VU by ITALO at 3/5/2024 1144                         Point: Precautions (Done)       Learning Progress Summary             Patient Acceptance, E, VU,NR by CHERYLE at 3/8/2024 1126    Acceptance, E,TB, VU by ITALO at 3/5/2024 1144                                         User Key       Initials Effective Dates Name Provider Type Discipline    ITALO 06/03/21 -  Aleksey Marino, PT Physical Therapist PT    CHERYLE 11/27/23 -  Estela Sky RN Registered Nurse Nurse                  PT Recommendation and Plan     Progress Summary (PT)  Progress Toward Functional Goals (PT): (P) progress toward functional goals is good  Daily Progress Summary (PT): (P) Patient continues to improve with functional mobility by increasing distance during ambulation. Pt continues to complain about knee buckling during ambulation.   Outcome Measures       Row Name 03/08/24 1200 03/07/24 1056 03/06/24 1046       How much help from another  person do you currently need...    Turning from your back to your side while in flat bed without using bedrails? 4 (P)   -DG 4  -DK 4  -DK    Moving from lying on back to sitting on the side of a flat bed without bedrails? 4 (P)   -DG 4  -DK 4  -DK    Moving to and from a bed to a chair (including a wheelchair)? 4 (P)   -DG 4  -DK 3  -DK    Standing up from a chair using your arms (e.g., wheelchair, bedside chair)? 4 (P)   -DG 4  -DK 3  -DK    Climbing 3-5 steps with a railing? 3 (P)   -DG 3  -DK 2  -DK    To walk in hospital room? 3 (P)   -DG 3  -DK 3  -DK    AM-PAC 6 Clicks Score (PT) 22 (P)   -DG 22  -DK 19  -DK    Highest Level of Mobility Goal 7 --> Walk 25 feet or more (P)   -DG 7 --> Walk 25 feet or more  -DK 6 --> Walk 10 steps or more  -DK       Functional Assessment    Outcome Measure Options AM-PAC 6 Clicks Basic Mobility (PT) (P)   -DG AM-PAC 6 Clicks Basic Mobility (PT)  -DK AM-PAC 6 Clicks Basic Mobility (PT)  -DK              User Key  (r) = Recorded By, (t) = Taken By, (c) = Cosigned By      Initials Name Provider Type    Lauren Barron, PTA Physical Therapist Assistant    Juliana Duran, PT Student PT Student                     Time Calculation:    PT Charges       Row Name 03/08/24 1216             Time Calculation    PT Received On 03/08/24 (P)   -DG         Timed Charges    69632 - Gait Training Minutes  18 (P)   -DG      51374 - PT Therapeutic Activity Minutes 6 (P)   -DG         Total Minutes    Timed Charges Total Minutes 24 (P)   -DG       Total Minutes 24 (P)   -DG                User Key  (r) = Recorded By, (t) = Taken By, (c) = Cosigned By      Initials Name Provider Type    Juliana Duran, PT Student PT Student                  Therapy Charges for Today       Code Description Service Date Service Provider Modifiers Qty    24349727982 HC GAIT TRAINING EA 15 MIN 3/8/2024 Juliana Richardson, PT Student GP 1    [id number]  PT THERAPEUTIC ACT EA 15 MIN 3/8/2024 Juliana Richardson, PT Student GP 1             PT G-Codes  Outcome Measure Options: (P) AM-PAC 6 Clicks Basic Mobility (PT)  AM-PAC 6 Clicks Score (PT): (P) 22  AM-PAC 6 Clicks Score (OT): 24    Juliana Richardson PT Student  3/8/2024

## 2024-03-08 NOTE — PLAN OF CARE
Goal Outcome Evaluation:  Plan of Care Reviewed With: patient        Progress: improving  Outcome Evaluation: Alert and oriented x4. No complaints of pain. Ambulated throughout hallway x2 with standby assist this shift. Bed alarm on. blood glucose monitored per order, insulin administered per mar. Fluid restricted per order.

## 2024-03-08 NOTE — PLAN OF CARE
Goal Outcome Evaluation:  Plan of Care Reviewed With: patient        Progress: improving  Outcome Evaluation: pt aox4, vs stable. pt refuses bed alert. up to bathroom with walker. prn hydroxyzine given for itching. pt c/o pain in BLE, reports voltaren gel effective. pt had one episode of nausea overnight, prn zofran given, nausea relieved.

## 2024-03-08 NOTE — PLAN OF CARE
Goal Outcome Evaluation:  Plan of Care Reviewed With: patient        Progress: no change  Outcome Evaluation: OT evaluation with no findings of major deficits or limitations in ADL performance; Patient does state he would like to go to a facility for inpatient rehab; OT to make appropriate reccomendations for discharge; no findings on need for continued OT POC at this time.      Anticipated Discharge Disposition (OT): sub acute care setting, home with home health (Possible need for sober living environment per EMR and prior documentation)

## 2024-03-08 NOTE — THERAPY EVALUATION
Patient Name: Simon Peters  : 1970    MRN: [id number]                              Today's Date: 3/8/2024       Admit Date: 3/4/2024    Visit Dx:     ICD-10-CM ICD-9-CM   1. Weakness generalized  R53.1 780.79   2. Cirrhosis of liver without ascites, unspecified hepatic cirrhosis type  K74.60 571.5   3. Difficulty in walking  R26.2 719.7   4. Decreased activities of daily living (ADL)  Z78.9 V49.89     Patient Active Problem List   Diagnosis    Hypertension    Anxiety    Elevated serum glucose    Diabetes mellitus    Elevated liver enzymes    Encounter for screening for malignant neoplasm of colon    Weakness     Past Medical History:   Diagnosis Date    Abnormal LFTs     Anxiety     Back pain     Diabetes mellitus     Hypertension     Kidney failure     Liver failure     Rash      Past Surgical History:   Procedure Laterality Date    TIPS PROCEDURE        General Information       Row Name 24 1320          OT Time and Intention    Document Type evaluation  -EG     Mode of Treatment individual therapy;occupational therapy  -EG       Row Name 24 1320          General Information    Patient Profile Reviewed yes  Inconsistent reports from patient and EMR on if he lives alone or with mother; Ind. w/ADL's per report with RW/Cane occ. as needed; tub shower, stands to bathe and groom  -EG     Prior Level of Function independent:;ADL's;all household mobility;community mobility  -EG     Existing Precautions/Restrictions fall  -EG     Barriers to Rehab none identified  -EG       Row Name 24 1320          Occupational Profile    Reason for Services/Referral (Occupational Profile) Patient is a 53 year old male admitted to [facility] on 3/4/2024; originally evaluated by OT on 24 with decision not to continue services; New orders placed by MD for evaluation; OT to evaluate for change in status and discharge placement needs.  -EG       Row Name 24 1320          Living Environment    People in Home  alone  -EG       Row Name 03/08/24 1320          Home Main Entrance    Number of Stairs, Main Entrance one  -EG       Row Name 03/08/24 1320          Cognition    Orientation Status (Cognition) oriented to;person;place;situation  -EG       Row Name 03/08/24 1320          Safety Issues, Functional Mobility    Safety Issues Affecting Function (Mobility) judgment;problem-solving;impulsivity;insight into deficits/self-awareness;safety precautions follow-through/compliance  -EG     Impairments Affecting Function (Mobility) balance;sensation/sensory awareness;shortness of breath;strength;pain;cognition  -EG     Cognitive Impairments, Mobility Safety/Performance attention;awareness, need for assistance;impulsivity;insight into deficits/self-awareness;judgment;problem-solving/reasoning  -EG     Comment, Safety Issues/Impairments (Mobility) Patient reports dizziness with mobility  -EG               User Key  (r) = Recorded By, (t) = Taken By, (c) = Cosigned By      Initials Name Provider Type    EG Fabiola Cee OT Occupational Therapist                     Mobility/ADL's       Row Name 03/08/24 1324          Bed Mobility    Bed Mobility bed mobility (all) activities  -EG     All Activities, [place] (Bed Mobility) modified independence  -EG       Row Name 03/08/24 1324          Transfers    Comment, (Transfers) Patient declines assist or use of AD; SBA for transfers with lateral sway noted but able to right self  -EG       Row Name 03/08/24 1324          Functional Mobility    Functional Mobility- Ind. Level standby assist  -EG     Functional Mobility- Comment Declines use of AD; Patient able to perform for 50-60ft before complaints of dizziness and needing to rest, no LOB with lateral sway noted but no true LOB  -EG       Row Name 03/08/24 1324          Activities of Daily Living    BADL Assessment/Intervention bathing;upper body dressing;lower body dressing;grooming;feeding;toileting  -EG       Row Name 03/08/24  1324          Bathing Assessment/Intervention    [place] Level (Bathing) bathing skills;set up  -EG       Row Name 03/08/24 1324          Upper Body Dressing Assessment/Training    [place] Level (Upper Body Dressing) upper body dressing skills;set up  -EG       Row Name 03/08/24 1324          Lower Body Dressing Assessment/Training    [place] Level (Lower Body Dressing) lower body dressing skills;set up  -EG       [facility] Name 03/08/24 1324          Grooming Assessment/Training    [place] Level (Grooming) grooming skills;set up  -EG       [facility] Name 03/08/24 1324          Self-Feeding Assessment/Training    [place] Level (Feeding) feeding skills;set up  -EG       [facility] Name 03/08/24 1324          Toileting Assessment/Training    [place] Level (Toileting) toileting skills;standby assist  -EG               User Key  (r) = Recorded By, (t) = Taken By, (c) = Cosigned By      Initials Name Provider Type    EG Fabiola Cee OT Occupational Therapist                   Obj/Interventions       [facility] Name 03/08/24 1327          Sensory Assessment (Somatosensory)    Sensory Assessment (Somatosensory) UE sensation intact  -EG       Row Name 03/08/24 1327          Vision Assessment/Intervention    Visual Impairment/Limitations WFL  -EG       [facility] Name 03/08/24 1327          Range of Motion Comprehensive    General Range of Motion bilateral upper extremity ROM WNL  -EG       [facility] Name 03/08/24 1327          Strength Comprehensive (MMT)    Comment, General Manual Muscle Testing (MMT) Assessment BUE's 4/5 grossly  -EG       [facility] Name 03/08/24 1327          Motor Skills    Motor Skills coordination;functional endurance  -EG     Coordination WFL  -EG     Functional Endurance fair/fair- on room air  -EG       [facility] Name 03/08/24 1327          Balance    Balance Assessment sit to stand dynamic balance;standing static balance  -EG     Sit to Stand Dynamic Balance standby assist  -EG     Static Standing Balance standby  assist;1-person assist  -EG               User Key  (r) = Recorded By, (t) = Taken By, (c) = Cosigned By      Initials Name Provider Type    EG Fabiola Cee, MANUEL Occupational Therapist                   Goals/Plan    No documentation.                  Clinical Impression       [facility] Name 03/08/24 1327          Pain Assessment    Additional Documentation Pain Scale: FACES Pre/Post-Treatment (Group)  -EG       Row Name 03/08/24 1327          Pain Scale: FACES Pre/Post-Treatment    Pain: FACES Scale, Pretreatment 2-->hurts little bit  -EG     Posttreatment Pain Rating 2-->hurts little bit  -EG     Pain Location generalized  -EG       [facility] Name 03/08/24 1327          Plan of Care Review    Plan of Care Reviewed With patient  -EG     Progress no change  -EG     Outcome Evaluation OT evaluation with no findings of major deficits or limitations in ADL performance; Patient does state he would like to go to a facility for inpatient rehab; OT to make appropriate reccomendations for discharge; no findings on need for continued OT POC at this time.  -EG       Row Name 03/08/24 1327          Therapy Assessment/Plan (OT)    Criteria for Skilled Therapeutic Interventions Met (OT) no problems identified which require skilled intervention  -EG     Therapy Frequency (OT) evaluation only  -[facility] Name 03/08/24 1327          Therapy Plan Review/Discharge Plan (OT)    Anticipated Discharge Disposition (OT) sub acute care setting;home with home health  Possible need for sober living environment per EMR and prior documentation  -EG       Row Name 03/08/24 1327          Positioning and Restraints    Post Treatment Position bed  -EG     In Bed sitting EOB;exit alarm on;encouraged to call for assist;call light within reach  -EG               User Key  (r) = Recorded By, (t) = Taken By, (c) = Cosigned By      Initials Name Provider Type    EG Fabiola Cee, OT Occupational Therapist                   Outcome Measures       Row Name  03/08/24 1330          How much help from another is currently needed...    Putting on and taking off regular lower body clothing? 4  -EG     Bathing (including washing, rinsing, and drying) 4  -EG     Toileting (which includes using toilet bed pan or urinal) 4  -EG     Putting on and taking off regular upper body clothing 4  -EG     Taking care of personal grooming (such as brushing teeth) 4  -EG     Eating meals 4  -EG     AM-PAC 6 Clicks Score (OT) 24  -EG       Row Name 03/08/24 1200 03/08/24 0750       How much help from another person do you currently need...    Turning from your back to your side while in flat bed without using bedrails? 4 (P)   -DG 4  -CHERYLE    Moving from lying on back to sitting on the side of a flat bed without bedrails? 4 (P)   -DG 4  -CHERYLE    Moving to and from a bed to a chair (including a wheelchair)? 4 (P)   -DG 4  -CHERYLE    Standing up from a chair using your arms (e.g., wheelchair, bedside chair)? 4 (P)   -DG 4  -CHERYLE    Climbing 3-5 steps with a railing? 3 (P)   -DG 3  -CHERYLE    To walk in hospital room? 3 (P)   -DG 4  -CHERYLE    AM-PAC 6 Clicks Score (PT) 22 (P)   -DG 23  -CHERYLE    Highest Level of Mobility Goal 7 --> Walk 25 feet or more (P)   -DG 7 --> Walk 25 feet or more  -CHERYLE      Row Name 03/08/24 1330 03/08/24 1200       Functional Assessment    Outcome Measure Options AM-PAC 6 Clicks Daily Activity (OT);Optimal Instrument  -EG AM-PAC 6 Clicks Basic Mobility (PT) (P)   -DG      Row Name 03/08/24 1330          Optimal Instrument    Optimal Instrument Optimal - 3  -EG     Bending/Stooping 1  -EG     Standing 1  -EG     Reaching 1  -EG     From the list, choose the 3 activities you would most like to be able to do without any difficulty Standing;Reaching;Bending/stooping  -EG     Total Score Optimal - 3 3  -EG               User Key  (r) = Recorded By, (t) = Taken By, (c) = Cosigned By      Initials Name Provider Type    Estela Villegas, RN Registered Nurse    Fabiola Diane, MANUEL Occupational  Therapist    Juliana Duran, PT Student PT Student                      OT Recommendation and Plan  Therapy Frequency (OT): evaluation only  Plan of Care Review  Plan of Care Reviewed With: patient  Progress: no change  Outcome Evaluation: OT evaluation with no findings of major deficits or limitations in ADL performance; Patient does state he would like to go to a facility for inpatient rehab; OT to make appropriate reccomendations for discharge; no findings on need for continued OT POC at this time.     Time Calculation:   Evaluation Complexity (OT)  Review Occupational Profile/Medical/Therapy History Complexity: expanded/moderate complexity  Assessment, Occupational Performance/Identification of Deficit Complexity: 3-5 performance deficits  Clinical Decision Making Complexity (OT): detailed assessment/moderate complexity  Overall Complexity of Evaluation (OT): moderate complexity     Time Calculation- OT       Row Name 03/08/24 1331 03/08/24 1216          Time Calculation- OT    OT Received On 03/08/24  -EG --        Timed Charges    14709 - Gait Training Minutes  -- 18 (P)   -DG        Untimed Charges    OT Eval/Re-eval Minutes 33  -EG --        Total Minutes    Timed Charges Total Minutes -- 18 (P)   -DG     Untimed Charges Total Minutes 33  -EG --      Total Minutes 33  -EG 18 (P)   -DG               User Key  (r) = Recorded By, (t) = Taken By, (c) = Cosigned By      Initials Name Provider Type    EG Fabiola Cee OT Occupational Therapist    Juliana Duran, PT Student PT Student                  Therapy Charges for Today       Code Description Service Date Service Provider Modifiers Qty    [id number] HC OT EVAL MOD COMPLEXITY 3 3/8/2024 Fabiola Cee OT GO 1                 Fabiola Cee OT  3/8/2024

## 2024-03-08 NOTE — PROGRESS NOTES
[facility]   Hospitalist Progress Note  Date: 3/8/2024  Patient Name: Simon Peters  : 1970  MRN: [id number]  Date of admission: 3/4/2024  Room/Bed: [facility]/      Subjective   Subjective     Chief Complaint:   Generalized weakness, legs giving out and frequent falls    Summary:  Simon Peters is a 53 y.o. male with past medical history of alcoholic cirrhosis s/p TIPS in May 23.  Patient seen at  transplant center on  and has been in [place] from  to  for hepatic encephalopathy, CKD 3B, diabetes mellitus with nephropathy seen by  endocrinology, noncompliant, hypertension, tobacco use disorder, pleural effusion and ascites, chronic scrotal swelling and chronic anemia thrombocytopenia due to cirrhosis. Patient has been generally getting more weak with increasing numbness tingling in lower extremities.  Does have a history of neuropathy in bilateral lower extremities.  Patient has not taken any of his medications including insulin for some time. Patient has fallen 3-4 times in the past few weeks.  There is no loss of consciousness.  His leg just gives out.  No major injury from the fall.  Apparently he has been kicked out by his mother due to drinking.  Per patient his last drink was on  last year.    Patient went down for paracentesis by IR.  Ultrasound did not show much fluid.  Patient did not want paracentesis.  MELD score of 19 during this admission.    Interval Followup:   Discussed with case management and referrals have been sent.  Patient's creatinine trending up along with BUN therefore we will hold diuretics today and monitor renal function.  Continue to encourage ambulation as able      Objective   Objective     Vitals:   Temp:  [97.3 °F (36.3 °C)-98.1 °F (36.7 °C)] 97.7 °F (36.5 °C)  Heart Rate:  [65-73] 69  Resp:  [18] 18  BP: (103-142)/(55-83) 130/72    Physical Exam     Constitutional: Awake, alert, no acute distress, lying in bed comfortably               Eyes: Pupils equal, sclerae anicteric, no conjunctival injection              HENT: NCAT, mucous membranes moist              Neck: Supple, no thyromegaly, no lymphadenopathy, trachea midline              Respiratory: Clear to auscultation bilaterally, nonlabored respirations               Cardiovascular: RRR, no murmurs, rubs, or gallops, palpable pedal pulses bilaterally              Gastrointestinal: Positive bowel sounds, soft, nontender, nondistended              Musculoskeletal: +1 bilateral ankle edema, no clubbing or cyanosis to extremities              Psychiatric: Appropriate affect, cooperative              Neurologic: Oriented x 3, strength symmetric in all extremities, patient has hard time raising both legs straight up, Cranial Nerves grossly intact to confrontation, speech clear.  Decreased sensation to touch bilateral lower extremities distally.              Skin: No rashes     Result Review    Result Review:  I have personally reviewed these results:  [x]  Laboratory      Lab 03/08/24 0443 03/07/24  0506 03/06/24  0516 03/05/24  0425 03/04/24  0245   WBC 6.92 8.12 9.05 7.66  --    HEMOGLOBIN 8.8* 9.1* 9.0* 9.5*  --    HEMATOCRIT 26.3* 26.6* 25.7* 26.4*  --    PLATELETS 106* 106* 111* 114*  --    NEUTROS ABS  --  5.17 6.19 5.02  --    IMMATURE GRANS (ABS)  --  0.04 0.03 0.03  --    LYMPHS ABS  --  1.34 1.34 1.30  --    MONOS ABS  --  0.78 0.77 0.63  --    EOS ABS  --  0.68* 0.62* 0.58*  --    MCV 89.2 87.5 85.4 84.6  --    LACTATE, ARTERIAL  --   --   --   --  2.75*   LDH  --   --   --  248*  --    PROTIME  --   --   --  16.9*  --    APTT  --   --   --  33.2  --          Lab 03/08/24  0443 03/07/24  0506 03/06/24  1645 03/06/24  0516 03/05/24  0425 03/04/24  0245   SODIUM 137 135*  --  134* 134*  --    SODIUM, VENOUS  --   --   --   --   --  129.3*   POTASSIUM 4.4 4.2 4.5 3.6 3.4*  --    POTASSIUM, VENOUS  --   --   --   --   --  3.1*   CHLORIDE 110* 108*  --  105 106  --    CHLORIDE, VENOUS   --   --   --   --   --  102   CO2 21.4* 20.2*  --  22.3 21.0*  --    ANION GAP 5.6 6.8  --  6.7 7.0  --    BUN 40* 31*  --  27* 23*  --    CREATININE 2.48* 2.12*  --  2.00* 1.87*  --    EGFR 30.3* 36.5*  --  39.2* 42.5*  --    GLUCOSE 241* 265*  --  332* 278*  --    GLUCOSE, ARTERIAL  --   --   --   --   --  649*   CALCIUM 7.6* 7.6*  --  7.6* 7.5*  --    IONIZED CALCIUM  --   --   --   --   --  1.10*   MAGNESIUM 1.8 1.7  --  1.7 1.7  --    PHOSPHORUS  --  2.4*  --  2.8 2.3*  --          Lab 03/08/24  0443 03/07/24  0506 03/06/24  0516 03/05/24  0425   TOTAL PROTEIN 4.0*  --  4.0* 4.3*   ALBUMIN 1.4* 1.6* 1.6* 1.7*   GLOBULIN 2.6  --  2.4 2.6   ALT (SGPT) 16  --  17 21   AST (SGOT) 34  --  37 46*   BILIRUBIN 1.1  --  1.3* 1.4*   ALK PHOS 266*  --  254* 260*         Lab 03/05/24  0425 03/04/24  0457 03/04/24  0229   HSTROP T  --  164* 166*   PROTIME 16.9*  --   --    INR 1.34*  --   --                  Lab 03/04/24  0245   FIO2 21   CARBOXYHEMOGLOBIN 3.1*     Brief Urine Lab Results  (Last result in the past 365 days)        Color   Clarity   Blood   Leuk Est   Nitrite   Protein   CREAT   Urine HCG        03/04/24 0307 Yellow   Clear   Large (3+)   Negative   Negative   100 mg/dL (2+)                 [x]  Microbiology   Microbiology Results (last 10 days)       ** No results found for the last 240 hours. **          [x]  Radiology  XR Knee 1 or 2 View Right    Result Date: 3/5/2024   Mild joint space narrowing in the medial compartment otherwise negative right knee      Jhonathan Mehta MD       Electronically Signed and Approved By: Jhonathan Mehta MD on 3/05/2024 at 17:25             US Abdomen Limited    Result Date: 3/5/2024    Scant amount of ascitic fluid in right upper quadrant, patient preferred to hold off on procedure at this time    BAYLEE CALDERON       Electronically Signed and Approved By: JESUS ROQUE MD on 3/05/2024 at 13:09             CT Abdomen Pelvis With Contrast    Result Date: 3/4/2024    1.  Cirrhosis and portal hypertension.  A TIPS is present. 2. Moderate ascites with generalized anasarca and a trace left pleural effusion. 3. Cholelithiasis without biliary obstruction. 4. Small bowel wall thickening without obstruction.  This may reflect enteritis or could be the sequela of chronic liver disease and ascites. 5. Normal large bowel and appendix. 6. Nonobstructed kidneys.      ADIS RICHARDSON MD       Electronically Signed and Approved By: ADIS RICHARDSON MD on 3/04/2024 at 5:14             CT Head Without Contrast    Result Date: 3/4/2024   Generalized atrophy.  No acute findings.     ADIS RICHARDSON MD       Electronically Signed and Approved By: ADIS RICHARDSON MD on 3/04/2024 at 4:54             XR Chest 1 View    Result Date: 3/4/2024   No acute cardiopulmonary findings.       ADIS RICHARDSON MD       Electronically Signed and Approved By: ADIS RICHARDSON MD on 3/04/2024 at 2:44            []  EKG/Telemetry   []  Cardiology/Vascular   []  Pathology  []  Old records  []  Other:    Assessment & Plan   Assessment / Plan     Assessment:  Frequent falls and generalized weakness.  Improving  Peripheral neuropathy likely diabetic and alcoholic causing above.  Noncompliance with medication including insulin.  Diabetes mellitus uncontrolled hyperglycemia due to above.  Hemoglobin A1c of 8.8% on January 31st.  Alcoholic cirrhosis s/p TIPS on May 2023.  Anasarca with ascites and chronic scrotal swelling.  CKD 3B.  Baseline creatinine 2-2.3.  Stable  Chronic anemia and thrombocytopenia from cirrhosis.  Hematuria and pyuria without bacteriuria.  Asymptomatic gallstones.  Hypoalbuminemia from liver disease.  Hypertension.  Tobacco use disorder.     Plan:  Patient remains admitted to hospital for further care and management  IR attempted ultrasound-guided paracentesis however minimal fluid seen  Patient remains on thiamine and folic acid  Low-salt diet, high-protein carb consistent  Remains on his lactulose and  rifaximin  Sign scale insulin with hypoglycemia protocol  Holding Lasix and Aldactone today, creatinine slightly trending up over the past 24 hours, continue to monitor renal function  PT and OT consulted, appreciate assistance  Continue to work towards disposition of rehab, insurance information has been obtained by        Discussed with RN, case management    DVT prophylaxis:  Mechanical DVT prophylaxis orders are present.        CODE STATUS:   Code Status (Patient has no pulse and is not breathing): CPR (Attempt to Resuscitate)  Medical Interventions (Patient has pulse or is breathing): Full Support

## 2024-03-08 NOTE — SIGNIFICANT NOTE
03/08/24 0930   Coping/Psychosocial   Observed Emotional State calm;cooperative   Verbalized Emotional State hopefulness   Trust Relationship/Rapport empathic listening provided   Involvement in Care interacting with patient   Additional Documentation Spiritual Care (Group)   Spiritual Care   Use of Spiritual Resources non-Islam use of spiritual care   Spiritual Care Source  initiative   Spiritual Care Follow-Up follow-up, none required as presently assessed   Response to Spiritual Care receptive of support   Spiritual Care Interventions supportive conversation provided   Spiritual Care Visit Type initial   Receptivity to Spiritual Care visit welcomed

## 2024-03-09 LAB
ALBUMIN SERPL-MCNC: 1.5 G/DL (ref 3.5–5.2)
ALBUMIN/GLOB SERPL: 0.6 G/DL
ALP SERPL-CCNC: 260 U/L (ref 39–117)
ALT SERPL W P-5'-P-CCNC: 17 U/L (ref 1–41)
ANION GAP SERPL CALCULATED.3IONS-SCNC: 8.1 MMOL/L (ref 5–15)
AST SERPL-CCNC: 37 U/L (ref 1–40)
BILIRUB SERPL-MCNC: 1.1 MG/DL (ref 0–1.2)
BUN SERPL-MCNC: 43 MG/DL (ref 6–20)
BUN/CREAT SERPL: 17 (ref 7–25)
CALCIUM SPEC-SCNC: 7.6 MG/DL (ref 8.6–10.5)
CHLORIDE SERPL-SCNC: 112 MMOL/L (ref 98–107)
CO2 SERPL-SCNC: 20.9 MMOL/L (ref 22–29)
CREAT SERPL-MCNC: 2.53 MG/DL (ref 0.76–1.27)
DEPRECATED RDW RBC AUTO: 52.1 FL (ref 37–54)
EGFRCR SERPLBLD CKD-EPI 2021: 29.5 ML/MIN/1.73
ERYTHROCYTE [DISTWIDTH] IN BLOOD BY AUTOMATED COUNT: 16.2 % (ref 12.3–15.4)
GLOBULIN UR ELPH-MCNC: 2.7 GM/DL
GLUCOSE BLDC GLUCOMTR-MCNC: 163 MG/DL (ref 70–99)
GLUCOSE BLDC GLUCOMTR-MCNC: 198 MG/DL (ref 70–99)
GLUCOSE BLDC GLUCOMTR-MCNC: 279 MG/DL (ref 70–99)
GLUCOSE BLDC GLUCOMTR-MCNC: 301 MG/DL (ref 70–99)
GLUCOSE SERPL-MCNC: 225 MG/DL (ref 65–99)
HCT VFR BLD AUTO: 26.2 % (ref 37.5–51)
HGB BLD-MCNC: 8.9 G/DL (ref 13–17.7)
MAGNESIUM SERPL-MCNC: 1.9 MG/DL (ref 1.6–2.6)
MCH RBC QN AUTO: 30.2 PG (ref 26.6–33)
MCHC RBC AUTO-ENTMCNC: 34 G/DL (ref 31.5–35.7)
MCV RBC AUTO: 88.8 FL (ref 79–97)
PLATELET # BLD AUTO: 109 10*3/MM3 (ref 140–450)
PMV BLD AUTO: 10.7 FL (ref 6–12)
POTASSIUM SERPL-SCNC: 4.6 MMOL/L (ref 3.5–5.2)
PROT SERPL-MCNC: 4.2 G/DL (ref 6–8.5)
RBC # BLD AUTO: 2.95 10*6/MM3 (ref 4.14–5.8)
SODIUM SERPL-SCNC: 141 MMOL/L (ref 136–145)
WBC NRBC COR # BLD AUTO: 6.26 10*3/MM3 (ref 3.4–10.8)

## 2024-03-09 PROCEDURE — 80053 COMPREHEN METABOLIC PANEL: CPT | Performed by: INTERNAL MEDICINE

## 2024-03-09 PROCEDURE — 82948 REAGENT STRIP/BLOOD GLUCOSE: CPT

## 2024-03-09 PROCEDURE — 82948 REAGENT STRIP/BLOOD GLUCOSE: CPT | Performed by: INTERNAL MEDICINE

## 2024-03-09 PROCEDURE — 83735 ASSAY OF MAGNESIUM: CPT | Performed by: INTERNAL MEDICINE

## 2024-03-09 PROCEDURE — 85027 COMPLETE CBC AUTOMATED: CPT | Performed by: INTERNAL MEDICINE

## 2024-03-09 PROCEDURE — 63710000001 INSULIN DETEMIR PER 5 UNITS: Performed by: INTERNAL MEDICINE

## 2024-03-09 PROCEDURE — 63710000001 INSULIN LISPRO (HUMAN) PER 5 UNITS: Performed by: INTERNAL MEDICINE

## 2024-03-09 PROCEDURE — 99232 SBSQ HOSP IP/OBS MODERATE 35: CPT | Performed by: INTERNAL MEDICINE

## 2024-03-09 RX ADMIN — Medication 5 MG: at 21:27

## 2024-03-09 RX ADMIN — LACTULOSE 20 G: 20 SOLUTION ORAL at 03:07

## 2024-03-09 RX ADMIN — LACTULOSE 20 G: 20 SOLUTION ORAL at 12:43

## 2024-03-09 RX ADMIN — LACTULOSE 20 G: 20 SOLUTION ORAL at 21:19

## 2024-03-09 RX ADMIN — FAMOTIDINE 20 MG: 20 TABLET ORAL at 08:07

## 2024-03-09 RX ADMIN — Medication 1 TABLET: at 08:07

## 2024-03-09 RX ADMIN — RIFAXIMIN 550 MG: 550 TABLET ORAL at 08:07

## 2024-03-09 RX ADMIN — RIFAXIMIN 550 MG: 550 TABLET ORAL at 21:18

## 2024-03-09 RX ADMIN — GABAPENTIN 100 MG: 100 CAPSULE ORAL at 21:18

## 2024-03-09 RX ADMIN — GABAPENTIN 100 MG: 100 CAPSULE ORAL at 17:22

## 2024-03-09 RX ADMIN — FOLIC ACID 1 MG: 1 TABLET ORAL at 08:07

## 2024-03-09 RX ADMIN — DICLOFENAC SODIUM 2 G: 10 GEL TOPICAL at 21:24

## 2024-03-09 RX ADMIN — FAMOTIDINE 20 MG: 20 TABLET ORAL at 17:22

## 2024-03-09 RX ADMIN — FLUOXETINE HYDROCHLORIDE 40 MG: 20 CAPSULE ORAL at 08:07

## 2024-03-09 RX ADMIN — INSULIN DETEMIR 20 UNITS: 100 INJECTION, SOLUTION SUBCUTANEOUS at 08:08

## 2024-03-09 RX ADMIN — INSULIN LISPRO 4 UNITS: 100 INJECTION, SOLUTION INTRAVENOUS; SUBCUTANEOUS at 17:23

## 2024-03-09 RX ADMIN — LACTULOSE 20 G: 20 SOLUTION ORAL at 17:22

## 2024-03-09 RX ADMIN — GABAPENTIN 100 MG: 100 CAPSULE ORAL at 08:07

## 2024-03-09 RX ADMIN — LACTULOSE 20 G: 20 SOLUTION ORAL at 08:07

## 2024-03-09 RX ADMIN — Medication 100 MG: at 08:07

## 2024-03-09 RX ADMIN — DICLOFENAC SODIUM 2 G: 10 GEL TOPICAL at 08:16

## 2024-03-09 RX ADMIN — INSULIN LISPRO 2 UNITS: 100 INJECTION, SOLUTION INTRAVENOUS; SUBCUTANEOUS at 08:08

## 2024-03-09 RX ADMIN — NADOLOL 20 MG: 20 TABLET ORAL at 08:07

## 2024-03-09 RX ADMIN — DICLOFENAC SODIUM 2 G: 10 GEL TOPICAL at 17:24

## 2024-03-09 RX ADMIN — Medication 10 ML: at 08:08

## 2024-03-09 RX ADMIN — INSULIN DETEMIR 20 UNITS: 100 INJECTION, SOLUTION SUBCUTANEOUS at 21:19

## 2024-03-09 RX ADMIN — Medication 10 ML: at 21:18

## 2024-03-09 RX ADMIN — INSULIN LISPRO 2 UNITS: 100 INJECTION, SOLUTION INTRAVENOUS; SUBCUTANEOUS at 12:43

## 2024-03-09 RX ADMIN — INSULIN LISPRO 5 UNITS: 100 INJECTION, SOLUTION INTRAVENOUS; SUBCUTANEOUS at 21:19

## 2024-03-09 NOTE — PROGRESS NOTES
[facility]   Hospitalist Progress Note  Date: 3/9/2024  Patient Name: Simon Peters  : 1970  MRN: [id number]  Date of admission: 3/4/2024  Room/Bed: [facility]/      Subjective   Subjective     Chief Complaint:   Generalized weakness, legs giving out and frequent falls    Summary:  Simon Peters is a 53 y.o. male with past medical history of alcoholic cirrhosis s/p TIPS in May 23.  Patient seen at  transplant center on  and has been in [place] from  to  for hepatic encephalopathy, CKD 3B, diabetes mellitus with nephropathy seen by  endocrinology, noncompliant, hypertension, tobacco use disorder, pleural effusion and ascites, chronic scrotal swelling and chronic anemia thrombocytopenia due to cirrhosis. Patient has been generally getting more weak with increasing numbness tingling in lower extremities.  Does have a history of neuropathy in bilateral lower extremities.  Patient has not taken any of his medications including insulin for some time. Patient has fallen 3-4 times in the past few weeks.  There is no loss of consciousness.  His leg just gives out.  No major injury from the fall.  Apparently he has been kicked out by his mother due to drinking.  Per patient his last drink was on  last year.    Patient went down for paracentesis by IR.  Ultrasound did not show much fluid.  Patient did not want paracentesis.  MELD score of 19 during this admission.    Interval Followup:   Confused overnight patient's ammonia rechecked elevated at 192 therefore started on an increased regiment of lactulose continue rifaximin.  On exam patient with minimal asterixis, good mentation.      Objective   Objective     Vitals:   Temp:  [97.3 °F (36.3 °C)-98.1 °F (36.7 °C)] 97.9 °F (36.6 °C)  Heart Rate:  [66-72] 67  Resp:  [18] 18  BP: (130-155)/(72-98) 136/94    Physical Exam     Constitutional: Awake, alert, no acute distress, sitting up in bed, eating breakfast              Eyes:  Pupils equal, sclerae anicteric, no conjunctival injection              HENT: NCAT, mucous membranes moist              Neck: Supple, no thyromegaly, no lymphadenopathy, trachea midline              Respiratory: Clear to auscultation bilaterally, nonlabored respirations               Cardiovascular: RRR, no murmurs, rubs, or gallops, palpable pedal pulses bilaterally              Gastrointestinal: Positive bowel sounds, soft, nontender, nondistended              Musculoskeletal: +1 bilateral ankle edema, no clubbing or cyanosis to extremities              Psychiatric: Appropriate affect, cooperative              Neurologic: Oriented x 3,, asterixis present on exam, strength symmetric in all extremities, patient has hard time raising both legs straight up, Cranial Nerves grossly intact to confrontation, speech clear.  Decreased sensation to touch bilateral lower extremities distally.              Skin: No rashes     Result Review    Result Review:  I have personally reviewed these results:  [x]  Laboratory      Lab 03/09/24 0444 03/08/24 0443 03/07/24  0506 03/06/24  0516 03/05/24  0425 03/04/24  0245   WBC 6.26 6.92 8.12 9.05 7.66  --    HEMOGLOBIN 8.9* 8.8* 9.1* 9.0* 9.5*  --    HEMATOCRIT 26.2* 26.3* 26.6* 25.7* 26.4*  --    PLATELETS 109* 106* 106* 111* 114*  --    NEUTROS ABS  --   --  5.17 6.19 5.02  --    IMMATURE GRANS (ABS)  --   --  0.04 0.03 0.03  --    LYMPHS ABS  --   --  1.34 1.34 1.30  --    MONOS ABS  --   --  0.78 0.77 0.63  --    EOS ABS  --   --  0.68* 0.62* 0.58*  --    MCV 88.8 89.2 87.5 85.4 84.6  --    LACTATE, ARTERIAL  --   --   --   --   --  2.75*   LDH  --   --   --   --  248*  --    PROTIME  --   --   --   --  16.9*  --    APTT  --   --   --   --  33.2  --          Lab 03/09/24 0444 03/08/24 0443 03/07/24  0506 03/06/24  1645 03/06/24  0516 03/05/24  0425 03/04/24  0245   SODIUM 141 137 135*  --  134* 134*  --    SODIUM, VENOUS  --   --   --   --   --   --  129.3*   POTASSIUM 4.6 4.4  4.2   < > 3.6 3.4*  --    POTASSIUM, VENOUS  --   --   --   --   --   --  3.1*   CHLORIDE 112* 110* 108*  --  105 106  --    CHLORIDE, VENOUS  --   --   --   --   --   --  102   CO2 20.9* 21.4* 20.2*  --  22.3 21.0*  --    ANION GAP 8.1 5.6 6.8  --  6.7 7.0  --    BUN 43* 40* 31*  --  27* 23*  --    CREATININE 2.53* 2.48* 2.12*  --  2.00* 1.87*  --    EGFR 29.5* 30.3* 36.5*  --  39.2* 42.5*  --    GLUCOSE 225* 241* 265*  --  332* 278*  --    GLUCOSE, ARTERIAL  --   --   --   --   --   --  649*   CALCIUM 7.6* 7.6* 7.6*  --  7.6* 7.5*  --    IONIZED CALCIUM  --   --   --   --   --   --  1.10*   MAGNESIUM 1.9 1.8 1.7  --  1.7 1.7  --    PHOSPHORUS  --   --  2.4*  --  2.8 2.3*  --     < > = values in this interval not displayed.         Lab 03/09/24  0444 03/08/24  0443 03/07/24  0506 03/06/24  0516   TOTAL PROTEIN 4.2* 4.0*  --  4.0*   ALBUMIN 1.5* 1.4* 1.6* 1.6*   GLOBULIN 2.7 2.6  --  2.4   ALT (SGPT) 17 16  --  17   AST (SGOT) 37 34  --  37   BILIRUBIN 1.1 1.1  --  1.3*   ALK PHOS 260* 266*  --  254*         Lab 03/05/24  0425 03/04/24  0457 03/04/24  0229   HSTROP T  --  164* 166*   PROTIME 16.9*  --   --    INR 1.34*  --   --                  Lab 03/04/24  0245   FIO2 21   CARBOXYHEMOGLOBIN 3.1*     Brief Urine Lab Results  (Last result in the past 365 days)        Color   Clarity   Blood   Leuk Est   Nitrite   Protein   CREAT   Urine HCG        03/04/24 0307 Yellow   Clear   Large (3+)   Negative   Negative   100 mg/dL (2+)                 [x]  Microbiology   Microbiology Results (last 10 days)       ** No results found for the last 240 hours. **          [x]  Radiology  XR Knee 1 or 2 View Right    Result Date: 3/5/2024   Mild joint space narrowing in the medial compartment otherwise negative right knee      Jhonathan Mehta MD       Electronically Signed and Approved By: Jhonathan Mehta MD on 3/05/2024 at 17:25             US Abdomen Limited    Result Date: 3/5/2024    Scant amount of ascitic fluid in right upper  quadrant, patient preferred to hold off on procedure at this time    BAYLEE CALDERON       Electronically Signed and Approved By: JESUS ROQUE MD on 3/05/2024 at 13:09             CT Abdomen Pelvis With Contrast    Result Date: 3/4/2024    1. Cirrhosis and portal hypertension.  A TIPS is present. 2. Moderate ascites with generalized anasarca and a trace left pleural effusion. 3. Cholelithiasis without biliary obstruction. 4. Small bowel wall thickening without obstruction.  This may reflect enteritis or could be the sequela of chronic liver disease and ascites. 5. Normal large bowel and appendix. 6. Nonobstructed kidneys.      ADIS RICHARDSON MD       Electronically Signed and Approved By: ADIS RICHARDSON MD on 3/04/2024 at 5:14             CT Head Without Contrast    Result Date: 3/4/2024   Generalized atrophy.  No acute findings.     ADIS RICHARDSON MD       Electronically Signed and Approved By: ADIS RICHARDSON MD on 3/04/2024 at 4:54             XR Chest 1 View    Result Date: 3/4/2024   No acute cardiopulmonary findings.       ADIS RICHARDSON MD       Electronically Signed and Approved By: ADIS RICHARDSON MD on 3/04/2024 at 2:44            []  EKG/Telemetry   []  Cardiology/Vascular   []  Pathology  []  Old records  []  Other:    Assessment & Plan   Assessment / Plan     Assessment:  Frequent falls and generalized weakness.  Improving  Peripheral neuropathy likely diabetic and alcoholic causing above.  Noncompliance with medication including insulin.  Diabetes mellitus uncontrolled hyperglycemia due to above.  Hemoglobin A1c of 8.8% on January 31st.  Alcoholic cirrhosis s/p TIPS on May 2023.  Anasarca with ascites and chronic scrotal swelling.  CKD 3B.  Baseline creatinine 2-2.3.  Stable  Chronic anemia and thrombocytopenia from cirrhosis.  Hematuria and pyuria without bacteriuria.  Asymptomatic gallstones.  Hypoalbuminemia from liver disease.  Hypertension.  Tobacco use disorder.     Plan:  Patient remains  admitted to hospital for further care and management  Increase regiment of patient's lactulose, continue rifaximin, goal 3 loose stools per day  Patient remains on thiamine and folic acid  Low-salt diet, high-protein carb consistent  Sliding scale insulin with hypoglycemia protocol  Continue to hold Lasix and Aldactone today, creatinine with a slight uptrend again today  PT and OT consulted, appreciate assistance  Has been denied for SNF discharge, patient updated at bedside       Discussed with RN    DVT prophylaxis:  Mechanical DVT prophylaxis orders are present.        CODE STATUS:   Code Status (Patient has no pulse and is not breathing): CPR (Attempt to Resuscitate)  Medical Interventions (Patient has pulse or is breathing): Full Support

## 2024-03-09 NOTE — PLAN OF CARE
Goal Outcome Evaluation:              Outcome Evaluation: aox3 with intermittent confusion. up ad garrick/standby assistance. fall risk measures in place. multiple BMs noted this shift, lactulose given per orders. blood glucose monitored. patient not complying with fluid restriction, continuing to educate and encourage patient to follow. c/o dizziness, orthostatic VS taken, attending MD notified and aware. no c/o pain or discomfort.

## 2024-03-09 NOTE — PLAN OF CARE
"Goal Outcome Evaluation:  Plan of Care Reviewed With: patient        Progress: declining  Outcome Evaluation: A&Ox4 with intermittent confusion to situation. At beginning of shift patient stated he felt \"foggy\" and was needing reorientated frequently. Reached out to [facility] to obtain order for ammonia level draw. Order added - ammonia 192. Provider notified and lactulose order modified. VSS during night. No further issues/complaints.                               "

## 2024-03-09 NOTE — PAYOR COMM NOTE
"Rocky Peters (53 y.o. Male)       Date of Birth   1970    Social Security Number       Address   [address] [id number]    Home Phone   [phone]    MRN   [id number]       Hindu   None    Marital Status                               Admission Date   3/4/24    Admission Type   Emergency    Admitting Provider   Seth Aguilar MD    Attending Provider   Seth Aguilar MD    Department, Room/Bed   [address], 4003/1       Discharge Date       Discharge Disposition       Discharge Destination                                 Attending Provider: Seth Aguilar MD    Allergies: No Known Allergies    Isolation: None   Infection: None   Code Status: CPR    Ht: 175.3 cm (69\")   Wt: 96.1 kg (211 lb 13.8 oz)    Admission Cmt: None   Principal Problem: Weakness [R53.1]                   Active Insurance as of 3/4/2024       Primary Coverage       Payor Plan Insurance Group Employer/Plan Group    [employer] [place] [employer] [place]       Payor Plan Address Payor Plan Phone Number Payor Plan Fax Number Effective Dates    [address] [phone]  3/1/2024 - None Entered    [facility] [id number]         Subscriber Name Subscriber Birth Date Member ID       ROCKY PETERS 1970 [id number]                     Emergency Contacts        (Rel.) Home Phone Work Phone Mobile Phone    ERIC PAREDES (Mother) -- -- [phone]                       History & Physical        Kosta Sampson MD at [address] [place] [address] HISTORY AND PHYSICAL  Date: 3/4/2024   Patient Name: Rocky Peters  : 1970  MRN: [id number]  Primary Care Physician:  Provider, No Known  Date of admission: 3/4/2024    Subjective  Subjective     Chief Complaint: Generalized weakness.  Legs giving out and frequent falls for past few days    HPI:    Rocky Peters is a 53 y.o. male with past ministry of alcoholic cirrhosis s/p TIPS " in May 23.  Patient has been seen at [facility] on January 31 and has been in [place] from January 8 to January 17 for hepatic encephalopathy, CKD 3B, diabetes mellitus with nephropathy seen by  endocrinology, noncompliant, hypertension, tobacco use disorder, pleural effusion and ascites, chronic scrotal swelling and chronic anemia thrombocytopenia due to cirrhosis.  Patient has been generally getting more weak with increasing numbness tingling in lower extremities.  Does have a history of neuropathy in bilateral lower extremities.  Patient has not taken any of his medications including insulin for some time.  Patient has fallen 3-4 times in the past few weeks.  There is no loss of consciousness.  His leg just gives out.  No major injury from the fall.  Apparently he has been kicked out by his mother due to drinking.  Per patient his last drink was on December 13 last year.  Denies any chest pain, shortness of air, vomiting diarrhea or blood in the stools or black-colored stools.  No cough or phlegm.  Does have lower abdominal pain chronic in nature.  No urinary complaints.  Workup in the ED showed elevated blood pressure with good oxygen saturation on room air.  Delta troponin is -2.  Venous pH is 7.4.  Creatinine 1.8.  Baseline creatinine is 2-2.3.  No anion gap.  Blood glucose of 682.  Albumin of 1.9.  Ammonia of 64 with a total bili of 1.9.  Hemoglobin of 12.  Platelets 112.  UA shows RBCs and WBCs with no bacteria or ketones.  EKG is normal sinus rhythm.  Chest x-ray, CT head negative.  CT abdomen pelvis shows gallstones with moderate ascites.  Because of uncontrolled hyperglycemia hospitalist has been called to admit him for further evaluation    Personal History     Past Medical History:  Past Medical History:   Diagnosis Date    Abnormal LFTs     Anxiety     Back pain     Diabetes mellitus     Hypertension     Kidney failure     Liver failure     Rash        Past Surgical History:  Past  Surgical History:   Procedure Laterality Date    TIPS PROCEDURE         Family History:   family history includes Diabetes in his brother and father; Diabetes type II in his father; Heart disease in his father; Hypertension in his father; Stroke in his father.    Social History:    reports that he has been smoking cigarettes. He started smoking about 39 years ago. He has a 19.6 pack-year smoking history. He has never used smokeless tobacco. He reports that he does not currently use alcohol. He reports that he does not use drugs.    Home Medications:   Patient has not been taking his insulin or other medications for ascites.    Allergies:  No Known Allergies    Review of Systems   All systems were reviewed and negative except for: H&P    Objective  Objective     Vitals:   Temp:  [97.7 °F (36.5 °C)-98.7 °F (37.1 °C)] 98.1 °F (36.7 °C)  Heart Rate:  [82-89] 86  Resp:  [12-20] 20  BP: (140-171)/(87-97) 166/93    Physical Exam    Constitutional: Awake, alert, no acute distress   Eyes: Pupils equal, sclerae anicteric, no conjunctival injection   HENT: NCAT, mucous membranes moist   Neck: Supple, no thyromegaly, no lymphadenopathy, trachea midline   Respiratory: Clear to auscultation bilaterally, nonlabored respirations    Cardiovascular: RRR, no murmurs, rubs, or gallops, palpable pedal pulses bilaterally   Gastrointestinal: Positive bowel sounds, soft, nontender, nondistended   Musculoskeletal: +2 bilateral ankle edema, no clubbing or cyanosis to extremities   Psychiatric: Appropriate affect, cooperative   Neurologic: Oriented x 3, strength symmetric in all extremities, patient has hard time raising both legs straight up, Cranial Nerves grossly intact to confrontation, speech clear.  Decreased sensation to touch bilateral lower extremities distally.   Skin: No rashes     Result Review   Result Review:  I have personally reviewed the results from the time of this admission to 3/4/2024 16:51 EST and agree with these  findings:  [x]  Laboratory  []  Microbiology  [x]  Radiology  [x]  EKG/Telemetry normal sinus rhythm 88.  QT interval 0.56  []  Cardiology/Vascular   []  Pathology  [x]  Old records  [x]  Other: Medications      Assessment & Plan  Assessment / Plan     Assessment:  Frequent falls and generalized weakness.  Peripheral neuropathy likely diabetic and alcoholic causing above.  Noncompliance with medication including insulin.  Diabetes mellitus uncontrolled hyperglycemia due to above.  Hemoglobin A1c of 8.8% on January 31st.  Alcoholic cirrhosis s/p TIPS on May 2023.  Anasarca with ascites and chronic scrotal swelling.  CKD 3B.  Baseline creatinine 2-2.3.  Chronic anemia and thrombocytopenia from cirrhosis.  Hematuria and pyuria without bacteriuria.  Asymptomatic gallstones.  Hypoalbuminemia from liver disease.  Hypertension.  Tobacco use disorder.      Plan:   Admit to monitored bed.  Low-salt diet, fluid restriction high-protein, carb consistent diet.  Lactulose and Xifaxan.  Sliding-scale  and basal insulin.  Titrate to control blood sugars.  Lasix and Aldactone.  Nicotine patch.  Check INR.  Paracentesis in the morning by IR.  No need of CIWA protocol as patient has been sober since December last year.  Thiamine and folic acid.  Resume appropriate home medications.  Protonix.  Dietitian consulted  Diabetic nurse educator  PT OT.  Discussed with ED physician and nursing staff  Needs rehab placement.  Patient agreeable         DVT prophylaxis:  Mechanical DVT prophylaxis orders are present.  SCD    CODE STATUS:    Code Status (Patient has no pulse and is not breathing): CPR (Attempt to Resuscitate)  Medical Interventions (Patient has pulse or is breathing): Full Support      Admission Status:  I believe this patient meets inpatient status.    Part of this note may be an electronic transcription/translation of spoken language to printed text using the Dragon Dictation System.     Electronically signed by Kosta Sampson  MD, 03/04/24, 4:51 PM EST.             Electronically signed by Kosta Sampson MD at 03/04/24 1701          Emergency Department Notes        Bibiana Bravo RN at 03/04/24 0621          Ambulated patient and he has extreme difficulty ambulating without assistance and experiences a lot of pain    Electronically signed by Bibiana Bravo RN at 03/04/24 0621       Roseline Blair MD at 03/04/24 0424          Time: 4:24 AM EST  Date of encounter:  3/4/2024  Independent Historian/Clinical History and Information was obtained by:   Patient    History is limited by: N/A    Chief Complaint: weakness      History of Present Illness:  Patient is a 53 y.o. year old male who presents to the emergency department for evaluation of Generalized weakness.  Patient stated he had multiple falls over the last 2 days due to generalized weakness.  He also reports worsening neuropathy in bilateral lower extremities.  He also has a history of cirrhosis.  Patient states has not been taking his medication as prescribed.  He especially has not been taking his insulin.  Denies any chest pain, shortness of breath, fever, chills, cough, congestion.  Does report diffuse abdominal pain.  He does not believe he hit his head with his frequent falls.    HPI    Patient Care Team  Primary Care Provider: Provider, No Known    Past Medical History:     No Known Allergies  Past Medical History:   Diagnosis Date    Abnormal LFTs     Anxiety     Back pain     Diabetes mellitus     Hypertension     Kidney failure     Liver failure     Rash      Past Surgical History:   Procedure Laterality Date    TIPS PROCEDURE       Family History   Problem Relation Age of Onset    Diabetes Father     Heart disease Father     Hypertension Father     Diabetes type II Father     Stroke Father     Diabetes Brother        Home Medications:  Prior to Admission medications    Medication Sig Start Date End Date Taking? Authorizing Provider   Ertugliflozin L-PyroglutamicAc  (STEGLATRO) 5 MG tablet Take 1 tablet by mouth Every Morning. 8/2/18   Shadi Barrett MD   FLUoxetine (PROzac) 20 MG capsule Take 1 capsule by mouth Daily. 6/12/19   Shadi Barrett MD   furosemide (LASIX) 40 MG tablet Take 1 tablet by mouth Daily. 8/5/23 8/4/24  Yareli Sanchez MD   glucose blood test strip 1 each by Other route Daily. Use as instructed 6/30/17   Woodrow Dash Jr., MD   lactulose (CHRONULAC) 10 GM/15ML solution Take 30 mL by mouth 3 (Three) Times a Day. Pt doesn't take as ordered 9/7/23   Yareli Sanchez MD   Lancets (FREESTYLE) lancets 1 each by Other route Daily. 6/30/17   Woodrow Dash Jr., MD   lidocaine (LIDODERM) 5 % Place 1 patch on the skin as directed by provider Daily. Remove & Discard patch within 12 hours or as directed by MD 1/6/24   Raymond Peters PA-C   lisinopril (PRINIVIL,ZESTRIL) 40 MG tablet Take 1 tablet by mouth Daily. 6/12/19   Shadi Barrett MD   potassium chloride (MICRO-K) 10 MEQ CR capsule Take 1 capsule by mouth. 12/29/23   Yareli Sanchez MD   SITagliptin-MetFORMIN HCl ER (JANUMET XR) 100-1000 MG tablet Take 1 tablet by mouth Daily. 8/2/18   Shadi Barrett MD        Social History:   Social History     Tobacco Use    Smoking status: Every Day     Current packs/day: 0.50     Average packs/day: 0.5 packs/day for 39.2 years (19.6 ttl pk-yrs)     Types: Cigarettes     Start date: 1985    Smokeless tobacco: Never   Substance Use Topics    Alcohol use: Not Currently     Comment: daily          sober since 10/19/2020    Drug use: No         Review of Systems:  Review of Systems   Constitutional:  Negative for chills and fever.   HENT:  Negative for congestion, ear pain and sore throat.    Eyes:  Negative for pain.   Respiratory:  Negative for cough, chest tightness and shortness of breath.    Cardiovascular:  Negative for chest pain.   Gastrointestinal:  Positive for abdominal pain. Negative for diarrhea, nausea and vomiting.  "  Genitourinary:  Negative for flank pain and hematuria.   Musculoskeletal:  Negative for joint swelling.   Skin:  Negative for pallor.   Neurological:  Positive for weakness. Negative for seizures and headaches.   All other systems reviewed and are negative.       Physical Exam:  /97   Pulse 87   Temp 98.7 °F (37.1 °C) (Oral)   Resp 12   Ht 175.3 cm (69\")   Wt 98.8 kg (217 lb 13 oz)   SpO2 100%   BMI 32.17 kg/m²     Physical Exam  Constitutional:       Appearance: Normal appearance.   HENT:      Head: Normocephalic and atraumatic.      Nose: Nose normal.      Mouth/Throat:      Mouth: Mucous membranes are moist.   Eyes:      Extraocular Movements: Extraocular movements intact.      Conjunctiva/sclera: Conjunctivae normal.      Pupils: Pupils are equal, round, and reactive to light.   Cardiovascular:      Rate and Rhythm: Normal rate and regular rhythm.      Pulses: Normal pulses.      Heart sounds: Normal heart sounds.   Pulmonary:      Effort: Pulmonary effort is normal.      Breath sounds: Normal breath sounds.   Abdominal:      General: There is no distension.      Palpations: Abdomen is soft.      Tenderness: There is abdominal tenderness.   Musculoskeletal:         General: Normal range of motion.      Cervical back: Normal range of motion.      Right lower leg: Edema present.      Left lower leg: Edema present.   Skin:     General: Skin is warm and dry.      Capillary Refill: Capillary refill takes less than 2 seconds.   Neurological:      General: No focal deficit present.      Mental Status: He is alert and oriented to person, place, and time. Mental status is at baseline.   Psychiatric:         Mood and Affect: Mood normal.         Behavior: Behavior normal.                  Procedures:  Procedures      Medical Decision Making:      Comorbidities that affect care:    cirrhosis, Chronic Kidney Disease, Diabetes, Hypertension, Smoking    External Notes reviewed:    Previous Clinic Note: Patient " was seen by gastroenterologist on 11/30/2020 for elevated liver enzymes and encounter for screening for malignant neoplasm of colon      The following orders were placed and all results were independently analyzed by me:  Orders Placed This Encounter   Procedures    XR Chest 1 View    CT Head Without Contrast    CT Abdomen Pelvis With Contrast    [facility] Draw    Comprehensive Metabolic Panel    Single High Sensitivity Troponin T    Magnesium    Urinalysis With Microscopic If Indicated (No Culture) - Urine, Clean Catch    CBC Auto Differential    Ammonia    VBG with Co-Ox and Electrolytes    Urinalysis, Microscopic Only - Urine, Clean Catch    High Sensitivity Troponin T 2Hr    NPO Diet NPO Type: Strict NPO    Undress & Gown    Continuous Pulse Oximetry    Vital Signs    Orthostatic Blood Pressure    Ambulate patient    Code Status and Medical Interventions:    Inpatient Hospitalist Consult    Oxygen Therapy- Nasal Cannula; Titrate 1-6 LPM Per SpO2; 90 - 95%    POC Glucose Once    POC Glucose Once    POC Glucose STAT    POC Glucose Q1H    POC Glucose Once    POC Glucose Once    ECG 12 Lead ED Triage Standing Order; Weak / Dizzy / AMS    Insert Peripheral IV    Initiate Observation Status    Fall Precautions    CBC & Differential    Green Top (Gel)    Lavender Top    Gold Top - SST    Light Blue Top    Extra Tubes    Gray Top       Medications Given in the Emergency Department:  Medications   sodium chloride 0.9 % flush 10 mL (has no administration in time range)   sodium chloride 0.9 % bolus 1,000 mL (0 mL Intravenous Stopped 3/4/24 0637)   insulin regular (humuLIN R,novoLIN R) injection 7 Units (7 Units Intravenous Given 3/4/24 4085)   iopamidol (ISOVUE-370) 76 % injection 100 mL (100 mL Intravenous Given 3/4/24 7953)        ED Course:    ED Course as of 03/04/24 0702   Mon Mar 04, 2024   0429 ECG 12 Lead ED Triage Standing Order; Weak / Dizzy / AMS  .  Sinus rhythm with rate of 88.  No acute ST elevation.  Normal  UT interval.  Prolonged QTc.  Nonspecific T wave abnormality.  EKG interpreted by me [LD]      ED Course User Index  [LD] Roseline Blair MD       Labs:    Lab Results (last 24 hours)       Procedure Component Value Units Date/Time    CBC & Differential [[id number]]  (Abnormal) Collected: 03/04/24 0229    Specimen: Blood from Arm, Right Updated: 03/04/24 0303    Narrative:      The following orders were created for panel order CBC & Differential.  Procedure                               Abnormality         Status                     ---------                               -----------         ------                     CBC Auto Differential[[id number]]        Abnormal            Final result                 Please view results for these tests on the individual orders.    Comprehensive Metabolic Panel [[id number]]  (Abnormal) Collected: 03/04/24 0229    Specimen: Blood from Arm, Right Updated: 03/04/24 0331     Glucose 682 mg/dL      BUN 24 mg/dL      Creatinine 1.85 mg/dL      Sodium 131 mmol/L      Potassium 3.3 mmol/L      Comment: Slight hemolysis detected by analyzer. Result may be falsely elevated.        Chloride 99 mmol/L      CO2 21.8 mmol/L      Calcium 7.8 mg/dL      Total Protein 4.7 g/dL      Albumin 1.9 g/dL      ALT (SGPT) 22 U/L      AST (SGOT) 47 U/L      Alkaline Phosphatase 351 U/L      Total Bilirubin 1.9 mg/dL      Globulin 2.8 gm/dL      A/G Ratio 0.7 g/dL      BUN/Creatinine Ratio 13.0     Anion Gap 10.2 mmol/L      eGFR 43.0 mL/min/1.73     Narrative:      GFR Normal >60  Chronic Kidney Disease <60  Kidney Failure <15      Single High Sensitivity Troponin T [[id number]]  (Abnormal) Collected: 03/04/24 0229    Specimen: Blood from Arm, Right Updated: 03/04/24 0330     HS Troponin T 166 ng/L     Narrative:      High Sensitive Troponin T Reference Range:  <14.0 ng/L- Negative Female for AMI  <22.0 ng/L- Negative Male for AMI  >=14 - Abnormal Female indicating possible myocardial injury.  >=22 -  Abnormal Male indicating possible myocardial injury.   Clinicians would have to utilize clinical acumen, EKG, Troponin, and serial changes to determine if it is an Acute Myocardial Infarction or myocardial injury due to an underlying chronic condition.         Magnesium [[id number]]  (Normal) Collected: 03/04/24 0229    Specimen: Blood from Arm, Right Updated: 03/04/24 0321     Magnesium 2.0 mg/dL     CBC Auto Differential [[id number]]  (Abnormal) Collected: 03/04/24 0229    Specimen: Blood from Arm, Right Updated: 03/04/24 0303     WBC 6.82 10*3/mm3      RBC 3.33 10*6/mm3      Hemoglobin 10.0 g/dL      Hematocrit 27.9 %      MCV 83.8 fL      MCH 30.0 pg      MCHC 35.8 g/dL      RDW 15.3 %      RDW-SD 45.9 fl      MPV 10.7 fL      Platelets 112 10*3/mm3      Neutrophil % 67.8 %      Lymphocyte % 17.3 %      Monocyte % 8.1 %      Eosinophil % 5.4 %      Basophil % 1.0 %      Immature Grans % 0.4 %      Neutrophils, Absolute 4.62 10*3/mm3      Lymphocytes, Absolute 1.18 10*3/mm3      Monocytes, Absolute 0.55 10*3/mm3      Eosinophils, Absolute 0.37 10*3/mm3      Basophils, Absolute 0.07 10*3/mm3      Immature Grans, Absolute 0.03 10*3/mm3      nRBC 0.0 /100 WBC     POC Glucose Once [[id number]]  (Abnormal) Collected: 03/04/24 0233    Specimen: Blood Updated: 03/04/24 0235     Glucose >600 mg/dL      Comment: Serial Number: [id number]:  [id number]       Ammonia [[id number]]  (Abnormal) Collected: 03/04/24 0244    Specimen: Blood Updated: 03/04/24 0319     Ammonia 64 umol/L     VBG with Co-Ox and Electrolytes [[id number]]  (Abnormal) Collected: 03/04/24 0245    Specimen: Venous Blood Updated: 03/04/24 0308     pH, Venous 7.430 pH Units      pCO2, Venous 33.3 mm Hg      pO2, Venous 33.3 mm Hg      HCO3, Venous 21.6 mmol/L      Base Excess, Venous -2.1 mmol/L      O2 Saturation, Venous 69.0 %      Hemoglobin, Blood Gas 11.2 g/dL      Carboxyhemoglobin 3.1 %      Methemoglobin 0.30 %      Oxyhemoglobin 66.7 %      FHHB  29.9 %      Site Drawn by RN     Modality Room Air     FIO2 21 %      Sodium, Venous 129.3 mmol/L      Potassium, Venous 3.1 mmol/L      Ionized Calcium, Arterial 1.10 mmol/L      Chloride, Venous  102 mmol/L      Glucose, Arterial 649 mg/dL      Lactate, Arterial 2.75 mmol/L     Urinalysis With Microscopic If Indicated (No Culture) - Urine, Clean Catch [[id number]]  (Abnormal) Collected: 03/04/24 0307    Specimen: Urine, Clean Catch Updated: 03/04/24 0323     Color, UA Yellow     Appearance, UA Clear     pH, UA 6.0     Specific Gravity, UA 1.022     Glucose, UA >=1000 mg/dL (3+)     Ketones, UA Negative     Bilirubin, UA Negative     Blood, UA Large (3+)     Protein,  mg/dL (2+)     Leuk Esterase, UA Negative     Nitrite, UA Negative     Urobilinogen, UA 1.0 E.U./dL    Urinalysis, Microscopic Only - Urine, Clean Catch [[id number]]  (Abnormal) Collected: 03/04/24 0307    Specimen: Urine, Clean Catch Updated: 03/04/24 0336     RBC, UA 21-50 /HPF      WBC, UA 3-5 /HPF      Bacteria, UA None Seen /HPF      Squamous Epithelial Cells, UA 0-2 /HPF      Yeast, UA Small/1+ Budding Yeast /HPF      Hyaline Casts, UA None Seen /LPF      Methodology Manual Light Microscopy    POC Glucose Once [[id number]]  (Abnormal) Collected: 03/04/24 0453    Specimen: Blood Updated: 03/04/24 0615     Glucose 581 mg/dL      Comment: Serial Number: [id number]:  [id number]       High Sensitivity Troponin T 2Hr [[id number]]  (Abnormal) Collected: 03/04/24 0457    Specimen: Blood Updated: 03/04/24 0535     HS Troponin T 164 ng/L      Troponin T Delta -2 ng/L     Narrative:      High Sensitive Troponin T Reference Range:  <14.0 ng/L- Negative Female for AMI  <22.0 ng/L- Negative Male for AMI  >=14 - Abnormal Female indicating possible myocardial injury.  >=22 - Abnormal Male indicating possible myocardial injury.   Clinicians would have to utilize clinical acumen, EKG, Troponin, and serial changes to determine if it is an Acute Myocardial  Infarction or myocardial injury due to an underlying chronic condition.         POC Glucose Once [[id number]]  (Abnormal) Collected: 03/04/24 0557    Specimen: Blood Updated: 03/04/24 0615     Glucose 424 mg/dL      Comment: Serial Number: [id number]:  [id number]                Imaging:    CT Abdomen Pelvis With Contrast    Result Date: 3/4/2024  PROCEDURE: CT ABDOMEN PELVIS W CONTRAST  COMPARISON: None  INDICATIONS: abdominal pain.  Chronic liver disease.  Confusion.  TECHNIQUE: After obtaining the patient's consent, CT images were created with non-ionic intravenous contrast material.   PROTOCOL:   Standard imaging protocol performed    RADIATION:   DLP: 1050.7 mGy*cm   Automated exposure control was utilized to minimize radiation dose. CONTRAST: 75 cc Isovue 370 I.V.  FINDINGS:  There is streak artifact from the patient's arms.  There is mild atelectasis in the lower lobes and lingula.  There is a trace amount of left pleural fluid.  There is gynecomastia bilaterally.  Generalized anasarca is present as well.  There is a moderate amount of ascites.  The aorta is normal in caliber.  Gallstone is present in the gallbladder.  No biliary obstruction.  The liver is frankly cirrhotic.  A TIPS is present.  Patency is difficult to assess due to timing of the contrast bolus.  No definite liver mass.  The solid abdominal organs otherwise appear within normal limits.  Splenorenal shunts in the left upper quadrant are noted compatible with portal hypertension.  Urinary bladder and prostate gland are within normal limits.  Large bowel appears normal without evidence of colitis.  The appendix is normal.  There is no small bowel obstruction.  Mildly thickened small bowel loops may reflect enteritis, although this could also be the sequela of chronic liver disease and ascites.  Stomach is grossly normal.  No adenopathy.        1. Cirrhosis and portal hypertension.  A TIPS is present. 2. Moderate ascites with generalized  anasarca and a trace left pleural effusion. 3. Cholelithiasis without biliary obstruction. 4. Small bowel wall thickening without obstruction.  This may reflect enteritis or could be the sequela of chronic liver disease and ascites. 5. Normal large bowel and appendix. 6. Nonobstructed kidneys.      ADIS RICHARDSON MD       Electronically Signed and Approved By: ADIS RICHARDSON MD on 3/04/2024 at 5:14             CT Head Without Contrast    Result Date: 3/4/2024  PROCEDURE: CT HEAD WO CONTRAST  COMPARISON:  None INDICATIONS: headache and confusion.  PROTOCOL:   Standard imaging protocol performed    RADIATION:   DLP: 1144.2 mGy*cm   MA and/or KV was adjusted to minimize radiation dose.     TECHNIQUE: After obtaining the patient's consent, CT images were obtained without non-ionic intravenous contrast material.  FINDINGS:  There is generalized atrophy.  Ventricular size and configuration are within normal limits.  No acute infarct or hemorrhage is seen.  There are no masses.  There is no skull fracture.  Mild chronic mucosal thickening is present in the maxillary sinuses.       Generalized atrophy.  No acute findings.     ADIS RICHARDSON MD       Electronically Signed and Approved By: ADIS RICHARDSON MD on 3/04/2024 at 4:54             XR Chest 1 View    Result Date: 3/4/2024  PROCEDURE: XR CHEST 1 VW  COMPARISON: [facility], , XR CHEST 1 VW, 1/01/2024, 12:33.  INDICATIONS: Weak/Dizzy/AMS triage protocol  FINDINGS:  Cardiac and mediastinal contours are normal.  Lung volumes are low, but the lungs are clear.  Vascularity is normal.  No pneumothorax.  There is mild elevation of the right hemidiaphragm.       No acute cardiopulmonary findings.       ADIS RICHARDSON MD       Electronically Signed and Approved By: ADIS RICHARDSON MD on 3/04/2024 at 2:44                Differential Diagnosis and Discussion:    Weakness: Based on the patient's history, signs, and symptoms, the diffential diagnosis includes but is  not limited to meningitis, stroke, sepsis, subarachnoid hemorrhage, intracranial bleeding, encephalitis, acute uti, dehydration, MS, myasthenia gravis, Gu[place]an [place], migraine variant, neuromuscular disorders vertigo, electrolyte imbalance, and metabolic disorders.    All labs were reviewed and interpreted by me.  All X-rays impressions were independently interpreted by me.  EKG was interpreted by me.  CT scan radiology impression was interpreted by me.    MDM  Number of Diagnoses or Management Options  Diagnosis management comments: Patient is afebrile nontoxic-appearing.  Vital signs are stable.  Patient reports generalized weakness.  Attempted get patient up to ambulate but he is too weak to stand.  He currently lives alone.  Blood glucose was elevated.  He was given insulin and IV fluids blood glucose improved.  Creatinine elevated to 1.85 this is similar to previous.  Troponin is also elevated.  With generalized weakness discussed patient with hospitalist and will admit for further care         Amount and/or Complexity of Data Reviewed  Decide to obtain previous medical records or to obtain history from someone other than the patient: yes                 Patient Care Considerations:    SEPSIS was considered but is NOT present in the emergency department as SIRS criteria is not present.      Consultants/Shared Management Plan:    Hospitalist: I have discussed the case with Dr Cm who agrees to accept the patient for admission.    Social Determinants of Health:    Patient is unable to carry out activities of daily life. Escalation of care is necessary.       Disposition and Care Coordination:    Admit:   Through independent evaluation of the patient's history, physical, and imperical data, the patient meets criteria for inpatient admission to the hospital.        Final diagnoses:   Weakness generalized   Cirrhosis of liver without ascites, unspecified hepatic cirrhosis type        ED Disposition       ED  Disposition   Decision to Admit    Condition   --    Comment   Level of Care: Telemetry [5]   Diagnosis: Weakness [979098]   Admitting Physician: HORACIO GUDINO [[id number]]   Attending Physician: HORACIO GUDINO [[id number]]                 This medical record created using voice recognition software.             Roseline Blair MD  03/04/24 0702      Electronically signed by Roseline Blair MD at 03/04/24 0702       Bibiana Bravo, RN at 03/04/24 0249          Patient states that his testicles are swollen      Electronically signed by Bibiana Bravo RN at 03/04/24 0250       Lab Results (last 7 days)       Procedure Component Value Units Date/Time    POC Glucose 4x Daily Before Meals & at Bedtime [[id number]]  (Abnormal) Collected: 03/09/24 1134    Specimen: Blood Updated: 03/09/24 1137     Glucose 198 mg/dL      Comment: Serial Number: [id number]:  [id number]       POC Glucose Once [[id number]]  (Abnormal) Collected: 03/09/24 0751    Specimen: Blood Updated: 03/09/24 0800     Glucose 163 mg/dL      Comment: Serial Number: [id number]:  [id number]       Comprehensive Metabolic Panel [[id number]]  (Abnormal) Collected: 03/09/24 0444    Specimen: Blood Updated: 03/09/24 0559     Glucose 225 mg/dL      BUN 43 mg/dL      Creatinine 2.53 mg/dL      Sodium 141 mmol/L      Potassium 4.6 mmol/L      Chloride 112 mmol/L      CO2 20.9 mmol/L      Calcium 7.6 mg/dL      Total Protein 4.2 g/dL      Albumin 1.5 g/dL      ALT (SGPT) 17 U/L      AST (SGOT) 37 U/L      Alkaline Phosphatase 260 U/L      Total Bilirubin 1.1 mg/dL      Globulin 2.7 gm/dL      A/G Ratio 0.6 g/dL      BUN/Creatinine Ratio 17.0     Anion Gap 8.1 mmol/L      eGFR 29.5 mL/min/1.73     Narrative:      GFR Normal >60  Chronic Kidney Disease <60  Kidney Failure <15      Magnesium [[id number]]  (Normal) Collected: 03/09/24 0444    Specimen: Blood Updated: 03/09/24 0559     Magnesium 1.9 mg/dL     CBC (No Diff) [[id number]]  (Abnormal) Collected: 03/09/24  0444    Specimen: Blood Updated: 03/09/24 0534     WBC 6.26 10*3/mm3      RBC 2.95 10*6/mm3      Hemoglobin 8.9 g/dL      Hematocrit 26.2 %      MCV 88.8 fL      MCH 30.2 pg      MCHC 34.0 g/dL      RDW 16.2 %      RDW-SD 52.1 fl      MPV 10.7 fL      Platelets 109 10*3/mm3     Ammonia [[id number]]  (Abnormal) Collected: 03/08/24 2204    Specimen: Blood Updated: 03/08/24 2229     Ammonia 192 umol/L     POC Glucose Once [[id number]]  (Abnormal) Collected: 03/08/24 1955    Specimen: Blood Updated: 03/08/24 1957     Glucose 286 mg/dL      Comment: Serial Number: [id number]:  [id number]       POC Glucose Once [[id number]]  (Abnormal) Collected: 03/08/24 1645    Specimen: Blood Updated: 03/08/24 1646     Glucose 327 mg/dL      Comment: Serial Number: [id number]:  [id number]       POC Glucose Once [[id number]]  (Abnormal) Collected: 03/08/24 1145    Specimen: Blood Updated: 03/08/24 1147     Glucose 332 mg/dL      Comment: Serial Number: [id number]:  [id number]       POC Glucose 4x Daily Before Meals & at Bedtime [[id number]]  (Abnormal) Collected: 03/08/24 0742    Specimen: Blood Updated: 03/08/24 0743     Glucose 217 mg/dL      Comment: Serial Number: [id number]:  [id number]       Comprehensive Metabolic Panel [[id number]]  (Abnormal) Collected: 03/08/24 0443    Specimen: Blood Updated: 03/08/24 0548     Glucose 241 mg/dL      BUN 40 mg/dL      Creatinine 2.48 mg/dL      Sodium 137 mmol/L      Potassium 4.4 mmol/L      Chloride 110 mmol/L      CO2 21.4 mmol/L      Calcium 7.6 mg/dL      Total Protein 4.0 g/dL      Albumin 1.4 g/dL      ALT (SGPT) 16 U/L      AST (SGOT) 34 U/L      Alkaline Phosphatase 266 U/L      Total Bilirubin 1.1 mg/dL      Globulin 2.6 gm/dL      A/G Ratio 0.5 g/dL      BUN/Creatinine Ratio 16.1     Anion Gap 5.6 mmol/L      eGFR 30.3 mL/min/1.73     Narrative:      GFR Normal >60  Chronic Kidney Disease <60  Kidney Failure <15      Magnesium [[id number]]  (Normal) Collected:  03/08/24 0443    Specimen: Blood Updated: 03/08/24 0548     Magnesium 1.8 mg/dL     CBC (No Diff) [[id number]]  (Abnormal) Collected: 03/08/24 0443    Specimen: Blood Updated: 03/08/24 0530     WBC 6.92 10*3/mm3      RBC 2.95 10*6/mm3      Hemoglobin 8.8 g/dL      Hematocrit 26.3 %      MCV 89.2 fL      MCH 29.8 pg      MCHC 33.5 g/dL      RDW 15.8 %      RDW-SD 50.5 fl      MPV 10.4 fL      Platelets 106 10*3/mm3     POC Glucose Once [[id number]]  (Abnormal) Collected: 03/07/24 1959    Specimen: Blood Updated: 03/07/24 2001     Glucose 194 mg/dL      Comment: Serial Number: [id number]:  [id number]       POC Glucose Once [[id number]]  (Abnormal) Collected: 03/07/24 1710    Specimen: Blood Updated: 03/07/24 1711     Glucose 202 mg/dL      Comment: Serial Number: [id number]:  [id number]       POC Glucose Once [[id number]]  (Abnormal) Collected: 03/07/24 1149    Specimen: Blood Updated: 03/07/24 1151     Glucose 172 mg/dL      Comment: Serial Number: [id number]:  [id number]       POC Glucose Once [[id number]]  (Abnormal) Collected: 03/07/24 0809    Specimen: Blood Updated: 03/07/24 0815     Glucose 243 mg/dL      Comment: Serial Number: [id number]:  [id number]       Magnesium [[id number]]  (Normal) Collected: 03/07/24 0506    Specimen: Blood Updated: 03/07/24 0639     Magnesium 1.7 mg/dL     Renal Function Panel [[id number]]  (Abnormal) Collected: 03/07/24 0506    Specimen: Blood Updated: 03/07/24 0639     Glucose 265 mg/dL      BUN 31 mg/dL      Creatinine 2.12 mg/dL      Sodium 135 mmol/L      Potassium 4.2 mmol/L      Chloride 108 mmol/L      CO2 20.2 mmol/L      Calcium 7.6 mg/dL      Albumin 1.6 g/dL      Phosphorus 2.4 mg/dL      Anion Gap 6.8 mmol/L      BUN/Creatinine Ratio 14.6     eGFR 36.5 mL/min/1.73     Narrative:      GFR Normal >60  Chronic Kidney Disease <60  Kidney Failure <15      CBC & Differential [[id number]]  (Abnormal) Collected: 03/07/24 0506    Specimen: Blood Updated: 03/07/24  0549    Narrative:      The following orders were created for panel order CBC & Differential.  Procedure                               Abnormality         Status                     ---------                               -----------         ------                     CBC Auto Differential[[id number]]        Abnormal            Final result                 Please view results for these tests on the individual orders.    CBC Auto Differential [[id number]]  (Abnormal) Collected: 03/07/24 0506    Specimen: Blood Updated: 03/07/24 0549     WBC 8.12 10*3/mm3      RBC 3.04 10*6/mm3      Hemoglobin 9.1 g/dL      Hematocrit 26.6 %      MCV 87.5 fL      MCH 29.9 pg      MCHC 34.2 g/dL      RDW 15.9 %      RDW-SD 50.4 fl      MPV 10.7 fL      Platelets 106 10*3/mm3      Neutrophil % 63.6 %      Lymphocyte % 16.5 %      Monocyte % 9.6 %      Eosinophil % 8.4 %      Basophil % 1.4 %      Immature Grans % 0.5 %      Neutrophils, Absolute 5.17 10*3/mm3      Lymphocytes, Absolute 1.34 10*3/mm3      Monocytes, Absolute 0.78 10*3/mm3      Eosinophils, Absolute 0.68 10*3/mm3      Basophils, Absolute 0.11 10*3/mm3      Immature Grans, Absolute 0.04 10*3/mm3      nRBC 0.0 /100 WBC     POC Glucose Once [[id number]]  (Abnormal) Collected: 03/06/24 1949    Specimen: Blood Updated: 03/06/24 1952     Glucose 318 mg/dL      Comment: Serial Number: [id number]:  [id number]       Potassium [[id number]]  (Normal) Collected: 03/06/24 1645    Specimen: Blood from Arm, Right Updated: 03/06/24 1712     Potassium 4.5 mmol/L      Comment: Slight hemolysis detected by analyzer. Result may be falsely elevated.       POC Glucose Once [[id number]]  (Abnormal) Collected: 03/06/24 1638    Specimen: Blood Updated: 03/06/24 1641     Glucose 345 mg/dL      Comment: Serial Number: [id number]:  [id number]       POC Glucose Once [[id number]]  (Abnormal) Collected: 03/06/24 1134    Specimen: Blood Updated: 03/06/24 1138     Glucose 295 mg/dL      Comment:  Serial Number: [id number]:  [id number]       POC Glucose Once [[id number]]  (Abnormal) Collected: 03/06/24 0822    Specimen: Blood Updated: 03/06/24 0825     Glucose 270 mg/dL      Comment: Serial Number: [id number]:  [id number]       Magnesium [[id number]]  (Normal) Collected: 03/06/24 0516    Specimen: Blood Updated: 03/06/24 0601     Magnesium 1.7 mg/dL     Comprehensive Metabolic Panel [[id number]]  (Abnormal) Collected: 03/06/24 0516    Specimen: Blood Updated: 03/06/24 0601     Glucose 332 mg/dL      BUN 27 mg/dL      Creatinine 2.00 mg/dL      Sodium 134 mmol/L      Potassium 3.6 mmol/L      Chloride 105 mmol/L      CO2 22.3 mmol/L      Calcium 7.6 mg/dL      Total Protein 4.0 g/dL      Albumin 1.6 g/dL      ALT (SGPT) 17 U/L      AST (SGOT) 37 U/L      Alkaline Phosphatase 254 U/L      Total Bilirubin 1.3 mg/dL      Globulin 2.4 gm/dL      A/G Ratio 0.7 g/dL      BUN/Creatinine Ratio 13.5     Anion Gap 6.7 mmol/L      eGFR 39.2 mL/min/1.73     Narrative:      GFR Normal >60  Chronic Kidney Disease <60  Kidney Failure <15      Phosphorus [[id number]]  (Normal) Collected: 03/06/24 0516    Specimen: Blood Updated: 03/06/24 0601     Phosphorus 2.8 mg/dL     CBC & Differential [[id number]]  (Abnormal) Collected: 03/06/24 0516    Specimen: Blood Updated: 03/06/24 0544    Narrative:      The following orders were created for panel order CBC & Differential.  Procedure                               Abnormality         Status                     ---------                               -----------         ------                     CBC Auto Differential[[id number]]        Abnormal            Final result                 Please view results for these tests on the individual orders.    CBC Auto Differential [[id number]]  (Abnormal) Collected: 03/06/24 0516    Specimen: Blood Updated: 03/06/24 0544     WBC 9.05 10*3/mm3      RBC 3.01 10*6/mm3      Hemoglobin 9.0 g/dL      Hematocrit 25.7 %      MCV 85.4 fL      MCH  29.9 pg      MCHC 35.0 g/dL      RDW 15.7 %      RDW-SD 48.9 fl      MPV 10.5 fL      Platelets 111 10*3/mm3      Neutrophil % 68.4 %      Lymphocyte % 14.8 %      Monocyte % 8.5 %      Eosinophil % 6.9 %      Basophil % 1.1 %      Immature Grans % 0.3 %      Neutrophils, Absolute 6.19 10*3/mm3      Lymphocytes, Absolute 1.34 10*3/mm3      Monocytes, Absolute 0.77 10*3/mm3      Eosinophils, Absolute 0.62 10*3/mm3      Basophils, Absolute 0.10 10*3/mm3      Immature Grans, Absolute 0.03 10*3/mm3      nRBC 0.0 /100 WBC     POC Glucose Once [[id number]]  (Abnormal) Collected: 03/05/24 2050    Specimen: Blood Updated: 03/05/24 2051     Glucose 315 mg/dL      Comment: Serial Number: [id number]:  [id number]       POC Glucose Once [[id number]]  (Abnormal) Collected: 03/05/24 1653    Specimen: Blood Updated: 03/05/24 1655     Glucose 286 mg/dL      Comment: Serial Number: [id number]:  [id number]       POC Glucose 4x Daily Before Meals & at Bedtime [[id number]]  (Abnormal) Collected: 03/05/24 1148    Specimen: Blood Updated: 03/05/24 1151     Glucose 352 mg/dL      Comment: Serial Number: [id number]:  [id number]       POC Glucose 4x Daily Before Meals & at Bedtime [[id number]]  (Abnormal) Collected: 03/05/24 0748    Specimen: Blood Updated: 03/05/24 0749     Glucose 290 mg/dL      Comment: Serial Number: [id number]:  [id number]       Magnesium [[id number]]  (Normal) Collected: 03/05/24 0425    Specimen: Blood from Arm, Left Updated: 03/05/24 0518     Magnesium 1.7 mg/dL     Comprehensive Metabolic Panel [[id number]]  (Abnormal) Collected: 03/05/24 0425    Specimen: Blood from Arm, Left Updated: 03/05/24 0518     Glucose 278 mg/dL      BUN 23 mg/dL      Creatinine 1.87 mg/dL      Sodium 134 mmol/L      Potassium 3.4 mmol/L      Chloride 106 mmol/L      CO2 21.0 mmol/L      Calcium 7.5 mg/dL      Total Protein 4.3 g/dL      Albumin 1.7 g/dL      ALT (SGPT) 21 U/L      AST (SGOT) 46 U/L      Alkaline  Phosphatase 260 U/L      Total Bilirubin 1.4 mg/dL      Globulin 2.6 gm/dL      A/G Ratio 0.7 g/dL      BUN/Creatinine Ratio 12.3     Anion Gap 7.0 mmol/L      eGFR 42.5 mL/min/1.73     Narrative:      GFR Normal >60  Chronic Kidney Disease <60  Kidney Failure <15      Phosphorus [[id number]]  (Abnormal) Collected: 03/05/24 0425    Specimen: Blood from Arm, Left Updated: 03/05/24 0518     Phosphorus 2.3 mg/dL     Lactate Dehydrogenase [[id number]]  (Abnormal) Collected: 03/05/24 0425    Specimen: Blood from Arm, Left Updated: 03/05/24 0518      U/L     Protime-INR [[id number]]  (Abnormal) Collected: 03/05/24 0425    Specimen: Blood from Arm, Left Updated: 03/05/24 0457     Protime 16.9 Seconds      INR 1.34    Narrative:      Suggested Therapeutic Ranges For Oral Anticoagulant Therapy:  Level of Therapy                      INR Target Range  Standard Dose                            2.0-3.0  High Dose                                2.5-3.5  Patients not receiving anticoagulant  Therapy Normal Range                     0.86-1.15    aPTT [[id number]]  (Normal) Collected: 03/05/24 0425    Specimen: Blood from Arm, Left Updated: 03/05/24 0457     PTT 33.2 seconds     CBC & Differential [[id number]]  (Abnormal) Collected: 03/05/24 0425    Specimen: Blood from Arm, Left Updated: 03/05/24 0445    Narrative:      The following orders were created for panel order CBC & Differential.  Procedure                               Abnormality         Status                     ---------                               -----------         ------                     CBC Auto Differential[[id number]]        Abnormal            Final result                 Please view results for these tests on the individual orders.    CBC Auto Differential [[id number]]  (Abnormal) Collected: 03/05/24 0425    Specimen: Blood from Arm, Left Updated: 03/05/24 0445     WBC 7.66 10*3/mm3      RBC 3.12 10*6/mm3      Hemoglobin 9.5 g/dL      Hematocrit 26.4 %       MCV 84.6 fL      MCH 30.4 pg      MCHC 36.0 g/dL      RDW 15.9 %      RDW-SD 47.7 fl      MPV 10.0 fL      Platelets 114 10*3/mm3      Neutrophil % 65.5 %      Lymphocyte % 17.0 %      Monocyte % 8.2 %      Eosinophil % 7.6 %      Basophil % 1.3 %      Immature Grans % 0.4 %      Neutrophils, Absolute 5.02 10*3/mm3      Lymphocytes, Absolute 1.30 10*3/mm3      Monocytes, Absolute 0.63 10*3/mm3      Eosinophils, Absolute 0.58 10*3/mm3      Basophils, Absolute 0.10 10*3/mm3      Immature Grans, Absolute 0.03 10*3/mm3      nRBC 0.0 /100 WBC     POC Glucose Once [[id number]]  (Abnormal) Collected: 03/04/24 1953    Specimen: Blood Updated: 03/04/24 1954     Glucose 325 mg/dL      Comment: Serial Number: [id number]:  [id number]       POC Glucose Q1H [[id number]]  (Abnormal) Collected: 03/04/24 1904    Specimen: Blood Updated: 03/04/24 1906     Glucose 356 mg/dL      Comment: Serial Number: [id number]:  [id number]       POC Glucose Q1H [[id number]]  (Abnormal) Collected: 03/04/24 1805    Specimen: Blood Updated: 03/04/24 1807     Glucose 404 mg/dL      Comment: Serial Number: [id number]:  [id number]       POC Glucose Once [[id number]]  (Abnormal) Collected: 03/04/24 1626    Specimen: Blood Updated: 03/04/24 1659     Glucose 396 mg/dL      Comment: Serial Number: [id number]:  [id number]       POC Glucose Once [[id number]]  (Abnormal) Collected: 03/04/24 1447    Specimen: Blood Updated: 03/04/24 1449     Glucose 480 mg/dL      Comment: Serial Number: [id number]:  [id number]       POC Glucose Once [[id number]]  (Abnormal) Collected: 03/04/24 1339    Specimen: Blood Updated: 03/04/24 1342     Glucose 482 mg/dL      Comment: Serial Number: [id number]:  [id number]       POC Glucose Q1H [[id number]]  (Abnormal) Collected: 03/04/24 1225    Specimen: Blood Updated: 03/04/24 1227     Glucose 530 mg/dL      Comment: Serial Number: [id number]:  [id number]       POC Glucose 4x Daily Before Meals &  at Bedtime [[id number]]  (Abnormal) Collected: 03/04/24 1111    Specimen: Blood Updated: 03/04/24 1112     Glucose 488 mg/dL      Comment: Serial Number: [id number]:  [id number]       POC Glucose Q1H [[id number]]  (Abnormal) Collected: 03/04/24 0841    Specimen: Blood Updated: 03/04/24 0901     Glucose 490 mg/dL      Comment: Serial Number: [id number]:  [id number]       POC Glucose Once [[id number]]  (Abnormal) Collected: 03/04/24 0557    Specimen: Blood Updated: 03/04/24 0615     Glucose 424 mg/dL      Comment: Serial Number: [id number]:  [id number]       POC Glucose Once [[id number]]  (Abnormal) Collected: 03/04/24 0453    Specimen: Blood Updated: 03/04/24 0615     Glucose 581 mg/dL      Comment: Serial Number: [id number]:  [id number]       High Sensitivity Troponin T 2Hr [[id number]]  (Abnormal) Collected: 03/04/24 0457    Specimen: Blood Updated: 03/04/24 0535     HS Troponin T 164 ng/L      Troponin T Delta -2 ng/L     Narrative:      High Sensitive Troponin T Reference Range:  <14.0 ng/L- Negative Female for AMI  <22.0 ng/L- Negative Male for AMI  >=14 - Abnormal Female indicating possible myocardial injury.  >=22 - Abnormal Male indicating possible myocardial injury.   Clinicians would have to utilize clinical acumen, EKG, Troponin, and serial changes to determine if it is an Acute Myocardial Infarction or myocardial injury due to an underlying chronic condition.         Urinalysis, Microscopic Only - Urine, Clean Catch [[id number]]  (Abnormal) Collected: 03/04/24 0307    Specimen: Urine, Clean Catch Updated: 03/04/24 0336     RBC, UA 21-50 /HPF      WBC, UA 3-5 /HPF      Bacteria, UA None Seen /HPF      Squamous Epithelial Cells, UA 0-2 /HPF      Yeast, UA Small/1+ Budding Yeast /HPF      Hyaline Casts, UA None Seen /LPF      Methodology Manual Light Microscopy    Comprehensive Metabolic Panel [[id number]]  (Abnormal) Collected: 03/04/24 0229    Specimen: Blood from Arm, Right Updated:  03/04/24 0331     Glucose 682 mg/dL      BUN 24 mg/dL      Creatinine 1.85 mg/dL      Sodium 131 mmol/L      Potassium 3.3 mmol/L      Comment: Slight hemolysis detected by analyzer. Result may be falsely elevated.        Chloride 99 mmol/L      CO2 21.8 mmol/L      Calcium 7.8 mg/dL      Total Protein 4.7 g/dL      Albumin 1.9 g/dL      ALT (SGPT) 22 U/L      AST (SGOT) 47 U/L      Alkaline Phosphatase 351 U/L      Total Bilirubin 1.9 mg/dL      Globulin 2.8 gm/dL      A/G Ratio 0.7 g/dL      BUN/Creatinine Ratio 13.0     Anion Gap 10.2 mmol/L      eGFR 43.0 mL/min/1.73     Narrative:      GFR Normal >60  Chronic Kidney Disease <60  Kidney Failure <15      Single High Sensitivity Troponin T [[id number]]  (Abnormal) Collected: 03/04/24 0229    Specimen: Blood from Arm, Right Updated: 03/04/24 0330     HS Troponin T 166 ng/L     Narrative:      High Sensitive Troponin T Reference Range:  <14.0 ng/L- Negative Female for AMI  <22.0 ng/L- Negative Male for AMI  >=14 - Abnormal Female indicating possible myocardial injury.  >=22 - Abnormal Male indicating possible myocardial injury.   Clinicians would have to utilize clinical acumen, EKG, Troponin, and serial changes to determine if it is an Acute Myocardial Infarction or myocardial injury due to an underlying chronic condition.         Urinalysis With Microscopic If Indicated (No Culture) - Urine, Clean Catch [[id number]]  (Abnormal) Collected: 03/04/24 0307    Specimen: Urine, Clean Catch Updated: 03/04/24 0323     Color, UA Yellow     Appearance, UA Clear     pH, UA 6.0     Specific Gravity, UA 1.022     Glucose, UA >=1000 mg/dL (3+)     Ketones, UA Negative     Bilirubin, UA Negative     Blood, UA Large (3+)     Protein,  mg/dL (2+)     Leuk Esterase, UA Negative     Nitrite, UA Negative     Urobilinogen, UA 1.0 E.U./dL    Magnesium [[id number]]  (Normal) Collected: 03/04/24 0229    Specimen: Blood from Arm, Right Updated: 03/04/24 0321     Magnesium 2.0 mg/dL      Ammonia [[id number]]  (Abnormal) Collected: 03/04/24 0244    Specimen: Blood Updated: 03/04/24 0319     Ammonia 64 umol/L     VBG with Co-Ox and Electrolytes [[id number]]  (Abnormal) Collected: 03/04/24 0245    Specimen: Venous Blood Updated: 03/04/24 0308     pH, Venous 7.430 pH Units      pCO2, Venous 33.3 mm Hg      pO2, Venous 33.3 mm Hg      HCO3, Venous 21.6 mmol/L      Base Excess, Venous -2.1 mmol/L      O2 Saturation, Venous 69.0 %      Hemoglobin, Blood Gas 11.2 g/dL      Carboxyhemoglobin 3.1 %      Methemoglobin 0.30 %      Oxyhemoglobin 66.7 %      FHHB 29.9 %      Site Drawn by RN     Modality Room Air     FIO2 21 %      Sodium, Venous 129.3 mmol/L      Potassium, Venous 3.1 mmol/L      Ionized Calcium, Arterial 1.10 mmol/L      Chloride, Venous  102 mmol/L      Glucose, Arterial 649 mg/dL      Lactate, Arterial 2.75 mmol/L     Extra Tubes [[id number]] Collected: 03/04/24 0229    Specimen: Blood from Arm, Right Updated: 03/04/24 0306    Narrative:      The following orders were created for panel order Extra Tubes.  Procedure                               Abnormality         Status                     ---------                               -----------         ------                     Gray Top[[id number]]                                         Final result                 Please view results for these tests on the individual orders.    Gray Top [[id number]] Collected: 03/04/24 0229    Specimen: Blood from Arm, Right Updated: 03/04/24 0306     Extra Tube Hold for add-ons.     Comment: Auto resulted.       [place] [facility] [[id number]] Collected: 03/04/24 0229    Specimen: Blood from Arm, Right Updated: 03/04/24 0306    Narrative:      The following orders were created for panel order [place] [facility].  Procedure                               Abnormality         Status                     ---------                               -----------         ------                     Green Top (Gel)[[id number]]                                   Final result               Lavender Top[[id number]]                                     Final result               Gold Top - SST[[id number]]                                   Final result               Light Blue Top[[id number]]                                   Final result                 Please view results for these tests on the individual orders.    Light Blue Top [[id number]] Collected: 03/04/24 0229    Specimen: Blood from Arm, Right Updated: 03/04/24 0306     Extra Tube Hold for add-ons.     Comment: Auto resulted       Gold Top - SST [[id number]] Collected: 03/04/24 0229    Specimen: Blood from Arm, Right Updated: 03/04/24 0306     Extra Tube Hold for add-ons.     Comment: Auto resulted.       Green Top (Gel) [[id number]] Collected: 03/04/24 0229    Specimen: Blood from Arm, Right Updated: 03/04/24 0306     Extra Tube Hold for add-ons.     Comment: Auto resulted.       Lavender Top [[id number]] Collected: 03/04/24 0229    Specimen: Blood from Arm, Right Updated: 03/04/24 0306     Extra Tube hold for add-on     Comment: Auto resulted       CBC & Differential [[id number]]  (Abnormal) Collected: 03/04/24 0229    Specimen: Blood from Arm, Right Updated: 03/04/24 0303    Narrative:      The following orders were created for panel order CBC & Differential.  Procedure                               Abnormality         Status                     ---------                               -----------         ------                     CBC Auto Differential[[id number]]        Abnormal            Final result                 Please view results for these tests on the individual orders.    CBC Auto Differential [[id number]]  (Abnormal) Collected: 03/04/24 0229    Specimen: Blood from Arm, Right Updated: 03/04/24 0303     WBC 6.82 10*3/mm3      RBC 3.33 10*6/mm3      Hemoglobin 10.0 g/dL      Hematocrit 27.9 %      MCV 83.8 fL      MCH 30.0 pg      MCHC 35.8 g/dL      RDW 15.3 %      RDW-SD 45.9 fl      MPV 10.7 fL       Platelets 112 10*3/mm3      Neutrophil % 67.8 %      Lymphocyte % 17.3 %      Monocyte % 8.1 %      Eosinophil % 5.4 %      Basophil % 1.0 %      Immature Grans % 0.4 %      Neutrophils, Absolute 4.62 10*3/mm3      Lymphocytes, Absolute 1.18 10*3/mm3      Monocytes, Absolute 0.55 10*3/mm3      Eosinophils, Absolute 0.37 10*3/mm3      Basophils, Absolute 0.07 10*3/mm3      Immature Grans, Absolute 0.03 10*3/mm3      nRBC 0.0 /100 WBC     POC Glucose Once [[id number]]  (Abnormal) Collected: 24    Specimen: Blood Updated: 24     Glucose >600 mg/dL      Comment: Serial Number: [id number]:  [id number]                Physician Progress Notes (last 7 days)        Seth Aguilar MD at 24 1017           [facility] Hospitalist Progress Note  Date: 3/9/2024  Patient Name: Simon Peters  : 1970  MRN: [id number]  Date of admission: 3/4/2024  Room/Bed: [facility]      Subjective   Subjective     Chief Complaint:   Generalized weakness, legs giving out and frequent falls    Summary:  Simon Peters is a 53 y.o. male with past medical history of alcoholic cirrhosis s/p TIPS in May 23.  Patient seen at [facility] on  and has been in [place] from  to  for hepatic encephalopathy, CKD 3B, diabetes mellitus with nephropathy seen by  endocrinology, noncompliant, hypertension, tobacco use disorder, pleural effusion and ascites, chronic scrotal swelling and chronic anemia thrombocytopenia due to cirrhosis. Patient has been generally getting more weak with increasing numbness tingling in lower extremities.  Does have a history of neuropathy in bilateral lower extremities.  Patient has not taken any of his medications including insulin for some time. Patient has fallen 3-4 times in the past few weeks.  There is no loss of consciousness.  His leg just gives out.  No major injury from the fall.  Apparently he has been kicked out by his mother due to  drinking.  Per patient his last drink was on December 13 last year.    Patient went down for paracentesis by IR.  Ultrasound did not show much fluid.  Patient did not want paracentesis.  MELD score of 19 during this admission.    Interval Followup:   Confused overnight patient's ammonia rechecked elevated at 192 therefore started on an increased regiment of lactulose continue rifaximin.  On exam patient with minimal asterixis, good mentation.      Objective   Objective     Vitals:   Temp:  [97.3 °F (36.3 °C)-98.1 °F (36.7 °C)] 97.9 °F (36.6 °C)  Heart Rate:  [66-72] 67  Resp:  [18] 18  BP: (130-155)/(72-98) 136/94    Physical Exam     Constitutional: Awake, alert, no acute distress, sitting up in bed, eating breakfast              Eyes: Pupils equal, sclerae anicteric, no conjunctival injection              HENT: NCAT, mucous membranes moist              Neck: Supple, no thyromegaly, no lymphadenopathy, trachea midline              Respiratory: Clear to auscultation bilaterally, nonlabored respirations               Cardiovascular: RRR, no murmurs, rubs, or gallops, palpable pedal pulses bilaterally              Gastrointestinal: Positive bowel sounds, soft, nontender, nondistended              Musculoskeletal: +1 bilateral ankle edema, no clubbing or cyanosis to extremities              Psychiatric: Appropriate affect, cooperative              Neurologic: Oriented x 3,, asterixis present on exam, strength symmetric in all extremities, patient has hard time raising both legs straight up, Cranial Nerves grossly intact to confrontation, speech clear.  Decreased sensation to touch bilateral lower extremities distally.              Skin: No rashes     Result Review    Result Review:  I have personally reviewed these results:  [x]  Laboratory      Lab 03/09/24  0444 03/08/24  0443 03/07/24  0506 03/06/24  0516 03/05/24  0425 03/04/24  0245   WBC 6.26 6.92 8.12 9.05 7.66  --    HEMOGLOBIN 8.9* 8.8* 9.1* 9.0* 9.5*  --     HEMATOCRIT 26.2* 26.3* 26.6* 25.7* 26.4*  --    PLATELETS 109* 106* 106* 111* 114*  --    NEUTROS ABS  --   --  5.17 6.19 5.02  --    IMMATURE GRANS (ABS)  --   --  0.04 0.03 0.03  --    LYMPHS ABS  --   --  1.34 1.34 1.30  --    MONOS ABS  --   --  0.78 0.77 0.63  --    EOS ABS  --   --  0.68* 0.62* 0.58*  --    MCV 88.8 89.2 87.5 85.4 84.6  --    LACTATE, ARTERIAL  --   --   --   --   --  2.75*   LDH  --   --   --   --  248*  --    PROTIME  --   --   --   --  16.9*  --    APTT  --   --   --   --  33.2  --          Lab 03/09/24  0444 03/08/24  0443 03/07/24  0506 03/06/24  1645 03/06/24  0516 03/05/24  0425 03/04/24  0245   SODIUM 141 137 135*  --  134* 134*  --    SODIUM, VENOUS  --   --   --   --   --   --  129.3*   POTASSIUM 4.6 4.4 4.2   < > 3.6 3.4*  --    POTASSIUM, VENOUS  --   --   --   --   --   --  3.1*   CHLORIDE 112* 110* 108*  --  105 106  --    CHLORIDE, VENOUS  --   --   --   --   --   --  102   CO2 20.9* 21.4* 20.2*  --  22.3 21.0*  --    ANION GAP 8.1 5.6 6.8  --  6.7 7.0  --    BUN 43* 40* 31*  --  27* 23*  --    CREATININE 2.53* 2.48* 2.12*  --  2.00* 1.87*  --    EGFR 29.5* 30.3* 36.5*  --  39.2* 42.5*  --    GLUCOSE 225* 241* 265*  --  332* 278*  --    GLUCOSE, ARTERIAL  --   --   --   --   --   --  649*   CALCIUM 7.6* 7.6* 7.6*  --  7.6* 7.5*  --    IONIZED CALCIUM  --   --   --   --   --   --  1.10*   MAGNESIUM 1.9 1.8 1.7  --  1.7 1.7  --    PHOSPHORUS  --   --  2.4*  --  2.8 2.3*  --     < > = values in this interval not displayed.         Lab 03/09/24  0444 03/08/24  0443 03/07/24  0506 03/06/24  0516   TOTAL PROTEIN 4.2* 4.0*  --  4.0*   ALBUMIN 1.5* 1.4* 1.6* 1.6*   GLOBULIN 2.7 2.6  --  2.4   ALT (SGPT) 17 16  --  17   AST (SGOT) 37 34  --  37   BILIRUBIN 1.1 1.1  --  1.3*   ALK PHOS 260* 266*  --  254*         Lab 03/05/24  0425 03/04/24  0457 03/04/24  0229   HSTROP T  --  164* 166*   PROTIME 16.9*  --   --    INR 1.34*  --   --                  Lab 03/04/24  0245   FIO2 21    CARBOXYHEMOGLOBIN 3.1*     Brief Urine Lab Results  (Last result in the past 365 days)        Color   Clarity   Blood   Leuk Est   Nitrite   Protein   CREAT   Urine HCG        03/04/24 0307 Yellow   Clear   Large (3+)   Negative   Negative   100 mg/dL (2+)                 [x]  Microbiology   Microbiology Results (last 10 days)       ** No results found for the last 240 hours. **          [x]  Radiology  XR Knee 1 or 2 View Right    Result Date: 3/5/2024   Mild joint space narrowing in the medial compartment otherwise negative right knee      Jhonathan Mehta MD       Electronically Signed and Approved By: Jhonathan Mehta MD on 3/05/2024 at 17:25             US Abdomen Limited    Result Date: 3/5/2024    Scant amount of ascitic fluid in right upper quadrant, patient preferred to hold off on procedure at this time    BAYLEE CALDERON       Electronically Signed and Approved By: JESUS ROQUE MD on 3/05/2024 at 13:09             CT Abdomen Pelvis With Contrast    Result Date: 3/4/2024    1. Cirrhosis and portal hypertension.  A TIPS is present. 2. Moderate ascites with generalized anasarca and a trace left pleural effusion. 3. Cholelithiasis without biliary obstruction. 4. Small bowel wall thickening without obstruction.  This may reflect enteritis or could be the sequela of chronic liver disease and ascites. 5. Normal large bowel and appendix. 6. Nonobstructed kidneys.      ADIS RICHARDSON MD       Electronically Signed and Approved By: ADIS RICHARDSON MD on 3/04/2024 at 5:14             CT Head Without Contrast    Result Date: 3/4/2024   Generalized atrophy.  No acute findings.     ADIS RICHARDSON MD       Electronically Signed and Approved By: ADIS RICHARDSON MD on 3/04/2024 at 4:54             XR Chest 1 View    Result Date: 3/4/2024   No acute cardiopulmonary findings.       ADIS RICHARDSON MD       Electronically Signed and Approved By: ADIS RICHARDSON MD on 3/04/2024 at 2:44            []  EKG/Telemetry   []   Cardiology/Vascular   []  Pathology  []  Old records  []  Other:    Assessment & Plan   Assessment / Plan     Assessment:  Frequent falls and generalized weakness.  Improving  Peripheral neuropathy likely diabetic and alcoholic causing above.  Noncompliance with medication including insulin.  Diabetes mellitus uncontrolled hyperglycemia due to above.  Hemoglobin A1c of 8.8% on .  Alcoholic cirrhosis s/p TIPS on May 2023.  Anasarca with ascites and chronic scrotal swelling.  CKD 3B.  Baseline creatinine 2-2.3.  Stable  Chronic anemia and thrombocytopenia from cirrhosis.  Hematuria and pyuria without bacteriuria.  Asymptomatic gallstones.  Hypoalbuminemia from liver disease.  Hypertension.  Tobacco use disorder.     Plan:  Patient remains admitted to hospital for further care and management  Increase regiment of patient's lactulose, continue rifaximin, goal 3 loose stools per day  Patient remains on thiamine and folic acid  Low-salt diet, high-protein carb consistent  Sliding scale insulin with hypoglycemia protocol  Continue to hold Lasix and Aldactone today, creatinine with a slight uptrend again today  PT and OT consulted, appreciate assistance  Has been denied for SNF discharge, patient updated at bedside       Discussed with RN    DVT prophylaxis:  Mechanical DVT prophylaxis orders are present.        CODE STATUS:   Code Status (Patient has no pulse and is not breathing): CPR (Attempt to Resuscitate)  Medical Interventions (Patient has pulse or is breathing): Full Support          Electronically signed by Seth Aguilar MD at 24 1029       Seth Aguilar MD at 24 1406           [facility]  [facility]: 3/[facility]/2024  Patient Name: Simon Peters  : 1970  MRN: [id number]  Date of admission: 3/4/2024  Room/Bed: [facility]      Subjective   Subjective     Chief Complaint:   Generalized weakness, legs giving out and frequent falls    Summary:  Simon Peters is a 53 y.o.  male with past medical history of alcoholic cirrhosis s/p TIPS in May 23.  Patient seen at [facility] on January 31 and has been in [place] from January 8 to January 17 for hepatic encephalopathy, CKD 3B, diabetes mellitus with nephropathy seen by  endocrinology, noncompliant, hypertension, tobacco use disorder, pleural effusion and ascites, chronic scrotal swelling and chronic anemia thrombocytopenia due to cirrhosis. Patient has been generally getting more weak with increasing numbness tingling in lower extremities.  Does have a history of neuropathy in bilateral lower extremities.  Patient has not taken any of his medications including insulin for some time. Patient has fallen 3-4 times in the past few weeks.  There is no loss of consciousness.  His leg just gives out.  No major injury from the fall.  Apparently he has been kicked out by his mother due to drinking.  Per patient his last drink was on December 13 last year.    Patient went down for paracentesis by IR.  Ultrasound did not show much fluid.  Patient did not want paracentesis.  MELD score of 19 during this admission.    Interval Followup:   Discussed with case management and referrals have been sent.  Patient's creatinine trending up along with BUN therefore we will hold diuretics today and monitor renal function.  Continue to encourage ambulation as able      Objective   Objective     Vitals:   Temp:  [97.3 °F (36.3 °C)-98.1 °F (36.7 °C)] 97.7 °F (36.5 °C)  Heart Rate:  [65-73] 69  Resp:  [18] 18  BP: (103-142)/(55-83) 130/72    Physical Exam     Constitutional: Awake, alert, no acute distress, lying in bed comfortably              Eyes: Pupils equal, sclerae anicteric, no conjunctival injection              HENT: NCAT, mucous membranes moist              Neck: Supple, no thyromegaly, no lymphadenopathy, trachea midline              Respiratory: Clear to auscultation bilaterally, nonlabored respirations               Cardiovascular:  RRR, no murmurs, rubs, or gallops, palpable pedal pulses bilaterally              Gastrointestinal: Positive bowel sounds, soft, nontender, nondistended              Musculoskeletal: +1 bilateral ankle edema, no clubbing or cyanosis to extremities              Psychiatric: Appropriate affect, cooperative              Neurologic: Oriented x 3, strength symmetric in all extremities, patient has hard time raising both legs straight up, Cranial Nerves grossly intact to confrontation, speech clear.  Decreased sensation to touch bilateral lower extremities distally.              Skin: No rashes     Result Review    Result Review:  I have personally reviewed these results:  [x]  Laboratory      Lab 03/08/24  0443 03/07/24  0506 03/06/24  0516 03/05/24  0425 03/04/24  0245   WBC 6.92 8.12 9.05 7.66  --    HEMOGLOBIN 8.8* 9.1* 9.0* 9.5*  --    HEMATOCRIT 26.3* 26.6* 25.7* 26.4*  --    PLATELETS 106* 106* 111* 114*  --    NEUTROS ABS  --  5.17 6.19 5.02  --    IMMATURE GRANS (ABS)  --  0.04 0.03 0.03  --    LYMPHS ABS  --  1.34 1.34 1.30  --    MONOS ABS  --  0.78 0.77 0.63  --    EOS ABS  --  0.68* 0.62* 0.58*  --    MCV 89.2 87.5 85.4 84.6  --    LACTATE, ARTERIAL  --   --   --   --  2.75*   LDH  --   --   --  248*  --    PROTIME  --   --   --  16.9*  --    APTT  --   --   --  33.2  --          Lab 03/08/24  0443 03/07/24  0506 03/06/24  1645 03/06/24  0516 03/05/24  0425 03/04/24  0245   SODIUM 137 135*  --  134* 134*  --    SODIUM, VENOUS  --   --   --   --   --  129.3*   POTASSIUM 4.4 4.2 4.5 3.6 3.4*  --    POTASSIUM, VENOUS  --   --   --   --   --  3.1*   CHLORIDE 110* 108*  --  105 106  --    CHLORIDE, VENOUS  --   --   --   --   --  102   CO2 21.4* 20.2*  --  22.3 21.0*  --    ANION GAP 5.6 6.8  --  6.7 7.0  --    BUN 40* 31*  --  27* 23*  --    CREATININE 2.48* 2.12*  --  2.00* 1.87*  --    EGFR 30.3* 36.5*  --  39.2* 42.5*  --    GLUCOSE 241* 265*  --  332* 278*  --    GLUCOSE, ARTERIAL  --   --   --   --   --   649*   CALCIUM 7.6* 7.6*  --  7.6* 7.5*  --    IONIZED CALCIUM  --   --   --   --   --  1.10*   MAGNESIUM 1.8 1.7  --  1.7 1.7  --    PHOSPHORUS  --  2.4*  --  2.8 2.3*  --          Lab 03/08/24  0443 03/07/24  0506 03/06/24  0516 03/05/24  0425   TOTAL PROTEIN 4.0*  --  4.0* 4.3*   ALBUMIN 1.4* 1.6* 1.6* 1.7*   GLOBULIN 2.6  --  2.4 2.6   ALT (SGPT) 16  --  17 21   AST (SGOT) 34  --  37 46*   BILIRUBIN 1.1  --  1.3* 1.4*   ALK PHOS 266*  --  254* 260*         Lab 03/05/24  0425 03/04/24  0457 03/04/24  0229   HSTROP T  --  164* 166*   PROTIME 16.9*  --   --    INR 1.34*  --   --                  Lab 03/04/24  0245   FIO2 21   CARBOXYHEMOGLOBIN 3.1*     Brief Urine Lab Results  (Last result in the past 365 days)        Color   Clarity   Blood   Leuk Est   Nitrite   Protein   CREAT   Urine HCG        03/04/24 0307 Yellow   Clear   Large (3+)   Negative   Negative   100 mg/dL (2+)                 [x]  Microbiology   Microbiology Results (last 10 days)       ** No results found for the last 240 hours. **          [x]  Radiology  XR Knee 1 or 2 View Right    Result Date: 3/5/2024   Mild joint space narrowing in the medial compartment otherwise negative right knee      Jhonathan Mehta MD       Electronically Signed and Approved By: Jhonathan Mehta MD on 3/05/2024 at 17:25             US Abdomen Limited    Result Date: 3/5/2024    Scant amount of ascitic fluid in right upper quadrant, patient preferred to hold off on procedure at this time    BAYLEE CALDERON       Electronically Signed and Approved By: JESUS ROQUE MD on 3/05/2024 at 13:09             CT Abdomen Pelvis With Contrast    Result Date: 3/4/2024    1. Cirrhosis and portal hypertension.  A TIPS is present. 2. Moderate ascites with generalized anasarca and a trace left pleural effusion. 3. Cholelithiasis without biliary obstruction. 4. Small bowel wall thickening without obstruction.  This may reflect enteritis or could be the sequela of chronic liver disease  and ascites. 5. Normal large bowel and appendix. 6. Nonobstructed kidneys.      ADIS RICHARDSON MD       Electronically Signed and Approved By: ADIS RICHARDSON MD on 3/04/2024 at 5:14             CT Head Without Contrast    Result Date: 3/4/2024   Generalized atrophy.  No acute findings.     ADIS RICHARDSON MD       Electronically Signed and Approved By: ADIS RICHARDSON MD on 3/04/2024 at 4:54             XR Chest 1 View    Result Date: 3/4/2024   No acute cardiopulmonary findings.       ADIS RICHARDSON MD       Electronically Signed and Approved By: ADIS RICHARDSON MD on 3/04/2024 at 2:44            []  EKG/Telemetry   []  Cardiology/Vascular   []  Pathology  []  Old records  []  Other:    Assessment & Plan   Assessment / Plan     Assessment:  Frequent falls and generalized weakness.  Improving  Peripheral neuropathy likely diabetic and alcoholic causing above.  Noncompliance with medication including insulin.  Diabetes mellitus uncontrolled hyperglycemia due to above.  Hemoglobin A1c of 8.8% on January 31st.  Alcoholic cirrhosis s/p TIPS on May 2023.  Anasarca with ascites and chronic scrotal swelling.  CKD 3B.  Baseline creatinine 2-2.3.  Stable  Chronic anemia and thrombocytopenia from cirrhosis.  Hematuria and pyuria without bacteriuria.  Asymptomatic gallstones.  Hypoalbuminemia from liver disease.  Hypertension.  Tobacco use disorder.     Plan:  Patient remains admitted to hospital for further care and management  IR attempted ultrasound-guided paracentesis however minimal fluid seen  Patient remains on thiamine and folic acid  Low-salt diet, high-protein carb consistent  Remains on his lactulose and rifaximin  Sign scale insulin with hypoglycemia protocol  Holding Lasix and Aldactone today, creatinine slightly trending up over the past 24 hours, continue to monitor renal function  PT and OT consulted, appreciate assistance  Continue to work towards disposition of rehab, insurance information has been obtained by         Discussed with RN, case management    DVT prophylaxis:  Mechanical DVT prophylaxis orders are present.        CODE STATUS:   Code Status (Patient has no pulse and is not breathing): CPR (Attempt to Resuscitate)  Medical Interventions (Patient has pulse or is breathing): Full Support          Electronically signed by Seth Aguilar MD at 24 1408       Seth Aguilar MD at 24 1533           [place] Hospitalist Progress Note  Date: 3/7/2024  Patient Name: Simon Peters  : 1970  MRN: [id number]  Date of admission: 3/4/2024  Room/Bed: [facility]      Subjective   Subjective     Chief Complaint:   Generalized weakness, legs giving out and frequent falls    Summary:  Simon Peters is a 53 y.o. male with past medical history of alcoholic cirrhosis s/p TIPS in May 23.  Patient seen at  transplant center on  and has been in [place] from  to  for hepatic encephalopathy, CKD 3B, diabetes mellitus with nephropathy seen by  endocrinology, noncompliant, hypertension, tobacco use disorder, pleural effusion and ascites, chronic scrotal swelling and chronic anemia thrombocytopenia due to cirrhosis. Patient has been generally getting more weak with increasing numbness tingling in lower extremities.  Does have a history of neuropathy in bilateral lower extremities.  Patient has not taken any of his medications including insulin for some time. Patient has fallen 3-4 times in the past few weeks.  There is no loss of consciousness.  His leg just gives out.  No major injury from the fall.  Apparently he has been kicked out by his mother due to drinking.  Per patient his last drink was on  last year.    Patient went down for paracentesis by IR.  Ultrasound did not show much fluid.  Patient did not want paracentesis.  MELD score of 19 during this admission.    Interval Followup:   No acute events overnight, patient still interested in discharging to SNF  if possible.  Discussed with case management, patient's insurance has not been discovered, referrals will be sent      Objective   Objective     Vitals:   Temp:  [97.3 °F (36.3 °C)-98.4 °F (36.9 °C)] 97.3 °F (36.3 °C)  Heart Rate:  [63-70] 65  Resp:  [18] 18  BP: (103-122)/(46-79) 103/55    Physical Exam     Constitutional: Awake, alert, no acute distress, lying in bed comfortably              Eyes: Pupils equal, sclerae anicteric, no conjunctival injection              HENT: NCAT, mucous membranes moist              Neck: Supple, no thyromegaly, no lymphadenopathy, trachea midline              Respiratory: Clear to auscultation bilaterally, nonlabored respirations               Cardiovascular: RRR, no murmurs, rubs, or gallops, palpable pedal pulses bilaterally              Gastrointestinal: Positive bowel sounds, soft, nontender, nondistended              Musculoskeletal: +2 bilateral ankle edema, no clubbing or cyanosis to extremities              Psychiatric: Appropriate affect, cooperative              Neurologic: Oriented x 3, strength symmetric in all extremities, patient has hard time raising both legs straight up, Cranial Nerves grossly intact to confrontation, speech clear.  Decreased sensation to touch bilateral lower extremities distally.              Skin: No rashes     Result Review    Result Review:  I have personally reviewed these results:  [x]  Laboratory      Lab 03/07/24  0506 03/06/24  0516 03/05/24  0425 03/04/24  0245   WBC 8.12 9.05 7.66  --    HEMOGLOBIN 9.1* 9.0* 9.5*  --    HEMATOCRIT 26.6* 25.7* 26.4*  --    PLATELETS 106* 111* 114*  --    NEUTROS ABS 5.17 6.19 5.02  --    IMMATURE GRANS (ABS) 0.04 0.03 0.03  --    LYMPHS ABS 1.34 1.34 1.30  --    MONOS ABS 0.78 0.77 0.63  --    EOS ABS 0.68* 0.62* 0.58*  --    MCV 87.5 85.4 84.6  --    LACTATE, ARTERIAL  --   --   --  2.75*   LDH  --   --  248*  --    PROTIME  --   --  16.9*  --    APTT  --   --  33.2  --          Lab 03/07/24  0506  03/06/24  1645 03/06/24  0516 03/05/24 0425 03/04/24 0245   SODIUM 135*  --  134* 134*  --    SODIUM, VENOUS  --   --   --   --  129.3*   POTASSIUM 4.2 4.5 3.6 3.4*  --    POTASSIUM, VENOUS  --   --   --   --  3.1*   CHLORIDE 108*  --  105 106  --    CHLORIDE, VENOUS  --   --   --   --  102   CO2 20.2*  --  22.3 21.0*  --    ANION GAP 6.8  --  6.7 7.0  --    BUN 31*  --  27* 23*  --    CREATININE 2.12*  --  2.00* 1.87*  --    EGFR 36.5*  --  39.2* 42.5*  --    GLUCOSE 265*  --  332* 278*  --    GLUCOSE, ARTERIAL  --   --   --   --  649*   CALCIUM 7.6*  --  7.6* 7.5*  --    IONIZED CALCIUM  --   --   --   --  1.10*   MAGNESIUM 1.7  --  1.7 1.7  --    PHOSPHORUS 2.4*  --  2.8 2.3*  --          Lab 03/07/24  0506 03/06/24  0516 03/05/24 0425 03/04/24  0229   TOTAL PROTEIN  --  4.0* 4.3* 4.7*   ALBUMIN 1.6* 1.6* 1.7* 1.9*   GLOBULIN  --  2.4 2.6 2.8   ALT (SGPT)  --  17 21 22   AST (SGOT)  --  37 46* 47*   BILIRUBIN  --  1.3* 1.4* 1.9*   ALK PHOS  --  254* 260* 351*         Lab 03/05/24  0425 03/04/24  0457 03/04/24 0229   HSTROP T  --  164* 166*   PROTIME 16.9*  --   --    INR 1.34*  --   --                  Lab 03/04/24  0245   FIO2 21   CARBOXYHEMOGLOBIN 3.1*     Brief Urine Lab Results  (Last result in the past 365 days)        Color   Clarity   Blood   Leuk Est   Nitrite   Protein   CREAT   Urine HCG        03/04/24 0307 Yellow   Clear   Large (3+)   Negative   Negative   100 mg/dL (2+)                 [x]  Microbiology   Microbiology Results (last 10 days)       ** No results found for the last 240 hours. **          [x]  Radiology  XR Knee 1 or 2 View Right    Result Date: 3/5/2024   Mild joint space narrowing in the medial compartment otherwise negative right knee      Jhonathan Mehta MD       Electronically Signed and Approved By: Jhonathan Mehta MD on 3/05/2024 at 17:25             US Abdomen Limited    Result Date: 3/5/2024    Scant amount of ascitic fluid in right upper quadrant, patient preferred to hold  off on procedure at this time    BAYLEE CALDERON       Electronically Signed and Approved By: JESUS ROQUE MD on 3/05/2024 at 13:09             CT Abdomen Pelvis With Contrast    Result Date: 3/4/2024    1. Cirrhosis and portal hypertension.  A TIPS is present. 2. Moderate ascites with generalized anasarca and a trace left pleural effusion. 3. Cholelithiasis without biliary obstruction. 4. Small bowel wall thickening without obstruction.  This may reflect enteritis or could be the sequela of chronic liver disease and ascites. 5. Normal large bowel and appendix. 6. Nonobstructed kidneys.      ADIS RICHARDSON MD       Electronically Signed and Approved By: ADIS RICHARDSON MD on 3/04/2024 at 5:14             CT Head Without Contrast    Result Date: 3/4/2024   Generalized atrophy.  No acute findings.     DAIS RICHARDSON MD       Electronically Signed and Approved By: ADIS RICHARDSON MD on 3/04/2024 at 4:54             XR Chest 1 View    Result Date: 3/4/2024   No acute cardiopulmonary findings.       ADIS RICHARDSON MD       Electronically Signed and Approved By: ADIS RICHARDSON MD on 3/04/2024 at 2:44            []  EKG/Telemetry   []  Cardiology/Vascular   []  Pathology  []  Old records  []  Other:    Assessment & Plan   Assessment / Plan     Assessment:  Frequent falls and generalized weakness.  Improving  Peripheral neuropathy likely diabetic and alcoholic causing above.  Noncompliance with medication including insulin.  Diabetes mellitus uncontrolled hyperglycemia due to above.  Hemoglobin A1c of 8.8% on January 31st.  Alcoholic cirrhosis s/p TIPS on May 2023.  Anasarca with ascites and chronic scrotal swelling.  CKD 3B.  Baseline creatinine 2-2.3.  Stable  Chronic anemia and thrombocytopenia from cirrhosis.  Hematuria and pyuria without bacteriuria.  Asymptomatic gallstones.  Hypoalbuminemia from liver disease.  Hypertension.  Tobacco use disorder.     Plan:  Patient remains admitted to hospital for further care and  management  IR attempted ultrasound-guided paracentesis however minimal fluid seen  Patient remains on thiamine and folic acid  Low-salt diet, high-protein carb consistent  Remains on his lactulose and rifaximin  Sign scale insulin with hypoglycemia protocol  Continue on oral Lasix and Aldactone, still fluid up, evident by edema to bilateral lower extremities  PT and OT consulted, appreciate assistance  Continue to work towards disposition of rehab, insurance information has been obtained by        Discussed with RN, case management    DVT prophylaxis:  Mechanical DVT prophylaxis orders are present.        CODE STATUS:   Code Status (Patient has no pulse and is not breathing): CPR (Attempt to Resuscitate)  Medical Interventions (Patient has pulse or is breathing): Full Support          Electronically signed by Seth Aguilar MD at 24 1540       Kosta Sampson MD at 24 1524           [facility] Progress Note  Date: 3/6/2024  Patient Name: Simon Peters  : 1970  MRN: [id number]  Date of admission: 3/4/2024      Subjective   Subjective     Chief Complaint: Generalized weakness.  Legs giving out and frequent falls for past few days    Summary:   Simon Peters is a 53 y.o. male with past ministry of alcoholic cirrhosis s/p TIPS in May 23.  Patient has been seen at [facility] on  and has been in [place] from  to  for hepatic encephalopathy, CKD 3B, diabetes mellitus with nephropathy seen by  endocrinology, noncompliant, hypertension, tobacco use disorder, pleural effusion and ascites, chronic scrotal swelling and chronic anemia thrombocytopenia due to cirrhosis.  Patient has been generally getting more weak with increasing numbness tingling in lower extremities.  Does have a history of neuropathy in bilateral lower extremities.  Patient has not taken any of his medications including insulin for some time.  Patient has fallen 3-4  times in the past few weeks.  There is no loss of consciousness.  His leg just gives out.  No major injury from the fall.  Apparently he has been kicked out by his mother due to drinking.  Per patient his last drink was on December 13 last year.  Denies any chest pain, shortness of air, vomiting diarrhea or blood in the stools or black-colored stools.  No cough or phlegm.  Does have lower abdominal pain chronic in nature.  No urinary complaints.  Workup in the ED showed elevated blood pressure with good oxygen saturation on room air.  Delta troponin is -2.  Venous pH is 7.4.  Creatinine 1.8.  Baseline creatinine is 2-2.3.  No anion gap.  Blood glucose of 682.  Albumin of 1.9.  Ammonia of 64 with a total bili of 1.9.  Hemoglobin of 12.  Platelets 112.  UA shows RBCs and WBCs with no bacteria or ketones.  EKG is normal sinus rhythm.  Chest x-ray, CT head negative.  CT abdomen pelvis shows gallstones with moderate ascites.  Because of uncontrolled hyperglycemia hospitalist has been called to admit him for further evaluation  Patient went down for paracentesis by IR.  Ultrasound did not show much fluid.  Patient did not want paracentesis.  MELD score of 19 during this admission.    Interval Followup:   Vital signs stable.  Remains on room air.  Blood sugars still up  Negative orthostatic vital signs.  No more falls while in the hospital.  Ambulating without much problem  Complaining of pain in bilateral knees for some time and wants some pain medication.  Right knee x-ray negative  Wants medicine for itching as soon as possible.  Has been started on Vistaril  Wants his Prozac to be resumed.  Denies abdominal pain.  Weakness improving.    Review of Systems   All systems were reviewed and negative except for: Summary interval follow-up    Objective   Objective     Vitals:   Temp:  [97.4 °F (36.3 °C)-98.6 °F (37 °C)] 97.4 °F (36.3 °C)  Heart Rate:  [58-73] 58  Resp:  [16-20] 17  BP: (121-151)/(73-87) 122/75  Physical  Exam       Constitutional: Awake, alert, no acute distress              Eyes: Pupils equal, sclerae anicteric, no conjunctival injection              HENT: NCAT, mucous membranes moist              Neck: Supple, no thyromegaly, no lymphadenopathy, trachea midline              Respiratory: Clear to auscultation bilaterally, nonlabored respirations               Cardiovascular: RRR, no murmurs, rubs, or gallops, palpable pedal pulses bilaterally              Gastrointestinal: Positive bowel sounds, soft, nontender, nondistended              Musculoskeletal: +2 bilateral ankle edema, no clubbing or cyanosis to extremities              Psychiatric: Appropriate affect, cooperative              Neurologic: Oriented x 3, strength symmetric in all extremities, patient has hard time raising both legs straight up, Cranial Nerves grossly intact to confrontation, speech clear.  Decreased sensation to touch bilateral lower extremities distally.              Skin: No rashes     Result Review    Result Review:  I have personally reviewed the results for the past 24 hours and agree with these findings:  [x]  Laboratory  [x]  Microbiology  [x]  Radiology  [x]  EKG/Telemetry   []  Cardiology/Vascular   []  Pathology  [x]  Old records  [x]  Other: Medications    Assessment & Plan   Assessment / Plan     Assessment:  Frequent falls and generalized weakness.  Improving  Peripheral neuropathy likely diabetic and alcoholic causing above.  Noncompliance with medication including insulin.  Diabetes mellitus uncontrolled hyperglycemia due to above.  Hemoglobin A1c of 8.8% on January 31st.  Alcoholic cirrhosis s/p TIPS on May 2023.  Anasarca with ascites and chronic scrotal swelling.  CKD 3B.  Baseline creatinine 2-2.3.  Stable  Chronic anemia and thrombocytopenia from cirrhosis.  Hematuria and pyuria without bacteriuria.  Asymptomatic gallstones.  Hypoalbuminemia from liver disease.  Hypertension.  Tobacco use disorder.      Plan:   X-ray of  right knee noted negative  Topical Voltaren for both knees  Continue nadolol for elevated blood pressure  Low-salt diet,  high-protein, carb consistent diet.  Lactulose and Xifaxan.  Ammonia level noted at 64  Sliding-scale  and basal insulin.  Titrate to control blood sugars.  Continue oral Lasix and Aldactone.  Nicotine patch.   INR.  Noted  Discussed with IR not enough fluid on ultrasound for paracentesis and patient refusing anyway as he is not feeling bloated or distended.  Although CT scan abdomen showed moderate ascites  No need of CIWA protocol as patient has been sober since December last year.  Thiamine and folic acid.  Check B12  Vistaril for itching.  Started Prozac at 40 mg  Delta troponin -2  Protonix.  Dietitian consulted  Diabetic nurse educator consulted  PT OT.  Patient wants rehab placement.      Discussed plan with RN.  Discharge to rehab when bed available.  Patient has not provided his health insurance information yet to start the process.  This was discussed with patient.    DVT prophylaxis:  Mechanical DVT prophylaxis orders are present.        CODE STATUS:   Code Status (Patient has no pulse and is not breathing): CPR (Attempt to Resuscitate)  Medical Interventions (Patient has pulse or is breathing): Full Support      Part of this note may be an electronic transcription/translation of spoken language to printed text using the Dragon Dictation System.       Electronically signed by Kosta Sampson MD, 24, 3:27 PM EST.                 Electronically signed by Kosta Sampson MD at 24 1528       Kosta Sampson MD at 24 1623           [facility] Progress Note  Date: 3/5/2024  Patient Name: Simon Peters  : 1970  MRN: [id number]  Date of admission: 3/4/2024      Subjective   Subjective     Chief Complaint: Generalized weakness.  Legs giving out and frequent falls for past few days    Summary:   Simon Peters is a 53 y.o. male with past ministry of  alcoholic cirrhosis s/p TIPS in May 23.  Patient has been seen at [facility] on January 31 and has been in [place] from January 8 to January 17 for hepatic encephalopathy, CKD 3B, diabetes mellitus with nephropathy seen by  endocrinology, noncompliant, hypertension, tobacco use disorder, pleural effusion and ascites, chronic scrotal swelling and chronic anemia thrombocytopenia due to cirrhosis.  Patient has been generally getting more weak with increasing numbness tingling in lower extremities.  Does have a history of neuropathy in bilateral lower extremities.  Patient has not taken any of his medications including insulin for some time.  Patient has fallen 3-4 times in the past few weeks.  There is no loss of consciousness.  His leg just gives out.  No major injury from the fall.  Apparently he has been kicked out by his mother due to drinking.  Per patient his last drink was on December 13 last year.  Denies any chest pain, shortness of air, vomiting diarrhea or blood in the stools or black-colored stools.  No cough or phlegm.  Does have lower abdominal pain chronic in nature.  No urinary complaints.  Workup in the ED showed elevated blood pressure with good oxygen saturation on room air.  Delta troponin is -2.  Venous pH is 7.4.  Creatinine 1.8.  Baseline creatinine is 2-2.3.  No anion gap.  Blood glucose of 682.  Albumin of 1.9.  Ammonia of 64 with a total bili of 1.9.  Hemoglobin of 12.  Platelets 112.  UA shows RBCs and WBCs with no bacteria or ketones.  EKG is normal sinus rhythm.  Chest x-ray, CT head negative.  CT abdomen pelvis shows gallstones with moderate ascites.  Because of uncontrolled hyperglycemia hospitalist has been called to admit him for further evaluation  Patient went down for paracentesis by IR.  Ultrasound did not show much fluid.  Patient did not want paracentesis.  MELD score of 19 during this admission.    Interval Followup:   Blood pressure up.  Remains on room  air.  Negative orthostatic vital signs.  No more falls while in the hospital.  Complaining of pain in bilateral knees for some time and wants some pain medication.  Wants medicine for itching as soon as possible.  Denies abdominal pain.  Weakness improving.    Review of Systems   All systems were reviewed and negative except for: Summary interval follow-up    Objective   Objective     Vitals:   Temp:  [97.9 °F (36.6 °C)-98.2 °F (36.8 °C)] 98.1 °F (36.7 °C)  Heart Rate:  [78-94] 78  Resp:  [20] 20  BP: (139-163)/(86-99) 146/86  Physical Exam       Constitutional: Awake, alert, no acute distress              Eyes: Pupils equal, sclerae anicteric, no conjunctival injection              HENT: NCAT, mucous membranes moist              Neck: Supple, no thyromegaly, no lymphadenopathy, trachea midline              Respiratory: Clear to auscultation bilaterally, nonlabored respirations               Cardiovascular: RRR, no murmurs, rubs, or gallops, palpable pedal pulses bilaterally              Gastrointestinal: Positive bowel sounds, soft, nontender, nondistended              Musculoskeletal: +2 bilateral ankle edema, no clubbing or cyanosis to extremities              Psychiatric: Appropriate affect, cooperative              Neurologic: Oriented x 3, strength symmetric in all extremities, patient has hard time raising both legs straight up, Cranial Nerves grossly intact to confrontation, speech clear.  Decreased sensation to touch bilateral lower extremities distally.              Skin: No rashes     Result Review    Result Review:  I have personally reviewed the results for the past 24 hours and agree with these findings:  [x]  Laboratory  [x]  Microbiology  [x]  Radiology  [x]  EKG/Telemetry   []  Cardiology/Vascular   []  Pathology  [x]  Old records  [x]  Other: Medications    Assessment & Plan   Assessment / Plan     Assessment:  Frequent falls and generalized weakness.  Improving  Peripheral neuropathy likely  diabetic and alcoholic causing above.  Noncompliance with medication including insulin.  Diabetes mellitus uncontrolled hyperglycemia due to above.  Hemoglobin A1c of 8.8% on January 31st.  Alcoholic cirrhosis s/p TIPS on May 2023.  Anasarca with ascites and chronic scrotal swelling.  CKD 3B.  Baseline creatinine 2-2.3.  Stable  Chronic anemia and thrombocytopenia from cirrhosis.  Hematuria and pyuria without bacteriuria.  Asymptomatic gallstones.  Hypoalbuminemia from liver disease.  Hypertension.  Tobacco use disorder.      Plan:   X-ray of right knee  Topical Voltaren for both knees  Nadolol for elevated blood pressure  Low-salt diet, fluid restriction high-protein, carb consistent diet.  Lactulose and Xifaxan.  Ammonia level noted at 64  Sliding-scale  and basal insulin.  Titrate to control blood sugars.  Continue oral Lasix and Aldactone.  Nicotine patch.   INR.  Noted  DC paracentesis by IR..  Discussed with IR not enough fluid and patient refusing anyway as he is not feeling bloated or distended  No need of CIWA protocol as patient has been sober since December last year.  Thiamine and folic acid.  Check B12  Vistaril for itching.  Delta troponin -2  Protonix.  Dietitian consulted  Diabetic nurse educator consulted  PT OT.  Needs rehab placement.  Patient agreeable.  DC telemetry    Discussed plan with RN.  Discharge to rehab when bed available    DVT prophylaxis:  Mechanical DVT prophylaxis orders are present.        CODE STATUS:   Code Status (Patient has no pulse and is not breathing): CPR (Attempt to Resuscitate)  Medical Interventions (Patient has pulse or is breathing): Full Support      Part of this note may be an electronic transcription/translation of spoken language to printed text using the Dragon Dictation System.     Electronically signed by Kosta Sampson MD, 03/05/24, 4:24 PM EST.               Electronically signed by Kosta Sampson MD at 03/05/24 5873

## 2024-03-10 LAB
ALBUMIN SERPL-MCNC: 1.8 G/DL (ref 3.5–5.2)
ALBUMIN/GLOB SERPL: 0.5 G/DL
ALP SERPL-CCNC: 302 U/L (ref 39–117)
ALT SERPL W P-5'-P-CCNC: 23 U/L (ref 1–41)
ANION GAP SERPL CALCULATED.3IONS-SCNC: 7.5 MMOL/L (ref 5–15)
AST SERPL-CCNC: 49 U/L (ref 1–40)
BILIRUB SERPL-MCNC: 1.5 MG/DL (ref 0–1.2)
BUN SERPL-MCNC: 41 MG/DL (ref 6–20)
BUN/CREAT SERPL: 19.2 (ref 7–25)
CALCIUM SPEC-SCNC: 8 MG/DL (ref 8.6–10.5)
CHLORIDE SERPL-SCNC: 111 MMOL/L (ref 98–107)
CO2 SERPL-SCNC: 18.5 MMOL/L (ref 22–29)
CREAT SERPL-MCNC: 2.14 MG/DL (ref 0.76–1.27)
DEPRECATED RDW RBC AUTO: 51.7 FL (ref 37–54)
EGFRCR SERPLBLD CKD-EPI 2021: 36.1 ML/MIN/1.73
ERYTHROCYTE [DISTWIDTH] IN BLOOD BY AUTOMATED COUNT: 15.8 % (ref 12.3–15.4)
GLOBULIN UR ELPH-MCNC: 3.4 GM/DL
GLUCOSE BLDC GLUCOMTR-MCNC: 208 MG/DL (ref 70–99)
GLUCOSE BLDC GLUCOMTR-MCNC: 235 MG/DL (ref 70–99)
GLUCOSE BLDC GLUCOMTR-MCNC: 264 MG/DL (ref 70–99)
GLUCOSE BLDC GLUCOMTR-MCNC: 276 MG/DL (ref 70–99)
GLUCOSE SERPL-MCNC: 269 MG/DL (ref 65–99)
HCT VFR BLD AUTO: 31.4 % (ref 37.5–51)
HGB BLD-MCNC: 10.3 G/DL (ref 13–17.7)
MAGNESIUM SERPL-MCNC: 1.9 MG/DL (ref 1.6–2.6)
MCH RBC QN AUTO: 29.7 PG (ref 26.6–33)
MCHC RBC AUTO-ENTMCNC: 32.8 G/DL (ref 31.5–35.7)
MCV RBC AUTO: 90.5 FL (ref 79–97)
PLATELET # BLD AUTO: 131 10*3/MM3 (ref 140–450)
PMV BLD AUTO: 10.3 FL (ref 6–12)
POTASSIUM SERPL-SCNC: 4.8 MMOL/L (ref 3.5–5.2)
PROT SERPL-MCNC: 5.2 G/DL (ref 6–8.5)
RBC # BLD AUTO: 3.47 10*6/MM3 (ref 4.14–5.8)
SODIUM SERPL-SCNC: 137 MMOL/L (ref 136–145)
WBC NRBC COR # BLD AUTO: 8.84 10*3/MM3 (ref 3.4–10.8)

## 2024-03-10 PROCEDURE — 63710000001 INSULIN LISPRO (HUMAN) PER 5 UNITS: Performed by: INTERNAL MEDICINE

## 2024-03-10 PROCEDURE — 83735 ASSAY OF MAGNESIUM: CPT | Performed by: INTERNAL MEDICINE

## 2024-03-10 PROCEDURE — 80053 COMPREHEN METABOLIC PANEL: CPT | Performed by: INTERNAL MEDICINE

## 2024-03-10 PROCEDURE — 82948 REAGENT STRIP/BLOOD GLUCOSE: CPT | Performed by: INTERNAL MEDICINE

## 2024-03-10 PROCEDURE — 85027 COMPLETE CBC AUTOMATED: CPT | Performed by: INTERNAL MEDICINE

## 2024-03-10 PROCEDURE — 82948 REAGENT STRIP/BLOOD GLUCOSE: CPT

## 2024-03-10 PROCEDURE — 63710000001 INSULIN DETEMIR PER 5 UNITS: Performed by: INTERNAL MEDICINE

## 2024-03-10 PROCEDURE — 97110 THERAPEUTIC EXERCISES: CPT

## 2024-03-10 PROCEDURE — 97116 GAIT TRAINING THERAPY: CPT

## 2024-03-10 PROCEDURE — 99232 SBSQ HOSP IP/OBS MODERATE 35: CPT | Performed by: INTERNAL MEDICINE

## 2024-03-10 RX ORDER — SPIRONOLACTONE 25 MG/1
25 TABLET ORAL DAILY
Status: DISCONTINUED | OUTPATIENT
Start: 2024-03-10 | End: 2024-03-11 | Stop reason: HOSPADM

## 2024-03-10 RX ORDER — FUROSEMIDE 40 MG/1
40 TABLET ORAL DAILY
Status: DISCONTINUED | OUTPATIENT
Start: 2024-03-10 | End: 2024-03-11 | Stop reason: HOSPADM

## 2024-03-10 RX ORDER — LACTULOSE 10 G/15ML
20 SOLUTION ORAL 3 TIMES DAILY
Status: DISCONTINUED | OUTPATIENT
Start: 2024-03-10 | End: 2024-03-11 | Stop reason: HOSPADM

## 2024-03-10 RX ADMIN — Medication 100 MG: at 08:03

## 2024-03-10 RX ADMIN — INSULIN DETEMIR 20 UNITS: 100 INJECTION, SOLUTION SUBCUTANEOUS at 20:49

## 2024-03-10 RX ADMIN — INSULIN LISPRO 3 UNITS: 100 INJECTION, SOLUTION INTRAVENOUS; SUBCUTANEOUS at 17:45

## 2024-03-10 RX ADMIN — GABAPENTIN 100 MG: 100 CAPSULE ORAL at 17:45

## 2024-03-10 RX ADMIN — INSULIN LISPRO 4 UNITS: 100 INJECTION, SOLUTION INTRAVENOUS; SUBCUTANEOUS at 13:48

## 2024-03-10 RX ADMIN — INSULIN LISPRO 4 UNITS: 100 INJECTION, SOLUTION INTRAVENOUS; SUBCUTANEOUS at 08:03

## 2024-03-10 RX ADMIN — ACETAMINOPHEN 650 MG: 325 TABLET ORAL at 18:35

## 2024-03-10 RX ADMIN — RIFAXIMIN 550 MG: 550 TABLET ORAL at 20:49

## 2024-03-10 RX ADMIN — GABAPENTIN 100 MG: 100 CAPSULE ORAL at 08:04

## 2024-03-10 RX ADMIN — Medication 5 MG: at 20:49

## 2024-03-10 RX ADMIN — Medication 1 TABLET: at 08:03

## 2024-03-10 RX ADMIN — DICLOFENAC SODIUM 2 G: 10 GEL TOPICAL at 17:48

## 2024-03-10 RX ADMIN — DICLOFENAC SODIUM 2 G: 10 GEL TOPICAL at 08:04

## 2024-03-10 RX ADMIN — FAMOTIDINE 20 MG: 20 TABLET ORAL at 17:45

## 2024-03-10 RX ADMIN — FOLIC ACID 1 MG: 1 TABLET ORAL at 08:03

## 2024-03-10 RX ADMIN — FUROSEMIDE 40 MG: 40 TABLET ORAL at 13:48

## 2024-03-10 RX ADMIN — GABAPENTIN 100 MG: 100 CAPSULE ORAL at 20:49

## 2024-03-10 RX ADMIN — FAMOTIDINE 20 MG: 20 TABLET ORAL at 08:04

## 2024-03-10 RX ADMIN — SPIRONOLACTONE 25 MG: 25 TABLET ORAL at 13:48

## 2024-03-10 RX ADMIN — LACTULOSE 20 G: 20 SOLUTION ORAL at 00:38

## 2024-03-10 RX ADMIN — NADOLOL 20 MG: 20 TABLET ORAL at 08:04

## 2024-03-10 RX ADMIN — INSULIN DETEMIR 20 UNITS: 100 INJECTION, SOLUTION SUBCUTANEOUS at 08:03

## 2024-03-10 RX ADMIN — INSULIN LISPRO 3 UNITS: 100 INJECTION, SOLUTION INTRAVENOUS; SUBCUTANEOUS at 20:49

## 2024-03-10 RX ADMIN — LACTULOSE 20 G: 20 SOLUTION ORAL at 05:16

## 2024-03-10 RX ADMIN — RIFAXIMIN 550 MG: 550 TABLET ORAL at 08:04

## 2024-03-10 RX ADMIN — LACTULOSE 20 G: 20 SOLUTION ORAL at 20:49

## 2024-03-10 RX ADMIN — Medication 10 ML: at 20:49

## 2024-03-10 RX ADMIN — LACTULOSE 20 G: 20 SOLUTION ORAL at 17:45

## 2024-03-10 RX ADMIN — Medication 10 ML: at 08:04

## 2024-03-10 RX ADMIN — DICLOFENAC SODIUM 2 G: 10 GEL TOPICAL at 20:49

## 2024-03-10 RX ADMIN — FLUOXETINE HYDROCHLORIDE 40 MG: 20 CAPSULE ORAL at 08:04

## 2024-03-10 NOTE — PLAN OF CARE
"Goal Outcome Evaluation:              Outcome Evaluation: aox3 with intermittent confusion. up ad garrick/standby assistance. fall risk measures in place. patient refusing nursing staff to assist with bathroom/toileting. patient states he had \"enough\" BMs. ambuated in hallway. blood glucose monitored. medicated x1 for c/o pain. patient continues to be noncompliant with fluid restriction, education provided. no c/o dizziness noted this shift.                               "

## 2024-03-10 NOTE — THERAPY TREATMENT NOTE
Acute Care - Physical Therapy Treatment Note   [facility]     Patient Name: Simon Peters  : 1970  MRN: [id number]  Today's Date: 3/10/2024      Visit Dx:     ICD-10-CM ICD-9-CM   1. Weakness generalized  R53.1 780.79   2. Cirrhosis of liver without ascites, unspecified hepatic cirrhosis type  K74.60 571.5   3. Difficulty in walking  R26.2 719.7   4. Decreased activities of daily living (ADL)  Z78.9 V49.89     Patient Active Problem List   Diagnosis    Hypertension    Anxiety    Elevated serum glucose    Diabetes mellitus    Elevated liver enzymes    Encounter for screening for malignant neoplasm of colon    Weakness     Past Medical History:   Diagnosis Date    Abnormal LFTs     Anxiety     Back pain     Diabetes mellitus     Hypertension     Kidney failure     Liver failure     Rash      Past Surgical History:   Procedure Laterality Date    TIPS PROCEDURE       PT Assessment (Last 12 Hours)       PT Evaluation and Treatment       Row Name 03/10/24 1040          Physical Therapy Time and Intention    Subjective Information complains of;weakness;fatigue;pain  -DK     Document Type therapy note (daily note)  -DK     Mode of Treatment individual therapy;physical therapy  -DK     Patient Effort good  -DK     Symptoms Noted During/After Treatment fatigue;increased pain  -DK     Comment Pt reports his main issues is tingling in his feet. Pt remains somewhat impulsive with transfers and gait.  -DK       Row Name 03/10/24 1040          Pain    Pretreatment Pain Rating 3/10  -DK     Posttreatment Pain Rating 3/10  -DK     Pain Location - Side/Orientation Bilateral  -DK     Pain Location generalized  -DK     Pain Location - foot  -DK     Pain Intervention(s) Repositioned;Ambulation/increased activity;Distraction;Therapeutic presence  -DK       Row Name 03/10/24 1040          Cognition    Affect/Mental Status (Cognition) confused  -DK     Behavioral Issues (Cognition) overwhelmed easily;difficulty managing stress  -DK      Orientation Status (Cognition) oriented to;person;place;situation  -DK     Follows Commands (Cognition) physical/tactile prompts required;repetition of directions required;verbal cues/prompting required  -DK     Cognitive Function attention deficit  -DK     Attention Deficit (Cognition) minimal deficit;concentration;arousal/alertness;focused/sustained attention  -DK     Personal Safety Interventions gait belt;nonskid shoes/slippers when out of bed;supervised activity  -       Row Name 03/10/24 1040          Bed Mobility    Bed Mobility supine-sit-supine  -DK     Supine-Sit Mora (Bed Mobility) supervision  -DK     Sit-Supine Mora (Bed Mobility) supervision  -DK     Supine-Sit-Supine Mora (Bed Mobility) supervision  -DK     Bed Mobility, Safety Issues decreased use of arms for pushing/pulling;decreased use of legs for bridging/pushing  -DK     Assistive Device (Bed Mobility) bed rails  -       Row Name 03/10/24 1040          Transfers    Transfers sit-stand transfer;stand-sit transfer  -       Row Name 03/10/24 1040          Sit-Stand Transfer    Sit-Stand Mora (Transfers) standby assist  -DK     Assistive Device (Sit-Stand Transfers) --  no assistive device  -       Row Name 03/10/24 1040          Stand-Sit Transfer    Stand-Sit Mora (Transfers) standby assist  -DK     Assistive Device (Stand-Sit Transfers) --  no assistive device  -       Row Name 03/10/24 1040          Gait/Stairs (Locomotion)    Gait/Stairs Locomotion gait/ambulation independence;gait/ambulation assistive device;distance ambulated;gait pattern  -     Mora Level (Gait) contact guard;1 person assist;standby assist  -DK     Assistive Device (Gait) --  no assistive device  -DK     Distance in Feet (Gait) 250  -DK     Pattern (Gait) step-through  -     Deviations/Abnormal Patterns (Gait) javy decreased;festinating/shuffling;gait speed decreased;stride length decreased;base of support,  wide  -DK     Comment, (Gait/Stairs) Pt ambulated on room air with no assistive device (pt declined its use). He does have a tendency to occasionally stagger and weave throughout gait.  Pt returned to bed on alert post treatment.  -       Row Name 03/10/24 1040          Safety Issues, Functional Mobility    Safety Issues Affecting Function (Mobility) impulsivity;judgment;safety precaution awareness  -DK     Impairments Affecting Function (Mobility) balance;cognition;endurance/activity tolerance;pain;strength  -DK     Cognitive Impairments, Mobility Safety/Performance impulsivity;judgment;safety precaution awareness  -DK       Row Name 03/10/24 1040          Balance    Balance Assessment sitting static balance;sitting dynamic balance;standing static balance;standing dynamic balance  -DK     Static Sitting Balance standby assist  -DK     Dynamic Sitting Balance standby assist  -DK     Position, Sitting Balance unsupported;sitting edge of bed  -DK     Sit to Stand Dynamic Balance standby assist  -DK     Static Standing Balance standby assist  -DK     Dynamic Standing Balance standby assist  -DK     Position/Device Used, Standing Balance other (see comments)  no assistive device  -DK     Balance Interventions standing;dynamic;tandem gait  -       Row Name 03/10/24 1040          Motor Skills    Motor Skills --  therapeutic exercises  -     Coordination WFL  -     Therapeutic Exercise hip;knee;ankle  -       Row Name 03/10/24 1040          Hip (Therapeutic Exercise)    Hip (Therapeutic Exercise) AROM (active range of motion)  -     Hip AROM (Therapeutic Exercise) bilateral;flexion;extension;aBduction;aDduction;sitting;20 repititions  -       Row Name 03/10/24 1040          Knee (Therapeutic Exercise)    Knee (Therapeutic Exercise) AROM (active range of motion)  -     Knee AROM (Therapeutic Exercise) bilateral;flexion;extension;LAQ (long arc quad);sitting;20 repititions  -       Row Name 03/10/24 1040           Ankle (Therapeutic Exercise)    Ankle (Therapeutic Exercise) AROM (active range of motion)  -     Ankle AROM (Therapeutic Exercise) bilateral;dorsiflexion;plantarflexion;sitting;20 repititions  -       Row Name 03/10/24 1040          Plan of Care Review    Plan of Care Reviewed With patient  -DK     Progress improving  -       Row Name 03/10/24 1040          Positioning and Restraints    Pre-Treatment Position in bed  -DK     Post Treatment Position bed  -DK     In Bed supine;call light within reach;encouraged to call for assist;exit alarm on;side rails up x2;legs elevated  -       Row Name 03/10/24 1040          Therapy Assessment/Plan (PT)    Rehab Potential (PT) good, to achieve stated therapy goals  -     Criteria for Skilled Interventions Met (PT) skilled treatment is necessary  -     Therapy Frequency (PT) daily  -     Problem List (PT) problems related to;balance;cognition;mobility;strength;pain;hearing;communication  -     Activity Limitations Related to Problem List (PT) unable to ambulate safely;unable to transfer safely  -       Row Name 03/10/24 1040          Progress Summary (PT)    Progress Toward Functional Goals (PT) progress toward functional goals is good  -               User Key  (r) = Recorded By, (t) = Taken By, (c) = Cosigned By      Initials Name Provider Type    Lauren Barron PTA Physical Therapist Assistant                    Physical Therapy Education       Title: PT OT SLP Therapies (Done)       Topic: Physical Therapy (Done)       Point: Mobility training (Done)       Learning Progress Summary             Patient Acceptance, E, VU,NR by RF at 3/9/2024 1201    Acceptance, E, VU,NR by CHERYLE at 3/8/2024 1126    Acceptance, E,TB, VU by ITALO at 3/5/2024 1144                         Point: Precautions (Done)       Learning Progress Summary             Patient Acceptance, E, VU,NR by RF at 3/9/2024 1201    Acceptance, E, VU,NR by CHERYLE at 3/8/2024 1126    Acceptance,  E,TB, VU by ITALO at 3/5/2024 1144                                         User Key       Initials Effective Dates Name Provider Type Discipline    ITALO 06/03/21 -  Aleksey Marino, PT Physical Therapist PT    RF 01/19/22 -  Hadley Anders, RN Registered Nurse Nurse    CHERYLE 11/27/23 -  Estela Sky RN Registered Nurse Nurse                  PT Recommendation and Plan  Planned Therapy Interventions (PT): balance training, bed mobility training, gait training, home exercise program, strengthening, transfer training  Therapy Frequency (PT): daily  Progress Summary (PT)  Progress Toward Functional Goals (PT): progress toward functional goals is good  Plan of Care Reviewed With: patient  Progress: improving   Outcome Measures       Row Name 03/10/24 1040 03/08/24 1200 03/07/24 1056       How much help from another person do you currently need...    Turning from your back to your side while in flat bed without using bedrails? 4  -DK 4  -ITALO (r) DG (t) ITALO (c) 4  -DK    Moving from lying on back to sitting on the side of a flat bed without bedrails? 4  -DK 4  -ITALO (r) DG (t) ITALO (c) 4  -DK    Moving to and from a bed to a chair (including a wheelchair)? 4  -DK 4  -ITALO (r) DG (t) ITALO (c) 4  -DK    Standing up from a chair using your arms (e.g., wheelchair, bedside chair)? 4  -DK 4  -ITALO (r) DG (t) ITALO (c) 4  -DK    Climbing 3-5 steps with a railing? 3  -DK 3  -ITALO (r) DG (t) ITALO (c) 3  -DK    To walk in hospital room? 3  -DK 3  -ITALO (r) DG (t) ITALO (c) 3  -DK    AM-PAC 6 Clicks Score (PT) 22  -DK 22  -ITALO (r) DG (t) 22  -DK    Highest Level of Mobility Goal 7 --> Walk 25 feet or more  -DK 7 --> Walk 25 feet or more  -ITALO (r) DG (t) 7 --> Walk 25 feet or more  -DK       Functional Assessment    Outcome Measure Options AM-PAC 6 Clicks Basic Mobility (PT)  -DK AM-PAC 6 Clicks Basic Mobility (PT)  -ITALO (r) DG (t) ITALO (c) AM-[facility] 6 Clicks Basic Mobility (PT)  -DK              User Key  (r) = Recorded By, (t) = Taken By, (c) = Cosigned By      Initials  Name Provider Type    Lauren Barron PTA Physical Therapist Assistant    Aleksey Franks, PT Physical Therapist    Juliana Duran, PT Student PT Student                     Time Calculation:    PT Charges       Row Name 03/10/24 1045             Time Calculation    PT Received On 03/10/24  -DK      PT Goal Re-Cert Due Date 03/14/24  -DK         Timed Charges    44107 - PT Therapeutic Exercise Minutes 12  -DK      58733 - Gait Training Minutes  9  -DK      87451 - PT Therapeutic Activity Minutes 6  -DK         Total Minutes    Timed Charges Total Minutes 27  -DK       Total Minutes 27  -DK                User Key  (r) = Recorded By, (t) = Taken By, (c) = Cosigned By      Initials Name Provider Type    Lauren Barron PTA Physical Therapist Assistant                  Therapy Charges for Today       Code Description Service Date Service Provider Modifiers Qty    77382236104 HC PT THER PROC EA 15 MIN 3/10/2024 Lauren Brizuela PTA GP 1    84635605480 HC GAIT TRAINING EA 15 MIN 3/10/2024 Lauren Brizuela PTA GP 1            PT G-Codes  Outcome Measure Options: AM-PAC 6 Clicks Basic Mobility (PT)  AM-PAC 6 Clicks Score (PT): 22  AM-PAC 6 Clicks Score (OT): 24    Lauren Brizuela PTA  3/10/2024

## 2024-03-10 NOTE — PROGRESS NOTES
[facility]   Hospitalist Progress Note  Date: 3/10/2024  Patient Name: Simon Peters  : 1970  MRN: [id number]  Date of admission: 3/4/2024  Room/Bed: [facility]/      Subjective   Subjective     Chief Complaint:   Generalized weakness, legs giving out and frequent falls    Summary:  Simon Peters is a 53 y.o. male with past medical history of alcoholic cirrhosis s/p TIPS in May 23.  Patient seen at  transplant center on  and has been in [facility] from  to  for hepatic encephalopathy, CKD 3B, diabetes mellitus with nephropathy seen by  endocrinology, noncompliant, hypertension, tobacco use disorder, pleural effusion and ascites, chronic scrotal swelling and chronic anemia thrombocytopenia due to cirrhosis. Patient has been generally getting more weak with increasing numbness tingling in lower extremities.  Does have a history of neuropathy in bilateral lower extremities.  Patient has not taken any of his medications including insulin for some time. Patient has fallen 3-4 times in the past few weeks.  There is no loss of consciousness.  His leg just gives out.  No major injury from the fall.  Apparently he has been kicked out by his mother due to drinking.  Per patient his last drink was on  last year.    Patient went down for paracentesis by IR.  Ultrasound did not show much fluid.  Patient did not want paracentesis.  MELD score of 19 during this admission.    Interval Followup:   Patient's mentation improved over the past 24 hours.  Every bowel movement over the past 24 hours, will reduce lactulose dosing.  Goal is ultimately 3 loose stools a day.  Patient's renal function improved, resuming on diuretics today      Objective   Objective     Vitals:   Temp:  [97.7 °F (36.5 °C)-98.8 °F (37.1 °C)] 97.7 °F (36.5 °C)  Heart Rate:  [64-92] 64  Resp:  [18] 18  BP: (130-162)/(70-95) 137/78    Physical Exam     Constitutional: Sleeping, wakes to voice, no acute distress,  good mentation              Eyes: Pupils equal, sclerae anicteric, no conjunctival injection              HENT: NCAT, mucous membranes moist              Neck: Supple, no thyromegaly, no lymphadenopathy, trachea midline              Respiratory: Clear to auscultation bilaterally, nonlabored respirations               Cardiovascular: RRR, no murmurs, rubs, or gallops, palpable pedal pulses bilaterally              Gastrointestinal: Positive bowel sounds, soft, nontender, nondistended              Musculoskeletal: +1 bilateral ankle edema, no clubbing or cyanosis to extremities              Psychiatric: Appropriate affect, cooperative              Neurologic: Oriented x 3, good concentration, minimal asterixis present on exam, strength symmetric in all extremities, patient has hard time raising both legs straight up, Cranial Nerves grossly intact to confrontation, speech clear.  Decreased sensation to touch bilateral lower extremities distally.              Skin: No rashes     Result Review    Result Review:  I have personally reviewed these results:  [x]  Laboratory      Lab 03/10/24  0546 03/09/24  0444 03/08/24  0443 03/07/24  0506 03/06/24  0516 03/05/24  0425 03/04/24  0245   WBC 8.84 6.26 6.92 8.12 9.05 7.66  --    HEMOGLOBIN 10.3* 8.9* 8.8* 9.1* 9.0* 9.5*  --    HEMATOCRIT 31.4* 26.2* 26.3* 26.6* 25.7* 26.4*  --    PLATELETS 131* 109* 106* 106* 111* 114*  --    NEUTROS ABS  --   --   --  5.17 6.19 5.02  --    IMMATURE GRANS (ABS)  --   --   --  0.04 0.03 0.03  --    LYMPHS ABS  --   --   --  1.34 1.34 1.30  --    MONOS ABS  --   --   --  0.78 0.77 0.63  --    EOS ABS  --   --   --  0.68* 0.62* 0.58*  --    MCV 90.5 88.8 89.2 87.5 85.4 84.6  --    LACTATE, ARTERIAL  --   --   --   --   --   --  2.75*   LDH  --   --   --   --   --  248*  --    PROTIME  --   --   --   --   --  16.9*  --    APTT  --   --   --   --   --  33.2  --          Lab 03/10/24  0546 03/09/24  0444 03/08/24  0443 03/07/24  0506 03/06/24  1645  03/06/24 0516 03/05/24 0425 03/04/24  0245   SODIUM 137 141 137 135*  --  134* 134*  --    SODIUM, VENOUS  --   --   --   --   --   --   --  129.3*   POTASSIUM 4.8 4.6 4.4 4.2   < > 3.6 3.4*  --    POTASSIUM, VENOUS  --   --   --   --   --   --   --  3.1*   CHLORIDE 111* 112* 110* 108*  --  105 106  --    CHLORIDE, VENOUS  --   --   --   --   --   --   --  102   CO2 18.5* 20.9* 21.4* 20.2*  --  22.3 21.0*  --    ANION GAP 7.5 8.1 5.6 6.8  --  6.7 7.0  --    BUN 41* 43* 40* 31*  --  27* 23*  --    CREATININE 2.14* 2.53* 2.48* 2.12*  --  2.00* 1.87*  --    EGFR 36.1* 29.5* 30.3* 36.5*  --  39.2* 42.5*  --    GLUCOSE 269* 225* 241* 265*  --  332* 278*  --    GLUCOSE, ARTERIAL  --   --   --   --   --   --   --  649*   CALCIUM 8.0* 7.6* 7.6* 7.6*  --  7.6* 7.5*  --    IONIZED CALCIUM  --   --   --   --   --   --   --  1.10*   MAGNESIUM 1.9 1.9 1.8 1.7  --  1.7 1.7  --    PHOSPHORUS  --   --   --  2.4*  --  2.8 2.3*  --     < > = values in this interval not displayed.         Lab 03/10/24  0546 03/09/24  0444 03/08/24 0443   TOTAL PROTEIN 5.2* 4.2* 4.0*   ALBUMIN 1.8* 1.5* 1.4*   GLOBULIN 3.4 2.7 2.6   ALT (SGPT) 23 17 16   AST (SGOT) 49* 37 34   BILIRUBIN 1.5* 1.1 1.1   ALK PHOS 302* 260* 266*         Lab 03/05/24  0425 03/04/24  0457 03/04/24  0229   HSTROP T  --  164* 166*   PROTIME 16.9*  --   --    INR 1.34*  --   --                  Lab 03/04/24  0245   FIO2 21   CARBOXYHEMOGLOBIN 3.1*     Brief Urine Lab Results  (Last result in the past 365 days)        Color   Clarity   Blood   Leuk Est   Nitrite   Protein   CREAT   Urine HCG        03/04/24 0307 Yellow   Clear   Large (3+)   Negative   Negative   100 mg/dL (2+)                 [x]  Microbiology   Microbiology Results (last 10 days)       ** No results found for the last 240 hours. **          [x]  Radiology  XR Knee 1 or 2 View Right    Result Date: 3/5/2024   Mild joint space narrowing in the medial compartment otherwise negative right knee      Jhonathan RAJPUT  MD Tyson       Electronically Signed and Approved By: Jhonatahn Mehta MD on 3/05/2024 at 17:25             US Abdomen Limited    Result Date: 3/5/2024    Scant amount of ascitic fluid in right upper quadrant, patient preferred to hold off on procedure at this time    BAYLEE CALDERON       Electronically Signed and Approved By: JESUS ROQUE MD on 3/05/2024 at 13:09             CT Abdomen Pelvis With Contrast    Result Date: 3/4/2024    1. Cirrhosis and portal hypertension.  A TIPS is present. 2. Moderate ascites with generalized anasarca and a trace left pleural effusion. 3. Cholelithiasis without biliary obstruction. 4. Small bowel wall thickening without obstruction.  This may reflect enteritis or could be the sequela of chronic liver disease and ascites. 5. Normal large bowel and appendix. 6. Nonobstructed kidneys.      ADIS RICHARDSON MD       Electronically Signed and Approved By: ADIS RICHARDSON MD on 3/04/2024 at 5:14             CT Head Without Contrast    Result Date: 3/4/2024   Generalized atrophy.  No acute findings.     ADIS RICHARDSON MD       Electronically Signed and Approved By: ADIS RICHARDSON MD on 3/04/2024 at 4:54             XR Chest 1 View    Result Date: 3/4/2024   No acute cardiopulmonary findings.       ADIS RICHARDSON MD       Electronically Signed and Approved By: AIDS RICHARDSON MD on 3/04/2024 at 2:44            []  EKG/Telemetry   []  Cardiology/Vascular   []  Pathology  []  Old records  []  Other:    Assessment & Plan   Assessment / Plan     Assessment:  Frequent falls and generalized weakness.  Improving  Peripheral neuropathy likely diabetic and alcoholic causing above.  Noncompliance with medication including insulin.  Diabetes mellitus uncontrolled hyperglycemia due to above.  Hemoglobin A1c of 8.8% on January 31st.  Alcoholic cirrhosis s/p TIPS on May 2023.  Anasarca with ascites and chronic scrotal swelling.  CKD 3B.  Baseline creatinine 2-2.3.  Stable  Chronic anemia and  thrombocytopenia from cirrhosis.  Hematuria and pyuria without bacteriuria.  Asymptomatic gallstones.  Hypoalbuminemia from liver disease.  Hypertension.  Tobacco use disorder.     Plan:  Patient remains admitted to hospital for further care and management  Decreasing lactulose dosing, continue rifaximin, goal is 3 loose stools per day  Patient remains on thiamine and folic acid  Low-salt diet, high-protein carb consistent  Sliding scale insulin with hypoglycemia protocol  Resuming Lasix and Aldactone today, creatinine has improved, monitor with labs tomorrow  PT and OT consulted, appreciate assistance  Has been denied for SNF discharge, patient updated at bedside       Discussed with RN    DVT prophylaxis:  Mechanical DVT prophylaxis orders are present.        CODE STATUS:   Code Status (Patient has no pulse and is not breathing): CPR (Attempt to Resuscitate)  Medical Interventions (Patient has pulse or is breathing): Full Support

## 2024-03-11 ENCOUNTER — READMISSION MANAGEMENT (OUTPATIENT)
Dept: CALL CENTER | Facility: HOSPITAL | Age: 54
End: 2024-03-11
Payer: COMMERCIAL

## 2024-03-11 VITALS
HEART RATE: 61 BPM | OXYGEN SATURATION: 98 % | WEIGHT: 214.07 LBS | SYSTOLIC BLOOD PRESSURE: 140 MMHG | BODY MASS INDEX: 31.71 KG/M2 | RESPIRATION RATE: 18 BRPM | HEIGHT: 69 IN | DIASTOLIC BLOOD PRESSURE: 82 MMHG | TEMPERATURE: 97.3 F

## 2024-03-11 LAB
ANION GAP SERPL CALCULATED.3IONS-SCNC: 5.4 MMOL/L (ref 5–15)
BUN SERPL-MCNC: 45 MG/DL (ref 6–20)
BUN/CREAT SERPL: 18.8 (ref 7–25)
CALCIUM SPEC-SCNC: 8 MG/DL (ref 8.6–10.5)
CHLORIDE SERPL-SCNC: 111 MMOL/L (ref 98–107)
CO2 SERPL-SCNC: 19.6 MMOL/L (ref 22–29)
CREAT SERPL-MCNC: 2.4 MG/DL (ref 0.76–1.27)
DEPRECATED RDW RBC AUTO: 50.9 FL (ref 37–54)
EGFRCR SERPLBLD CKD-EPI 2021: 31.5 ML/MIN/1.73
ERYTHROCYTE [DISTWIDTH] IN BLOOD BY AUTOMATED COUNT: 15.7 % (ref 12.3–15.4)
GLUCOSE BLDC GLUCOMTR-MCNC: 237 MG/DL (ref 70–99)
GLUCOSE BLDC GLUCOMTR-MCNC: 292 MG/DL (ref 70–99)
GLUCOSE SERPL-MCNC: 264 MG/DL (ref 65–99)
HCT VFR BLD AUTO: 27.2 % (ref 37.5–51)
HGB BLD-MCNC: 9.2 G/DL (ref 13–17.7)
MAGNESIUM SERPL-MCNC: 1.9 MG/DL (ref 1.6–2.6)
MCH RBC QN AUTO: 30.5 PG (ref 26.6–33)
MCHC RBC AUTO-ENTMCNC: 33.8 G/DL (ref 31.5–35.7)
MCV RBC AUTO: 90.1 FL (ref 79–97)
PLATELET # BLD AUTO: 112 10*3/MM3 (ref 140–450)
PMV BLD AUTO: 10.4 FL (ref 6–12)
POTASSIUM SERPL-SCNC: 4.9 MMOL/L (ref 3.5–5.2)
RBC # BLD AUTO: 3.02 10*6/MM3 (ref 4.14–5.8)
SODIUM SERPL-SCNC: 136 MMOL/L (ref 136–145)
WBC NRBC COR # BLD AUTO: 6.97 10*3/MM3 (ref 3.4–10.8)

## 2024-03-11 PROCEDURE — 80048 BASIC METABOLIC PNL TOTAL CA: CPT | Performed by: INTERNAL MEDICINE

## 2024-03-11 PROCEDURE — 82948 REAGENT STRIP/BLOOD GLUCOSE: CPT | Performed by: INTERNAL MEDICINE

## 2024-03-11 PROCEDURE — 99239 HOSP IP/OBS DSCHRG MGMT >30: CPT | Performed by: INTERNAL MEDICINE

## 2024-03-11 PROCEDURE — 63710000001 INSULIN DETEMIR PER 5 UNITS: Performed by: INTERNAL MEDICINE

## 2024-03-11 PROCEDURE — 63710000001 INSULIN LISPRO (HUMAN) PER 5 UNITS: Performed by: INTERNAL MEDICINE

## 2024-03-11 PROCEDURE — 83735 ASSAY OF MAGNESIUM: CPT | Performed by: INTERNAL MEDICINE

## 2024-03-11 PROCEDURE — 85027 COMPLETE CBC AUTOMATED: CPT | Performed by: INTERNAL MEDICINE

## 2024-03-11 RX ORDER — SPIRONOLACTONE 25 MG/1
25 TABLET ORAL DAILY
Qty: 30 TABLET | Refills: 0 | Status: ON HOLD | OUTPATIENT
Start: 2024-03-12 | End: 2024-04-11

## 2024-03-11 RX ORDER — FUROSEMIDE 40 MG/1
40 TABLET ORAL DAILY
Qty: 30 TABLET | Refills: 0 | Status: ON HOLD | OUTPATIENT
Start: 2024-03-12 | End: 2024-04-11

## 2024-03-11 RX ORDER — LACTULOSE 10 G/15ML
20 SOLUTION ORAL 3 TIMES DAILY
Qty: 2700 ML | Refills: 0 | Status: ON HOLD | OUTPATIENT
Start: 2024-03-11 | End: 2024-04-10

## 2024-03-11 RX ORDER — LANOLIN ALCOHOL/MO/W.PET/CERES
100 CREAM (GRAM) TOPICAL DAILY
Qty: 30 TABLET | Refills: 0 | Status: ON HOLD | OUTPATIENT
Start: 2024-03-12 | End: 2024-04-11

## 2024-03-11 RX ORDER — FOLIC ACID 1 MG/1
1 TABLET ORAL DAILY
Qty: 30 TABLET | Refills: 0 | Status: ON HOLD | OUTPATIENT
Start: 2024-03-12 | End: 2024-04-11

## 2024-03-11 RX ORDER — MULTIPLE VITAMINS W/ MINERALS TAB 9MG-400MCG
1 TAB ORAL DAILY
Qty: 30 TABLET | Refills: 0 | Status: ON HOLD | OUTPATIENT
Start: 2024-03-12 | End: 2024-04-11

## 2024-03-11 RX ORDER — GABAPENTIN 100 MG/1
100 CAPSULE ORAL 3 TIMES DAILY
Qty: 90 CAPSULE | Refills: 0 | Status: ON HOLD | OUTPATIENT
Start: 2024-03-11 | End: 2024-04-10

## 2024-03-11 RX ORDER — HYDROXYZINE PAMOATE 25 MG/1
25 CAPSULE ORAL 4 TIMES DAILY PRN
Qty: 120 CAPSULE | Refills: 0 | Status: ON HOLD | OUTPATIENT
Start: 2024-03-11 | End: 2024-04-10

## 2024-03-11 RX ORDER — FLUOXETINE HYDROCHLORIDE 40 MG/1
40 CAPSULE ORAL DAILY
Qty: 30 CAPSULE | Refills: 0 | Status: ON HOLD | OUTPATIENT
Start: 2024-03-12 | End: 2024-04-11

## 2024-03-11 RX ORDER — METFORMIN HYDROCHLORIDE EXTENDED-RELEASE TABLETS 1000 MG/1
1000 TABLET, FILM COATED, EXTENDED RELEASE ORAL
Qty: 30 TABLET | Refills: 0 | Status: ON HOLD | OUTPATIENT
Start: 2024-03-11 | End: 2024-03-14

## 2024-03-11 RX ORDER — FAMOTIDINE 20 MG/1
20 TABLET, FILM COATED ORAL
Qty: 60 TABLET | Refills: 0 | Status: ON HOLD | OUTPATIENT
Start: 2024-03-11 | End: 2024-04-10

## 2024-03-11 RX ORDER — NADOLOL 20 MG/1
20 TABLET ORAL
Qty: 30 TABLET | Refills: 0 | Status: ON HOLD | OUTPATIENT
Start: 2024-03-12 | End: 2024-04-11

## 2024-03-11 RX ADMIN — SPIRONOLACTONE 25 MG: 25 TABLET ORAL at 08:36

## 2024-03-11 RX ADMIN — INSULIN LISPRO 3 UNITS: 100 INJECTION, SOLUTION INTRAVENOUS; SUBCUTANEOUS at 08:36

## 2024-03-11 RX ADMIN — Medication 10 ML: at 08:41

## 2024-03-11 RX ADMIN — NADOLOL 20 MG: 20 TABLET ORAL at 08:36

## 2024-03-11 RX ADMIN — INSULIN DETEMIR 20 UNITS: 100 INJECTION, SOLUTION SUBCUTANEOUS at 08:36

## 2024-03-11 RX ADMIN — Medication 1 TABLET: at 08:36

## 2024-03-11 RX ADMIN — FAMOTIDINE 20 MG: 20 TABLET ORAL at 08:36

## 2024-03-11 RX ADMIN — FOLIC ACID 1 MG: 1 TABLET ORAL at 08:36

## 2024-03-11 RX ADMIN — DICLOFENAC SODIUM 2 G: 10 GEL TOPICAL at 08:36

## 2024-03-11 RX ADMIN — FLUOXETINE HYDROCHLORIDE 40 MG: 20 CAPSULE ORAL at 08:36

## 2024-03-11 RX ADMIN — FUROSEMIDE 40 MG: 40 TABLET ORAL at 08:36

## 2024-03-11 RX ADMIN — INSULIN LISPRO 4 UNITS: 100 INJECTION, SOLUTION INTRAVENOUS; SUBCUTANEOUS at 12:04

## 2024-03-11 RX ADMIN — GABAPENTIN 100 MG: 100 CAPSULE ORAL at 08:36

## 2024-03-11 RX ADMIN — Medication 100 MG: at 08:36

## 2024-03-11 RX ADMIN — RIFAXIMIN 550 MG: 550 TABLET ORAL at 08:36

## 2024-03-11 RX ADMIN — HYDROXYZINE PAMOATE 25 MG: 25 CAPSULE ORAL at 06:48

## 2024-03-11 RX ADMIN — LACTULOSE 20 G: 20 SOLUTION ORAL at 08:36

## 2024-03-11 NOTE — PLAN OF CARE
Goal Outcome Evaluation:  Plan of Care Reviewed With: patient        Progress: improving  Outcome Evaluation: pt aox3, Vs stable. up with standby assist. pt refuses for staff to go into bathroom with him. pt noncompliant with fluid restrictions, educated on fluid restriction.

## 2024-03-11 NOTE — DISCHARGE INSTR - APPOINTMENTS
Follow up with Dr. Elizabeth Woo On March 18 at 10:30   [address] [address], [place] phone [phone]

## 2024-03-11 NOTE — PLAN OF CARE
Goal Outcome Evaluation:              Outcome Evaluation: aox3-4 with confusion and forgetfulness. refusing staff to go into bathroom with him. intermittently refusing bed alarm, all other fall risk measures in place. ambulated in hallway frequently, up ad garrick. medicated for c/o pain per scheduled meds, effective per patient. blood glucose monitored. discharge education provided to patient and mother, Kanwal, at bedside. discharge home via private vehicle.

## 2024-03-11 NOTE — DISCHARGE SUMMARY
[facility]         HOSPITALIST  DISCHARGE SUMMARY    Patient Name: Simon Peters  : 1970  MRN: [id number]    Date of Admission: 3/4/2024  Date of Discharge:  3/11/2024  Primary Care Physician: Elizabeth Woo MD    Active and Resolved Hospital Problems:  Frequent falls and generalized weakness.  Improving  Peripheral neuropathy likely diabetic and alcoholic causing above.  Noncompliance with medication including insulin.  Diabetes mellitus uncontrolled hyperglycemia due to above.  Hemoglobin A1c of 8.8% on .  Alcoholic cirrhosis s/p TIPS on May 2023.  Anasarca with ascites and chronic scrotal swelling.  CKD 3B.  Baseline creatinine 2-2.3.  Stable  Chronic anemia and thrombocytopenia from cirrhosis.  Hematuria and pyuria without bacteriuria.  Asymptomatic gallstones.  Hypoalbuminemia from liver disease.  Hypertension.  Tobacco use disorder.     Hospital Course     Hospital Course:  Simon Peters is a 53 y.o. male with past medical history of alcoholic cirrhosis s/p TIPS in May 23.  Patient seen at  transplant center on  and has been in [place] from  to  for hepatic encephalopathy, CKD 3B, diabetes mellitus with nephropathy seen by  endocrinology, noncompliant, hypertension, tobacco use disorder, pleural effusion and ascites, chronic scrotal swelling and chronic anemia thrombocytopenia due to cirrhosis. Patient has been generally getting more weak with increasing numbness tingling in lower extremities.  Does have a history of neuropathy in bilateral lower extremities.  Patient has not taken any of his medications including insulin for some time. Patient has fallen 3-4 times in the past few weeks.  There is no loss of consciousness.  His leg just gives out.  No major injury from the fall.  Apparently he has been kicked out by his mother due to drinking.  Per patient his last drink was on  last year.  Attempted paracentesis with IR however  not enough fluid found.  Patient initially working up towards SNF discharge however denied given his mobility.  Patient started back on home lactulose and rifaximin.  Patient discharging home into the care of his mother.  Patient's medications all sent for refills.  Patient seen on date of discharge, clinically and hemodynamically stable.  Patient provided concerning signs and symptoms prompting immediate medical attention, patient understanding and agreeable    DISCHARGE Follow Up Recommendations for labs and diagnostics:   Follow-up with primary care physician as soon as possible      Day of Discharge     Vital Signs:  Temp:  [97.3 °F (36.3 °C)-98.2 °F (36.8 °C)] 97.3 °F (36.3 °C)  Heart Rate:  [61-65] 61  Resp:  [16-18] 18  BP: (132-147)/(69-92) 140/82  Physical Exam:                Constitutional: Resting in bed comfortably, no acute distress              Eyes: Pupils equal, sclerae anicteric, no conjunctival injection              HENT: NCAT, mucous membranes moist              Neck: Supple, no thyromegaly, no lymphadenopathy, trachea midline              Respiratory: Clear to auscultation bilaterally, nonlabored respirations               Cardiovascular: RRR, no murmurs, rubs, or gallops, palpable pedal pulses bilaterally              Gastrointestinal: Positive bowel sounds, soft, nontender, nondistended              Musculoskeletal: +1 bilateral ankle edema, no clubbing or cyanosis to extremities              Psychiatric: Appropriate affect, cooperative              Neurologic: Oriented x 3, good concentration, minimal asterixis present on exam, strength symmetric in all extremities, patient has hard time raising both legs straight up, Cranial Nerves grossly intact to confrontation, speech clear.  Decreased sensation to touch bilateral lower extremities distally.              Skin: No rashes     Discharge Details        Discharge Medications        New Medications        Instructions Start Date   famotidine  20 MG tablet  Commonly known as: PEPCID   20 mg, Oral, 2 Times Daily Before Meals      FLUoxetine 40 MG capsule  Commonly known as: PROzac   40 mg, Oral, Daily   Start Date: March 12, 2024     folic acid 1 MG tablet  Commonly known as: FOLVITE   1 mg, Oral, Daily   Start Date: March 12, 2024     gabapentin 100 MG capsule  Commonly known as: NEURONTIN   100 mg, Oral, 3 Times Daily      hydrOXYzine pamoate 25 MG capsule  Commonly known as: VISTARIL   25 mg, Oral, 4 Times Daily PRN      lactulose 10 GM/15ML solution  Commonly known as: CHRONULAC   20 g, Oral, 3 Times Daily      metFORMIN 1000 MG (OSM) 24 hr tablet  Commonly known as: FORTAMET   1,000 mg, Oral, Daily With Breakfast      multivitamin with minerals tablet tablet   1 tablet, Oral, Daily   Start Date: March 12, 2024     nadolol 20 MG tablet  Commonly known as: CORGARD   20 mg, Oral, Every 24 Hours Scheduled   Start Date: March 12, 2024     riFAXIMin 550 MG tablet  Commonly known as: XIFAXAN   550 mg, Oral, Every 12 Hours Scheduled      spironolactone 25 MG tablet  Commonly known as: ALDACTONE   25 mg, Oral, Daily   Start Date: March 12, 2024     thiamine 100 MG tablet  Commonly known as: VITAMIN B1   100 mg, Oral, Daily   Start Date: March 12, 2024            Continue These Medications        Instructions Start Date   furosemide 40 MG tablet  Commonly known as: LASIX   40 mg, Oral, Daily   Start Date: March 12, 2024              No Known Allergies    Discharge Disposition:  Home-Health Care [facility]    Diet:  Hospital:  Diet Order   Procedures   • Diet: Cardiac, Diabetic, High Protein, Fluid Restriction (240 mL/tray); Low Sodium (2g); Consistent Carbohydrate; 2000 mL/day; Fluid Consistency: Thin (IDDSI 0)       Discharge Activity:   Activity Instructions       Activity as Tolerated              CODE STATUS:  Code Status and Medical Interventions:   Ordered at: 03/04/24 0638     Code Status (Patient has no pulse and is not breathing):    CPR (Attempt to  Resuscitate)     Medical Interventions (Patient has pulse or is breathing):    Full Support         No future appointments.    Additional Instructions for the Follow-ups that You Need to Schedule       Discharge Follow-up with PCP   As directed       Currently Documented PCP:    Provider, No Known    PCP Phone Number:    None     Follow Up Details: In less than one week                Pertinent  and/or Most Recent Results     PROCEDURES:   NA    LAB RESULTS:      Lab 03/11/24  0444 03/10/24  0546 03/09/24  0444 03/08/24  0443 03/07/24  0506 03/06/24  0516 03/05/24  0425   WBC 6.97 8.84 6.26 6.92 8.12 9.05 7.66   HEMOGLOBIN 9.2* 10.3* 8.9* 8.8* 9.1* 9.0* 9.5*   HEMATOCRIT 27.2* 31.4* 26.2* 26.3* 26.6* 25.7* 26.4*   PLATELETS 112* 131* 109* 106* 106* 111* 114*   NEUTROS ABS  --   --   --   --  5.17 6.19 5.02   IMMATURE GRANS (ABS)  --   --   --   --  0.04 0.03 0.03   LYMPHS ABS  --   --   --   --  1.34 1.34 1.30   MONOS ABS  --   --   --   --  0.78 0.77 0.63   EOS ABS  --   --   --   --  0.68* 0.62* 0.58*   MCV 90.1 90.5 88.8 89.2 87.5 85.4 84.6   LDH  --   --   --   --   --   --  248*   PROTIME  --   --   --   --   --   --  16.9*   APTT  --   --   --   --   --   --  33.2         Lab 03/11/24  0444 03/10/24  0546 03/09/24  0444 03/08/24 0443 03/07/24  0506 03/06/24  1645 03/06/24  0516 03/05/24  0425   SODIUM 136 137 141 137 135*  --  134* 134*   POTASSIUM 4.9 4.8 4.6 4.4 4.2   < > 3.6 3.4*   CHLORIDE 111* 111* 112* 110* 108*  --  105 106   CO2 19.6* 18.5* 20.9* 21.4* 20.2*  --  22.3 21.0*   ANION GAP 5.4 7.5 8.1 5.6 6.8  --  6.7 7.0   BUN 45* 41* 43* 40* 31*  --  27* 23*   CREATININE 2.40* 2.14* 2.53* 2.48* 2.12*  --  2.00* 1.87*   EGFR 31.5* 36.1* 29.5* 30.3* 36.5*  --  39.2* 42.5*   GLUCOSE 264* 269* 225* 241* 265*  --  332* 278*   CALCIUM 8.0* 8.0* 7.6* 7.6* 7.6*  --  7.6* 7.5*   MAGNESIUM 1.9 1.9 1.9 1.8 1.7  --  1.7 1.7   PHOSPHORUS  --   --   --   --  2.4*  --  2.8 2.3*    < > = values in this interval not  displayed.         Lab 03/10/24  0546 03/09/24  0444 03/08/24  0443 03/07/24  0506 03/06/24  0516 03/05/24  0425   TOTAL PROTEIN 5.2* 4.2* 4.0*  --  4.0* 4.3*   ALBUMIN 1.8* 1.5* 1.4* 1.6* 1.6* 1.7*   GLOBULIN 3.4 2.7 2.6  --  2.4 2.6   ALT (SGPT) 23 17 16  --  17 21   AST (SGOT) 49* 37 34  --  37 46*   BILIRUBIN 1.5* 1.1 1.1  --  1.3* 1.4*   ALK PHOS 302* 260* 266*  --  254* 260*         Lab 03/05/24  0425   PROTIME 16.9*   INR 1.34*                 Brief Urine Lab Results  (Last result in the past 365 days)        Color   Clarity   Blood   Leuk Est   Nitrite   Protein   CREAT   Urine HCG        03/04/24 0307 Yellow   Clear   Large (3+)   Negative   Negative   100 mg/dL (2+)                 Microbiology Results (last 10 days)       ** No results found for the last 240 hours. **            XR Knee 1 or 2 View Right    Result Date: 3/5/2024  Impression:  Mild joint space narrowing in the medial compartment otherwise negative right knee      Jhonathan Mehta MD       Electronically Signed and Approved By: Jhonathan Mehta MD on 3/05/2024 at 17:25             US Abdomen Limited    Result Date: 3/5/2024  Impression:   Scant amount of ascitic fluid in right upper quadrant, patient preferred to hold off on procedure at this time    BAYLEE CALDERON       Electronically Signed and Approved By: JESUS ROQUE MD on 3/05/2024 at 13:09             CT Abdomen Pelvis With Contrast    Result Date: 3/4/2024  Impression:   1. Cirrhosis and portal hypertension.  A TIPS is present. 2. Moderate ascites with generalized anasarca and a trace left pleural effusion. 3. Cholelithiasis without biliary obstruction. 4. Small bowel wall thickening without obstruction.  This may reflect enteritis or could be the sequela of chronic liver disease and ascites. 5. Normal large bowel and appendix. 6. Nonobstructed kidneys.      ADIS RICHARDSON MD       Electronically Signed and Approved By: ADIS RICHARDSON MD on 3/04/2024 at 5:14             CT Head  Without Contrast    Result Date: 3/4/2024  Impression:  Generalized atrophy.  No acute findings.     ADIS RICHARDSON MD       Electronically Signed and Approved By: ADIS RICHARDSON MD on 3/04/2024 at 4:54             XR Chest 1 View    Result Date: 3/4/2024  Impression:  No acute cardiopulmonary findings.       ADIS RICHARDSON MD       Electronically Signed and Approved By: ADIS RICHARDSON MD on 3/04/2024 at 2:44                          Labs Pending at Discharge:        Time spent on Discharge including face to face service:  38 minutes    Electronically signed by Seth Aguilar MD, 03/11/24, 1:36 PM EDT.

## 2024-03-12 NOTE — OUTREACH NOTE
Prep Survey      Flowsheet Row Responses   [facility] facility patient discharged from? [facility]   Is LACE score < 7 ? No   Eligibility Readm Mgmt   Discharge diagnosis weakness, frequent falls   Does the patient have one of the following disease processes/diagnoses(primary or secondary)? Other   Does the patient have Home health ordered? No   Is there a DME ordered? No   Prep survey completed? Yes            Irene ESPARZA - Registered Nurse

## 2024-03-13 ENCOUNTER — HOSPITAL ENCOUNTER (INPATIENT)
Facility: HOSPITAL | Age: 54
LOS: 9 days | Discharge: HOME | DRG: 441 | End: 2024-03-23
Attending: EMERGENCY MEDICINE | Admitting: FAMILY MEDICINE
Payer: COMMERCIAL

## 2024-03-13 ENCOUNTER — APPOINTMENT (OUTPATIENT)
Dept: GENERAL RADIOLOGY | Facility: HOSPITAL | Age: 54
DRG: 441 | End: 2024-03-13
Payer: COMMERCIAL

## 2024-03-13 ENCOUNTER — APPOINTMENT (OUTPATIENT)
Dept: CT IMAGING | Facility: HOSPITAL | Age: 54
DRG: 441 | End: 2024-03-13
Payer: COMMERCIAL

## 2024-03-13 DIAGNOSIS — K76.82 HEPATIC ENCEPHALOPATHY: Primary | ICD-10-CM

## 2024-03-13 DIAGNOSIS — R13.12 OROPHARYNGEAL DYSPHAGIA: ICD-10-CM

## 2024-03-13 DIAGNOSIS — R26.2 DIFFICULTY WALKING: ICD-10-CM

## 2024-03-13 DIAGNOSIS — E11.9 TYPE 2 DIABETES MELLITUS WITHOUT COMPLICATION, WITHOUT LONG-TERM CURRENT USE OF INSULIN: ICD-10-CM

## 2024-03-13 PROBLEM — J18.9 PNEUMONIA: Status: ACTIVE | Noted: 2024-03-13

## 2024-03-13 LAB
ALBUMIN SERPL-MCNC: 2.1 G/DL (ref 3.5–5.2)
ALBUMIN/GLOB SERPL: 0.6 G/DL
ALP SERPL-CCNC: 264 U/L (ref 39–117)
ALT SERPL W P-5'-P-CCNC: 26 U/L (ref 1–41)
AMMONIA BLD-SCNC: 138 UMOL/L (ref 16–60)
AMPHET+METHAMPHET UR QL: NEGATIVE
ANION GAP SERPL CALCULATED.3IONS-SCNC: 9.5 MMOL/L (ref 5–15)
APAP SERPL-MCNC: <5 MCG/ML (ref 0–30)
APTT PPP: 34 SECONDS (ref 24.2–34.2)
AST SERPL-CCNC: 52 U/L (ref 1–40)
BACTERIA UR QL AUTO: ABNORMAL /HPF
BARBITURATES UR QL SCN: NEGATIVE
BASE EXCESS BLDV CALC-SCNC: -5.8 MMOL/L (ref -2–2)
BASOPHILS # BLD AUTO: 0.1 10*3/MM3 (ref 0–0.2)
BASOPHILS NFR BLD AUTO: 1.5 % (ref 0–1.5)
BDY SITE: ABNORMAL
BENZODIAZ UR QL SCN: NEGATIVE
BILIRUB SERPL-MCNC: 1.8 MG/DL (ref 0–1.2)
BILIRUB UR QL STRIP: NEGATIVE
BUN SERPL-MCNC: 44 MG/DL (ref 6–20)
BUN/CREAT SERPL: 18.3 (ref 7–25)
CA-I BLDA-SCNC: 0.9 MMOL/L (ref 1.13–1.32)
CALCIUM SPEC-SCNC: 8.2 MG/DL (ref 8.6–10.5)
CANNABINOIDS SERPL QL: NEGATIVE
CHLORIDE BLDV-SCNC: 110 MMOL/L (ref 98–106)
CHLORIDE SERPL-SCNC: 107 MMOL/L (ref 98–107)
CK SERPL-CCNC: 262 U/L (ref 20–200)
CLARITY UR: CLEAR
CO2 SERPL-SCNC: 19.5 MMOL/L (ref 22–29)
COCAINE UR QL: NEGATIVE
COHGB MFR BLD: 5.5 % (ref 0–1.5)
COLOR UR: YELLOW
CREAT SERPL-MCNC: 2.4 MG/DL (ref 0.76–1.27)
D-LACTATE SERPL-SCNC: 1.9 MMOL/L (ref 0.5–2)
DEPRECATED RDW RBC AUTO: 51.9 FL (ref 37–54)
EGFRCR SERPLBLD CKD-EPI 2021: 31.5 ML/MIN/1.73
EOSINOPHIL # BLD AUTO: 0.43 10*3/MM3 (ref 0–0.4)
EOSINOPHIL NFR BLD AUTO: 6.4 % (ref 0.3–6.2)
ERYTHROCYTE [DISTWIDTH] IN BLOOD BY AUTOMATED COUNT: 16 % (ref 12.3–15.4)
ETHANOL BLD-MCNC: <10 MG/DL (ref 0–10)
ETHANOL UR QL: <0.01 %
FENTANYL UR-MCNC: NEGATIVE NG/ML
FHHB: 3 % (ref 0–5)
GAS FLOW AIRWAY: ABNORMAL L/MIN
GLOBULIN UR ELPH-MCNC: 3.6 GM/DL
GLUCOSE BLDA-MCNC: 340 MG/DL (ref 70–99)
GLUCOSE SERPL-MCNC: 371 MG/DL (ref 65–99)
GLUCOSE UR STRIP-MCNC: ABNORMAL MG/DL
HCO3 BLDV-SCNC: 15 MMOL/L (ref 22–26)
HCT VFR BLD AUTO: 30.8 % (ref 37.5–51)
HGB BLD-MCNC: 10.4 G/DL (ref 13–17.7)
HGB BLDA-MCNC: 12 G/DL (ref 13.8–16.4)
HGB UR QL STRIP.AUTO: ABNORMAL
HOLD SPECIMEN: NORMAL
HOLD SPECIMEN: NORMAL
HYALINE CASTS UR QL AUTO: ABNORMAL /LPF
IMM GRANULOCYTES # BLD AUTO: 0.02 10*3/MM3 (ref 0–0.05)
IMM GRANULOCYTES NFR BLD AUTO: 0.3 % (ref 0–0.5)
INHALED O2 CONCENTRATION: 21 %
INR PPP: 1.17 (ref 0.86–1.15)
KETONES UR QL STRIP: NEGATIVE
LACTATE BLDA-SCNC: 2.33 MMOL/L (ref 0.5–2)
LEUKOCYTE ESTERASE UR QL STRIP.AUTO: NEGATIVE
LYMPHOCYTES # BLD AUTO: 1.1 10*3/MM3 (ref 0.7–3.1)
LYMPHOCYTES NFR BLD AUTO: 16.2 % (ref 19.6–45.3)
MAGNESIUM SERPL-MCNC: 1.9 MG/DL (ref 1.6–2.6)
MCH RBC QN AUTO: 30.5 PG (ref 26.6–33)
MCHC RBC AUTO-ENTMCNC: 33.8 G/DL (ref 31.5–35.7)
MCV RBC AUTO: 90.3 FL (ref 79–97)
METHADONE UR QL SCN: NEGATIVE
METHGB BLD QL: 0.3 % (ref 0–1.5)
MODALITY: ABNORMAL
MONOCYTES # BLD AUTO: 0.63 10*3/MM3 (ref 0.1–0.9)
MONOCYTES NFR BLD AUTO: 9.3 % (ref 5–12)
NEUTROPHILS NFR BLD AUTO: 4.49 10*3/MM3 (ref 1.7–7)
NEUTROPHILS NFR BLD AUTO: 66.3 % (ref 42.7–76)
NITRITE UR QL STRIP: NEGATIVE
NOTE: ABNORMAL
NRBC BLD AUTO-RTO: 0 /100 WBC (ref 0–0.2)
OPIATES UR QL: NEGATIVE
OXYCODONE UR QL SCN: NEGATIVE
OXYHGB MFR BLDV: 91.2 % (ref 94–99)
PCO2 BLDV: 19.1 MM HG (ref 41–51)
PH BLDV: 7.51 PH UNITS (ref 7.31–7.41)
PH UR STRIP.AUTO: 6 [PH] (ref 5–8)
PLATELET # BLD AUTO: 133 10*3/MM3 (ref 140–450)
PMV BLD AUTO: 10.4 FL (ref 6–12)
PO2 BLDV: 91.4 MM HG (ref 35–42)
POTASSIUM BLDV-SCNC: 5.2 MMOL/L (ref 3.5–5)
POTASSIUM SERPL-SCNC: 5.1 MMOL/L (ref 3.5–5.2)
PROT SERPL-MCNC: 5.7 G/DL (ref 6–8.5)
PROT UR QL STRIP: ABNORMAL
PROTHROMBIN TIME: 15.2 SECONDS (ref 11.8–14.9)
RBC # BLD AUTO: 3.41 10*6/MM3 (ref 4.14–5.8)
RBC # UR STRIP: ABNORMAL /HPF
REF LAB TEST METHOD: ABNORMAL
SALICYLATES SERPL-MCNC: <0.3 MG/DL
SAO2 % BLDCOV: 96.8 % (ref 45–75)
SODIUM BLDV-SCNC: 134 MMOL/L (ref 136–146)
SODIUM SERPL-SCNC: 136 MMOL/L (ref 136–145)
SP GR UR STRIP: 1.02 (ref 1–1.03)
SQUAMOUS #/AREA URNS HPF: ABNORMAL /HPF
TROPONIN T SERPL HS-MCNC: 209 NG/L
UROBILINOGEN UR QL STRIP: ABNORMAL
WBC # UR STRIP: ABNORMAL /HPF
WBC NRBC COR # BLD AUTO: 6.77 10*3/MM3 (ref 3.4–10.8)
WHOLE BLOOD HOLD COAG: NORMAL
WHOLE BLOOD HOLD SPECIMEN: NORMAL

## 2024-03-13 PROCEDURE — 82550 ASSAY OF CK (CPK): CPT | Performed by: EMERGENCY MEDICINE

## 2024-03-13 PROCEDURE — 93010 ELECTROCARDIOGRAM REPORT: CPT | Performed by: INTERNAL MEDICINE

## 2024-03-13 PROCEDURE — 25010000002 CEFEPIME PER 500 MG: Performed by: EMERGENCY MEDICINE

## 2024-03-13 PROCEDURE — 82805 BLOOD GASES W/O2 SATURATION: CPT | Performed by: EMERGENCY MEDICINE

## 2024-03-13 PROCEDURE — 25810000003 SODIUM CHLORIDE 0.9 % SOLUTION: Performed by: EMERGENCY MEDICINE

## 2024-03-13 PROCEDURE — 80307 DRUG TEST PRSMV CHEM ANLYZR: CPT | Performed by: EMERGENCY MEDICINE

## 2024-03-13 PROCEDURE — 82820 HEMOGLOBIN-OXYGEN AFFINITY: CPT | Performed by: EMERGENCY MEDICINE

## 2024-03-13 PROCEDURE — 82140 ASSAY OF AMMONIA: CPT | Performed by: EMERGENCY MEDICINE

## 2024-03-13 PROCEDURE — 25010000002 VANCOMYCIN 5 G RECONSTITUTED SOLUTION: Performed by: EMERGENCY MEDICINE

## 2024-03-13 PROCEDURE — 80053 COMPREHEN METABOLIC PANEL: CPT

## 2024-03-13 PROCEDURE — 71045 X-RAY EXAM CHEST 1 VIEW: CPT

## 2024-03-13 PROCEDURE — 85610 PROTHROMBIN TIME: CPT | Performed by: EMERGENCY MEDICINE

## 2024-03-13 PROCEDURE — 93005 ELECTROCARDIOGRAM TRACING: CPT

## 2024-03-13 PROCEDURE — 99291 CRITICAL CARE FIRST HOUR: CPT

## 2024-03-13 PROCEDURE — 85730 THROMBOPLASTIN TIME PARTIAL: CPT | Performed by: EMERGENCY MEDICINE

## 2024-03-13 PROCEDURE — 80143 DRUG ASSAY ACETAMINOPHEN: CPT | Performed by: EMERGENCY MEDICINE

## 2024-03-13 PROCEDURE — 83735 ASSAY OF MAGNESIUM: CPT

## 2024-03-13 PROCEDURE — 36415 COLL VENOUS BLD VENIPUNCTURE: CPT

## 2024-03-13 PROCEDURE — 87040 BLOOD CULTURE FOR BACTERIA: CPT | Performed by: EMERGENCY MEDICINE

## 2024-03-13 PROCEDURE — 84145 PROCALCITONIN (PCT): CPT | Performed by: FAMILY MEDICINE

## 2024-03-13 PROCEDURE — 82077 ASSAY SPEC XCP UR&BREATH IA: CPT | Performed by: EMERGENCY MEDICINE

## 2024-03-13 PROCEDURE — 80179 DRUG ASSAY SALICYLATE: CPT | Performed by: EMERGENCY MEDICINE

## 2024-03-13 PROCEDURE — 84484 ASSAY OF TROPONIN QUANT: CPT

## 2024-03-13 PROCEDURE — P9612 CATHETERIZE FOR URINE SPEC: HCPCS

## 2024-03-13 PROCEDURE — 81001 URINALYSIS AUTO W/SCOPE: CPT | Performed by: EMERGENCY MEDICINE

## 2024-03-13 PROCEDURE — 83605 ASSAY OF LACTIC ACID: CPT | Performed by: EMERGENCY MEDICINE

## 2024-03-13 PROCEDURE — 70450 CT HEAD/BRAIN W/O DYE: CPT

## 2024-03-13 PROCEDURE — 85025 COMPLETE CBC W/AUTO DIFF WBC: CPT

## 2024-03-13 PROCEDURE — 99222 1ST HOSP IP/OBS MODERATE 55: CPT | Performed by: FAMILY MEDICINE

## 2024-03-13 RX ORDER — VANCOMYCIN 2 GRAM/500 ML IN 0.9 % SODIUM CHLORIDE INTRAVENOUS
20 ONCE
Status: COMPLETED | OUTPATIENT
Start: 2024-03-13 | End: 2024-03-14

## 2024-03-13 RX ORDER — SODIUM CHLORIDE 0.9 % (FLUSH) 0.9 %
10 SYRINGE (ML) INJECTION AS NEEDED
Status: DISCONTINUED | OUTPATIENT
Start: 2024-03-13 | End: 2024-03-23 | Stop reason: HOSPADM

## 2024-03-13 RX ADMIN — VANCOMYCIN HYDROCHLORIDE 2000 MG: 5 INJECTION, POWDER, LYOPHILIZED, FOR SOLUTION INTRAVENOUS at 23:21

## 2024-03-13 RX ADMIN — CEFEPIME 2000 MG: 2 INJECTION, POWDER, FOR SOLUTION INTRAVENOUS at 22:15

## 2024-03-13 RX ADMIN — SODIUM CHLORIDE 500 ML: 9 INJECTION, SOLUTION INTRAVENOUS at 22:11

## 2024-03-14 ENCOUNTER — APPOINTMENT (OUTPATIENT)
Dept: GENERAL RADIOLOGY | Facility: HOSPITAL | Age: 54
DRG: 441 | End: 2024-03-14
Payer: COMMERCIAL

## 2024-03-14 ENCOUNTER — APPOINTMENT (OUTPATIENT)
Dept: ULTRASOUND IMAGING | Facility: HOSPITAL | Age: 54
DRG: 441 | End: 2024-03-14
Payer: COMMERCIAL

## 2024-03-14 ENCOUNTER — APPOINTMENT (OUTPATIENT)
Dept: CT IMAGING | Facility: HOSPITAL | Age: 54
DRG: 441 | End: 2024-03-14
Payer: COMMERCIAL

## 2024-03-14 ENCOUNTER — READMISSION MANAGEMENT (OUTPATIENT)
Dept: CALL CENTER | Facility: HOSPITAL | Age: 54
End: 2024-03-14
Payer: COMMERCIAL

## 2024-03-14 LAB
ALBUMIN FLD-MCNC: 0.7 G/DL
ALBUMIN SERPL-MCNC: 1.8 G/DL (ref 3.5–5.2)
ALBUMIN/GLOB SERPL: 0.5 G/DL
ALP SERPL-CCNC: 249 U/L (ref 39–117)
ALT SERPL W P-5'-P-CCNC: 23 U/L (ref 1–41)
AMYLASE FLD-CCNC: 18 U/L
ANION GAP SERPL CALCULATED.3IONS-SCNC: 9.5 MMOL/L (ref 5–15)
APPEARANCE FLD: CLEAR
AST SERPL-CCNC: 45 U/L (ref 1–40)
BASOPHILS # BLD MANUAL: 0.18 10*3/MM3 (ref 0–0.2)
BASOPHILS NFR BLD MANUAL: 3 % (ref 0–1.5)
BILIRUB SERPL-MCNC: 1.7 MG/DL (ref 0–1.2)
BUN SERPL-MCNC: 43 MG/DL (ref 6–20)
BUN/CREAT SERPL: 18.5 (ref 7–25)
CALCIUM SPEC-SCNC: 8.1 MG/DL (ref 8.6–10.5)
CHLORIDE SERPL-SCNC: 111 MMOL/L (ref 98–107)
CO2 SERPL-SCNC: 17.5 MMOL/L (ref 22–29)
COLOR FLD: NORMAL
CREAT SERPL-MCNC: 2.32 MG/DL (ref 0.76–1.27)
DEPRECATED RDW RBC AUTO: 51 FL (ref 37–54)
DEPRECATED RDW RBC AUTO: 52.6 FL (ref 37–54)
EGFRCR SERPLBLD CKD-EPI 2021: 32.8 ML/MIN/1.73
EOSINOPHIL # BLD MANUAL: 0.12 10*3/MM3 (ref 0–0.4)
EOSINOPHIL NFR BLD MANUAL: 2 % (ref 0.3–6.2)
ERYTHROCYTE [DISTWIDTH] IN BLOOD BY AUTOMATED COUNT: 15.9 % (ref 12.3–15.4)
ERYTHROCYTE [DISTWIDTH] IN BLOOD BY AUTOMATED COUNT: 16 % (ref 12.3–15.4)
GEN 5 2HR TROPONIN T REFLEX: 188 NG/L
GLOBULIN UR ELPH-MCNC: 3.6 GM/DL
GLUCOSE BLDC GLUCOMTR-MCNC: 102 MG/DL (ref 70–99)
GLUCOSE BLDC GLUCOMTR-MCNC: 110 MG/DL (ref 70–99)
GLUCOSE BLDC GLUCOMTR-MCNC: 114 MG/DL (ref 70–99)
GLUCOSE BLDC GLUCOMTR-MCNC: 118 MG/DL (ref 70–99)
GLUCOSE BLDC GLUCOMTR-MCNC: 122 MG/DL (ref 70–99)
GLUCOSE BLDC GLUCOMTR-MCNC: 126 MG/DL (ref 70–99)
GLUCOSE BLDC GLUCOMTR-MCNC: 177 MG/DL (ref 70–99)
GLUCOSE BLDC GLUCOMTR-MCNC: 216 MG/DL (ref 70–99)
GLUCOSE BLDC GLUCOMTR-MCNC: 265 MG/DL (ref 70–99)
GLUCOSE BLDC GLUCOMTR-MCNC: 280 MG/DL (ref 70–99)
GLUCOSE BLDC GLUCOMTR-MCNC: 300 MG/DL (ref 70–99)
GLUCOSE BLDC GLUCOMTR-MCNC: 302 MG/DL (ref 70–99)
GLUCOSE BLDC GLUCOMTR-MCNC: 82 MG/DL (ref 70–99)
GLUCOSE FLD-MCNC: 242 MG/DL
GLUCOSE SERPL-MCNC: 311 MG/DL (ref 65–99)
HCT VFR BLD AUTO: 29.6 % (ref 37.5–51)
HCT VFR BLD AUTO: 31.2 % (ref 37.5–51)
HGB BLD-MCNC: 10 G/DL (ref 13–17.7)
HGB BLD-MCNC: 9.5 G/DL (ref 13–17.7)
HOLD SPECIMEN: NORMAL
INR PPP: 1.34 (ref 0.86–1.15)
LARGE PLATELETS: ABNORMAL
LDH FLD-CCNC: 77 U/L
LYMPHOCYTES # BLD MANUAL: 0.42 10*3/MM3 (ref 0.7–3.1)
LYMPHOCYTES NFR BLD MANUAL: 3 % (ref 5–12)
LYMPHOCYTES NFR FLD MANUAL: 56 %
MACROPHAGE FLUID: 16 %
MAGNESIUM SERPL-MCNC: 1.8 MG/DL (ref 1.6–2.6)
MCH RBC QN AUTO: 29.2 PG (ref 26.6–33)
MCH RBC QN AUTO: 29.3 PG (ref 26.6–33)
MCHC RBC AUTO-ENTMCNC: 32.1 G/DL (ref 31.5–35.7)
MCHC RBC AUTO-ENTMCNC: 32.1 G/DL (ref 31.5–35.7)
MCV RBC AUTO: 91 FL (ref 79–97)
MCV RBC AUTO: 91.4 FL (ref 79–97)
MESOTHL CELL NFR FLD MANUAL: 12 %
MONOCYTES # BLD: 0.18 10*3/MM3 (ref 0.1–0.9)
MONOCYTES NFR FLD: 4 %
MRSA DNA SPEC QL NAA+PROBE: NORMAL
NEUTROPHILS # BLD AUTO: 5.11 10*3/MM3 (ref 1.7–7)
NEUTROPHILS NFR BLD MANUAL: 85 % (ref 42.7–76)
NEUTROPHILS NFR FLD MANUAL: 12 %
NUC CELL # FLD: 106 /MM3
PLATELET # BLD AUTO: 119 10*3/MM3 (ref 140–450)
PLATELET # BLD AUTO: 125 10*3/MM3 (ref 140–450)
PMV BLD AUTO: 10 FL (ref 6–12)
PMV BLD AUTO: 9.9 FL (ref 6–12)
POTASSIUM SERPL-SCNC: 4.9 MMOL/L (ref 3.5–5.2)
PROCALCITONIN SERPL-MCNC: 0.43 NG/ML (ref 0–0.25)
PROT FLD-MCNC: 1.3 G/DL
PROT SERPL-MCNC: 5.4 G/DL (ref 6–8.5)
PROTHROMBIN TIME: 16.9 SECONDS (ref 11.8–14.9)
RBC # BLD AUTO: 3.24 10*6/MM3 (ref 4.14–5.8)
RBC # BLD AUTO: 3.43 10*6/MM3 (ref 4.14–5.8)
RBC # FLD AUTO: <2000 /MM3
RBC MORPH BLD: NORMAL
SMALL PLATELETS BLD QL SMEAR: ABNORMAL
SODIUM SERPL-SCNC: 138 MMOL/L (ref 136–145)
T4 FREE SERPL-MCNC: 0.79 NG/DL (ref 0.93–1.7)
TROPONIN T DELTA: -21 NG/L
TSH SERPL DL<=0.05 MIU/L-ACNC: 16.57 UIU/ML (ref 0.27–4.2)
VARIANT LYMPHS NFR BLD MANUAL: 7 % (ref 19.6–45.3)
WBC MORPH BLD: NORMAL
WBC NRBC COR # BLD AUTO: 5.03 10*3/MM3 (ref 3.4–10.8)
WBC NRBC COR # BLD AUTO: 6.01 10*3/MM3 (ref 3.4–10.8)

## 2024-03-14 PROCEDURE — 85027 COMPLETE CBC AUTOMATED: CPT | Performed by: INTERNAL MEDICINE

## 2024-03-14 PROCEDURE — 25010000002 HEPARIN (PORCINE) PER 1000 UNITS: Performed by: FAMILY MEDICINE

## 2024-03-14 PROCEDURE — 83735 ASSAY OF MAGNESIUM: CPT | Performed by: FAMILY MEDICINE

## 2024-03-14 PROCEDURE — 99233 SBSQ HOSP IP/OBS HIGH 50: CPT | Performed by: INTERNAL MEDICINE

## 2024-03-14 PROCEDURE — 83615 LACTATE (LD) (LDH) ENZYME: CPT | Performed by: INTERNAL MEDICINE

## 2024-03-14 PROCEDURE — 25010000002 THIAMINE PER 100 MG

## 2024-03-14 PROCEDURE — 89051 BODY FLUID CELL COUNT: CPT | Performed by: INTERNAL MEDICINE

## 2024-03-14 PROCEDURE — 82042 OTHER SOURCE ALBUMIN QUAN EA: CPT | Performed by: INTERNAL MEDICINE

## 2024-03-14 PROCEDURE — 93010 ELECTROCARDIOGRAM REPORT: CPT | Performed by: INTERNAL MEDICINE

## 2024-03-14 PROCEDURE — 71045 X-RAY EXAM CHEST 1 VIEW: CPT

## 2024-03-14 PROCEDURE — 76705 ECHO EXAM OF ABDOMEN: CPT

## 2024-03-14 PROCEDURE — 84439 ASSAY OF FREE THYROXINE: CPT | Performed by: NURSE PRACTITIONER

## 2024-03-14 PROCEDURE — 0W9G3ZZ DRAINAGE OF PERITONEAL CAVITY, PERCUTANEOUS APPROACH: ICD-10-PCS | Performed by: INTERNAL MEDICINE

## 2024-03-14 PROCEDURE — 25010000002 THIAMINE PER 100 MG: Performed by: INTERNAL MEDICINE

## 2024-03-14 PROCEDURE — 87102 FUNGUS ISOLATION CULTURE: CPT | Performed by: INTERNAL MEDICINE

## 2024-03-14 PROCEDURE — 82948 REAGENT STRIP/BLOOD GLUCOSE: CPT

## 2024-03-14 PROCEDURE — 85007 BL SMEAR W/DIFF WBC COUNT: CPT | Performed by: FAMILY MEDICINE

## 2024-03-14 PROCEDURE — 93005 ELECTROCARDIOGRAM TRACING: CPT | Performed by: FAMILY MEDICINE

## 2024-03-14 PROCEDURE — 87015 SPECIMEN INFECT AGNT CONCNTJ: CPT | Performed by: INTERNAL MEDICINE

## 2024-03-14 PROCEDURE — 99221 1ST HOSP IP/OBS SF/LOW 40: CPT | Performed by: NEUROLOGICAL SURGERY

## 2024-03-14 PROCEDURE — 83036 HEMOGLOBIN GLYCOSYLATED A1C: CPT | Performed by: NURSE PRACTITIONER

## 2024-03-14 PROCEDURE — 25010000002 CEFEPIME PER 500 MG: Performed by: FAMILY MEDICINE

## 2024-03-14 PROCEDURE — 25010000002 CEFTRIAXONE PER 250 MG: Performed by: NURSE PRACTITIONER

## 2024-03-14 PROCEDURE — 87075 CULTR BACTERIA EXCEPT BLOOD: CPT | Performed by: INTERNAL MEDICINE

## 2024-03-14 PROCEDURE — 85610 PROTHROMBIN TIME: CPT | Performed by: INTERNAL MEDICINE

## 2024-03-14 PROCEDURE — 85027 COMPLETE CBC AUTOMATED: CPT | Performed by: FAMILY MEDICINE

## 2024-03-14 PROCEDURE — 71250 CT THORAX DX C-: CPT

## 2024-03-14 PROCEDURE — 74018 RADEX ABDOMEN 1 VIEW: CPT

## 2024-03-14 PROCEDURE — 99291 CRITICAL CARE FIRST HOUR: CPT | Performed by: INTERNAL MEDICINE

## 2024-03-14 PROCEDURE — 84443 ASSAY THYROID STIM HORMONE: CPT | Performed by: FAMILY MEDICINE

## 2024-03-14 PROCEDURE — 84157 ASSAY OF PROTEIN OTHER: CPT | Performed by: INTERNAL MEDICINE

## 2024-03-14 PROCEDURE — 80053 COMPREHEN METABOLIC PANEL: CPT | Performed by: FAMILY MEDICINE

## 2024-03-14 PROCEDURE — 88108 CYTOPATH CONCENTRATE TECH: CPT | Performed by: INTERNAL MEDICINE

## 2024-03-14 PROCEDURE — 87641 MR-STAPH DNA AMP PROBE: CPT | Performed by: FAMILY MEDICINE

## 2024-03-14 PROCEDURE — 82945 GLUCOSE OTHER FLUID: CPT | Performed by: INTERNAL MEDICINE

## 2024-03-14 PROCEDURE — 49083 ABD PARACENTESIS W/IMAGING: CPT | Performed by: INTERNAL MEDICINE

## 2024-03-14 PROCEDURE — 25810000003 SODIUM CHLORIDE 0.9 % SOLUTION: Performed by: FAMILY MEDICINE

## 2024-03-14 PROCEDURE — 82150 ASSAY OF AMYLASE: CPT | Performed by: INTERNAL MEDICINE

## 2024-03-14 PROCEDURE — 25010000002 LORAZEPAM PER 2 MG

## 2024-03-14 PROCEDURE — 87070 CULTURE OTHR SPECIMN AEROBIC: CPT | Performed by: INTERNAL MEDICINE

## 2024-03-14 PROCEDURE — 87205 SMEAR GRAM STAIN: CPT | Performed by: INTERNAL MEDICINE

## 2024-03-14 PROCEDURE — 87581 M.PNEUMON DNA AMP PROBE: CPT | Performed by: FAMILY MEDICINE

## 2024-03-14 RX ORDER — THIAMINE HYDROCHLORIDE 100 MG/ML
200 INJECTION, SOLUTION INTRAMUSCULAR; INTRAVENOUS EVERY 8 HOURS SCHEDULED
Status: DISCONTINUED | OUTPATIENT
Start: 2024-03-14 | End: 2024-03-14

## 2024-03-14 RX ORDER — LORAZEPAM 2 MG/1
2 TABLET ORAL EVERY 6 HOURS
Status: DISCONTINUED | OUTPATIENT
Start: 2024-03-14 | End: 2024-03-14

## 2024-03-14 RX ORDER — LORAZEPAM 2 MG/ML
1 INJECTION INTRAMUSCULAR
Status: DISCONTINUED | OUTPATIENT
Start: 2024-03-14 | End: 2024-03-14

## 2024-03-14 RX ORDER — BISACODYL 5 MG/1
5 TABLET, DELAYED RELEASE ORAL DAILY PRN
Status: DISCONTINUED | OUTPATIENT
Start: 2024-03-14 | End: 2024-03-14

## 2024-03-14 RX ORDER — LORAZEPAM 0.5 MG/1
2 TABLET ORAL
Status: DISCONTINUED | OUTPATIENT
Start: 2024-03-14 | End: 2024-03-14

## 2024-03-14 RX ORDER — FAMOTIDINE 20 MG/1
20 TABLET, FILM COATED ORAL DAILY
Status: DISCONTINUED | OUTPATIENT
Start: 2024-03-15 | End: 2024-03-20

## 2024-03-14 RX ORDER — AMOXICILLIN 250 MG
2 CAPSULE ORAL 2 TIMES DAILY PRN
Status: DISCONTINUED | OUTPATIENT
Start: 2024-03-14 | End: 2024-03-14

## 2024-03-14 RX ORDER — LORAZEPAM 2 MG/ML
2 INJECTION INTRAMUSCULAR
Status: DISCONTINUED | OUTPATIENT
Start: 2024-03-14 | End: 2024-03-16

## 2024-03-14 RX ORDER — SODIUM CHLORIDE 0.9 % (FLUSH) 0.9 %
10 SYRINGE (ML) INJECTION AS NEEDED
Status: DISCONTINUED | OUTPATIENT
Start: 2024-03-14 | End: 2024-03-23 | Stop reason: HOSPADM

## 2024-03-14 RX ORDER — FAMOTIDINE 20 MG/1
20 TABLET, FILM COATED ORAL DAILY
Status: DISCONTINUED | OUTPATIENT
Start: 2024-03-14 | End: 2024-03-14

## 2024-03-14 RX ORDER — LORAZEPAM 2 MG/ML
1 INJECTION INTRAMUSCULAR ONCE
Status: DISCONTINUED | OUTPATIENT
Start: 2024-03-14 | End: 2024-03-14

## 2024-03-14 RX ORDER — SODIUM CHLORIDE 9 MG/ML
100 INJECTION, SOLUTION INTRAVENOUS CONTINUOUS
Status: DISCONTINUED | OUTPATIENT
Start: 2024-03-14 | End: 2024-03-14

## 2024-03-14 RX ORDER — LORAZEPAM 2 MG/ML
2 INJECTION INTRAMUSCULAR
Status: DISCONTINUED | OUTPATIENT
Start: 2024-03-14 | End: 2024-03-14

## 2024-03-14 RX ORDER — LACTULOSE 10 G/15ML
30 SOLUTION ORAL EVERY 6 HOURS
Status: DISCONTINUED | OUTPATIENT
Start: 2024-03-14 | End: 2024-03-16

## 2024-03-14 RX ORDER — NITROGLYCERIN 0.4 MG/1
0.4 TABLET SUBLINGUAL
Status: DISCONTINUED | OUTPATIENT
Start: 2024-03-14 | End: 2024-03-23 | Stop reason: HOSPADM

## 2024-03-14 RX ORDER — AMOXICILLIN 250 MG
2 CAPSULE ORAL 2 TIMES DAILY PRN
Status: DISCONTINUED | OUTPATIENT
Start: 2024-03-14 | End: 2024-03-20

## 2024-03-14 RX ORDER — LEVOTHYROXINE SODIUM 0.05 MG/1
50 TABLET ORAL
Status: DISCONTINUED | OUTPATIENT
Start: 2024-03-14 | End: 2024-03-14

## 2024-03-14 RX ORDER — DEXTROSE MONOHYDRATE 25 G/50ML
10-50 INJECTION, SOLUTION INTRAVENOUS
Status: DISCONTINUED | OUTPATIENT
Start: 2024-03-14 | End: 2024-03-16

## 2024-03-14 RX ORDER — DEXMEDETOMIDINE HYDROCHLORIDE 4 UG/ML
.2-1.5 INJECTION, SOLUTION INTRAVENOUS
Status: DISCONTINUED | OUTPATIENT
Start: 2024-03-14 | End: 2024-03-15

## 2024-03-14 RX ORDER — ONDANSETRON 2 MG/ML
4 INJECTION INTRAMUSCULAR; INTRAVENOUS EVERY 6 HOURS PRN
Status: DISCONTINUED | OUTPATIENT
Start: 2024-03-14 | End: 2024-03-23 | Stop reason: HOSPADM

## 2024-03-14 RX ORDER — SODIUM CHLORIDE 0.9 % (FLUSH) 0.9 %
10 SYRINGE (ML) INJECTION EVERY 12 HOURS SCHEDULED
Status: DISCONTINUED | OUTPATIENT
Start: 2024-03-14 | End: 2024-03-23 | Stop reason: HOSPADM

## 2024-03-14 RX ORDER — LORAZEPAM 0.5 MG/1
2 TABLET ORAL
Status: DISCONTINUED | OUTPATIENT
Start: 2024-03-14 | End: 2024-03-16

## 2024-03-14 RX ORDER — BISACODYL 10 MG
10 SUPPOSITORY, RECTAL RECTAL DAILY PRN
Status: DISCONTINUED | OUTPATIENT
Start: 2024-03-14 | End: 2024-03-14

## 2024-03-14 RX ORDER — NICOTINE POLACRILEX 4 MG
15 LOZENGE BUCCAL
Status: DISCONTINUED | OUTPATIENT
Start: 2024-03-14 | End: 2024-03-15

## 2024-03-14 RX ORDER — LORAZEPAM 0.5 MG/1
1 TABLET ORAL
Status: DISCONTINUED | OUTPATIENT
Start: 2024-03-14 | End: 2024-03-14

## 2024-03-14 RX ORDER — LORAZEPAM 2 MG/ML
1 INJECTION INTRAMUSCULAR EVERY 4 HOURS PRN
Status: DISCONTINUED | OUTPATIENT
Start: 2024-03-14 | End: 2024-03-14

## 2024-03-14 RX ORDER — SODIUM CHLORIDE 9 MG/ML
40 INJECTION, SOLUTION INTRAVENOUS AS NEEDED
Status: DISCONTINUED | OUTPATIENT
Start: 2024-03-14 | End: 2024-03-23 | Stop reason: HOSPADM

## 2024-03-14 RX ORDER — MULTIVIT AND MINERALS-FERROUS GLUCONATE 9 MG IRON/15 ML ORAL LIQUID 9 MG/15 ML
15 LIQUID (ML) ORAL DAILY
Status: DISCONTINUED | OUTPATIENT
Start: 2024-03-15 | End: 2024-03-20

## 2024-03-14 RX ORDER — POLYETHYLENE GLYCOL 3350 17 G/17G
17 POWDER, FOR SOLUTION ORAL DAILY PRN
Status: DISCONTINUED | OUTPATIENT
Start: 2024-03-14 | End: 2024-03-14

## 2024-03-14 RX ORDER — MULTIVIT AND MINERALS-FERROUS GLUCONATE 9 MG IRON/15 ML ORAL LIQUID 9 MG/15 ML
15 LIQUID (ML) ORAL DAILY
Status: DISCONTINUED | OUTPATIENT
Start: 2024-03-14 | End: 2024-03-14

## 2024-03-14 RX ORDER — POLYETHYLENE GLYCOL 3350 17 G/17G
17 POWDER, FOR SOLUTION ORAL DAILY PRN
Status: DISCONTINUED | OUTPATIENT
Start: 2024-03-14 | End: 2024-03-20

## 2024-03-14 RX ORDER — LEVOTHYROXINE SODIUM 0.05 MG/1
50 TABLET ORAL
Status: DISCONTINUED | OUTPATIENT
Start: 2024-03-15 | End: 2024-03-20

## 2024-03-14 RX ORDER — LORAZEPAM 0.5 MG/1
1 TABLET ORAL
Status: DISCONTINUED | OUTPATIENT
Start: 2024-03-14 | End: 2024-03-16

## 2024-03-14 RX ORDER — METHION/INOS/CHOL BT/B COM/LIV 110MG-86MG
100 CAPSULE ORAL DAILY
Status: DISCONTINUED | OUTPATIENT
Start: 2024-03-19 | End: 2024-03-20

## 2024-03-14 RX ORDER — BISACODYL 10 MG
10 SUPPOSITORY, RECTAL RECTAL DAILY PRN
Status: DISCONTINUED | OUTPATIENT
Start: 2024-03-14 | End: 2024-03-20

## 2024-03-14 RX ORDER — LORAZEPAM 2 MG/ML
1 INJECTION INTRAMUSCULAR
Status: DISCONTINUED | OUTPATIENT
Start: 2024-03-14 | End: 2024-03-16

## 2024-03-14 RX ORDER — THIAMINE HYDROCHLORIDE 100 MG/ML
200 INJECTION, SOLUTION INTRAMUSCULAR; INTRAVENOUS EVERY 8 HOURS SCHEDULED
Status: COMPLETED | OUTPATIENT
Start: 2024-03-14 | End: 2024-03-18

## 2024-03-14 RX ORDER — METFORMIN HYDROCHLORIDE 500 MG/1
1000 TABLET, EXTENDED RELEASE ORAL
COMMUNITY
Start: 2024-03-11 | End: 2024-03-23 | Stop reason: HOSPADM

## 2024-03-14 RX ORDER — LORAZEPAM 0.5 MG/1
1 TABLET ORAL EVERY 6 HOURS
Status: DISCONTINUED | OUTPATIENT
Start: 2024-03-15 | End: 2024-03-14

## 2024-03-14 RX ORDER — LACTULOSE 10 G/15ML
30 SOLUTION ORAL 3 TIMES DAILY
Status: DISCONTINUED | OUTPATIENT
Start: 2024-03-14 | End: 2024-03-14

## 2024-03-14 RX ORDER — HEPARIN SODIUM 5000 [USP'U]/ML
5000 INJECTION, SOLUTION INTRAVENOUS; SUBCUTANEOUS EVERY 12 HOURS SCHEDULED
Status: DISCONTINUED | OUTPATIENT
Start: 2024-03-14 | End: 2024-03-23 | Stop reason: HOSPADM

## 2024-03-14 RX ORDER — IBUPROFEN 600 MG/1
1 TABLET ORAL
Status: DISCONTINUED | OUTPATIENT
Start: 2024-03-14 | End: 2024-03-15

## 2024-03-14 RX ORDER — METHION/INOS/CHOL BT/B COM/LIV 110MG-86MG
100 CAPSULE ORAL DAILY
Status: DISCONTINUED | OUTPATIENT
Start: 2024-03-19 | End: 2024-03-14

## 2024-03-14 RX ADMIN — THIAMINE HYDROCHLORIDE 200 MG: 100 INJECTION, SOLUTION INTRAMUSCULAR; INTRAVENOUS at 05:15

## 2024-03-14 RX ADMIN — FAMOTIDINE 20 MG: 20 TABLET ORAL at 10:48

## 2024-03-14 RX ADMIN — THIAMINE HYDROCHLORIDE 200 MG: 100 INJECTION, SOLUTION INTRAMUSCULAR; INTRAVENOUS at 21:44

## 2024-03-14 RX ADMIN — CEFEPIME 2000 MG: 2 INJECTION, POWDER, FOR SOLUTION INTRAVENOUS at 05:36

## 2024-03-14 RX ADMIN — CEFTRIAXONE SODIUM 2000 MG: 2 INJECTION, POWDER, FOR SOLUTION INTRAMUSCULAR; INTRAVENOUS at 13:54

## 2024-03-14 RX ADMIN — RIFAXIMIN 550 MG: 550 TABLET ORAL at 21:44

## 2024-03-14 RX ADMIN — HEPARIN SODIUM 5000 UNITS: 5000 INJECTION INTRAVENOUS; SUBCUTANEOUS at 21:44

## 2024-03-14 RX ADMIN — INSULIN HUMAN 4.8 UNITS/HR: 1 INJECTION, SOLUTION INTRAVENOUS at 10:54

## 2024-03-14 RX ADMIN — LACTULOSE 30 G: 20 SOLUTION ORAL at 15:18

## 2024-03-14 RX ADMIN — Medication 10 ML: at 09:47

## 2024-03-14 RX ADMIN — LORAZEPAM 2 MG: 2 INJECTION INTRAMUSCULAR; INTRAVENOUS at 04:34

## 2024-03-14 RX ADMIN — Medication 10 ML: at 21:23

## 2024-03-14 RX ADMIN — Medication 15 ML: at 10:47

## 2024-03-14 RX ADMIN — Medication 10 ML: at 09:49

## 2024-03-14 RX ADMIN — RIFAXIMIN 550 MG: 550 TABLET ORAL at 10:47

## 2024-03-14 RX ADMIN — SODIUM CHLORIDE 100 ML/HR: 9 INJECTION, SOLUTION INTRAVENOUS at 02:17

## 2024-03-14 RX ADMIN — LACTULOSE 30 G: 20 SOLUTION ORAL at 21:44

## 2024-03-14 RX ADMIN — Medication 10 ML: at 02:17

## 2024-03-14 RX ADMIN — DEXMEDETOMIDINE HYDROCHLORIDE 0.2 MCG/KG/HR: 4 INJECTION, SOLUTION INTRAVENOUS at 04:11

## 2024-03-14 RX ADMIN — FOLIC ACID 1 MG: 5 INJECTION, SOLUTION INTRAMUSCULAR; INTRAVENOUS; SUBCUTANEOUS at 09:46

## 2024-03-14 RX ADMIN — LORAZEPAM 1 MG: 2 INJECTION INTRAMUSCULAR; INTRAVENOUS at 02:41

## 2024-03-14 RX ADMIN — LACTULOSE 30 G: 20 SOLUTION ORAL at 10:47

## 2024-03-14 RX ADMIN — THIAMINE HYDROCHLORIDE 200 MG: 100 INJECTION, SOLUTION INTRAMUSCULAR; INTRAVENOUS at 13:54

## 2024-03-14 NOTE — PLAN OF CARE
Goal Outcome Evaluation:     Plan of care reviewed with: Family    Progress: Ongoing, progressing    Outcome: Patient disoriented x4, will arouse to painful vigorous stimuli. Precedex off at start of shift. Bedside paracentesis completed. US liver completed. NG placement completed. CT chest completed. Sinus bradycardia 50-60s. Room air. Bladder scan completed and martin catheter placed per acute retention protocol. Warming blanket on bladder temp 95.9.

## 2024-03-14 NOTE — H&P
[facility]   HISTORY AND PHYSICAL    Patient Name: Simon Peters  : 1970  MRN: [id number]  Primary Care Physician:  Elizabeth Woo MD  Date of admission: 3/13/2024    Subjective   Subjective     Chief Complaint: Altered mental status    History of Present Illness  Patient is a 53-year-old male with past medical history of cirrhosis, renal failure, diabetes mellitus, hypertension who presents with altered mental status.  Patient has a history of hepatic encephalopathy.  Here in the ED he is and not coherent enough to give a review of systems.  He is asking people to close his eggs and is unaware of self time or place.  His workup here has shown a newly developed left infrahilar pneumonia.  This coupled with slightly worsened renal functions and a greatly elevated ammonia of 138 has the ED physician asking that the patient be admitted for further evaluation.  Review of Systems   Review of systems could not be performed given patient's altered mental status  Personal History     Past Medical History:   Diagnosis Date    Abnormal LFTs     Anxiety     Back pain     Diabetes mellitus     Hypertension     Kidney failure     Liver failure     Rash        Past Surgical History:   Procedure Laterality Date    TIPS PROCEDURE         Family History: family history includes Diabetes in his brother and father; Diabetes type II in his father; Heart disease in his father; Hypertension in his father; Stroke in his father. Otherwise pertinent FHx was reviewed and not pertinent to current issue.    Social History:  reports that he has been smoking cigarettes. He started smoking about 39 years ago. He has a 19.6 pack-year smoking history. He has never used smokeless tobacco. He reports that he does not currently use alcohol. He reports that he does not use drugs.    Home Medications:  FLUoxetine, famotidine, folic acid, furosemide, gabapentin, hydrOXYzine pamoate, lactulose, metFORMIN, multivitamin with minerals, nadolol,  riFAXIMin, spironolactone, and thiamine    Allergies:  No Known Allergies    Objective    Objective     Vitals:   Temp:  [98.9 °F (37.2 °C)] 98.9 °F (37.2 °C)  Heart Rate:  [64-70] 65  Resp:  [16] 16  BP: (185)/(95) 185/95    Physical Exam  Constitutional:  Well-developed and well-nourished.  No acute distress.      HENT:  Head:  Normocephalic and atraumatic.  Mouth:  Moist mucous membranes.    Eyes:  Conjunctivae and EOM are normal. No scleral icterus.    Neck:  Neck supple.  No JVD present.    Cardiovascular:  Normal rate, regular rhythm and normal heart sounds with no murmur.  Pulmonary/Chest:  No respiratory distress, no wheezes, no crackles, with normal breath sounds and good air movement.  Abdominal:  Soft. No distension and no tenderness.   Musculoskeletal:  No tenderness, and no deformity.  No red or swollen joints anywhere.    Neurological:  Alert and oriented to person, place, and time.  No cranial nerve deficit.    Skin:  Skin is warm and dry. No rash noted. No pallor.   Peripheral vascular:  No clubbing, no cyanosis, no edema.  Psychiatric: Appropriate mood and affect  :    Result Review    Result Review:  I have personally reviewed the results from the time of this admission to 3/13/2024 23:55 EDT and agree with these findings:  []  Laboratory list / accordion  []  Microbiology  []  Radiology  []  EKG/Telemetry   []  Cardiology/Vascular   []  Pathology  []  Old records  []  Other:  Most notable findings include: Elevated ammonia level new pneumonia on chest x-ray      Assessment & Plan   Assessment / Plan     Brief Patient Summary:  Simon Peters is a 53 y.o. male who presents with altered mental status.  He was found to have elevated ammonia and a new pneumonia.    Active Hospital Problems:  1.  Left pneumonia  2.  Hepatic encephalopathy  3.  Elevated troponin rule out MI  4.  Acute on chronic renal failure  5.  Hypertensive urgency  6.  Diabetes mellitus  Plan:   Patient will be admitted to the  hospital service  Patient was started on pneumonia pathway  Patient will be started on broad-spectrum IV antibiotics  We will treat the patient with aggressive lactulose therapy orally  Will gently hydrate the patient overnight and repeat kidney function in the a.m.  Will continue to trend troponin levels and obtain follow-up EKG  Will treat the patient's hypertensive urgency    DVT prophylaxis:  Medical DVT prophylaxis orders are signed and held.          CODE STATUS:    Code Status (Patient has no pulse and is not breathing): CPR (Attempt to Resuscitate)  Medical Interventions (Patient has pulse or is breathing): Full Support    Admission Status:  I believe this patient meets inpatient status.    Jhonathan Moore, DO

## 2024-03-14 NOTE — PAYOR COMM NOTE
"Simon Peters (53 y.o. Male)     PATIENT INFORMATION  Name:  Simon Peters  MRN#:     [id number]  :  1970       ADMISSION INFORMATION  CLASS: Inpatient   DOS:  3/13/24    CURRENT ATTENDING PROVIDER INFORMATION  Name/NPI: Kosta Sampson MD [[id number]]  Phone: Phone: [phone]      REQUESTING PROVIDER and RENDERING FACILITY  Name:  [facility]   NPI:  [id number]  TID:  [id number]  Address:      [address] [place] [address]  Phone  [phone]      UTILIZATION REVIEW CONTACT INFORMATION  Phone:      [phone]  Fax:           [phone]    ADMISSION DIAGNOSIS  Hepatic encephalopathy [K76.82]  Pneumonia [J18.9]  Elevated troponin   R79.89  Acute on Chronic Renal failure. N17.9; N18.9  +++++++++++++++++++++++++++++++++++++++++++++++++++++++++++++++++++++++++++++++      Date of Birth   1970    Social Security Number       Address   [address] [place] [address]    Home Phone   [phone]    MRN   [id number]       [place]   None    Marital Status                               Admission Date   3/13/24    Admission Type   Emergency    Admitting Provider   Jhonathan Moore DO    Attending Provider   Kosta Sampson MD    Department, Room/Bed   [facility] CORONARY CARE UNIT, C08/1       Discharge Date       Discharge Disposition       Discharge Destination                                 Attending Provider: Kosta Sampson MD    Allergies: No Known Allergies    Isolation: None   Infection: None   Code Status: CPR    Ht: 175.3 cm (69.02\")   Wt: 96.2 kg (212 lb 1.3 oz)    Admission Cmt: None   Principal Problem: Pneumonia [J18.9]                   Active Insurance as of 3/13/2024       Primary Coverage       Payor Plan Insurance Group Employer/Plan Group    [employer] [place] [employer] [place]       Payor Plan Address Payor Plan Phone Number Payor Plan Fax Number Effective Dates    [address] [phone]  3/1/2024 - " None Entered    [facility] [id number]         Subscriber Name Subscriber Birth Date Member ID       ROCKY WILKINSON 1970 [id number]                    Pneumonia [facility] Inpatient Care       Indications Met   Last updated by Marni Marmolejo RN on 3/14/2024 5750     Review Status Created By   Primary Completed Marni Marmolejo RN      Criteria Review   Pneumonia [facility] Inpatient Care     Overall Determination: Indications Met     Criteria:  [×] Admission is indicated for  1 or more  of the following  [B] [D]:      [×] Altered mental status that is severe or persistent          3/14/2024 11:40 AM              -- 3/14/2024 11:40 AM by Marni Marmolejo RN --                                    (X) Altered mental status (ie, different from baseline) that is severe or persistent, as indicated by  1 or more  of the following  (1) (2) (3) (4):                  (X) Confusional state (eg, disorientation, difficulty following commands, deficit in attention) that persists (eg, for more than few hours) despite appropriate treatment (eg, of underlying cause)          3/14/2024 11:40 AM              -- 3/14/2024 11:40 AM by Marni Marmolejo RN --                  Confused, Disoriented. Nonsensical sentences      [×] Moderate-risk-category or high-risk-category patient [E] (Pneumonia Severity Index class IV or V, or CURB-65 score of 3 or greater)           3/14/2024 11:40 AM              -- 3/14/2024 11:40 AM by Marni Marmolejo RN --                  PSI score: 123 Class: IV      [×] Presence of risk factor for poor outcome (eg, gross hemoptysis, cavitary infiltrate, neuromuscular weakness, cystic fibrosis)          3/14/2024 11:40 AM              -- 3/14/2024 11:40 AM by Marni Marmolejo RN --                  Liver failuire/Cirrhosis. Elevated Ammonia     Notes:  -- 3/14/2024 11:40 AM by Marni Marmolejo RN --      Retro Review for 3/13/24:      To ED w/AMS. Pt's mother checked on him & found him down.       On exam: Confused & Oriented to  self only.  Agitated in ED. Sometimes combative. Anxious because a carton of eggs is open in his room (visually hallucinating).                   PMHx: Liver failure; Kidney failure. DM; Abnormal LFTs. TIPs Procedure.                   In ED: Glucose 340; Lactate 2.33; ; Troponin 209. Repeat Trop 188.       BUN 44; Creatiine 2.40; GFR 31.5. ; Bilirubin 1.8. Ammonia 138;       UA: 3+ blood; + Protein.  UDS: NEG. ETOH: NEG.       CXR: Prominent opacity in the left infrahilar region suspected to represent infiltrate or       atelectasis.                              In ED: IV NS 500ml bolus; Cefepime 2gm IV; Vanc 2gm IV.                   Admit: Telemetry. Seizure precautions. O2. CIWA/neuro cks per protocol. CT chest. US of liver. Rocephin IV qd; Rifaxmin PO q12 hr. Ativan IV or IM per CIWA protocol. Zofran IV prn; Lactulose per NG q6h. Heparin SQ q8h; Thiamine IV q8h.       Soon transferred to to Critical Care. On IV Insulin drip. On Precedex for agitation.                               History & Physical        OscarJhonathanDO at [phone]          [facility]   HISTORY AND PHYSICAL    Patient Name: Simon Peters  : 1970  MRN: [id number]  Primary Care Physician:  Elizabeth Woo MD  Date of admission: 3/13/2024    Subjective  Subjective     Chief Complaint: Altered mental status    History of Present Illness  Patient is a 53-year-old male with past medical history of cirrhosis, renal failure, diabetes mellitus, hypertension who presents with altered mental status.  Patient has a history of hepatic encephalopathy.  Here in the ED he is and not coherent enough to give a review of systems.  He is asking people to close his eggs and is unaware of self time or place.  His workup here has shown a newly developed left infrahilar pneumonia.  This coupled with slightly worsened renal functions and a greatly elevated ammonia of 138 has the ED physician asking that the patient be admitted for  further evaluation.  Review of Systems   Review of systems could not be performed given patient's altered mental status  Personal History     Past Medical History:   Diagnosis Date    Abnormal LFTs     Anxiety     Back pain     Diabetes mellitus     Hypertension     Kidney failure     Liver failure     Rash        Past Surgical History:   Procedure Laterality Date    TIPS PROCEDURE         Family History: family history includes Diabetes in his brother and father; Diabetes type II in his father; Heart disease in his father; Hypertension in his father; Stroke in his father. Otherwise pertinent FHx was reviewed and not pertinent to current issue.    Social History:  reports that he has been smoking cigarettes. He started smoking about 39 years ago. He has a 19.6 pack-year smoking history. He has never used smokeless tobacco. He reports that he does not currently use alcohol. He reports that he does not use drugs.    Home Medications:  FLUoxetine, famotidine, folic acid, furosemide, gabapentin, hydrOXYzine pamoate, lactulose, metFORMIN, multivitamin with minerals, nadolol, riFAXIMin, spironolactone, and thiamine    Allergies:  No Known Allergies    Objective   Objective     Vitals:   Temp:  [98.9 °F (37.2 °C)] 98.9 °F (37.2 °C)  Heart Rate:  [64-70] 65  Resp:  [16] 16  BP: (185)/(95) 185/95    Physical Exam  Constitutional:  Well-developed and well-nourished.  No acute distress.      HENT:  Head:  Normocephalic and atraumatic.  Mouth:  Moist mucous membranes.    Eyes:  Conjunctivae and EOM are normal. No scleral icterus.    Neck:  Neck supple.  No JVD present.    Cardiovascular:  Normal rate, regular rhythm and normal heart sounds with no murmur.  Pulmonary/Chest:  No respiratory distress, no wheezes, no crackles, with normal breath sounds and good air movement.  Abdominal:  Soft. No distension and no tenderness.   Musculoskeletal:  No tenderness, and no deformity.  No red or swollen joints anywhere.    Neurological:   Alert and oriented to person, place, and time.  No cranial nerve deficit.    Skin:  Skin is warm and dry. No rash noted. No pallor.   Peripheral vascular:  No clubbing, no cyanosis, no edema.  Psychiatric: Appropriate mood and affect  :    Result Review   Result Review:  I have personally reviewed the results from the time of this admission to 3/13/2024 23:55 EDT and agree with these findings:  []  Laboratory list / accordion  []  Microbiology  []  Radiology  []  EKG/Telemetry   []  Cardiology/Vascular   []  Pathology  []  Old records  []  Other:  Most notable findings include: Elevated ammonia level new pneumonia on chest x-ray      Assessment & Plan  Assessment / Plan     Brief Patient Summary:  Simon Peters is a 53 y.o. male who presents with altered mental status.  He was found to have elevated ammonia and a new pneumonia.    Active Hospital Problems:  1.  Left pneumonia  2.  Hepatic encephalopathy  3.  Elevated troponin rule out MI  4.  Acute on chronic renal failure  5.  Hypertensive urgency  6.  Diabetes mellitus  Plan:   Patient will be admitted to the hospital service  Patient was started on pneumonia pathway  Patient will be started on broad-spectrum IV antibiotics  We will treat the patient with aggressive lactulose therapy orally  Will gently hydrate the patient overnight and repeat kidney function in the a.m.  Will continue to trend troponin levels and obtain follow-up EKG  Will treat the patient's hypertensive urgency    DVT prophylaxis:  Medical DVT prophylaxis orders are signed and held.          CODE STATUS:    Code Status (Patient has no pulse and is not breathing): CPR (Attempt to Resuscitate)  Medical Interventions (Patient has pulse or is breathing): Full Support    Admission Status:  I believe this patient meets inpatient status.    Jhonathan Moore DO    Electronically signed by Jhonathan Moore DO at 03/14/24 0003       Current Facility-Administered Medications   Medication Dose Route  Frequency Provider Last Rate Last Admin    sennosides-docusate (PERICOLACE) 8.6-50 MG per tablet 2 tablet  2 tablet Oral BID PRN Jhonathan Moore DO        And    polyethylene glycol (MIRALAX) packet 17 g  17 g Oral Daily PRN Jhonathan Moore DO        And    bisacodyl (DULCOLAX) EC tablet 5 mg  5 mg Oral Daily PRN Jhonathan Moore DO        And    bisacodyl (DULCOLAX) suppository 10 mg  10 mg Rectal Daily PRN Jhonathan Moore DO        cefTRIAXone (ROCEPHIN) 2000 mg/100 mL 0.9% NS IVPB (MBP)  2,000 mg Intravenous Q24H Gabby Camacho APRN        dexmedetomidine (PRECEDEX) 400 mcg in 100 mL NS infusion  0.2-1.5 mcg/kg/hr Intravenous Titrated Daiana Marie PA-C 4.8 mL/hr at 03/14/24 0726 0.2 mcg/kg/hr at 03/14/24 0726    dextrose (D50W) (25 g/50 mL) IV injection 10-50 mL  10-50 mL Intravenous Q15 Min PRN Gabby Camacho APRN        dextrose (GLUTOSE) oral gel 15 g  15 g Oral Q15 Min PRN Gabby Camacho APRN        famotidine (PEPCID) tablet 20 mg  20 mg Oral Daily Gabby Camacho APRN   20 mg at 03/14/24 1048    folic acid 1 mg in sodium chloride 0.9 % 50 mL IVPB  1 mg Intravenous Daily Daiana Marie PA-C 100 mL/hr at 03/14/24 0946 1 mg at 03/14/24 0946    glucagon (GLUCAGEN) injection 1 mg  1 mg Intramuscular Q15 Min PRN Gabby Camacho APRN        heparin (porcine) 5000 UNIT/ML injection 5,000 Units  5,000 Units Subcutaneous Q12H Jhonathan Moore DO        insulin regular 1 unit/mL in 0.9% sodium chloride (Glucommander)  0-100 Units/hr Intravenous Titrated Gabby Camacho APRN 4.8 mL/hr at 03/14/24 1054 4.8 Units/hr at 03/14/24 1054    lactulose (CHRONULAC) 10 GM/15ML solution 30 g  30 g Nasogastric Q6H Gabby Camacho APRN        levothyroxine (SYNTHROID, LEVOTHROID) tablet 50 mcg  50 mcg Oral Q AM Kosta Sampson MD        LORazepam (ATIVAN) tablet 1 mg  1 mg Oral Q1H PRN Daiana Marie PA-C        Or    LORazepam (ATIVAN) injection 1 mg  1 mg  Intravenous Q1H PRN Daiana Marie PA-C   1 mg at 03/14/24 0241    Or    LORazepam (ATIVAN) tablet 2 mg  2 mg Oral Q1H PRN Daiana Marie PA-C        Or    LORazepam (ATIVAN) injection 2 mg  2 mg Intravenous Q1H PRN [place] lam[place] PA-C   2 mg at 03/14/24 0434    Or    LORazepam (ATIVAN) injection 2 mg  2 mg Intravenous Q15 Min PRN Daiana Marie PA-KOREY        Or    LORazepam (ATIVAN) injection 2 mg  2 mg Intramuscular Q15 Min PRN Daiana Marie PA-C        multivitamin and minerals liquid 15 mL  15 mL Oral Daily Ed Alvares MD   15 mL at 03/14/24 1047    nitroglycerin (NITROSTAT) SL tablet 0.4 mg  0.4 mg Sublingual Q5 Min PRN Jhonathan Moore DO        ondansetron (ZOFRAN) injection 4 mg  4 mg Intravenous Q6H PRN Jhonathan Moore DO        riFAXIMin (XIFAXAN) tablet 550 mg  550 mg Oral Q12H Gabby Camacho APRN   550 mg at 03/14/24 1047    sodium chloride 0.9 % flush 10 mL  10 mL Intravenous PRN Roseline Blair MD        sodium chloride 0.9 % flush 10 mL  10 mL Intravenous Q12H Jhonathan Moore DO   10 mL at 03/14/24 0947    sodium chloride 0.9 % flush 10 mL  10 mL Intravenous PRN Jhonathan Moore DO        sodium chloride 0.9 % flush 10 mL  10 mL Intravenous Q12H Gabby Camacho APRN   10 mL at 03/14/24 0949    sodium chloride 0.9 % flush 10 mL  10 mL Intravenous PRN Gabby Camacho APRN        sodium chloride 0.9 % infusion 40 mL  40 mL Intravenous PRN Jhonathan Moore DO        sodium chloride 0.9 % infusion 40 mL  40 mL Intravenous PRN Gabby Camacho APRLLOYD        thiamine (B-1) injection 200 mg  200 mg Intravenous Q8H Daiana Marie PA-C   200 mg at 03/14/24 0515    Followed by    [START ON 3/19/2024] thiamine (VITAMIN B-1) tablet 100 mg  100 mg Oral Daily Daiana Marie PA-C         Lab Results (last 24 hours)       Procedure Component Value Units Date/Time    POC Glucose Once [[id number]]  (Abnormal)  Collected: 03/14/24 1131    Specimen: Blood Updated: 03/14/24 1132     Glucose 280 mg/dL      Comment: Serial Number: [id number]:  [id number]       POC Glucose Once [[id number]]  (Abnormal) Collected: 03/14/24 1052    Specimen: Blood Updated: 03/14/24 1054     Glucose 300 mg/dL      Comment: Serial Number: [id number]:  [id number]       T4, Free [[id number]]  (Abnormal) Collected: 03/14/24 0332    Specimen: Blood from Hand, Right Updated: 03/14/24 1000     Free T4 0.79 ng/dL     Narrative:      Results may be falsely increased if patient taking Biotin.      TSH [[id number]]  (Abnormal) Collected: 03/14/24 0332    Specimen: Blood from Hand, Right Updated: 03/14/24 0511     TSH 16.570 uIU/mL     LSAC Slide Creation [[id number]] Collected: 03/14/24 0332    Specimen: Blood from Hand, Right Updated: 03/14/24 0507    CBC & Differential [[id number]]  (Abnormal) Collected: 03/14/24 0332    Specimen: Blood from Hand, Right Updated: 03/14/24 0454    Narrative:      The following orders were created for panel order CBC & Differential.  Procedure                               Abnormality         Status                     ---------                               -----------         ------                     Manual Differential[[id number]]          Abnormal            Final result               CBC Auto Differential[[id number]]        Abnormal            Final result                 Please view results for these tests on the individual orders.    Manual Differential [[id number]]  (Abnormal) Collected: 03/14/24 0332    Specimen: Blood from Hand, Right Updated: 03/14/24 0454     Neutrophil % 85.0 %      Lymphocyte % 7.0 %      Monocyte % 3.0 %      Eosinophil % 2.0 %      Basophil % 3.0 %      Neutrophils Absolute 5.11 10*3/mm3      Lymphocytes Absolute 0.42 10*3/mm3      Monocytes Absolute 0.18 10*3/mm3      Eosinophils Absolute 0.12 10*3/mm3      Basophils Absolute 0.18 10*3/mm3      RBC Morphology Normal     WBC Morphology  Normal     Platelet Estimate Decreased     Large Platelets Slight/1+    Comprehensive Metabolic Panel [[id number]]  (Abnormal) Collected: 03/14/24 0332    Specimen: Blood from Hand, Right Updated: 03/14/24 0447     Glucose 311 mg/dL      BUN 43 mg/dL      Creatinine 2.32 mg/dL      Sodium 138 mmol/L      Potassium 4.9 mmol/L      Comment: Slight hemolysis detected by analyzer. Result may be falsely elevated.        Chloride 111 mmol/L      CO2 17.5 mmol/L      Calcium 8.1 mg/dL      Total Protein 5.4 g/dL      Albumin 1.8 g/dL      ALT (SGPT) 23 U/L      AST (SGOT) 45 U/L      Alkaline Phosphatase 249 U/L      Total Bilirubin 1.7 mg/dL      Globulin 3.6 gm/dL      A/G Ratio 0.5 g/dL      BUN/Creatinine Ratio 18.5     Anion Gap 9.5 mmol/L      eGFR 32.8 mL/min/1.73     Narrative:      GFR Normal >60  Chronic Kidney Disease <60  Kidney Failure <15      Magnesium [[id number]]  (Normal) Collected: 03/14/24 0332    Specimen: Blood from Hand, Right Updated: 03/14/24 0447     Magnesium 1.8 mg/dL     CBC Auto Differential [[id number]]  (Abnormal) Collected: 03/14/24 0332    Specimen: Blood from Hand, Right Updated: 03/14/24 0431     WBC 6.01 10*3/mm3      RBC 3.43 10*6/mm3      Hemoglobin 10.0 g/dL      Hematocrit 31.2 %      MCV 91.0 fL      MCH 29.2 pg      MCHC 32.1 g/dL      RDW 15.9 %      RDW-SD 51.0 fl      MPV 10.0 fL      Platelets 125 10*3/mm3     POC Glucose Once [[id number]]  (Abnormal) Collected: 03/14/24 0427    Specimen: Blood Updated: 03/14/24 0428     Glucose 302 mg/dL      Comment: Serial Number: [id number]:  [id number]       MRSA Screen, PCR (Inpatient) - Swab, Nares [[id number]]  (Normal) Collected: 03/14/24 0216    Specimen: Swab from Nares Updated: 03/14/24 0350     MRSA PCR No MRSA Detected    Narrative:      The negative predictive value of this diagnostic test is high and should only be used to consider de-escalating anti-MRSA therapy. A positive result may indicate colonization with MRSA and must  "be correlated clinically.    Procalcitonin [[id number]]  (Abnormal) Collected: 03/13/24 2353    Specimen: Blood from Arm, Left Updated: 03/14/24 0256     Procalcitonin 0.43 ng/mL     Narrative:      As a Marker for Sepsis (Non-Neonates):    1. <0.5 ng/mL represents a low risk of severe sepsis and/or septic shock.  2. >2 ng/mL represents a high risk of severe sepsis and/or septic shock.    As a Marker for Lower Respiratory Tract Infections that require antibiotic therapy:    PCT on Admission    Antibiotic Therapy       6-12 Hrs later    >0.5                Strongly Recommended  >0.25 - <0.5        Recommended  0.1 - 0.25          Discouraged              Remeasure/reassess PCT  <0.1                Strongly Discouraged     Remeasure/reassess PCT    As 28 day mortality risk marker: \"Change in Procalcitonin Result\" (>80% or <=80%) if Day 0 (or Day 1) and Day 4 values are available. Refer to http://www.[facility] [place]-pct-calculator.com    Change in PCT <=80%  A decrease of PCT levels below or equal to 80% defines a positive change in PCT test result representing a higher risk for 28-day all-cause mortality of patients diagnosed with severe sepsis for septic shock.    Change in PCT >80%  A decrease of PCT levels of more than 80% defines a negative change in PCT result representing a lower risk for 28-day all-cause mortality of patients diagnosed with severe sepsis or septic shock.    This test is Prognostic not Diagnostic, if elevated correlate with clinical findings before administering antibiotic treatment.        Mycoplasma Pneumoniae PCR - Swab, Nasopharynx [[id number]] Collected: 03/14/24 0216    Specimen: Swab from Nasopharynx Updated: 03/14/24 0218    High Sensitivity Troponin T 2Hr [[id number]]  (Abnormal) Collected: 03/13/24 2353    Specimen: Blood from Arm, Left Updated: 03/14/24 0040     HS Troponin T 188 ng/L      Troponin T Delta -21 ng/L     Narrative:      High Sensitive Troponin T Reference Range:  <14.0 ng/L- " Negative Female for AMI  <22.0 ng/L- Negative Male for AMI  >=14 - Abnormal Female indicating possible myocardial injury.  >=22 - Abnormal Male indicating possible myocardial injury.   Clinicians would have to utilize clinical acumen, EKG, Troponin, and serial changes to determine if it is an Acute Myocardial Infarction or myocardial injury due to an underlying chronic condition.         Urine Drug Screen - Straight Cath [[id number]]  (Normal) Collected: 03/13/24 2154    Specimen: Urine from Straight Cath Updated: 03/13/24 2338     Amphet/Methamphet, Screen Negative     Barbiturates Screen, Urine Negative     Benzodiazepine Screen, Urine Negative     Cocaine Screen, Urine Negative     Opiate Screen Negative     THC, Screen, Urine Negative     Methadone Screen, Urine Negative     Oxycodone Screen, Urine Negative     Fentanyl, Urine Negative    Narrative:      Negative Thresholds Per Drugs Screened:    Amphetamines                 500 ng/ml  Barbiturates                 200 ng/ml  Benzodiazepines              100 ng/ml  Cocaine                      300 ng/ml  Methadone                    300 ng/ml  Opiates                      300 ng/ml  Oxycodone                    100 ng/ml  THC                           50 ng/ml  Fentanyl                       5 ng/ml      The Normal Value for all drugs tested is negative. This report includes final unconfirmed screening results to be used for medical treatment purposes only. Unconfirmed results must not be used for non-medical purposes such as employment or legal testing. Clinical consideration should be applied to any drug of abuse test, particularly when unconfirmed results are used.            Protime-INR [[id number]]  (Abnormal) Collected: 03/13/24 2137    Specimen: Blood Updated: 03/13/24 2336     Protime 15.2 Seconds      INR 1.17    Narrative:      Suggested Therapeutic Ranges For Oral Anticoagulant Therapy:  Level of Therapy                      INR Target Range  Standard  Dose                            2.0-3.0  High Dose                                2.5-3.5  Patients not receiving anticoagulant  Therapy Normal Range                     0.86-1.15    aPTT [[id number]]  (Normal) Collected: 03/13/24 2137    Specimen: Blood Updated: 03/13/24 2336     PTT 34.0 seconds     Single High Sensitivity Troponin T [[id number]]  (Abnormal) Collected: 03/13/24 2137    Specimen: Blood Updated: 03/13/24 2257     HS Troponin T 209 ng/L     Narrative:      High Sensitive Troponin T Reference Range:  <14.0 ng/L- Negative Female for AMI  <22.0 ng/L- Negative Male for AMI  >=14 - Abnormal Female indicating possible myocardial injury.  >=22 - Abnormal Male indicating possible myocardial injury.   Clinicians would have to utilize clinical acumen, EKG, Troponin, and serial changes to determine if it is an Acute Myocardial Infarction or myocardial injury due to an underlying chronic condition.         [place] Draw [[id number]] Collected: 03/13/24 2137    Specimen: Blood Updated: 03/13/24 2246    Narrative:      The following orders were created for panel order [place] Draw.  Procedure                               Abnormality         Status                     ---------                               -----------         ------                     Green Top (Gel)[[id number]]                                  Final result               Lavender Top[[id number]]                                     Final result               Gold Top - SST[[id number]]                                   Final result               Light Blue Top[[id number]]                                   Final result                 Please view results for these tests on the individual orders.    Green Top (Gel) [[id number]] Collected: 03/13/24 2137    Specimen: Blood Updated: 03/13/24 2246     Extra Tube Hold for add-ons.     Comment: Auto resulted.       Lavender Top [[id number]] Collected: 03/13/24 2137    Specimen: Blood Updated: 03/13/24 2246      Extra Tube hold for add-on     Comment: Auto resulted       Gold Top - SST [[id number]] Collected: 03/13/24 2137    Specimen: Blood Updated: 03/13/24 2246     Extra Tube Hold for add-ons.     Comment: Auto resulted.       Light Blue Top [[id number]] Collected: 03/13/24 2137    Specimen: Blood Updated: 03/13/24 2246     Extra Tube Hold for add-ons.     Comment: Auto resulted       Ammonia [[id number]]  (Abnormal) Collected: 03/13/24 2137    Specimen: Blood Updated: 03/13/24 2242     Ammonia 138 umol/L     Comprehensive Metabolic Panel [[id number]]  (Abnormal) Collected: 03/13/24 2137    Specimen: Blood Updated: 03/13/24 2241     Glucose 371 mg/dL      BUN 44 mg/dL      Creatinine 2.40 mg/dL      Sodium 136 mmol/L      Potassium 5.1 mmol/L      Comment: Slight hemolysis detected by analyzer. Result may be falsely elevated.        Chloride 107 mmol/L      CO2 19.5 mmol/L      Calcium 8.2 mg/dL      Total Protein 5.7 g/dL      Albumin 2.1 g/dL      ALT (SGPT) 26 U/L      AST (SGOT) 52 U/L      Comment: Slight hemolysis detected by analyzer. Result may be falsely elevated.        Alkaline Phosphatase 264 U/L      Total Bilirubin 1.8 mg/dL      Globulin 3.6 gm/dL      A/G Ratio 0.6 g/dL      BUN/Creatinine Ratio 18.3     Anion Gap 9.5 mmol/L      eGFR 31.5 mL/min/1.73     Narrative:      GFR Normal >60  Chronic Kidney Disease <60  Kidney Failure <15      Magnesium [[id number]]  (Normal) Collected: 03/13/24 2137    Specimen: Blood Updated: 03/13/24 2241     Magnesium 1.9 mg/dL     Ethanol [[id number]] Collected: 03/13/24 2137    Specimen: Blood Updated: 03/13/24 2241     Ethanol <10 mg/dL      Ethanol % <0.010 %     Narrative:      Ethanol (Plasma)  <10 Essentially Negative    Toxic Concentrations           mg/dL    Flushing, slowing of reflexes    Impaired visual activity         Depression of CNS              >100  Possible Coma                  >300       CK [[id number]]  (Abnormal) Collected: 03/13/24 2137     Specimen: Blood Updated: 03/13/24 2241     Creatine Kinase 262 U/L     Acetaminophen Level [[id number]]  (Normal) Collected: 03/13/24 2137    Specimen: Blood Updated: 03/13/24 2241     Acetaminophen <5.0 mcg/mL     Salicylate Level [[id number]]  (Normal) Collected: 03/13/24 2137    Specimen: Blood Updated: 03/13/24 2241     Salicylate <0.3 mg/dL     Lactic Acid, Plasma [[id number]]  (Normal) Collected: 03/13/24 2141    Specimen: Blood Updated: 03/13/24 2234     Lactate 1.9 mmol/L     Urinalysis With Microscopic If Indicated (No Culture) - Straight Cath [[id number]]  (Abnormal) Collected: 03/13/24 2154    Specimen: Urine from Straight Cath Updated: 03/13/24 2215     Color, UA Yellow     Appearance, UA Clear     pH, UA 6.0     Specific Gravity, UA 1.016     Glucose,  mg/dL (2+)     Ketones, UA Negative     Bilirubin, UA Negative     Blood, UA Large (3+)     Protein, UA >=300 mg/dL (3+)     Leuk Esterase, UA Negative     Nitrite, UA Negative     Urobilinogen, UA 1.0 E.U./dL    Urinalysis, Microscopic Only - Straight Cath [[id number]]  (Abnormal) Collected: 03/13/24 2154    Specimen: Urine from Straight Cath Updated: 03/13/24 2215     RBC, UA 21-50 /HPF      WBC, UA 0-2 /HPF      Bacteria, UA None Seen /HPF      Squamous Epithelial Cells, UA 0-2 /HPF      Hyaline Casts, UA None Seen /LPF      Methodology Automated Microscopy    CBC & Differential [[id number]]  (Abnormal) Collected: 03/13/24 2137    Specimen: Blood Updated: 03/13/24 2204    Narrative:      The following orders were created for panel order CBC & Differential.  Procedure                               Abnormality         Status                     ---------                               -----------         ------                     CBC Auto Differential[[id number]]        Abnormal            Final result                 Please view results for these tests on the individual orders.    CBC Auto Differential [[id number]]  (Abnormal) Collected: 03/13/24 2137     Specimen: Blood Updated: 03/13/24 2204     WBC 6.77 10*3/mm3      RBC 3.41 10*6/mm3      Hemoglobin 10.4 g/dL      Hematocrit 30.8 %      MCV 90.3 fL      MCH 30.5 pg      MCHC 33.8 g/dL      RDW 16.0 %      RDW-SD 51.9 fl      MPV 10.4 fL      Platelets 133 10*3/mm3      Neutrophil % 66.3 %      Lymphocyte % 16.2 %      Monocyte % 9.3 %      Eosinophil % 6.4 %      Basophil % 1.5 %      Immature Grans % 0.3 %      Neutrophils, Absolute 4.49 10*3/mm3      Lymphocytes, Absolute 1.10 10*3/mm3      Monocytes, Absolute 0.63 10*3/mm3      Eosinophils, Absolute 0.43 10*3/mm3      Basophils, Absolute 0.10 10*3/mm3      Immature Grans, Absolute 0.02 10*3/mm3      nRBC 0.0 /100 WBC     Blood Culture - Blood, Arm, Right [[id number]] Collected: 03/13/24 2153    Specimen: Blood from Arm, Right Updated: 03/13/24 2202    Blood Culture - Blood, Arm, Left [[id number]] Collected: 03/13/24 2153    Specimen: Blood from Arm, Left Updated: 03/13/24 2202    VBG with Co-Ox and Electrolytes [[id number]]  (Abnormal) Collected: 03/13/24 2139    Specimen: Venous Blood Updated: 03/13/24 2200     pH, Venous 7.513 pH Units      pCO2, Venous 19.1 mm Hg      pO2, Venous 91.4 mm Hg      HCO3, Venous 15.0 mmol/L      Base Excess, Venous -5.8 mmol/L      O2 Saturation, Venous 96.8 %      Hemoglobin, Blood Gas 12.0 g/dL      Carboxyhemoglobin 5.5 %      Methemoglobin 0.30 %      Oxyhemoglobin 91.2 %      FHHB 3.0 %      Note --     Site Venous     Modality Room Air     FIO2 21 %      Flow Rate --     Sodium, Venous 134.0 mmol/L      Potassium, Venous 5.2 mmol/L      Ionized Calcium, Arterial 0.90 mmol/L      Chloride, Venous  110 mmol/L      Glucose, Arterial 340 mg/dL      Lactate, Arterial 2.33 mmol/L           Imaging Results (Last 24 Hours)       Procedure Component Value Units Date/Time    XR Abdomen KUB [[id number]] Collected: 03/14/24 1119     Updated: 03/14/24 1123    Narrative:      PROCEDURE: XR ABDOMEN KUB     COMPARISON: [facility], CR, XR ABDOMEN KUB, 3/14/2024, 10:06.     INDICATIONS: NG PLACEMENT     FINDINGS:   The tip of the nasogastric tube is in our in good position terminating the fundus of the stomach.    Again I would recommend a chest x-ray to exclude a pneumothorax given the prior right main stem   bronchus intubation on the previous study.  The pelvis and lower abdomen were excluded from the   field of view.  Visualized bowel gas pattern is unremarkable.  There is a TIPS in the right upper   quadrant.       Impression:         1. The tip of the nasogastric tube is in good position terminating in the fundus of the stomach.  2. I would recommend a follow-up chest x-ray to exclude a pneumothorax given the prior right main   stem bronchus intubation.              ZAC ESCALERA MD         Electronically Signed and Approved By: ZAC ESCALERA MD on 3/14/2024 at 11:19                     XR Abdomen KUB [[id number]] Collected: 03/14/24 1115     Updated: 03/14/24 1119    Narrative:      PROCEDURE: XR ABDOMEN KUB     COMPARISON: [facility], CT, CT ABDOMEN PELVIS W CONTRAST, 3/04/2024, 4:42.     INDICATIONS: NG placement     FINDINGS:   The nasogastric tube is malposition.  The tip terminates in the right infrahilar region indicating   right mainstem bronchus i intubation.  I would recommend removal of the tube and obtaining a chest   x-ray to exclude a pneumothorax.  There is a TIPS in the right upper quadrant.  The pelvis and   lower abdomen were excluded from the field of view.  The visualized bowel gas pattern is   unremarkable.  There are degenerative changes in the thoracic spine.       Impression:         1. Malpositioned nasogastric tube.  The tip terminates in the right infrahilar region indicating   right mainstem bronchus intubation.    2. I would recommend removal of the nasogastric tube and obtaining a chest x-ray to exclude a   pneumothorax.  3. The findings were discussed with the patient's nurse (Dior) by  "telephone.  The referring   clinician will be contacted.            ZAC ESCALERA MD         Electronically Signed and Approved By: ZAC ESCALERA MD on 3/14/2024 at 11:15                     CT Head Without Contrast [[id number]] Collected: 03/14/24 0001     Updated: 03/14/24 0004    Narrative:      PROCEDURE: CT HEAD WO CONTRAST     COMPARISONS: 3/13/2024, 2118 hours (CXR); 3/4/2024 (head CT w/o).     INDICATIONS: headache; AMS (altered mental status)     PROTOCOL:   Standard CT imaging protocol performed.      RADIATION:   Total DLP: 954.7 mGy*cm.    MA and/or KV were/was adjusted to minimize radiation dose.      TECHNIQUE: After obtaining the patient's consent, 446 multiplanar CT images were obtained without   non-ionic intravenous contrast material.      DISCUSSION:   A routine nonenhanced head CT was performed.  New intravenous gas is seen, especially   involving the right superior ophthalmic vein and the bilateral cavernous sinus.  These findings are   seen on axial image 11 of series 204 and axial image 14 of series 204 and adjacent images.  Please   correlate clinically.  They may represent transient intravenous gas, such as related to a   peripheral intravenous access site.  Ectopic intravenous gas (i.e., air embolism), such as related   to penetrating trauma or a recent dental procedure, cannot be excluded.  Rarely, such findings are   related to an active gas-producing organism.  Please correlate clinically.  The findings are   depicted in the \"Montage\" associated with the study on the [facility] PACS.  Otherwise, no acute brain   abnormality is identified.  No acute intracranial hemorrhage.  No acute infarct is seen.  No acute   skull fracture.  Specifically, no acute skull base fracture is appreciated.  No midline shift or   acute intracranial mass effect is seen.  Mild diffuse prominence of the extra-axial spaces is   noted.  There is also mild prominence of the ventricular system.  These findings may represent mild " "  central atrophy.  Mild age-indeterminate mucosal thickening involves the imaged paranasal sinuses.    No air-fluid interfaces are seen within the imaged paranasal sinuses.  Motion artifact particularly   obscures the orbits.         Impression:       There is suspected new transient intravenous gas involving the cavernous sinus   bilaterally and the right superior ophthalmic vein, as detailed above.  Otherwise, no acute brain   abnormality is seen.  Specifically, no acute intracranial hemorrhage, no acute infarct, no   intracranial mass lesion, and no acute intracranial mass effect are appreciated.     Please note that portions of this note were completed with a voice recognition program.     SURJIT WILHELM JR, MD         Electronically Signed and Approved By: SURJIT WILHELM JR, MD on 3/14/2024 at 0:01                     XR Chest 1 View [[id number]] Collected: 03/13/24 2150     Updated: 03/13/24 2153    Narrative:      PROCEDURE: XR CHEST 1 VW     COMPARISON: [facility], CT, CT ABDOMEN PELVIS W CONTRAST, 3/04/2024, 4:42.  [facility], CR, XR CHEST 1 VW, 3/04/2024, 2:17.     INDICATIONS: Weak/Dizzy/AMS triage protocol  weak     FINDINGS:   Lung volumes are low consistent with a poor inspiratory effort.  There is airspace opacity in the   left infrahilar region.  No effusion is seen.  The cardiac and mediastinal silhouettes appear   normal.       Impression:         1. Prominent opacity in the left infrahilar region suspected to represent infiltrate or   atelectasis.                  Christopher Rice M.D.         Electronically Signed and Approved By: Christopher Rice M.D. on 3/13/2024 at 21:50                           Orders (last 24 hrs)        Start     Ordered    03/19/24 0900  thiamine (VITAMIN B-1) tablet 100 mg  Daily        Placed in \"Followed by\" Linked Group    03/14/24 0228    03/15/24 0600  Vancomycin, Random  Timed         03/14/24 0150    03/15/24 0600  CBC (No Diff)  Morning " "Draw         03/14/24 0944    03/15/24 0600  Basic Metabolic Panel  Morning Draw         03/14/24 0944    03/15/24 0600  Magnesium  Morning Draw         03/14/24 0944    03/15/24 0600  Phosphorus  Morning Draw         03/14/24 0944    03/15/24 0315  LORazepam (ATIVAN) tablet 1 mg  Every 6 Hours,   Status:  Discontinued        Placed in \"Followed by\" Linked Group    03/14/24 0228    03/14/24 1500  lactulose (CHRONULAC) 10 GM/15ML solution 30 g  Every 6 Hours         03/14/24 0942    03/14/24 1330  cefTRIAXone (ROCEPHIN) 2000 mg/100 mL 0.9% NS IVPB (MBP)  Every 24 Hours         03/14/24 0943    03/14/24 1200  levothyroxine (SYNTHROID, LEVOTHROID) tablet 50 mcg  Every Early Morning         03/14/24 1105    03/14/24 1133  POC Glucose Once  PROCEDURE ONCE        Comments: Complete no more than 45 minutes prior to patient eating      03/14/24 1131    03/14/24 1130  vancomycin 1000 mg/250 mL 0.9% NS IVPB ([facility])  Every 24 Hours,   Status:  Discontinued         03/14/24 0150    03/14/24 1055  POC Glucose Once  PROCEDURE ONCE        Comments: Complete no more than 45 minutes prior to patient eating      03/14/24 1052    03/14/24 1045  multivitamin and minerals liquid 15 mL  Daily         03/14/24 0945    03/14/24 1045  famotidine (PEPCID) tablet 20 mg  Daily         03/14/24 0946    03/14/24 1030  riFAXIMin (XIFAXAN) tablet 550 mg  Every 12 Hours Scheduled         03/14/24 0942    03/14/24 1030  sodium chloride 0.9 % flush 10 mL  Every 12 Hours Scheduled         03/14/24 0944    03/14/24 1030  insulin regular 1 unit/mL in 0.9% sodium chloride (Glucommander)  Titrated        Note to Pharmacy: Upon initiation of Glucommander insulin drip, make sure all other insulin orders and anti-diabetic medications have been discontinued.    03/14/24 0944    03/14/24 1025  XR Abdomen KUB  1 Time Imaging         03/14/24 1025    03/14/24 1004  XR Abdomen KUB  1 Time Imaging         03/14/24 1003    03/14/24 0945  Insert Peripheral IV x2  Once "         03/14/24 0944 03/14/24 0945  Saline Lock & Maintain IV Access  Continuous         03/14/24 0944 03/14/24 0945  Prior to Initiating Glucommander™, Ensure All Prior Insulin Orders Are Discontinued  Once         03/14/24 0944 03/14/24 0945  Do Not Start Insulin Infusion if Potassium < 3.3  Per Order Details         03/14/24 0944 03/14/24 0945  Use a Dedicated Line for Insulin Infusion (If Possible).  May Use a Carrier Fluid of NS at KVO Rate if Insulin Rate is Insufficient to Maintain IV Patency.  Prime IV Line With Insulin Infusion  Continuous         03/14/24 0944 03/14/24 0945  Glucommander Must Be Discontinued if Insulin Infusion is Discontinued.  If Insulin Infusion is Restarted, Previous Glucommander Settings Must Be Discontinued and Re-Entered From New Order  Per Order Details         03/14/24 0944 03/14/24 0945  Patient is on Glucommander  Continuous         03/14/24 0944 03/14/24 0945  NPO Diet NPO Type: Strict NPO  Diet Effective Now         03/14/24 0944 03/14/24 0945  Utilize the Start Meal Feature / Meal Bolus Feature in Glucommander if Patient Starts a Diet or Bolus Tube Feedings  Until Discontinued         03/14/24 0944 03/14/24 0945  Notify Provider - Insulin Infusion  Continuous        Comments: Open Order Report to View Parameters Requiring Provider Notification    03/14/24 0944 03/14/24 0945  RN to Release PRN POC Glucose Orders Per Glucommander  Continuous        Comments: Glucommander Recommended POC Glucose Testing Will Vary Between Every 15 Minutes & Every 2 Hours   Release PRN POC Glucose Orders as Needed    03/14/24 0944 03/14/24 0945  RN to Order STAT Glucose For Any POC Glucose <10 or >600  Continuous        Comments: Do Not Delay Treatment of Unstable Patient to Obtain Glucose Sample  Inform Provider of Results    03/14/24 0944 03/14/24 0944  dextrose (D50W) (25 g/50 mL) IV injection 10-50 mL  Every 15 Minutes PRN         03/14/24 0944 03/14/24  "0944  glucagon (GLUCAGEN) injection 1 mg  Every 15 Minutes PRN         03/14/24 0944    03/14/24 0944  sodium chloride 0.9 % flush 10 mL  As Needed         03/14/24 0944    03/14/24 0944  sodium chloride 0.9 % infusion 40 mL  As Needed         03/14/24 0944    03/14/24 0944  dextrose (GLUTOSE) oral gel 15 g  Every 15 Minutes PRN         03/14/24 0944    03/14/24 0942  US Liver  1 Time Imaging         03/14/24 0942    03/14/24 0938  T4, Free  Once         03/14/24 0937    03/14/24 0938  Nasogastric Tube Insertion  Once         03/14/24 0942    03/14/24 0938  Inpatient Nutrition Consult  Once        Provider:  (Not yet assigned)    03/14/24 0942    03/14/24 0900  lactulose (CHRONULAC) 10 GM/15ML solution 30 g  3 Times Daily,   Status:  Discontinued         03/14/24 0136 03/14/24 0900  folic acid 1 mg in sodium chloride 0.9 % 50 mL IVPB  Daily         03/14/24 0228    03/14/24 0600  Incentive Spirometry  Every 4 Hours While Awake       03/14/24 0136 03/14/24 0600  CBC & Differential  Morning Draw         03/14/24 0136 03/14/24 0600  Comprehensive Metabolic Panel  Morning Draw         03/14/24 0136    03/14/24 0600  Magnesium  Morning Draw         03/14/24 0136    03/14/24 0600  TSH  Morning Draw         03/14/24 0136 03/14/24 0600  cefepime (MAXIPIME) 2000 mg/100 mL 0.9% NS (mbp)  Every 8 Hours,   Status:  Discontinued         03/14/24 0136 03/14/24 0600  Manual Differential  PROCEDURE ONCE         03/14/24 0136    03/14/24 0600  CBC Auto Differential  PROCEDURE ONCE         03/14/24 0136 03/14/24 0600  thiamine (B-1) injection 200 mg  Every 8 Hours Scheduled        Placed in \"Followed by\" Linked Group    03/14/24 0228 03/14/24 0429  POC Glucose Once  PROCEDURE ONCE        Comments: Complete no more than 45 minutes prior to patient eating      03/14/24 0427 03/14/24 0418  LSAC Slide Creation  Once         03/14/24 0417    03/14/24 0400  Vital Signs  Every 4 Hours       03/14/24 0136    03/14/24 " "0345  dexmedetomidine (PRECEDEX) 400 mcg in 100 mL NS infusion  Titrated         03/14/24 0247    03/14/24 0315  LORazepam (ATIVAN) tablet 2 mg  Every 6 Hours,   Status:  Discontinued        Placed in \"Followed by\" Linked Group    03/14/24 0228    03/14/24 0315  LORazepam (ATIVAN) injection 1 mg  Once,   Status:  Discontinued         03/14/24 0229    03/14/24 0248  Transfer Patient  Once         03/14/24 0247    03/14/24 0248  Target Arousal Level RASS -1 to -2  Continuous         03/14/24 0247    03/14/24 0247  Restraints Non-Violent or Non-Self Destructive  Calendar Day         03/14/24 0246    03/14/24 0230  sodium chloride 0.9 % flush 10 mL  Every 12 Hours Scheduled         03/14/24 0136 03/14/24 0230  heparin (porcine) 5000 UNIT/ML injection 5,000 Units  Every 12 Hours Scheduled         03/14/24 0136 03/14/24 0230  sodium chloride 0.9 % infusion  Continuous,   Status:  Discontinued         03/14/24 0136 03/14/24 0230  cefepime (MAXIPIME) 2000 mg/100 mL 0.9% NS (mbp)  Once,   Status:  Discontinued         03/14/24 0136 03/14/24 0229  Initiate Alcohol Withdrawal Protocol  Once         03/14/24 0228 03/14/24 0229  Vital Signs  Per Hospital Policy         03/14/24 0228 03/14/24 0229  Continuous Pulse Oximetry  Continuous         03/14/24 0228 03/14/24 0229  Obtain Baseline Clinical [place] Withdrawal Assessment - Ar (CIWA-Ar), Sedation Scale & Vital Signs  Once        Comments: Follow CIWA Scoring Reference Guide    03/14/24 0228 03/14/24 0229  Clinical [place] Withdrawal Assessment (CIWA-Ar)  Per Hospital Policy         03/14/24 0228 03/14/24 0229  If CIWA-Ar Score Less Than 8 For 3 Consecutive Assessments, Monitor Every 4 Hours & Discontinue Assessment When CIWA-Ar Less Than 8 for 24 Hours  Per Hospital Policy        Comments: Contact Provider to Restart Protocol if Any Symptoms Reappear    03/14/24 0228 03/14/24 0229  Seizure Precautions  Continuous         03/14/24 0228    " "03/14/24 0229  Notify Provider - Withdrawal  Continuous        Comments: Open Order Report to View Parameters Requiring Provider Notification    03/14/24 0228 03/14/24 0229  Notify Provider of Abnormal Lab Results  Continuous        Comments: Open Order Report to View Parameters Requiring Provider Notification    03/14/24 0228 03/14/24 0229  Notify Provider - Vitals  Continuous        Comments: Open Order Report to View Parameters Requiring Provider Notification    03/14/24 0228 03/14/24 0229  Safety Precautions  Continuous         03/14/24 0228 03/14/24 0228  LORazepam (ATIVAN) tablet 1 mg  Every 1 Hour PRN        Placed in \"Or\" Linked Group    03/14/24 0228 03/14/24 0228  LORazepam (ATIVAN) injection 1 mg  Every 1 Hour PRN        Placed in \"Or\" Linked Group    03/14/24 0228 03/14/24 0228  LORazepam (ATIVAN) tablet 2 mg  Every 1 Hour PRN        Placed in \"Or\" Linked Group    03/14/24 0228 03/14/24 0228  LORazepam (ATIVAN) injection 2 mg  Every 1 Hour PRN        Placed in \"Or\" Linked Group    03/14/24 0228 03/14/24 0228  LORazepam (ATIVAN) injection 2 mg  Every 15 Minutes PRN        Placed in \"Or\" Linked Group    03/14/24 0228 03/14/24 0228  LORazepam (ATIVAN) injection 2 mg  Every 15 Minutes PRN        Placed in \"Or\" Linked Group    03/14/24 0228 03/14/24 0226  LORazepam (ATIVAN) injection 1 mg  Every 4 Hours PRN,   Status:  Discontinued         03/14/24 0227 03/14/24 0137  Weigh Patient  Once         03/14/24 0136 03/14/24 0137  Insert Peripheral IV  Once         03/14/24 0136 03/14/24 0137  Saline Lock & Maintain IV Access  Continuous,   Status:  Canceled         03/14/24 0136 03/14/24 0137  Nursing Dysphagia Screening (Complete Prior to Giving Anything By Mouth)  Once        Comments: Use Dysphagia Screen for Pneumonia    03/14/24 0136 03/14/24 0137  RN to Place Order SLP Consult - Eval & Treat Choosing Reason of RN Dysphagia Screen Failed  Per Order Details      "   Comments: RN to Place Order SLP Consult - Eval & Treat Choosing Reason of RN Dysphagia Screen Failed    03/14/24 0136    03/14/24 0137  Nurse to Call MD or Nutrition Services for Diet if Patient Passes Dysphagia Screen  Once         03/14/24 0136 03/14/24 0137  High Sensitivity Troponin T  Once,   Status:  Canceled         03/14/24 0136 03/14/24 0137  Pneumonia Finding Seen on CXR  Once         03/14/24 0136 03/14/24 0137  Continuous Cardiac Monitoring  Continuous        Comments: Follow Standing Orders As Outlined in Process Instructions (Open Order Report to View Full Instructions)    03/14/24 0136 03/14/24 0137  Telemetry - Maintain IV Access  Continuous,   Status:  Canceled         03/14/24 0136 03/14/24 0137  Telemetry - Place Orders & Notify Provider of Results When Patient Experiences Acute Chest Pain, Dysrhythmia or Respiratory Distress  Continuous        Comments: Open Order Report to View Parameters Requiring Provider Notification    03/14/24 0136 03/14/24 0137  Activity - Ad Ina  Until Discontinued         03/14/24 0136 03/14/24 0137  Notify Provider (With Default Parameters)  Continuous        Comments: Open Order Report to View Parameters Requiring Provider Notification    03/14/24 0136 03/14/24 0137  Diet: Renal; Low Sodium (2-3g); Fluid Consistency: Thin (IDDSI 0)  Diet Effective Now,   Status:  Canceled         03/14/24 0136 03/14/24 0137  Procalcitonin  STAT         03/14/24 0136 03/14/24 0137  Legionella Antigen, Urine - Urine, Urine, Clean Catch  Once         03/14/24 0136 03/14/24 0137  Mycoplasma Pneumoniae PCR - Swab, Nasopharynx  Once         03/14/24 0136 03/14/24 0137  S. Pneumo Ag Urine or CSF - Urine, Urine, Clean Catch  Once         03/14/24 0136 03/14/24 0137  ECG 12 Lead Altered Mental Status  Once         03/14/24 0136 03/14/24 0137  MRSA Screen, PCR (Inpatient) - Swab, Nares  STAT         03/14/24 0136 03/14/24 0137  CT Chest Without  "Contrast Diagnostic  1 Time Imaging         03/14/24 0136 03/14/24 0136  Intake & Output  Every Shift       03/14/24 0136 03/14/24 0136  sodium chloride 0.9 % flush 10 mL  As Needed         03/14/24 0136 03/14/24 0136  sodium chloride 0.9 % infusion 40 mL  As Needed         03/14/24 0136 03/14/24 0136  nitroglycerin (NITROSTAT) SL tablet 0.4 mg  Every 5 Minutes PRN         03/14/24 0136 03/14/24 0136  Pharmacy to dose vancomycin  Continuous PRN,   Status:  Discontinued         03/14/24 0136 03/14/24 0136  sennosides-docusate (PERICOLACE) 8.6-50 MG per tablet 2 tablet  2 Times Daily PRN        Placed in \"And\" Linked Group    03/14/24 0136 03/14/24 0136  polyethylene glycol (MIRALAX) packet 17 g  Daily PRN        Placed in \"And\" Linked Group    03/14/24 0136 03/14/24 0136  bisacodyl (DULCOLAX) EC tablet 5 mg  Daily PRN        Placed in \"And\" Linked Group    03/14/24 0136    03/14/24 0136  bisacodyl (DULCOLAX) suppository 10 mg  Daily PRN        Placed in \"And\" Linked Group    03/14/24 0136    03/14/24 0136  ondansetron (ZOFRAN) injection 4 mg  Every 6 Hours PRN         03/14/24 0136 03/14/24 0051  IP General Consult (Use specialty-specific consult if known)  Once        Provider:  Jordan Pena MD    03/14/24 0050    03/14/24 0018  NPO Diet NPO Type: Strict NPO  Diet Effective Now,   Status:  Canceled        Comments: Should Remain in Effect Until a Complete SLP Evaluation Occurs & a Superceding Order is Received    03/14/24 0017    03/14/24 0018  SLP Consult: Eval & Treat RN Dysphagia Screen Failed  Once         03/14/24 0017    03/13/24 2351  Code Status and Medical Interventions:  Continuous         03/13/24 2354    03/13/24 2350  Inpatient Admission  Once         03/13/24 2354    03/13/24 2337  High Sensitivity Troponin T 2Hr  PROCEDURE ONCE         03/13/24 2257    03/13/24 2328  Protime-INR  STAT         03/13/24 2327    03/13/24 2328  aPTT  STAT         03/13/24 2327 03/13/24 2308 "  Inpatient Hospitalist Consult  Once        Specialty:  Hospitalist  Provider:  Jhonathan Moore,     03/13/24 2308 03/13/24 2259  Urine Drug Screen - Urine, Clean Catch  STAT,   Status:  Canceled         03/13/24 2154 03/13/24 2230  sodium chloride 0.9 % bolus 500 mL  Once         03/13/24 2202 03/13/24 2230  vancomycin IVPB 2000 mg in 0.9% Sodium Chloride 500 mL  Once         03/13/24 2208    03/13/24 2230  cefepime (MAXIPIME) 2000 mg/100 mL 0.9% NS (mbp)  Once         03/13/24 2208 03/13/24 2214  Urinalysis, Microscopic Only - Straight Cath  Once         03/13/24 2213 03/13/24 2201  CT Head Without Contrast  1 Time Imaging         03/13/24 2202    03/13/24 2201  CK  STAT         03/13/24 2202 03/13/24 2200  Urine Drug Screen - Straight Cath  STAT         03/13/24 2159    03/13/24 2155  Ethanol  STAT         03/13/24 2154    03/13/24 2155  Acetaminophen Level  STAT         03/13/24 2154    03/13/24 2155  Salicylate Level  STAT         03/13/24 2154    03/13/24 2154  Urinalysis With Microscopic If Indicated (No Culture) - Straight Cath  Once         03/13/24 2153 03/13/24 2142  Lactic Acid, Plasma  Once         03/13/24 2141 03/13/24 2138  VBG with Co-Ox and Electrolytes  Once         03/13/24 2137 03/13/24 2132  Ammonia  STAT         03/13/24 2131 03/13/24 2132  Blood Culture - Blood, Arm, Left  Once         03/13/24 2131 03/13/24 2132  Blood Culture - Blood, Arm, Right  Once         03/13/24 2131 03/13/24 2104  NPO Diet NPO Type: Strict NPO  Diet Effective Now,   Status:  Canceled         03/13/24 2104 03/13/24 2104  Undress & Gown  Once         03/13/24 2104 03/13/24 2104  Cardiac Monitoring  Continuous,   Status:  Canceled        Comments: Follow Standing Orders As Outlined in Process Instructions (Open Order Report to View Full Instructions)    03/13/24 2104 03/13/24 2104  Continuous Pulse Oximetry  Continuous,   Status:  Canceled         03/13/24 2104 03/13/24  2104  Vital Signs  Per Hospital Policy         03/13/24 2104 03/13/24 2104  Orthostatic Blood Pressure  Once         03/13/24 2104 03/13/24 2104  Fall Precautions  Continuous         03/13/24 2104 03/13/24 2104  XR Chest 1 View  1 Time Imaging         03/13/24 2104 03/13/24 2104  ECG 12 Lead ED Triage Standing Order; Weak / Dizzy / AMS  Once         03/13/24 2104 03/13/24 2104  POC Glucose Once  Once        Comments: Complete no more than 45 minutes prior to patient eating      03/13/24 2104 03/13/24 2104  Insert Peripheral IV  Once         03/13/24 2104 03/13/24 2104  [place] Draw  Once         03/13/24 2104 03/13/24 2104  CBC & Differential  Once         03/13/24 2104 03/13/24 2104  Comprehensive Metabolic Panel  Once         03/13/24 2104 03/13/24 2104  Single High Sensitivity Troponin T  Once         03/13/24 2104 03/13/24 2104  Magnesium  Once         03/13/24 2104 03/13/24 2104  Urinalysis With Microscopic If Indicated (No Culture) - Urine, Clean Catch  Once,   Status:  Canceled         03/13/24 2104 03/13/24 2104  Green Top (Gel)  PROCEDURE ONCE         03/13/24 2104 03/13/24 2104  Lavender Top  PROCEDURE ONCE         03/13/24 2104 03/13/24 2104  Gold Top - SST  PROCEDURE ONCE         03/13/24 2104 03/13/24 2104  Light Blue Top  PROCEDURE ONCE         03/13/24 2104 03/13/24 2104  CBC Auto Differential  PROCEDURE ONCE         03/13/24 2104 03/13/24 2103  sodium chloride 0.9 % flush 10 mL  As Needed         03/13/24 2104 03/11/24 0000  metFORMIN ER (GLUCOPHAGE-XR) 500 MG 24 hr tablet  Daily With Breakfast         03/14/24 0944    Unscheduled  Oxygen Therapy- Nasal Cannula; Titrate 1-6 LPM Per SpO2; 90 - 95%  Continuous PRN       03/13/24 2104    Unscheduled  Blood Gas, Arterial -With Co-Ox Panel: Yes  As Needed      Comments: Respiratory Distress      03/14/24 0136    Unscheduled  Oxygen Therapy- Nasal Cannula; Titrate 1-6 LPM Per SpO2; 90 - 95%   Continuous PRN       03/14/24 0136    Unscheduled  Obtain Pre & Post Sedation Scores With Every Lorazepam Dose - Hold For POSS Greater Than 2 or RASS of -3 or Less  As Needed       03/14/24 0228    Unscheduled  Treat Hypoglycemia As Recommended By [employer]™ & Notify Provider of Treatment  As Needed      Comments: Follow Hypoglycemia Orders As Outlined in Process Instructions (Open Order Report to View Full Instructions)  Notify Provider Any Time Hypoglycemia Treatment is Administered    03/14/24 0944    Unscheduled  If Insulin Infusion is Paused - Follow [facility] Instructions  As Needed       03/14/24 0944    Unscheduled  POC Glucose PRN  As Needed      Comments: [employer] Recommended POC Glucose Testing Will Vary Between Every 15 Minutes & Every 2 Hours Release PRN POC Glucose Orders as Needed      03/14/24 0953

## 2024-03-14 NOTE — PLAN OF CARE
Transfer from 5th floor. Patient combative and verbally abusive upon arrival. In bilateral wrist restraints. Ativan given per CIWA and precedex started - see MAR.  Patient finally calm and resting with precedex @ 0.6.  Yaneth Brady RN

## 2024-03-14 NOTE — OUTREACH NOTE
Medical Week 1 Survey      Flowsheet Row Responses   [facility] patient discharged from? [facility]   Does the patient have one of the following disease processes/diagnoses(primary or secondary)? Other   Week 1 attempt successful? No   Unsuccessful attempts Attempt 1   Revoke Readmitted            Verona H - Registered Nurse

## 2024-03-14 NOTE — PROCEDURES
Paracentesis Procedure Note    Indication: Ascites    Consent: Yes, placed in chart    Procedure: The patient was placed in the reclined position and the appropriate landmarks were identified. The skin over the puncture site in the right lower quadrant of abdomen was marked, draped and prepped in sterile fashion. Local anesthesia was applied with 1% lidocaine. The catheter was inserted was inserted with needle guidance, then catheter kept in place while needle was removed. Peritoneal fluid was miek colored, drained 1.6 lits fluid. The catheter was then withdrawn and a sterile dressing was placed over the site.  Specimen is sent for test.    The patient tolerated the procedure well

## 2024-03-14 NOTE — PROGRESS NOTES
"[facility] Clinical Pharmacy Services: Vancomycin Pharmacokinetic Initial Consult Note    Simon Peters is a 53 y.o. male who is on day 1 of pharmacy to dose vancomycin for Pneumonia.    Consult Information  Consulting Provider: [place]  Planned Duration of Therapy: 7 days   Was Patient Receiving Prior to Admission/Consult?: No  Loading Dose Ordered or Given: 2000 mg on 3/13/24 at 2330  PK/PD Target: -600 mg/L.hr   Relevant ID History: none  Other Antimicrobials: Cefepime    Imaging Reviewed?: Yes    Microbiology Data  MRSA PCR performed: 24; Result: Pending  Culture/Source:       Vitals/Labs  Ht: 175.3 cm (69.02\"); Wt: 94.9 kg (209 lb 3.5 oz)  Temp (24hrs), Av.9 °F (37.2 °C), Min:98.9 °F (37.2 °C), Max:98.9 °F (37.2 °C)   Estimated Creatinine Clearance: 40.5 mL/min (A) (by C-G formula based on SCr of 2.4 mg/dL (H)).       Results from last 7 days   Lab Units 24  2137 24  0444 03/10/24  0546   CREATININE mg/dL 2.40* 2.40* 2.14*   WBC 10*3/mm3 6.77 6.97 8.84     Assessment/Plan:    Vancomycin Dose:  1000 mg IV every 24 hours; which provides the following predicted parameters:  Exposure target: AUC24 (range)400-600 mg/L.hr   AUC24,ss: 460 mg/L.hr  PAUC*: 65 %  Ctrough,ss: 14.9 mg/L  Pconc*: 24 %  Tox.: 10 %  Vanc Random ordered for 3/15/23 at 0600  Patient has order for Complete Metabolic Panel    Pharmacy will follow patient's kidney function and will adjust doses and obtain levels as necessary. Thank you for involving pharmacy in this patient's care. Please contact pharmacy with any questions or concerns.                           Antoni Nichols [facility]  Clinical Pharmacist   "

## 2024-03-14 NOTE — CONSULTS
Pulmonary / Critical Care Consult Note      Patient Name: Simon Peters  : 1970  MRN: [id number]  Primary Care Physician:  Elizabeth Woo MD  Referring Physician: Kosta Sampson MD  Date of admission: 3/13/2024    Subjective   Subjective     Reason for Consult/ Chief Complaint: Altered mental status    HPI:  Simon Peters is a 53 y.o. male with history of alcoholic cirrhosis s/p TIPS procedure May 2023, CKD stage III, hypertension, diabetes with recent hospitalization for generalized weakness/hepatic encephalopathy presented to the ED after being found down by his mother.  Patient was initially admitted to telemetry floor, overnight had increased agitation, was given IV Ativan and transferred to the ICU for possible Precedex drip.  CT scan of the head was negative for any acute abnormalities.  Laboratory findings were grossly unremarkable, did have normal WBC count, urinalysis benign, Pro-Alfredo 0.43, ammonia level was 138.     This morning on exam patient is quite somnolent, does grimace to trapezius squeeze, is on room air, no acute distress.  Does have anasarca, abdominal exam is nonacute.  He is afebrile.    Review of Systems  Unable to obtain due to patient status    Personal History     Past Medical History:   Diagnosis Date   • Abnormal LFTs    • Anxiety    • Back pain    • Diabetes mellitus    • Hypertension    • Kidney failure    • Liver failure    • Rash        Past Surgical History:   Procedure Laterality Date   • TIPS PROCEDURE         Family History: family history includes Diabetes in his brother and father; Diabetes type II in his father; Heart disease in his father; Hypertension in his father; Stroke in his father. Otherwise pertinent FHx was reviewed and not pertinent to current issue.    Social History:  reports that he has been smoking cigarettes. He started smoking about 39 years ago. He has a 19.6 pack-year smoking history. He has never used smokeless tobacco. He reports that he does not  currently use alcohol. He reports that he does not use drugs.    Home Medications:  FLUoxetine, famotidine, folic acid, furosemide, gabapentin, hydrOXYzine pamoate, lactulose, metFORMIN ER, multivitamin with minerals, nadolol, spironolactone, and thiamine    Allergies:  No Known Allergies    Objective    Objective     Vitals:   Temp:  [98.2 °F (36.8 °C)-98.9 °F (37.2 °C)] 98.2 °F (36.8 °C)  Heart Rate:  [55-79] 55  Resp:  [10-20] 12  BP: (100-185)/() 131/78    Physical Exam:    Vital Signs Reviewed   General: Chronically ill-appearing male, lethargic, grimaces to pain  HEENT:  PERRL, EOMI. dry oral mucosa  Neck:  Supple, no JVD, no thyromegaly  Chest:  good aeration, clear to auscultation bilaterally, tympanic to percussion bilaterally, no work of breathing noted  CV: RRR, no MGR, pulses 2+, equal.  Abd:  Soft, NT, ND, + BS, no HSM  EXT:  no clubbing, no cyanosis, no edema  Neuro:  A&Ox0, CN grossly intact, no focal deficits.  Does respond to noxious/painful stimuli moves all 4 extremities  Skin: No rashes or lesions noted        Result Review    Result Review:  I have personally reviewed the results from the time of this admission to 3/14/2024 12:28 EDT and agree with these findings:  [x]  Laboratory  [x]  Microbiology  [x]  Radiology  [x]  EKG/Telemetry   [x]  Cardiology/Vascular   []  Pathology  [x]  Old records  []  Other:  Most notable findings include:       Lab 03/14/24  0332 03/13/24 2139 03/13/24 2137 03/11/24  0444 03/10/24  0546 03/09/24  0444 03/08/24  0443   WBC 6.01  --  6.77 6.97 8.84 6.26 6.92   HEMOGLOBIN 10.0*  --  10.4* 9.2* 10.3* 8.9* 8.8*   HEMATOCRIT 31.2*  --  30.8* 27.2* 31.4* 26.2* 26.3*   PLATELETS 125*  --  133* 112* 131* 109* 106*   SODIUM 138  --  136 136 137 141 137   SODIUM, VENOUS  --  134.0*  --   --   --   --   --    POTASSIUM 4.9  --  5.1 4.9 4.8 4.6 4.4   POTASSIUM, VENOUS  --  5.2*  --   --   --   --   --    CHLORIDE 111*  --  107 111* 111* 112* 110*   CHLORIDE, VENOUS   --  110*  --   --   --   --   --    CO2 17.5*  --  19.5* 19.6* 18.5* 20.9* 21.4*   BUN 43*  --  44* 45* 41* 43* 40*   CREATININE 2.32*  --  2.40* 2.40* 2.14* 2.53* 2.48*   GLUCOSE 311*  --  371* 264* 269* 225* 241*   GLUCOSE, ARTERIAL  --  340*  --   --   --   --   --    CALCIUM 8.1*  --  8.2* 8.0* 8.0* 7.6* 7.6*   TOTAL PROTEIN 5.4*  --  5.7*  --  5.2* 4.2* 4.0*   ALBUMIN 1.8*  --  2.1*  --  1.8* 1.5* 1.4*   GLOBULIN 3.6  --  3.6  --  3.4 2.7 2.6         Assessment & Plan   Assessment / Plan     Active Hospital Problems:  Active Hospital Problems    Diagnosis    • **Pneumonia      Impression:    Altered mental status  Hepatic encephalopathy  Alcoholic cirrhosis s/p TIPS May 2023  Decompensated liver cirrhosis with ascites and anasarca  Type 2 diabetes with hyperglycemia  Chronic thrombocytopenia secondary to hepatic cirrhosis  Chronic kidney disease stage III  Ongoing tobacco use  Hypothyroidism  Hypoalbuminemia  Hypertension    Plan:    -Patient is currently on room air  -Aspiration precautions, keep head of bed elevated  -CT scan of head negative for acute abnormalities  -Place NG tube, initiate lactulose 30 mg 4 times daily  -Start rifaximin  -Start tube feeds per dietitian recommendations  -Start insulin drip for hyperglycemia  -Will check liver ultrasound  -Will evaluate abdomen for possible ascites  -Check free T4, resume home Synthroid  -Blood culture in process, check urine antigens  -Okay to de-escalate antibiotics to Rocephin  -Continue thiamine, add multivitamin folic acid  -Trend renal function, monitor and replace electrolytes as needed  -CIWA protocol in place, okay to use Precedex if needed    Note, patient did have NG tube placed and lung, will follow-up chest x-ray in 2 hours to ensure no pneumothorax    Peptic ulcer prophylaxis-Pepcid  DVT prophylaxis: Subcutaneous heparin  Medical DVT prophylaxis orders are present.         Code Status and Medical Interventions:   Ordered at: 03/13/24 6891      Code Status (Patient has no pulse and is not breathing):    CPR (Attempt to Resuscitate)     Medical Interventions (Patient has pulse or is breathing):    Full Support      I, ANTOINETTE Nolan, spent 15 minutes critical care time.    Electronically signed by ANTOINETTE Lema, 03/14/24, 12:28 PM EDT.      The patient is critically ill in the ICU with altered mental status, hepatic encephalopathy, alcoholic cirrhosis status post TIPS May 2023, decompensated liver cirrhosis with ascites and anasarca, chronic thrombocytopenia, chronic renal disease stage III, hypothyroidism and hypertension. Multidisciplinary bedside critical care rounds were performed with nursing staff, respiratory therapy, pharmacy, nutritional services, social work. I have personally reviewed the chart, labs and any pertinent imaging available.  We have spent 34 minutes of critical care time, excluding procedures, in the care of this patient. I, Dr Alvares, spent 19 mins of critical care time according to split shared critical care billing guidelines.     Electronically signed by Ed Alvares MD, 03/14/24, 1:47 PM EDT.

## 2024-03-14 NOTE — ED PROVIDER NOTES
Time: 9:57 PM EDT  Date of encounter:  3/13/2024  Independent Historian/Clinical History and Information was obtained by:   Patient and EMS    History is limited by: Altered Mental Status    Chief Complaint: AMS      History of Present Illness:  Patient is a 53 y.o. year old male who presents to the emergency department for evaluation of Altered mental status.  Per report his mother went to check on him and found him on the ground.  EMS was called and was brought to the emergency department.  Patient is confused oriented to self only.  History very limited at this time.    HPI    Patient Care Team  Primary Care Provider: Elizabeth Woo MD    Past Medical History:     No Known Allergies  Past Medical History:   Diagnosis Date    Abnormal LFTs     Anxiety     Back pain     Diabetes mellitus     Hypertension     Kidney failure     Liver failure     Rash      Past Surgical History:   Procedure Laterality Date    TIPS PROCEDURE       Family History   Problem Relation Age of Onset    Diabetes Father     Heart disease Father     Hypertension Father     Diabetes type II Father     Stroke Father     Diabetes Brother        Home Medications:  Prior to Admission medications    Medication Sig Start Date End Date Taking? Authorizing Provider   famotidine (PEPCID) 20 MG tablet Take 1 tablet by mouth 2 (Two) Times a Day Before Meals for 30 days. 3/11/24 4/10/24  Seth Aguilar MD   FLUoxetine (PROzac) 40 MG capsule Take 1 capsule by mouth Daily for 30 days. 3/12/24 4/11/24  Seth Aguilar MD   folic acid (FOLVITE) 1 MG tablet Take 1 tablet by mouth Daily for 30 days. 3/12/24 4/11/24  Seth Aguilar MD   furosemide (LASIX) 40 MG tablet Take 1 tablet by mouth Daily for 30 days. 3/12/24 4/11/24  Seth Aguilar MD   gabapentin (NEURONTIN) 100 MG capsule Take 1 capsule by mouth 3 (Three) Times a Day for 30 days. 3/11/24 4/10/24  Seth Aguilar MD   hydrOXYzine pamoate (VISTARIL) 25 MG capsule Take 1 capsule by mouth 4 (Four) Times a Day As  "Needed for Itching or Anxiety for up to 30 days. 3/11/24 4/10/24  Seth Aguilar MD   lactulose (CHRONULAC) 10 GM/15ML solution Take 30 mL by mouth 3 (Three) Times a Day for 30 days. 3/11/24 4/10/24  Seth Aguilar MD   metFORMIN (FORTAMET) 1000 MG (OSM) 24 hr tablet Take 1 tablet by mouth Daily With Breakfast for 30 days. 3/11/24 4/10/24  Seth Aguilar MD   multivitamin with minerals tablet tablet Take 1 tablet by mouth Daily for 30 days. 3/12/24 4/11/24  Seth Aguilar MD   nadolol (CORGARD) 20 MG tablet Take 1 tablet by mouth Daily for 30 days. 3/12/24 4/11/24  Seth Aguilar MD   riFAXIMin (XIFAXAN) 550 MG tablet Take 1 tablet by mouth Every 12 (Twelve) Hours for 12 doses. Indications: Impaired Brain Function due to Liver Disease 3/11/24 3/17/24  Seth Aguilar MD   spironolactone (ALDACTONE) 25 MG tablet Take 1 tablet by mouth Daily for 30 days. 3/12/24 4/11/24  Seth Aguilar MD   thiamine (VITAMIN B1) 100 MG tablet Take 1 tablet by mouth Daily for 30 days. 3/12/24 4/11/24  Seth Aguilar MD        Social History:   Social History     Tobacco Use    Smoking status: Every Day     Current packs/day: 0.50     Average packs/day: 0.5 packs/day for 39.2 years (19.6 ttl pk-yrs)     Types: Cigarettes     Start date: 1985    Smokeless tobacco: Never   Vaping Use    Vaping status: Never Used   Substance Use Topics    Alcohol use: Not Currently     Comment: daily          sober since 10/19/2020    Drug use: No         Review of Systems:  Review of Systems   Unable to perform ROS: Mental status change        Physical Exam:  BP (!) 185/95 (BP Location: Right arm, Patient Position: Sitting)   Pulse 67   Temp 98.9 °F (37.2 °C) (Oral)   Resp 16   Ht 175.3 cm (69.02\")   Wt 94.9 kg (209 lb 3.5 oz)   SpO2 98%   BMI 30.88 kg/m²     Physical Exam  HENT:      Head: Normocephalic and atraumatic.      Mouth/Throat:      Mouth: Mucous membranes are dry.   Eyes:      General: Scleral icterus present.      Extraocular Movements: " Extraocular movements intact.   Cardiovascular:      Rate and Rhythm: Normal rate and regular rhythm.      Heart sounds: Normal heart sounds.   Pulmonary:      Effort: Pulmonary effort is normal.      Breath sounds: Normal breath sounds.   Abdominal:      Palpations: Abdomen is soft.      Tenderness: There is no abdominal tenderness.   Musculoskeletal:         General: Normal range of motion.      Cervical back: Normal range of motion.      Comments: Bilateral lower extremity edema   Skin:     General: Skin is warm.   Neurological:      Mental Status: He is alert. He is disoriented and confused.                  Procedures:  Procedures      Medical Decision Making:      Comorbidities that affect care:    Cirrhosis, Chronic Kidney Disease, Diabetes, Hypertension, Smoking    External Notes reviewed:    Previous Admission Note: Patient was admitted on 3//24 and discharged on 3/11/2024 for frequent falls, cirrhosis, hepatic encephalopathy.      The following orders were placed and all results were independently analyzed by me:  Orders Placed This Encounter   Procedures    Blood Culture - Blood,    Blood Culture - Blood,    XR Chest 1 View    CT Head Without Contrast    [place] Draw    Comprehensive Metabolic Panel    Single High Sensitivity Troponin T    Magnesium    CBC Auto Differential    Ammonia    VBG with Co-Ox and Electrolytes    Lactic Acid, Plasma    Urinalysis With Microscopic If Indicated (No Culture) - Straight Cath    Ethanol    Acetaminophen Level    Salicylate Level    Urine Drug Screen - Straight Cath    CK    Urinalysis, Microscopic Only - Urine, Clean Catch    High Sensitivity Troponin T 2Hr    Protime-INR    aPTT    NPO Diet NPO Type: Strict NPO    Undress & Gown    Continuous Pulse Oximetry    Vital Signs    Orthostatic Blood Pressure    Inpatient Hospitalist Consult    Oxygen Therapy- Nasal Cannula; Titrate 1-6 LPM Per SpO2; 90 - 95%    POC Glucose Once    ECG 12 Lead ED Triage Standing Order; Weak  / Dizzy / AMS    Insert Peripheral IV    Fall Precautions    CBC & Differential    Green Top (Gel)    Lavender Top    Gold Top - SST    Light Blue Top       Medications Given in the Emergency Department:  Medications   sodium chloride 0.9 % flush 10 mL (has no administration in time range)   vancomycin IVPB 2000 mg in 0.9% Sodium Chloride 500 mL (2,000 mg Intravenous New Bag 3/13/24 2321)   sodium chloride 0.9 % bolus 500 mL (0 mL Intravenous Stopped 3/13/24 2253)   cefepime (MAXIPIME) 2000 mg/100 mL 0.9% NS (mbp) (0 mg Intravenous Stopped 3/13/24 2253)        ED Course:    ED Course as of 03/13/24 2329   Wed Mar 13, 2024   2329 ECG 12 Lead ED Triage Standing Order; Weak / Dizzy / AMS  Normal sinus rhythm with rate of 61. QRS normal. ID interval normal. QTc interval is normal. No ST elevation or depression. No T wave abnormalities. This EKG was interpreted by me.  [LD]      ED Course User Index  [LD] Roseline Blair MD       Labs:    Lab Results (last 24 hours)       Procedure Component Value Units Date/Time    CBC & Differential [[id number]]  (Abnormal) Collected: 03/13/24 2137    Specimen: Blood Updated: 03/13/24 2204    Narrative:      The following orders were created for panel order CBC & Differential.  Procedure                               Abnormality         Status                     ---------                               -----------         ------                     CBC Auto Differential[[id number]]        Abnormal            Final result                 Please view results for these tests on the individual orders.    Comprehensive Metabolic Panel [[id number]]  (Abnormal) Collected: 03/13/24 2137    Specimen: Blood Updated: 03/13/24 2241     Glucose 371 mg/dL      BUN 44 mg/dL      Creatinine 2.40 mg/dL      Sodium 136 mmol/L      Potassium 5.1 mmol/L      Comment: Slight hemolysis detected by analyzer. Result may be falsely elevated.        Chloride 107 mmol/L      CO2 19.5 mmol/L      Calcium 8.2  mg/dL      Total Protein 5.7 g/dL      Albumin 2.1 g/dL      ALT (SGPT) 26 U/L      AST (SGOT) 52 U/L      Comment: Slight hemolysis detected by analyzer. Result may be falsely elevated.        Alkaline Phosphatase 264 U/L      Total Bilirubin 1.8 mg/dL      Globulin 3.6 gm/dL      A/G Ratio 0.6 g/dL      BUN/Creatinine Ratio 18.3     Anion Gap 9.5 mmol/L      eGFR 31.5 mL/min/1.73     Narrative:      GFR Normal >60  Chronic Kidney Disease <60  Kidney Failure <15      Single High Sensitivity Troponin T [[id number]]  (Abnormal) Collected: 03/13/24 2137    Specimen: Blood Updated: 03/13/24 2257     HS Troponin T 209 ng/L     Narrative:      High Sensitive Troponin T Reference Range:  <14.0 ng/L- Negative Female for AMI  <22.0 ng/L- Negative Male for AMI  >=14 - Abnormal Female indicating possible myocardial injury.  >=22 - Abnormal Male indicating possible myocardial injury.   Clinicians would have to utilize clinical acumen, EKG, Troponin, and serial changes to determine if it is an Acute Myocardial Infarction or myocardial injury due to an underlying chronic condition.         Magnesium [[id number]]  (Normal) Collected: 03/13/24 2137    Specimen: Blood Updated: 03/13/24 2241     Magnesium 1.9 mg/dL     CBC Auto Differential [[id number]]  (Abnormal) Collected: 03/13/24 2137    Specimen: Blood Updated: 03/13/24 2204     WBC 6.77 10*3/mm3      RBC 3.41 10*6/mm3      Hemoglobin 10.4 g/dL      Hematocrit 30.8 %      MCV 90.3 fL      MCH 30.5 pg      MCHC 33.8 g/dL      RDW 16.0 %      RDW-SD 51.9 fl      MPV 10.4 fL      Platelets 133 10*3/mm3      Neutrophil % 66.3 %      Lymphocyte % 16.2 %      Monocyte % 9.3 %      Eosinophil % 6.4 %      Basophil % 1.5 %      Immature Grans % 0.3 %      Neutrophils, Absolute 4.49 10*3/mm3      Lymphocytes, Absolute 1.10 10*3/mm3      Monocytes, Absolute 0.63 10*3/mm3      Eosinophils, Absolute 0.43 10*3/mm3      Basophils, Absolute 0.10 10*3/mm3      Immature Grans, Absolute 0.02  10*3/mm3      nRBC 0.0 /100 WBC     Ammonia [[id number]]  (Abnormal) Collected: 03/13/24 2137    Specimen: Blood Updated: 03/13/24 2242     Ammonia 138 umol/L     Ethanol [[id number]] Collected: 03/13/24 2137    Specimen: Blood Updated: 03/13/24 2241     Ethanol <10 mg/dL      Ethanol % <0.010 %     Narrative:      Ethanol (Plasma)  <10 Essentially Negative    Toxic Concentrations           mg/dL    Flushing, slowing of reflexes    Impaired visual activity         Depression of CNS              >100  Possible Coma                  >300       Acetaminophen Level [[id number]]  (Normal) Collected: 03/13/24 2137    Specimen: Blood Updated: 03/13/24 2241     Acetaminophen <5.0 mcg/mL     Salicylate Level [[id number]]  (Normal) Collected: 03/13/24 2137    Specimen: Blood Updated: 03/13/24 2241     Salicylate <0.3 mg/dL     CK [[id number]]  (Abnormal) Collected: 03/13/24 2137    Specimen: Blood Updated: 03/13/24 2241     Creatine Kinase 262 U/L     Protime-INR [[id number]] Collected: 03/13/24 2137    Specimen: Blood Updated: 03/13/24 2328    aPTT [[id number]] Collected: 03/13/24 2137    Specimen: Blood Updated: 03/13/24 2327    VBG with Co-Ox and Electrolytes [[id number]]  (Abnormal) Collected: 03/13/24 2139    Specimen: Venous Blood Updated: 03/13/24 2200     pH, Venous 7.513 pH Units      pCO2, Venous 19.1 mm Hg      pO2, Venous 91.4 mm Hg      HCO3, Venous 15.0 mmol/L      Base Excess, Venous -5.8 mmol/L      O2 Saturation, Venous 96.8 %      Hemoglobin, Blood Gas 12.0 g/dL      Carboxyhemoglobin 5.5 %      Methemoglobin 0.30 %      Oxyhemoglobin 91.2 %      FHHB 3.0 %      Note --     Site Venous     Modality Room Air     FIO2 21 %      Flow Rate --     Sodium, Venous 134.0 mmol/L      Potassium, Venous 5.2 mmol/L      Ionized Calcium, Arterial 0.90 mmol/L      Chloride, Venous  110 mmol/L      Glucose, Arterial 340 mg/dL      Lactate, Arterial 2.33 mmol/L     Lactic Acid, Plasma [[id number]]  (Normal) Collected:  03/13/24 2141    Specimen: Blood Updated: 03/13/24 2234     Lactate 1.9 mmol/L     Blood Culture - Blood, Arm, Left [[id number]] Collected: 03/13/24 2153    Specimen: Blood from Arm, Left Updated: 03/13/24 2202    Blood Culture - Blood, Arm, Right [[id number]] Collected: 03/13/24 2153    Specimen: Blood from Arm, Right Updated: 03/13/24 2202    Urinalysis With Microscopic If Indicated (No Culture) - Straight Cath [[id number]]  (Abnormal) Collected: 03/13/24 2154    Specimen: Urine from Straight Cath Updated: 03/13/24 2215     Color, UA Yellow     Appearance, UA Clear     pH, UA 6.0     Specific Gravity, UA 1.016     Glucose,  mg/dL (2+)     Ketones, UA Negative     Bilirubin, UA Negative     Blood, UA Large (3+)     Protein, UA >=300 mg/dL (3+)     Leuk Esterase, UA Negative     Nitrite, UA Negative     Urobilinogen, UA 1.0 E.U./dL    Urine Drug Screen - Straight Cath [[id number]] Collected: 03/13/24 2154    Specimen: Urine from Straight Cath Updated: 03/13/24 2200    Urinalysis, Microscopic Only - Straight Cath [[id number]]  (Abnormal) Collected: 03/13/24 2154    Specimen: Urine from Straight Cath Updated: 03/13/24 2215     RBC, UA 21-50 /HPF      WBC, UA 0-2 /HPF      Bacteria, UA None Seen /HPF      Squamous Epithelial Cells, UA 0-2 /HPF      Hyaline Casts, UA None Seen /LPF      Methodology Automated Microscopy             Imaging:    XR Chest 1 View    Result Date: 3/13/2024  PROCEDURE: XR CHEST 1 VW  COMPARISON: [facility], CT, CT ABDOMEN PELVIS W CONTRAST, 3/04/2024, 4:42.  [facility], CR, XR CHEST 1 VW, 3/04/2024, 2:17.  INDICATIONS: Weak/Dizzy/AMS triage protocol  weak  FINDINGS:  Lung volumes are low consistent with a poor inspiratory effort.  There is airspace opacity in the left infrahilar region.  No effusion is seen.  The cardiac and mediastinal silhouettes appear normal.        1. Prominent opacity in the left infrahilar region suspected to represent infiltrate or  atelectasis.       Christopher Rice M.D.       Electronically Signed and Approved By: Christopher Rice M.D. on 3/13/2024 at 21:50                Differential Diagnosis and Discussion:    Altered Mental Status: Based on the patient's signs and symptoms, differential diagnosis includes but is not limited to meningitis, stroke, sepsis, subarachnoid hemorrhage, intracranial bleeding, encephalitis, and metabolic encephalopathy.    All labs were reviewed and interpreted by me.  All X-rays impressions were independently interpreted by me.  EKG was interpreted by me.  CT scan radiology impression was interpreted by me.    MDM  Number of Diagnoses or Management Options  Diagnosis management comments: Patient presents emergency department with altered mental status.  History is limited.  Labs were obtained that showed elevated ammonia of 138.  Creatinine is elevated 2.4 which appears similar to previous.  Patient does have a history of cirrhosis.  Patient found on ground uncertain if he had a fall.  Chest x-ray also concerning for possible pneumonia.  Patient started antibiotics.  CT of his head showed no acute pathology.  Discussed patient with hospitalist and will admit for further care.       Amount and/or Complexity of Data Reviewed  Clinical lab tests: reviewed  Tests in the radiology section of CPT®: reviewed  Review and summarize past medical records: yes  Independent visualization of images, tracings, or specimens: yes         Critical Care Note: Total Critical Care time of 40 minutes. Total critical care time documented does not include time spent on separately billed procedures for services of nurses or physician assistants. I personally saw and examined the patient. I have reviewed all diagnostic interpretations and treatment plans as written. I was present for the key portions of any procedures performed and the inclusive time noted in any critical care statement. Critical care time includes patient management by me,  time spent at the patients bedside,  time to review lab and imaging results, discussing patient care, documentation in the medical record, and time spent with family or caregiver.        Patient Care Considerations:    SEPSIS was considered but is NOT present in the emergency department as SIRS criteria is not present.      Consultants/Shared Management Plan:    Hospitalist: I have discussed the case with Dr Moore who agrees to accept the patient for admission.    Social Determinants of Health:    Patient is unable to carry out activities of daily life. Escalation of care is necessary.       Disposition and Care Coordination:    Admit:   Through independent evaluation of the patient's history, physical, and imperical data, the patient meets criteria for inpatient admission to the hospital.        Final diagnoses:   Hepatic encephalopathy        ED Disposition       ED Disposition   Decision to Admit    Condition   --    Comment   --               This medical record created using voice recognition software.             Roseline Blair MD  03/13/24 0648

## 2024-03-14 NOTE — PROGRESS NOTES
[facility]   Hospitalist Progress Note  Date: 3/14/2024  Patient Name: Simon Peters  : 1970  MRN: [id number]  Date of admission: 3/13/2024      Subjective   Subjective     Chief Complaint: Altered mental status    Summary:   Patient is a 53-year-old male with past medical history of cirrhosis, renal failure, diabetes mellitus, hypertension who presents with altered mental status.  Patient was recently discharged home after prolonged hospitalization.  Patient has a history of hepatic encephalopathy. Here in the ED he is and not coherent enough to give a review of systems. He is asking people to close his eggs and is unaware of self time or place. His workup here has shown a newly developed left infrahilar pneumonia. This coupled with slightly worsened renal functions and a greatly elevated ammonia of 138 has the ED physician asking that the patient be admitted for further evaluation.   Transfer to unit overnight because of agitation as started on Precedex and restraints.    Interval Followup:   Vital signs stable on room air.  Patient very sleepy on Precedex  Continues on insulin drip  NG tube clamped.    Review of Systems   All systems were reviewed and negative except for: Unable to obtain as patient sleepy    Objective   Objective     Vitals:   Temp:  [98.2 °F (36.8 °C)-98.9 °F (37.2 °C)] 98.2 °F (36.8 °C)  Heart Rate:  [54-79] 54  Resp:  [10-20] 10  BP: (100-185)/() 120/77  Physical Exam      Constitutional:  Well-developed and well-nourished.  No acute distress.      HENT:  Head:  Normocephalic and atraumatic.  Mouth:  Moist mucous membranes.    Eyes:  Conjunctivae and EOM are normal. No scleral icterus.    Neck:  Neck supple.  No JVD present.    Cardiovascular:  Normal rate, regular rhythm and normal heart sounds with no murmur.  Pulmonary/Chest:  No respiratory distress, no wheezes, no crackles, with normal breath sounds and good air movement.  Abdominal:  Soft. No distension and no  tenderness.   Musculoskeletal:  No tenderness, and no deformity.  No red or swollen joints anywhere.    Neurological: Unable to obtain  Skin:  Skin is warm and dry. No rash noted. No pallor.   Peripheral vascular:  No clubbing, no cyanosis, no edema.  Psychiatric: Unable to examine        Result Review    Result Review:  I have personally reviewed the results for the past 24 hours and agree with these findings:  [x]  Laboratory  [x]  Microbiology  [x]  Radiology  [x]  EKG/Telemetry normal sinus rhythm 69.  QT 0.49  []  Cardiology/Vascular   []  Pathology  [x]  Old records  [x]  Other: Medications    Assessment & Plan   Assessment / Plan     Assessment:  Left infrahilar healthcare associate pneumonia.  Unspecified bacterial pathogen  Acute metabolic encephalopathy.  Recurrent hepatic encephalopathy.  History of alcoholic cirrhosis status post TIPS  Hypothyroidism.  New diagnosis.  Diabetes mellitus.  Most recent hemoglobin A1c of 8.8%.  Uncontrolled hyperglycemia  CKD 3B.  Baseline creatinine 2-2.3.  Stable  CT head with?  Intravenous gas right Superior ophthalmic vein and bilateral cavernous veins.  Hypertensive urgency  Frequent falls and generalized weakness.    Peripheral neuropathy likely diabetic and alcoholic causing above.  Noncompliance with medication including insulin.  Anasarca with ascites and chronic scrotal swelling.  S/p 1.6 L paracentesis March 14 by intensivist.  Chronic anemia and thrombocytopenia from cirrhosis.  Hematuria and pyuria without bacteriuria.  Asymptomatic gallstones.  Hypoalbuminemia from liver disease.  Tobacco use disorder.     Plan:  Continue Precedex.  Continue restraints  Patient on CIWA protocol.  Not sure of recent drinking.  Continue insulin drip.  IV Rocephin.  Synthroid.  Repeat CT of the head due to finding of intravenous gas.  NPO.  Lactulose and Xifaxan.  Thiamine and folic acid  Appreciate intensivist input.  S/p paracentesis  Resume appropriate home  medications  Continue CCU care  Discussed plan with RN.    DVT prophylaxis:  Medical DVT prophylaxis orders are present.        CODE STATUS:   Code Status (Patient has no pulse and is not breathing): CPR (Attempt to Resuscitate)  Medical Interventions (Patient has pulse or is breathing): Full Support      Part of this note may be an electronic transcription/translation of spoken language to printed text using the Dragon Dictation System.     Electronically signed by Kosta Sampson MD, 03/14/24, 5:04 PM EDT.

## 2024-03-14 NOTE — PROCEDURES
Abdominal ultrasound:    Showed ascites with anechoic space in all 4 quadrants.  Left lower quadrant was marked for paracentesis.    Images stored online

## 2024-03-14 NOTE — CONSULTS
"Nutrition Services    Patient Name: Simon Peters  YOB: 1970  MRN: [id number]  Admission date: 3/13/2024      CLINICAL NUTRITION ASSESSMENT      Reason for Assessment  Tube Feeding Assessment     H&P:  Past Medical History:   Diagnosis Date    Abnormal LFTs     Anxiety     Back pain     Diabetes mellitus     Hypertension     Kidney failure     Liver failure     Rash         Current Problems:   Active Hospital Problems    Diagnosis     **Pneumonia         Nutrition/Diet History         Narrative   Discussed during rounds. Pt is not waking up enough to eat. NGT placed this morning for lactulose. Will plan to begin tube feedings also (tube in good position per the last KUB). Pt was seen by RD team last week where he was eating well. NFPE: no signs of fat or muscle wasting. Discussed plan with RN. No recent weight loss is indicated per chart review.      Anthropometrics        Current Height, Weight Height: 175.3 cm (69.02\")  Weight: 96.2 kg (212 lb 1.3 oz)   Current BMI Body mass index is 31.3 kg/m².   BMI Classification Obese Class I   % %    Adjusted Body Weight (ABW)    Weight Hx  Wt Readings from Last 30 Encounters:   03/14/24 0144 96.2 kg (212 lb 1.3 oz)   03/13/24 2117 94.9 kg (209 lb 3.5 oz)   03/11/24 0900 97.1 kg (214 lb 1.1 oz)   03/10/24 0554 97 kg (213 lb 13.5 oz)   03/07/24 0708 96.1 kg (211 lb 13.8 oz)   03/06/24 0600 95.1 kg (209 lb 10.5 oz)   03/05/24 0700 95.3 kg (210 lb 1.6 oz)   03/04/24 0220 98.8 kg (217 lb 13 oz)   01/06/24 1353 105 kg (231 lb 14.8 oz)   01/01/24 1124 105 kg (232 lb 2.3 oz)   07/29/23 1140 92 kg (202 lb 13.2 oz)   11/03/20 1437 92 kg (202 lb 12.8 oz)   07/08/19 0901 99.8 kg (220 lb)   09/25/18 0920 97.4 kg (214 lb 11.2 oz)   08/02/18 0802 103 kg (226 lb)   07/27/18 0804 104 kg (228 lb 6.4 oz)   03/08/18 1055 99.8 kg (220 lb)   06/30/17 1013 103 kg (226 lb)   01/10/17 1127 107 kg (236 lb)   05/06/16 1609 107 kg (236 lb)          Wt Change Observation 5% gain " within 8 months      Estimated/Assessed Needs  Estimated Needs based on: Ideal Body Weight       Energy Requirements 30 kcal/kg    EST Needs (kcal/day) 2130 kcal/day        Protein Requirements 1.2 g/kg    EST Daily Needs (g/day) 85 g/day        Fluid Requirements 1 ml/kcal    Estimated Needs (mL/day) 2130 ml/day      Labs/Medications         Pertinent Labs Reviewed.   Results from last 7 days   Lab Units 03/14/24  0332 03/13/24  2139 03/13/24  2137 03/11/24  0444 03/10/24  0546   SODIUM mmol/L 138  --  136 136 137   SODIUM, VENOUS mmol/L  --  134.0*  --   --   --    POTASSIUM mmol/L 4.9  --  5.1 4.9 4.8   POTASSIUM, VENOUS mmol/L  --  5.2*  --   --   --    CHLORIDE mmol/L 111*  --  107 111* 111*   CHLORIDE, VENOUS mmol/L  --  110*  --   --   --    CO2 mmol/L 17.5*  --  19.5* 19.6* 18.5*   BUN mg/dL 43*  --  44* 45* 41*   CREATININE mg/dL 2.32*  --  2.40* 2.40* 2.14*   CALCIUM mg/dL 8.1*  --  8.2* 8.0* 8.0*   BILIRUBIN mg/dL 1.7*  --  1.8*  --  1.5*   ALK PHOS U/L 249*  --  264*  --  302*   ALT (SGPT) U/L 23  --  26  --  23   AST (SGOT) U/L 45*  --  52*  --  49*   GLUCOSE mg/dL 311*  --  371* 264* 269*   GLUCOSE, ARTERIAL mg/dL  --  340*  --   --   --      Results from last 7 days   Lab Units 03/14/24 0332 03/13/24 2137 03/11/24 0444   MAGNESIUM mg/dL 1.8 1.9 1.9   HEMOGLOBIN g/dL 10.0* 10.4* 9.2*   HEMATOCRIT % 31.2* 30.8* 27.2*     SARS-CoV-2, SRINIVASA   Date Value Ref Range Status   04/26/2022 Not Detected Not Detected Final     Lab Results   Component Value Date    HGBA1C 10.3 (H) 07/29/2023         Pertinent Medications Reviewed.     Malnutrition Severity Assessment              Nutrition Diagnosis         Nutrition Dx Problem 1 Inadequate oral Intake related to  AMS  as evidenced by  need for NPO/tube feedings      Nutrition Intervention           Current Nutrition Orders & Evaluation of Intake       Current PO Diet NPO Diet NPO Type: Strict NPO   Supplement Orders Placed This Encounter      Patient is on  Glucommander           Nutrition Intervention/Prescription        Start tube feedings: Diabetisource at 20 ml/hr, would advance by 10 ml q4hrs to goal of 75 ml/hr. This will provide 1980 kcal/day, 99g protein/day, and 1353 ml water.   Will start a water flush of 100 ml q4hrs for a total of 1953 ml water.         Medical Nutrition Therapy/Nutrition Education          Learner     Readiness N/A  Education not appropriate at this time     Method     Response N/A  N/A     Monitor/Evaluation        Monitor Pertinent labs, EN delivery/tolerance     Nutrition Discharge Plan         To be determined     Electronically signed by:  Clau Segundo RD  03/14/24 13:14 EDT

## 2024-03-15 LAB
ANION GAP SERPL CALCULATED.3IONS-SCNC: 8.7 MMOL/L (ref 5–15)
BUN SERPL-MCNC: 37 MG/DL (ref 6–20)
BUN/CREAT SERPL: 17.9 (ref 7–25)
CALCIUM SPEC-SCNC: 7.8 MG/DL (ref 8.6–10.5)
CHLORIDE SERPL-SCNC: 113 MMOL/L (ref 98–107)
CO2 SERPL-SCNC: 16.3 MMOL/L (ref 22–29)
CREAT SERPL-MCNC: 2.07 MG/DL (ref 0.76–1.27)
DEPRECATED RDW RBC AUTO: 53.9 FL (ref 37–54)
EGFRCR SERPLBLD CKD-EPI 2021: 37.6 ML/MIN/1.73
ERYTHROCYTE [DISTWIDTH] IN BLOOD BY AUTOMATED COUNT: 16.5 % (ref 12.3–15.4)
GLUCOSE BLDC GLUCOMTR-MCNC: 140 MG/DL (ref 70–99)
GLUCOSE BLDC GLUCOMTR-MCNC: 148 MG/DL (ref 70–99)
GLUCOSE BLDC GLUCOMTR-MCNC: 159 MG/DL (ref 70–99)
GLUCOSE BLDC GLUCOMTR-MCNC: 169 MG/DL (ref 70–99)
GLUCOSE BLDC GLUCOMTR-MCNC: 180 MG/DL (ref 70–99)
GLUCOSE BLDC GLUCOMTR-MCNC: 201 MG/DL (ref 70–99)
GLUCOSE BLDC GLUCOMTR-MCNC: 254 MG/DL (ref 70–99)
GLUCOSE BLDC GLUCOMTR-MCNC: 258 MG/DL (ref 70–99)
GLUCOSE SERPL-MCNC: 174 MG/DL (ref 65–99)
HBA1C MFR BLD: 9.5 % (ref 4.8–5.6)
HCT VFR BLD AUTO: 29.8 % (ref 37.5–51)
HGB BLD-MCNC: 9.9 G/DL (ref 13–17.7)
MAGNESIUM SERPL-MCNC: 1.8 MG/DL (ref 1.6–2.6)
MCH RBC QN AUTO: 30.3 PG (ref 26.6–33)
MCHC RBC AUTO-ENTMCNC: 33.2 G/DL (ref 31.5–35.7)
MCV RBC AUTO: 91.1 FL (ref 79–97)
PHOSPHATE SERPL-MCNC: 3.4 MG/DL (ref 2.5–4.5)
PLATELET # BLD AUTO: 124 10*3/MM3 (ref 140–450)
PMV BLD AUTO: 10.1 FL (ref 6–12)
POTASSIUM SERPL-SCNC: 4.6 MMOL/L (ref 3.5–5.2)
QT INTERVAL: 466 MS
QT INTERVAL: 473 MS
QTC INTERVAL: 478 MS
QTC INTERVAL: 498 MS
RBC # BLD AUTO: 3.27 10*6/MM3 (ref 4.14–5.8)
SODIUM SERPL-SCNC: 138 MMOL/L (ref 136–145)
VANCOMYCIN SERPL-MCNC: 15.08 MCG/ML (ref 5–40)
WBC NRBC COR # BLD AUTO: 7.93 10*3/MM3 (ref 3.4–10.8)

## 2024-03-15 PROCEDURE — 25010000002 LORAZEPAM PER 2 MG: Performed by: INTERNAL MEDICINE

## 2024-03-15 PROCEDURE — 99233 SBSQ HOSP IP/OBS HIGH 50: CPT | Performed by: INTERNAL MEDICINE

## 2024-03-15 PROCEDURE — 83735 ASSAY OF MAGNESIUM: CPT | Performed by: NURSE PRACTITIONER

## 2024-03-15 PROCEDURE — 25010000002 CEFTRIAXONE PER 250 MG: Performed by: NURSE PRACTITIONER

## 2024-03-15 PROCEDURE — 25010000002 MAGNESIUM SULFATE 2 GM/50ML SOLUTION: Performed by: NURSE PRACTITIONER

## 2024-03-15 PROCEDURE — 25010000002 HEPARIN (PORCINE) PER 1000 UNITS: Performed by: FAMILY MEDICINE

## 2024-03-15 PROCEDURE — 84100 ASSAY OF PHOSPHORUS: CPT | Performed by: NURSE PRACTITIONER

## 2024-03-15 PROCEDURE — 80048 BASIC METABOLIC PNL TOTAL CA: CPT | Performed by: NURSE PRACTITIONER

## 2024-03-15 PROCEDURE — 85027 COMPLETE CBC AUTOMATED: CPT | Performed by: NURSE PRACTITIONER

## 2024-03-15 PROCEDURE — 82948 REAGENT STRIP/BLOOD GLUCOSE: CPT

## 2024-03-15 PROCEDURE — 25010000002 THIAMINE PER 100 MG: Performed by: INTERNAL MEDICINE

## 2024-03-15 PROCEDURE — 80202 ASSAY OF VANCOMYCIN: CPT | Performed by: FAMILY MEDICINE

## 2024-03-15 PROCEDURE — 99291 CRITICAL CARE FIRST HOUR: CPT | Performed by: INTERNAL MEDICINE

## 2024-03-15 PROCEDURE — 99231 SBSQ HOSP IP/OBS SF/LOW 25: CPT | Performed by: NEUROLOGICAL SURGERY

## 2024-03-15 PROCEDURE — 92610 EVALUATE SWALLOWING FUNCTION: CPT

## 2024-03-15 PROCEDURE — 63710000001 INSULIN REGULAR HUMAN PER 5 UNITS: Performed by: NURSE PRACTITIONER

## 2024-03-15 RX ORDER — SPIRONOLACTONE 25 MG/1
25 TABLET ORAL DAILY
Status: DISCONTINUED | OUTPATIENT
Start: 2024-03-15 | End: 2024-03-15

## 2024-03-15 RX ORDER — NADOLOL 20 MG/1
20 TABLET ORAL
Status: DISCONTINUED | OUTPATIENT
Start: 2024-03-15 | End: 2024-03-20

## 2024-03-15 RX ORDER — IBUPROFEN 600 MG/1
1 TABLET ORAL
Status: DISCONTINUED | OUTPATIENT
Start: 2024-03-15 | End: 2024-03-16

## 2024-03-15 RX ORDER — DEXMEDETOMIDINE HYDROCHLORIDE 4 UG/ML
.2-1.5 INJECTION, SOLUTION INTRAVENOUS
Status: DISCONTINUED | OUTPATIENT
Start: 2024-03-16 | End: 2024-03-16

## 2024-03-15 RX ORDER — MAGNESIUM SULFATE HEPTAHYDRATE 40 MG/ML
2 INJECTION, SOLUTION INTRAVENOUS ONCE
Status: COMPLETED | OUTPATIENT
Start: 2024-03-15 | End: 2024-03-15

## 2024-03-15 RX ORDER — DEXTROSE MONOHYDRATE 25 G/50ML
25 INJECTION, SOLUTION INTRAVENOUS
Status: DISCONTINUED | OUTPATIENT
Start: 2024-03-15 | End: 2024-03-16

## 2024-03-15 RX ORDER — SPIRONOLACTONE 25 MG/5ML
25 SUSPENSION ORAL DAILY
Status: DISCONTINUED | OUTPATIENT
Start: 2024-03-15 | End: 2024-03-20

## 2024-03-15 RX ORDER — NADOLOL 20 MG/1
20 TABLET ORAL
Status: DISCONTINUED | OUTPATIENT
Start: 2024-03-15 | End: 2024-03-15

## 2024-03-15 RX ORDER — NICOTINE POLACRILEX 4 MG
15 LOZENGE BUCCAL
Status: DISCONTINUED | OUTPATIENT
Start: 2024-03-15 | End: 2024-03-16

## 2024-03-15 RX ADMIN — THIAMINE HYDROCHLORIDE 200 MG: 100 INJECTION, SOLUTION INTRAMUSCULAR; INTRAVENOUS at 13:56

## 2024-03-15 RX ADMIN — Medication 10 ML: at 20:15

## 2024-03-15 RX ADMIN — LORAZEPAM 2 MG: 2 INJECTION INTRAMUSCULAR; INTRAVENOUS at 10:44

## 2024-03-15 RX ADMIN — HEPARIN SODIUM 5000 UNITS: 5000 INJECTION INTRAVENOUS; SUBCUTANEOUS at 20:15

## 2024-03-15 RX ADMIN — CEFTRIAXONE SODIUM 2000 MG: 2 INJECTION, POWDER, FOR SOLUTION INTRAMUSCULAR; INTRAVENOUS at 13:55

## 2024-03-15 RX ADMIN — FAMOTIDINE 20 MG: 20 TABLET ORAL at 09:59

## 2024-03-15 RX ADMIN — RIFAXIMIN 550 MG: 550 TABLET ORAL at 09:28

## 2024-03-15 RX ADMIN — Medication 10 ML: at 09:35

## 2024-03-15 RX ADMIN — Medication 10 ML: at 20:17

## 2024-03-15 RX ADMIN — LORAZEPAM 2 MG: 2 INJECTION INTRAMUSCULAR; INTRAVENOUS at 14:13

## 2024-03-15 RX ADMIN — MAGNESIUM SULFATE HEPTAHYDRATE 2 G: 2 INJECTION, SOLUTION INTRAVENOUS at 09:58

## 2024-03-15 RX ADMIN — HEPARIN SODIUM 5000 UNITS: 5000 INJECTION INTRAVENOUS; SUBCUTANEOUS at 09:58

## 2024-03-15 RX ADMIN — LACTULOSE 30 G: 20 SOLUTION ORAL at 03:11

## 2024-03-15 RX ADMIN — FOLIC ACID 1 MG: 5 INJECTION, SOLUTION INTRAMUSCULAR; INTRAVENOUS; SUBCUTANEOUS at 09:26

## 2024-03-15 RX ADMIN — INSULIN HUMAN 4 UNITS: 100 INJECTION, SOLUTION PARENTERAL at 18:23

## 2024-03-15 RX ADMIN — THIAMINE HYDROCHLORIDE 200 MG: 100 INJECTION, SOLUTION INTRAMUSCULAR; INTRAVENOUS at 05:52

## 2024-03-15 RX ADMIN — LORAZEPAM 2 MG: 0.5 TABLET ORAL at 10:45

## 2024-03-15 RX ADMIN — LACTULOSE 30 G: 20 SOLUTION ORAL at 09:28

## 2024-03-15 RX ADMIN — NADOLOL 20 MG: 20 TABLET ORAL at 09:59

## 2024-03-15 RX ADMIN — THIAMINE HYDROCHLORIDE 200 MG: 100 INJECTION, SOLUTION INTRAMUSCULAR; INTRAVENOUS at 21:44

## 2024-03-15 RX ADMIN — INSULIN HUMAN 4 UNITS: 100 INJECTION, SOLUTION PARENTERAL at 12:42

## 2024-03-15 RX ADMIN — DEXMEDETOMIDINE HYDROCHLORIDE 0.2 MCG/KG/HR: 4 INJECTION, SOLUTION INTRAVENOUS at 23:52

## 2024-03-15 RX ADMIN — LEVOTHYROXINE SODIUM 50 MCG: 50 TABLET ORAL at 05:52

## 2024-03-15 RX ADMIN — Medication 15 ML: at 09:29

## 2024-03-15 RX ADMIN — LORAZEPAM 2 MG: 2 INJECTION INTRAMUSCULAR; INTRAVENOUS at 21:43

## 2024-03-15 RX ADMIN — CAROSPIR 25 MG: 25 SUSPENSION ORAL at 10:44

## 2024-03-15 NOTE — PROGRESS NOTES
Pulmonary / Critical Care Progress Note      Patient Name: Simon Peters  : 1970  MRN: [id number]  Attending:  Kosta Sampson MD   Date of admission: 3/13/2024    Subjective   Subjective   Patient critically ill with hepatic encephalopathy, altered mental status    Over past 24 hours: Patient remained in the ICU, NG tube placed, remained on lactulose and rifaximin, had paracentesis, on antibiotics, on insulin drip    Overnight, no acute events    This morning  Patient is on room air, no acute distress  Afebrile  Mental status improved  NG tube in place, still having bowel movements  Speech therapy working with patient  On insulin drip  Having BMs    Review of Systems  Constitutional symptoms:  Denied complaints   Ear, nose, throat: Denied complaints  Cardiovascular:  Denied complaints  Respiratory: Denied complaints  Gastrointestinal: Denied complaints  Musculoskeletal: Denied complaints  Neurologic: Denied complaints  Skin: Denied complaints        Objective   Objective     Vitals:   Vital signs for last 24 hours:  Temp:  [95.9 °F (35.5 °C)-99.1 °F (37.3 °C)] 98.2 °F (36.8 °C)  Heart Rate:  [54-77] 76  Resp:  [9-12] 11  BP: (117-172)/(72-96) 172/96    Intake/Output last 3 shifts:  I/O last 3 completed shifts:  In: 1453.6 [I.V.:910.6; Other:200; NG/GT:343]  Out: 1425 [Urine:1425]  Intake/Output this shift:  No intake/output data recorded.    Physical Exam     Vital Signs Reviewed   WDWN, Alert, NAD.  On room air  HEENT:  PERRL, EOMI.  OP, nares clear  Chest:  good aeration, clear to auscultation bilaterally, tympanic to percussion bilaterally, no work of breathing noted  CV: RRR, no MGR, pulses 2+, equal.  Abd:  Soft, NT, ND, + BS, no HSM  EXT:  no clubbing, no cyanosis, no edema  Neuro:  A&Ox2, CN grossly intact, no focal deficits.  Intermittent confusion about course of events  Skin: No rashes or lesions noted      Result Review    Result Review:  I have personally reviewed the results from the time of  this admission to 3/15/2024 10:32 EDT and agree with these findings:  [x]  Laboratory  [x]  Microbiology  [x]  Radiology  []  EKG/Telemetry   []  Cardiology/Vascular   []  Pathology  [x]  Old records  []  Other:  Most notable findings include:       Lab 03/15/24  0552 03/14/24  1551 03/14/24  0332 03/13/24 2139 03/13/24 2137 03/11/24  0444 03/10/24  0546 03/09/24  0444   WBC 7.93 5.03 6.01  --  6.77 6.97 8.84 6.26   HEMOGLOBIN 9.9* 9.5* 10.0*  --  10.4* 9.2* 10.3* 8.9*   HEMATOCRIT 29.8* 29.6* 31.2*  --  30.8* 27.2* 31.4* 26.2*   PLATELETS 124* 119* 125*  --  133* 112* 131* 109*   SODIUM 138  --  138  --  136 136 137 141   SODIUM, VENOUS  --   --   --  134.0*  --   --   --   --    POTASSIUM 4.6  --  4.9  --  5.1 4.9 4.8 4.6   POTASSIUM, VENOUS  --   --   --  5.2*  --   --   --   --    CHLORIDE 113*  --  111*  --  107 111* 111* 112*   CHLORIDE, VENOUS  --   --   --  110*  --   --   --   --    CO2 16.3*  --  17.5*  --  19.5* 19.6* 18.5* 20.9*   BUN 37*  --  43*  --  44* 45* 41* 43*   CREATININE 2.07*  --  2.32*  --  2.40* 2.40* 2.14* 2.53*   GLUCOSE 174*  --  311*  --  371* 264* 269* 225*   GLUCOSE, ARTERIAL  --   --   --  340*  --   --   --   --    CALCIUM 7.8*  --  8.1*  --  8.2* 8.0* 8.0* 7.6*   PHOSPHORUS 3.4  --   --   --   --   --   --   --    TOTAL PROTEIN  --   --  5.4*  --  5.7*  --  5.2* 4.2*   ALBUMIN  --   --  1.8*  --  2.1*  --  1.8* 1.5*   GLOBULIN  --   --  3.6  --  3.6  --  3.4 2.7         Assessment & Plan   Assessment / Plan     Active Hospital Problems:  Active Hospital Problems    Diagnosis    • **Pneumonia        Impression:    Altered mental status  Hepatic encephalopathy  Alcoholic cirrhosis s/p TIPS May 2023  Decompensated liver cirrhosis with ascites and anasarca  Type 2 diabetes with hyperglycemia  Chronic thrombocytopenia secondary to hepatic cirrhosis  Chronic kidney disease stage III  Ongoing tobacco use  Hypothyroidism  Hypoalbuminemia  Hypertension    Plan:    -Patient is currently on  room air  -Aspiration precautions, keep head of bed elevated  -NG tube in place, continue current dose of lactulose and rifaximin  -Continue Rocephin, s/p paracentesis, culture NG TD, likely not SBP would consider de-escalating to Unasyn  -Continue tube feeds per dietitian, can transition to diet if cleared per speech therapy  -Transition insulin drip to SSI  -Will check hemoglobin A1c  -Continue CIWA protocol  -Discontinue Precedex  -Continue thiamine, multivitamin folic acid  -Resume Synthroid  -CT scan with pneumocephalus, evaluated by neurosurgery  -Liver ultrasound reviewed, consistent with cirrhotic morphology  -Trend renal function, monitor replace electrolytes, replace IV magnesium      DVT prophylaxis:  Medical DVT prophylaxis orders are present.    CODE STATUS:   Code Status (Patient has no pulse and is not breathing): CPR (Attempt to Resuscitate)  Medical Interventions (Patient has pulse or is breathing): Full Support    IGabby APRN, spent 15 minutes critical care time.  Electronically signed by ANTOINETTE Lema, 03/15/24, 10:42 AM EDT.      The patient is critically ill in the ICU with altered mental status, hepatic encephalopathy, alcoholic cirrhosis status post TIPS May 2023, decompensated liver cirrhosis with ascites and anasarca, chronic thrombocytopenia, chronic renal disease stage III, hypothyroidism and hypertension. Multidisciplinary bedside critical care rounds were performed with nursing staff, respiratory therapy, pharmacy, nutritional services, social work. I have personally reviewed the chart, labs and any pertinent imaging available.  We have spent 35 minutes of critical care time, excluding procedures, in the care of this patient. I, Dr Alvares, spent 20 mins of critical care time according to split shared critical care billing guidelines.      Electronically signed by Ed Alvares MD, 03/15/24, 11:17 AM EDT.

## 2024-03-15 NOTE — PROGRESS NOTES
[facility]   Neurosurgery Progress Note    Patient Name: Simon Peters  : 1970  MRN: [id number]  Date of admission: 3/13/2024  Surgical Procedures Since Admission:    Subjective   Subjective     Chief Complaint: Abnormal head CT, altered mental status    History of Present Illness   Patient more awake and alert, but still very confused and not making sense with his speech.      Objective   Objective     Vitals:   Temp:  [95.9 °F (35.5 °C)-99.1 °F (37.3 °C)] 98.6 °F (37 °C)  Heart Rate:  [54-77] 77  Resp:  [9-16] 11  BP: (117-161)/() 159/90  Output by Drain (mL) 24 0701 - 24 1900 24 1901 - 03/15/24 0700 03/15/24 0701 - 03/15/24 0953 Range Total   Urethral Catheter Temperature probe 825 600  1425       He is more awake and alert but difficult to understand his speech.  Appears to be moving fairly symmetrically.    Assessment & Plan   Assessment / Plan     Brief Patient Summary:  Simon Peters is a 53 y.o. male who has likely iatrogenic pneumocephalus and altered mental status more likely metabolic in nature.    Active Hospital Problems:  Active Hospital Problems    Diagnosis    • **Pneumonia      Plan:   If mental status does not continue to improve, consider repeat brain imaging, however I feel this is most likely iatrogenic and not the issue with his mental status issues.

## 2024-03-15 NOTE — PROGRESS NOTES
[facility]   Hospitalist Progress Note  Date: 3/15/2024  Patient Name: Simon Peters  : 1970  MRN: [id number]  Date of admission: 3/13/2024      Subjective   Subjective     Chief Complaint: Altered mental status    Summary:   Patient is a 53-year-old male with past medical history of cirrhosis, renal failure, diabetes mellitus, hypertension who presents with altered mental status.  Patient was recently discharged home after prolonged hospitalization.  Patient has a history of hepatic encephalopathy. Here in the ED he is and not coherent enough to give a review of systems. He is asking people to close his eggs and is unaware of self time or place. His workup here has shown a newly developed left infrahilar pneumonia. This coupled with slightly worsened renal functions and a greatly elevated ammonia of 138 has the ED physician asking that the patient be admitted for further evaluation.   Transfer to unit overnight because of agitation as started on Precedex and restraints.  S/p bedside paracentesis by intensivist no evidence of SBP    Interval Followup:   Vital signs stable on room air.  Patient very sleepy has been off Precedex.  Patient remains confused.  Does at times follows vocal commands like opening eyes or mouth.  Given Ativan for anxiety but CIWA score running low  Of insulin drip  NG tube clamped.  Cleared by speech therapy for clears  As rectal tube due to diarrhea from lactulose.  Bladder scan with large volume.    Review of Systems   All systems were reviewed and negative except for: Unable to obtain as patient sleepy    Objective   Objective     Vitals:   Temp:  [95.9 °F (35.5 °C)-99.1 °F (37.3 °C)] 98.2 °F (36.8 °C)  Heart Rate:  [54-91] 79  Resp:  [9-12] 11  BP: (117-172)/() 153/87  Physical Exam      Constitutional:  Well-developed and well-nourished.  No acute distress.      HENT:  Head:  Normocephalic and atraumatic.  Mouth:  Moist mucous membranes.    Eyes:  Conjunctivae and EOM  are normal. No scleral icterus.    Neck:  Neck supple.  No JVD present.    Cardiovascular:  Normal rate, regular rhythm and normal heart sounds with no murmur.  Pulmonary/Chest:  No respiratory distress, no wheezes, no crackles, with normal breath sounds and good air movement.  Abdominal:  Soft. No distension and no tenderness.   Musculoskeletal:  No tenderness, and no deformity.  No red or swollen joints anywhere.    Neurological: Unable to obtain  Skin:  Skin is warm and dry. No rash noted. No pallor.   Peripheral vascular:  No clubbing, no cyanosis, no edema.  Psychiatric: Unable to examine        Result Review    Result Review:  I have personally reviewed the results for the past 24 hours and agree with these findings:  [x]  Laboratory  [x]  Microbiology  [x]  Radiology  [x]  EKG/Telemetry normal sinus rhythm 69.  QT 0.49  []  Cardiology/Vascular   []  Pathology  [x]  Old records  [x]  Other: Medications    Assessment & Plan   Assessment / Plan     Assessment:  Left infrahilar healthcare associate pneumonia.  Unspecified bacterial pathogen  Acute metabolic encephalopathy.  Improving  Recurrent hepatic encephalopathy.  History of alcoholic cirrhosis status post TIPS  Hypothyroidism.  New diagnosis.  Diabetes mellitus.  Most recent hemoglobin A1c of 8.8%.  Uncontrolled hyperglycemia  CKD 3B.  Baseline creatinine 2-2.3.  Stable  Pneumocephalus.Likely iatrogenic.  Intravenous gas right Superior ophthalmic vein and bilateral cavernous veins.  Hypertensive urgency  Frequent falls and generalized weakness.    Peripheral neuropathy likely diabetic and alcoholic causing above.  Noncompliance with medication including insulin.  Anasarca with ascites and chronic scrotal swelling.  S/p 1.6 L paracentesis March 14 by intensivist.  No SBP  Chronic anemia and thrombocytopenia from cirrhosis.  Hematuria and pyuria without bacteriuria.  Asymptomatic gallstones.  Hypoalbuminemia from liver disease.  Tobacco use disorder.      Plan:  Off Precedex  Patient pulled NG tube out  Continue restraints  Patient on CIWA protocol.  Should be okay to DC  Sliding-scale insulin as per intensivist  IV Rocephin.  Appreciate neurosurgery input.  Pneumocephalus likely iatrogenic.  If no improvement will need repeat imaging  Synthroid.  Repeat CT of the head due to finding of intravenous gas.  NPO.  Decrease the dose of lactulose and Xifaxan.  Thiamine and folic acid  Appreciate intensivist input.  S/p paracentesis  Resume appropriate home medications  If needed but patient can be transferred out of the unit  Discussed plan with RN.    DVT prophylaxis:  Medical DVT prophylaxis orders are present.        CODE STATUS:   Code Status (Patient has no pulse and is not breathing): CPR (Attempt to Resuscitate)  Medical Interventions (Patient has pulse or is breathing): Full Support      Part of this note may be an electronic transcription/translation of spoken language to printed text using the Dragon Dictation System.       Electronically signed by Kosta Sampson MD, 03/15/24, 5:37 PM EDT.

## 2024-03-15 NOTE — PROGRESS NOTES
Nutrition Services    Patient Name: Simon Peters  YOB: 1970  MRN: [id number]  Admission date: 3/13/2024    PROGRESS NOTE      Encounter Information: EN Follow Up       PO Diet: NPO Diet NPO Type: Strict NPO   PO Supplements: NPO   PO Intake:  NPO       Current nutrition support: Diabetisource at 20 ml/hr, advance by 10 ml q4hrs to goal of 75 ml/hr   q4h     Provides: 1980 kcal, 99 g pro, 1953 ml fluids       Estimated Needs: 2130 kcal, 85 g pro, 2130 ml fluids       Nutrition support review: Pt advancing to goal rate TF @ 50 ml/hr.        Labs (reviewed below): Reviewed. , lytes WDL        GI Function:  Last BM on 3/15 per primary RN       Nutrition Intervention Updates: SLP eval: sips of water w/ Nursing, PO improving. Continue current nutrition plan of care.      Results from last 7 days   Lab Units 03/15/24  0552 03/14/24  0332 03/13/24  2139 03/13/24  2137 03/11/24  0444 03/10/24  0546   SODIUM mmol/L 138 138  --  136   < > 137   SODIUM, VENOUS mmol/L  --   --  134.0*  --   --   --    POTASSIUM mmol/L 4.6 4.9  --  5.1   < > 4.8   POTASSIUM, VENOUS mmol/L  --   --  5.2*  --   --   --    CHLORIDE mmol/L 113* 111*  --  107   < > 111*   CHLORIDE, VENOUS mmol/L  --   --  110*  --   --   --    CO2 mmol/L 16.3* 17.5*  --  19.5*   < > 18.5*   BUN mg/dL 37* 43*  --  44*   < > 41*   CREATININE mg/dL 2.07* 2.32*  --  2.40*   < > 2.14*   CALCIUM mg/dL 7.8* 8.1*  --  8.2*   < > 8.0*   BILIRUBIN mg/dL  --  1.7*  --  1.8*  --  1.5*   ALK PHOS U/L  --  249*  --  264*  --  302*   ALT (SGPT) U/L  --  23  --  26  --  23   AST (SGOT) U/L  --  45*  --  52*  --  49*   GLUCOSE mg/dL 174* 311*  --  371*   < > 269*   GLUCOSE, ARTERIAL mg/dL  --   --  340*  --   --   --     < > = values in this interval not displayed.     Results from last 7 days   Lab Units 03/15/24  0552 03/14/24  1551 03/14/24  0332 03/13/24  2137   MAGNESIUM mg/dL 1.8  --  1.8 1.9   PHOSPHORUS mg/dL 3.4  --   --   --    HEMOGLOBIN g/dL  9.9*   < > 10.0* 10.4*   HEMATOCRIT % 29.8*   < > 31.2* 30.8*    < > = values in this interval not displayed.     SARS-CoV-2, SRINIVASA   Date Value Ref Range Status   04/26/2022 Not Detected Not Detected Final     Lab Results   Component Value Date    HGBA1C 10.3 (H) 07/29/2023       RD to follow up per protocol.    Electronically signed by:  Makayla Valle RD  03/15/24 09:33 EDT

## 2024-03-15 NOTE — THERAPY EVALUATION
Acute Care - Speech Language Pathology   Swallow Initial Evaluation  [facility]     Patient Name: Simon Peters  : 1970  MRN: [id number]  Today's Date: 3/15/2024               Admit Date: 3/13/2024    Visit Dx:     ICD-10-CM ICD-9-CM   1. Hepatic encephalopathy  K76.82 572.2   2. Oropharyngeal dysphagia  R13.12 787.22     Patient Active Problem List   Diagnosis    Hypertension    Anxiety    Elevated serum glucose    Diabetes mellitus    Elevated liver enzymes    Encounter for screening for malignant neoplasm of colon    Weakness    Pneumonia     Past Medical History:   Diagnosis Date    Abnormal LFTs     Anxiety     Back pain     Diabetes mellitus     Hypertension     Kidney failure     Liver failure     Rash      Past Surgical History:   Procedure Laterality Date    TIPS PROCEDURE             Inpatient Speech Pathology Dysphagia Evaluation        PAIN SCALE: None indicated.    PRECAUTIONS/CONTRAINDICATIONS: Standard    SUSPECTED ABUSE/NEGLECT/EXPLOITATION: None indicated.    SOCIAL/PSYCHOLOGICAL NEEDS/BARRIERS: None indicated.    PAST SOCIAL HISTORY: 53-year-old male lives alone.    PRIOR FUNCTION: Not fully determined    PATIENT GOALS/EXPECTATIONS: Patient did not verbalize    HISTORY: 53-year-old male with the above diagnoses referred for speech therapy evaluation to assess for swallowing.  No previous speech pathology services are reported.  Patient has been with altered mental status.    CURRENT DIET LEVEL: N.p.o. with NG    OBJECTIVE:    TEST ADMINISTERED: Clinical dysphagia evaluation    COGNITION/SAFETY AWARENESS: Impaired    BEHAVIORAL OBSERVATIONS: Awake, cues to increase alertness, cooperative.    ORAL MOTOR EXAM: Patient did not follow commands for complete oral motor examination.  Structures and functions appeared within functional limits during functional tasks.    VOICE QUALITY: Adequate    REFLEX EXAM: Deferred    POSTURE: Assisted sitting upright in bed, patient is in soft  restraints.    FEEDING/SWALLOWING FUNCTION: Assessed with nectar liquid, thin liquids, puréed solids, crunchy solid.    CLINICAL OBSERVATIONS: Nectar liquid by spoon with patient sucking to spoon, swallow completed with vocal quality remaining clear to cervical auscultation.  Nectar liquid by straw with patient demonstrating impulsivity attempting to chug, maximum cueing for single sips.  Swallows completed with vocal quality remaining clear.  Thin liquid by straw with patient demonstrating impulsivity, attempting to chug, minimal vocal wetness noted x 1.  Purée solid with swallow completed with laryngeal elevation noted to palpation.  Crunchy solid with patient initially not demonstrating bite, impulsivity noted with patient attempting to take whole cracker.  Patient demonstrated adequate chewing, minimal residue remaining.      DYSPHAGIA CRITERIA: Decreased cognition for feeding, patient demonstrating impulsivity placing patient at risk of aspiration.    FUNCTIONAL ASSESSMENT INSTRUMENT: Patient currently scored a level 4 of 7 on Functional Communication Measures for swallowing indicating a 40-59% limitation in function.    ASSESSMENT/ PLAN OF CARE:  Pt presents with limitations, noted below, that impede his ability to swallow safely and maintain nutrition. The skills of a therapist will be required to safely and effectively implement the following treatment plan to restore maximal level of function.    PROBLEMS:  1.  risk of aspiration, decreased cognition for feeding                       LTG 1: 30 days: Patient will tolerate least restrictive diet utilizing appropriate positioning and strategies with minimal assistance.                       STG 1a: 14 days: Patient will tolerate diet of regular solids and thin liquids with minimal assistance for strategies.                       STG 1b: 14 days:  Patient/family education.                       TREATMENT: Dysphagia therapy to address swallow function through  exercises and education of strategies.     FREQUENCY/DURATION: Once daily 5 times per week    REHAB POTENTIAL:  Pt has fair to good rehab potential.  The following limitations may influence improvement/ length of tx: Medical status.    RECOMMENDATIONS:   1.   DIET: Continue with alternative feeding method for now.  Patient may have sips of thin liquid by straw, cue for single sips.  2.  May upgrade to regular diet with thin liquids when patient is alert, cooperative and able to assist in feeding self.    2.  POSITION: Positioning fully upright for all p.o. intake and 30 minutes following.    3.  COMPENSATORY STRATEGIES: Alternate small bites and small sips of solids and liquids at a slow rate.    Pt/responsible party agrees with plan of care and has been informed of all alternatives, risks and benefits.                              Anticipated Discharge Disposition (SLP): inpatient rehabilitation facility (03/15/24 1025)                                                               EDUCATION  The patient has been educated in the following areas:   Modified Diet Instruction.              Time Calculation:    Time Calculation- SLP       Row Name 03/15/24 1025             Time Calculation- SLP    SLP Start Time 0900  -TB      SLP Stop Time 1000  -TB      SLP Time Calculation (min) 60 min  -TB      SLP Received On 03/15/24  -TB         Untimed Charges    SLP Eval/Re-eval  ST Eval Oral Pharyng Swallow - 78674  -TB      53476-PN Eval Oral Pharyng Swallow Minutes 60  -TB         Total Minutes    Untimed Charges Total Minutes 60  -TB       Total Minutes 60  -TB                User Key  (r) = Recorded By, (t) = Taken By, (c) = Cosigned By      Initials Name Provider Type    TB Anne Marie Isaac SLP Speech and Language Pathologist                    Therapy Charges for Today       Code Description Service Date Service Provider Modifiers Qty    [id number]  ST EVAL ORAL PHARYNG SWALLOW 4 3/15/2024 Anne Marie Isaac SLP GN 1                  Anne Marie Isaac, SLP  3/15/2024

## 2024-03-15 NOTE — CONSULTS
[facility]   Neurosurgery Consult    Patient Name:Simon Peters  : 1970  MRN: [id number]  Primary Care Physician: Elizabeth Woo MD  Date of admission: 3/13/2024    Subjective   Subjective     Chief Complaint: Abnormal head CT, altered mental status    Fall    Altered Mental Status       Simon Peters is a 53 y.o. male came into the emergency department with altered mental status.  He has a history of cirrhosis and liver failure.  He had a CT scan of the head which demonstrated pneumocephalus in the cavernous sinus as well as ophthalmic vein.  I was consulted for this.    Review of Systems   Unable to perform ROS: Mental status change         Personal History     Past Medical History:   Diagnosis Date   • Abnormal LFTs    • Anxiety    • Back pain    • Diabetes mellitus    • Hypertension    • Kidney failure    • Liver failure    • Rash        Past Surgical History:   Procedure Laterality Date   • TIPS PROCEDURE         Family History: His family history includes Diabetes in his brother and father; Diabetes type II in his father; Heart disease in his father; Hypertension in his father; Stroke in his father.     Social History: He  reports that he has been smoking cigarettes. He started smoking about 39 years ago. He has a 19.6 pack-year smoking history. He has never used smokeless tobacco. He reports that he does not currently use alcohol. He reports that he does not use drugs.    Home Medications:  FLUoxetine, famotidine, folic acid, furosemide, gabapentin, hydrOXYzine pamoate, lactulose, metFORMIN ER, multivitamin with minerals, nadolol, spironolactone, and thiamine    Allergies:  He has No Known Allergies.    Objective    Objective     Vitals:    Temp:  [95.9 °F (35.5 °C)-99.1 °F (37.3 °C)] 98.6 °F (37 °C)  Heart Rate:  [54-77] 77  Resp:  [9-16] 11  BP: (117-161)/() 159/90  Output by Drain (mL) 24 0701 - 24 1900 24 1901 - 03/15/24 0700 03/15/24 0701 - 03/15/24 0948 Range Total    Urethral Catheter Temperature probe 825 600  1425     His pupils are pinpoint but do appear to react to light.  He does not respond to sternal rub or voice.    Result Review    Result Review:  I have personally reviewed the results from the time of this admission to 3/15/2024 09:48 EDT and agree with these findings:  []  Laboratory  []  Microbiology  [x]  Radiology  []  EKG/Telemetry   []  Cardiology/Vascular   []  Pathology  []  Old records  []  Other:  Most notable findings include: Head CT demonstrates small amount of air within the cavernous sinus as well as the right ophthalmic vein.  The brain itself appears normal.    Assessment & Plan   Assessment / Plan     Brief Patient Summary:  Simon Peters is a 53 y.o. male has altered mental status, likely related to metabolic encephalopathy.  The pneumocephalus is likely iatrogenic.    Active Hospital Problems:  Active Hospital Problems    Diagnosis    • **Pneumonia      Plan:   No interventions for the small amount of pneumocephalus.  If he does not respond to appropriate treatment of his metabolic encephalopathy, consider repeat CT or MRI.    DVT prophylaxis:  Medical DVT prophylaxis orders are present.

## 2024-03-15 NOTE — SIGNIFICANT NOTE
Wound Eval / Progress Noted    PERLITA Funk     Patient Name: Simon Peters  : 1970  MRN: [id number]  Today's Date: 3/15/2024                 Admit Date: 3/13/2024    Visit Dx:    ICD-10-CM ICD-9-CM   1. Hepatic encephalopathy  K76.82 572.2   2. Oropharyngeal dysphagia  R13.12 787.22         Pneumonia        Past Medical History:   Diagnosis Date    Abnormal LFTs     Anxiety     Back pain     Diabetes mellitus     Hypertension     Kidney failure     Liver failure     Rash         Past Surgical History:   Procedure Laterality Date    TIPS PROCEDURE           Physical Assessment:  Wound 24 0430 penis Traumatic (Active)   Dressing Appearance open to air 03/15/24 0925   Closure None 03/15/24 0925   Base moist;red 03/15/24 0925   Periwound dry;intact;edematous 03/15/24 0925   Periwound Temperature warm 03/15/24 0925   Periwound Skin Turgor soft 03/15/24 0925   Edges open;rolled/closed 03/15/24 0925   Drainage Amount none 03/15/24 0925   Dressing Care open to air 03/15/24 0925      Wound Check / Follow-up: Patient seen today for wound consult. Patient is currently in CICU. He is awake and alert at time of visit. Patient currently in bilateral soft wrist restraints. Patient is agitated and combative during visit.     Edema noted to penis and scrotum. Per primary RN, foreskin was retracted for a period of time during martin insertion. Foreskin is in its natural position at this time. Redness and moisture noted to penis. Visualization is minimal due to edema. Recommending skin care with application of barrier cream.     Heels intact without discoloration.      Impression: Edema to penis and scrotum. Redness and moisture to penis.      Short term goals: Regain skin integrity, skin protection, skin care.      Pham Forrest RN    3/15/2024    16:23 EDT

## 2024-03-15 NOTE — PLAN OF CARE
Goal Outcome Evaluation:  Plan of Care Reviewed With: patient         ASSESSMENT/ PLAN OF CARE:  Pt presents with limitations, noted below, that impede his ability to swallow safely and maintain nutrition. The skills of a therapist will be required to safely and effectively implement the following treatment plan to restore maximal level of function.    PROBLEMS:  1.  risk of aspiration, decreased cognition for feeding                                            TREATMENT: Dysphagia therapy to address swallow function through exercises and education of strategies.     FREQUENCY/DURATION: Once daily 5 times per week    REHAB POTENTIAL:  Pt has fair to good rehab potential.  The following limitations may influence improvement/ length of tx: Medical status.    RECOMMENDATIONS:   1.   DIET: Continue with alternative feeding method for now.  Patient may have sips of thin liquid by straw, cue for single sips.  2.  May upgrade to regular diet with thin liquids when patient is alert, cooperative and able to assist in feeding self.    2.  POSITION: Positioning fully upright for all p.o. intake and 30 minutes following.    3.  COMPENSATORY STRATEGIES: Alternate small bites and small sips of solids and liquids at a slow rate.         Anticipated Discharge Disposition (SLP): inpatient rehabilitation facility

## 2024-03-15 NOTE — PLAN OF CARE
Goal Outcome Evaluation:               VSS, pt drowsy but able to arouse, refuses to follow commands but can speak, indicate preferences, and makes demands of staff. On insulin gtt, well-controlled. CIWA not high enough to necessitate medicating. Tube feeds infusing. Good UOP.

## 2024-03-16 ENCOUNTER — APPOINTMENT (OUTPATIENT)
Dept: GENERAL RADIOLOGY | Facility: HOSPITAL | Age: 54
DRG: 441 | End: 2024-03-16
Payer: COMMERCIAL

## 2024-03-16 ENCOUNTER — APPOINTMENT (OUTPATIENT)
Dept: CT IMAGING | Facility: HOSPITAL | Age: 54
DRG: 441 | End: 2024-03-16
Payer: COMMERCIAL

## 2024-03-16 LAB
ALBUMIN SERPL-MCNC: 1.6 G/DL (ref 3.5–5.2)
ALBUMIN/GLOB SERPL: 0.6 G/DL
ALP SERPL-CCNC: 164 U/L (ref 39–117)
ALT SERPL W P-5'-P-CCNC: 19 U/L (ref 1–41)
ANION GAP SERPL CALCULATED.3IONS-SCNC: 6.7 MMOL/L (ref 5–15)
AST SERPL-CCNC: 32 U/L (ref 1–40)
BASOPHILS # BLD AUTO: 0.09 10*3/MM3 (ref 0–0.2)
BASOPHILS NFR BLD AUTO: 1.1 % (ref 0–1.5)
BILIRUB SERPL-MCNC: 1.3 MG/DL (ref 0–1.2)
BUN SERPL-MCNC: 34 MG/DL (ref 6–20)
BUN/CREAT SERPL: 16.2 (ref 7–25)
CALCIUM SPEC-SCNC: 7.9 MG/DL (ref 8.6–10.5)
CHLORIDE SERPL-SCNC: 117 MMOL/L (ref 98–107)
CO2 SERPL-SCNC: 17.3 MMOL/L (ref 22–29)
CREAT SERPL-MCNC: 2.1 MG/DL (ref 0.76–1.27)
DEPRECATED RDW RBC AUTO: 52.1 FL (ref 37–54)
EGFRCR SERPLBLD CKD-EPI 2021: 36.9 ML/MIN/1.73
EOSINOPHIL # BLD AUTO: 0.43 10*3/MM3 (ref 0–0.4)
EOSINOPHIL NFR BLD AUTO: 5.4 % (ref 0.3–6.2)
ERYTHROCYTE [DISTWIDTH] IN BLOOD BY AUTOMATED COUNT: 16.2 % (ref 12.3–15.4)
GLOBULIN UR ELPH-MCNC: 2.8 GM/DL
GLUCOSE BLDC GLUCOMTR-MCNC: 162 MG/DL (ref 70–99)
GLUCOSE BLDC GLUCOMTR-MCNC: 205 MG/DL (ref 70–99)
GLUCOSE BLDC GLUCOMTR-MCNC: 211 MG/DL (ref 70–99)
GLUCOSE BLDC GLUCOMTR-MCNC: 220 MG/DL (ref 70–99)
GLUCOSE BLDC GLUCOMTR-MCNC: 297 MG/DL (ref 70–99)
GLUCOSE SERPL-MCNC: 243 MG/DL (ref 65–99)
HCT VFR BLD AUTO: 26.9 % (ref 37.5–51)
HGB BLD-MCNC: 9 G/DL (ref 13–17.7)
IMM GRANULOCYTES # BLD AUTO: 0.02 10*3/MM3 (ref 0–0.05)
IMM GRANULOCYTES NFR BLD AUTO: 0.2 % (ref 0–0.5)
LYMPHOCYTES # BLD AUTO: 1.08 10*3/MM3 (ref 0.7–3.1)
LYMPHOCYTES NFR BLD AUTO: 13.5 % (ref 19.6–45.3)
MAGNESIUM SERPL-MCNC: 2.3 MG/DL (ref 1.6–2.6)
MCH RBC QN AUTO: 30.2 PG (ref 26.6–33)
MCHC RBC AUTO-ENTMCNC: 33.5 G/DL (ref 31.5–35.7)
MCV RBC AUTO: 90.3 FL (ref 79–97)
MONOCYTES # BLD AUTO: 0.86 10*3/MM3 (ref 0.1–0.9)
MONOCYTES NFR BLD AUTO: 10.7 % (ref 5–12)
NEUTROPHILS NFR BLD AUTO: 5.53 10*3/MM3 (ref 1.7–7)
NEUTROPHILS NFR BLD AUTO: 69.1 % (ref 42.7–76)
NRBC BLD AUTO-RTO: 0 /100 WBC (ref 0–0.2)
PHOSPHATE SERPL-MCNC: 3 MG/DL (ref 2.5–4.5)
PLATELET # BLD AUTO: 113 10*3/MM3 (ref 140–450)
PMV BLD AUTO: 10.1 FL (ref 6–12)
POTASSIUM SERPL-SCNC: 4.7 MMOL/L (ref 3.5–5.2)
PROT SERPL-MCNC: 4.4 G/DL (ref 6–8.5)
RBC # BLD AUTO: 2.98 10*6/MM3 (ref 4.14–5.8)
SODIUM SERPL-SCNC: 141 MMOL/L (ref 136–145)
WBC NRBC COR # BLD AUTO: 8.01 10*3/MM3 (ref 3.4–10.8)

## 2024-03-16 PROCEDURE — 82948 REAGENT STRIP/BLOOD GLUCOSE: CPT

## 2024-03-16 PROCEDURE — 82948 REAGENT STRIP/BLOOD GLUCOSE: CPT | Performed by: NURSE PRACTITIONER

## 2024-03-16 PROCEDURE — 25010000002 HEPARIN (PORCINE) PER 1000 UNITS: Performed by: FAMILY MEDICINE

## 2024-03-16 PROCEDURE — 83735 ASSAY OF MAGNESIUM: CPT | Performed by: NURSE PRACTITIONER

## 2024-03-16 PROCEDURE — 25010000002 LORAZEPAM PER 2 MG: Performed by: INTERNAL MEDICINE

## 2024-03-16 PROCEDURE — 99233 SBSQ HOSP IP/OBS HIGH 50: CPT | Performed by: INTERNAL MEDICINE

## 2024-03-16 PROCEDURE — 84100 ASSAY OF PHOSPHORUS: CPT | Performed by: NURSE PRACTITIONER

## 2024-03-16 PROCEDURE — 74018 RADEX ABDOMEN 1 VIEW: CPT

## 2024-03-16 PROCEDURE — 25010000002 THIAMINE PER 100 MG: Performed by: INTERNAL MEDICINE

## 2024-03-16 PROCEDURE — 85025 COMPLETE CBC W/AUTO DIFF WBC: CPT | Performed by: INTERNAL MEDICINE

## 2024-03-16 PROCEDURE — 70450 CT HEAD/BRAIN W/O DYE: CPT

## 2024-03-16 PROCEDURE — 80053 COMPREHEN METABOLIC PANEL: CPT | Performed by: INTERNAL MEDICINE

## 2024-03-16 PROCEDURE — 82948 REAGENT STRIP/BLOOD GLUCOSE: CPT | Performed by: INTERNAL MEDICINE

## 2024-03-16 PROCEDURE — 99291 CRITICAL CARE FIRST HOUR: CPT | Performed by: STUDENT IN AN ORGANIZED HEALTH CARE EDUCATION/TRAINING PROGRAM

## 2024-03-16 PROCEDURE — 63710000001 INSULIN REGULAR HUMAN PER 5 UNITS: Performed by: NURSE PRACTITIONER

## 2024-03-16 PROCEDURE — 25010000002 CEFTRIAXONE PER 250 MG: Performed by: NURSE PRACTITIONER

## 2024-03-16 RX ORDER — DEXTROSE MONOHYDRATE 25 G/50ML
25 INJECTION, SOLUTION INTRAVENOUS
Status: DISCONTINUED | OUTPATIENT
Start: 2024-03-16 | End: 2024-03-23 | Stop reason: HOSPADM

## 2024-03-16 RX ORDER — LORAZEPAM 2 MG/ML
1 INJECTION INTRAMUSCULAR EVERY 4 HOURS PRN
Status: DISCONTINUED | OUTPATIENT
Start: 2024-03-16 | End: 2024-03-21

## 2024-03-16 RX ORDER — NICOTINE POLACRILEX 4 MG
15 LOZENGE BUCCAL
Status: DISCONTINUED | OUTPATIENT
Start: 2024-03-16 | End: 2024-03-23 | Stop reason: HOSPADM

## 2024-03-16 RX ORDER — LACTULOSE 10 G/15ML
20 SOLUTION ORAL 3 TIMES DAILY
Status: DISCONTINUED | OUTPATIENT
Start: 2024-03-16 | End: 2024-03-17

## 2024-03-16 RX ORDER — FUROSEMIDE 40 MG/1
40 TABLET ORAL DAILY
Status: DISCONTINUED | OUTPATIENT
Start: 2024-03-16 | End: 2024-03-19

## 2024-03-16 RX ORDER — IBUPROFEN 600 MG/1
1 TABLET ORAL
Status: DISCONTINUED | OUTPATIENT
Start: 2024-03-16 | End: 2024-03-23 | Stop reason: HOSPADM

## 2024-03-16 RX ORDER — FOLIC ACID 1 MG/1
1 TABLET ORAL DAILY
Status: DISCONTINUED | OUTPATIENT
Start: 2024-03-16 | End: 2024-03-20

## 2024-03-16 RX ORDER — HALOPERIDOL 5 MG/ML
2 INJECTION INTRAMUSCULAR EVERY 6 HOURS PRN
Status: DISCONTINUED | OUTPATIENT
Start: 2024-03-16 | End: 2024-03-23 | Stop reason: HOSPADM

## 2024-03-16 RX ADMIN — Medication 10 ML: at 20:07

## 2024-03-16 RX ADMIN — HEPARIN SODIUM 5000 UNITS: 5000 INJECTION INTRAVENOUS; SUBCUTANEOUS at 20:07

## 2024-03-16 RX ADMIN — INSULIN HUMAN 4 UNITS: 100 INJECTION, SOLUTION PARENTERAL at 00:34

## 2024-03-16 RX ADMIN — CEFTRIAXONE SODIUM 2000 MG: 2 INJECTION, POWDER, FOR SOLUTION INTRAMUSCULAR; INTRAVENOUS at 13:55

## 2024-03-16 RX ADMIN — THIAMINE HYDROCHLORIDE 200 MG: 100 INJECTION, SOLUTION INTRAMUSCULAR; INTRAVENOUS at 06:03

## 2024-03-16 RX ADMIN — NADOLOL 20 MG: 20 TABLET ORAL at 09:02

## 2024-03-16 RX ADMIN — Medication 10 ML: at 09:12

## 2024-03-16 RX ADMIN — THIAMINE HYDROCHLORIDE 200 MG: 100 INJECTION, SOLUTION INTRAMUSCULAR; INTRAVENOUS at 13:55

## 2024-03-16 RX ADMIN — LORAZEPAM 1 MG: 2 INJECTION INTRAMUSCULAR; INTRAVENOUS at 00:27

## 2024-03-16 RX ADMIN — Medication 10 ML: at 09:02

## 2024-03-16 RX ADMIN — FOLIC ACID 1 MG: 1 TABLET ORAL at 12:09

## 2024-03-16 RX ADMIN — THIAMINE HYDROCHLORIDE 200 MG: 100 INJECTION, SOLUTION INTRAMUSCULAR; INTRAVENOUS at 21:44

## 2024-03-16 RX ADMIN — INSULIN HUMAN 3 UNITS: 100 INJECTION, SOLUTION PARENTERAL at 06:04

## 2024-03-16 RX ADMIN — INSULIN HUMAN 4 UNITS: 100 INJECTION, SOLUTION PARENTERAL at 12:09

## 2024-03-16 RX ADMIN — LORAZEPAM 1 MG: 2 INJECTION INTRAMUSCULAR; INTRAVENOUS at 19:27

## 2024-03-16 RX ADMIN — FUROSEMIDE 40 MG: 40 TABLET ORAL at 09:38

## 2024-03-16 RX ADMIN — RIFAXIMIN 550 MG: 550 TABLET ORAL at 09:02

## 2024-03-16 RX ADMIN — LACTULOSE 30 G: 20 SOLUTION ORAL at 09:02

## 2024-03-16 RX ADMIN — Medication 10 ML: at 20:08

## 2024-03-16 RX ADMIN — INSULIN HUMAN 2 UNITS: 100 INJECTION, SOLUTION PARENTERAL at 23:42

## 2024-03-16 RX ADMIN — Medication 15 ML: at 09:02

## 2024-03-16 RX ADMIN — FAMOTIDINE 20 MG: 20 TABLET ORAL at 09:02

## 2024-03-16 RX ADMIN — INSULIN HUMAN 4 UNITS: 100 INJECTION, SOLUTION PARENTERAL at 17:47

## 2024-03-16 RX ADMIN — FOLIC ACID 1 MG: 5 INJECTION, SOLUTION INTRAMUSCULAR; INTRAVENOUS; SUBCUTANEOUS at 09:03

## 2024-03-16 RX ADMIN — CAROSPIR 25 MG: 25 SUSPENSION ORAL at 09:38

## 2024-03-16 RX ADMIN — HEPARIN SODIUM 5000 UNITS: 5000 INJECTION INTRAVENOUS; SUBCUTANEOUS at 09:02

## 2024-03-16 RX ADMIN — LORAZEPAM 1 MG: 2 INJECTION INTRAMUSCULAR; INTRAVENOUS at 23:38

## 2024-03-16 NOTE — PROGRESS NOTES
[facility]   Hospitalist Progress Note  Date: 3/16/2024  Patient Name: Simon Peters  : 1970  MRN: [id number]  Date of admission: 3/13/2024      Subjective   Subjective     Chief Complaint: Altered mental status    Summary:   Patient is a 53-year-old male with past medical history of cirrhosis, renal failure, diabetes mellitus, hypertension who presents with altered mental status.  Patient was recently discharged home after prolonged hospitalization.  Patient has a history of hepatic encephalopathy. Here in the ED he is and not coherent enough to give a review of systems. He is asking people to close his eggs and is unaware of self time or place. His workup here has shown a newly developed left infrahilar pneumonia. This coupled with slightly worsened renal functions and a greatly elevated ammonia of 138 has the ED physician asking that the patient be admitted for further evaluation.   Transfer to unit overnight because of agitation as started on Precedex and restraints.  S/p bedside paracentesis by intensivist no evidence of SBP    Interval Followup:   Vital signs stable on room air.  Precedex was resumed last night because of agitation.  Patient dropped his heart rate.  Patient very sleepy has been off Precedex for some hours.  Patient remains confused and lethargic.  Not following vocal commands today   CIWA score running low  NG tube clamped.  Previously was cleared by speech therapy   rectal tube due to diarrhea from lactulose.  Good urine output via Todd catheter    Review of Systems   All systems were reviewed and negative except for: Unable to obtain as patient sleepy    Objective   Objective     Vitals:   Temp:  [96.3 °F (35.7 °C)-99.5 °F (37.5 °C)] 97.7 °F (36.5 °C)  Heart Rate:  [63-95] 76  BP: (108-190)/() 155/100  Physical Exam      Constitutional:  Well-developed and well-nourished.  No acute distress.      HENT:  Head:  Normocephalic and atraumatic.  Mouth:  Moist mucous membranes.     Eyes:  Conjunctivae and EOM are normal. No scleral icterus.    Neck:  Neck supple.  No JVD present.    Cardiovascular:  Normal rate, regular rhythm and normal heart sounds with no murmur.  Pulmonary/Chest:  No respiratory distress, no wheezes, no crackles, with normal breath sounds and good air movement.  Abdominal:  Soft. No distension and no tenderness.   Musculoskeletal:  No tenderness, and no deformity.  No red or swollen joints anywhere.    Neurological: Unable to obtain  Skin:  Skin is warm and dry. No rash noted. No pallor.   Peripheral vascular:  No clubbing, no cyanosis, no edema.  Psychiatric: Unable to examine        Result Review    Result Review:  I have personally reviewed the results for the past 24 hours and agree with these findings:  [x]  Laboratory  [x]  Microbiology  [x]  Radiology  [x]  EKG/Telemetry normal sinus rhythm 69.  QT 0.49  []  Cardiology/Vascular   []  Pathology  [x]  Old records  [x]  Other: Medications    Assessment & Plan   Assessment / Plan     Assessment:  Left infrahilar healthcare associate pneumonia.  Unspecified bacterial pathogen  Acute metabolic encephalopathy.    Recurrent hepatic encephalopathy.  History of alcoholic cirrhosis status post TIPS  Hypothyroidism.  New diagnosis.  Diabetes mellitus.  Most recent hemoglobin A1c of 8.8%.  Uncontrolled hyperglycemia  CKD 3B.  Baseline creatinine 2-2.3.  Stable  Pneumocephalus.Likely iatrogenic.  Intravenous gas right Superior ophthalmic vein and bilateral cavernous veins.  Resolved  Hypertensive urgency  Frequent falls and generalized weakness.    Peripheral neuropathy likely diabetic and alcoholic causing above.  Noncompliance with medication including insulin.  Anasarca with ascites and chronic scrotal swelling.  S/p 1.6 L paracentesis March 14 by intensivist.  No SBP  Chronic anemia and thrombocytopenia from cirrhosis.  Hematuria and pyuria without bacteriuria.  Asymptomatic gallstones.  Hypoalbuminemia from liver  disease.  Tobacco use disorder.     Plan:  DC Precedex  DC CIWA protocol.  Use Ativan as needed for agitation and restlessness  Continue restraints  Sliding-scale insulin as per intensivist  IV Rocephin.  Appreciate neurosurgery input.  Pneumocephalus likely iatrogenic.  Repeat CT scan head with resolution of pneumocephalus.  Synthroid.  Tube feed as per dietitian  Decrease the dose of lactulose titrate for 3-4 soft BM, continue Xifaxan.  Trend ammonia level  Thiamine and folic acid  Appreciate intensivist input.  S/p paracentesis.  No evidence of SBP  Resume appropriate home medications  If needed  patient can be transferred out of the unit    Discussed plan with RN.    DVT prophylaxis:  Medical DVT prophylaxis orders are present.  Heparin        CODE STATUS:   Code Status (Patient has no pulse and is not breathing): CPR (Attempt to Resuscitate)  Medical Interventions (Patient has pulse or is breathing): Full Support      Part of this note may be an electronic transcription/translation of spoken language to printed text using the Dragon Dictation System.         Electronically signed by Kosat Sampson MD, 03/16/24, 4:36 PM EDT.

## 2024-03-16 NOTE — PROGRESS NOTES
Pulmonary / Critical Care Progress Note      Patient Name: Simon Peters  : 1970  MRN: [id number]  Attending:  Kosta Sampson MD   Date of admission: 3/13/2024    Subjective   Subjective   Patient critically ill with hepatic encephalopathy, altered mental status    Over past 24 hours: Patient remained in the ICU, with hepatic encephalopathy, receiving lactulose/rifaximin, on antibiotics, transition to SSI    Overnight, patient pulled NG tube out, was initiated on Precedex due to agitation    This morning  NG replaced  Patient is somnolent, does grimace to pain  FMS in place, having Bms  Precedex on hold  Blood glucose slightly elevated    Review of Systems  Unable to obtain due to patient status        Objective   Objective     Vitals:   Vital signs for last 24 hours:  Temp:  [96.3 °F (35.7 °C)-99.1 °F (37.3 °C)] 99.1 °F (37.3 °C)  Heart Rate:  [63-95] 89  BP: (108-190)/() 148/86    Intake/Output last 3 shifts:  I/O last 3 completed shifts:  In: 849.4 [I.V.:306.4; Other:200; NG/GT:343]  Out: 2550 [Urine:2550]  Intake/Output this shift:  No intake/output data recorded.    Physical Exam     Vital Signs Reviewed   WDWN, Alert, NAD.    HEENT:  PERRL, EOMI.  OP, nares clear  Chest:  good aeration, clear to auscultation bilaterally, tympanic to percussion bilaterally, no work of breathing noted  CV: RRR, no MGR, pulses 2+, equal.  Abd:  Soft, NT, ND, + BS, no HSM  EXT:  no clubbing, no cyanosis, no edema  Neuro:  A&Ox0, CN grossly intact, no focal deficits.  Patient is somnolent, grimaces to pain, no focal deficits   skin: No rashes or lesions noted      Result Review    Result Review:  I have personally reviewed the results from the time of this admission to 3/16/2024 10:37 EDT and agree with these findings:  [x]  Laboratory  [x]  Microbiology  [x]  Radiology  []  EKG/Telemetry   []  Cardiology/Vascular   []  Pathology  [x]  Old records  []  Other:  Most notable findings include:       Lab 24  0258  03/15/24  0552 03/14/24  1551 03/14/24  0332 03/13/24  2139 03/13/24  2137 03/11/24  0444 03/10/24  0546   WBC 8.01 7.93 5.03 6.01  --  6.77 6.97 8.84   HEMOGLOBIN 9.0* 9.9* 9.5* 10.0*  --  10.4* 9.2* 10.3*   HEMATOCRIT 26.9* 29.8* 29.6* 31.2*  --  30.8* 27.2* 31.4*   PLATELETS 113* 124* 119* 125*  --  133* 112* 131*   SODIUM 141 138  --  138  --  136 136 137   SODIUM, VENOUS  --   --   --   --  134.0*  --   --   --    POTASSIUM 4.7 4.6  --  4.9  --  5.1 4.9 4.8   POTASSIUM, VENOUS  --   --   --   --  5.2*  --   --   --    CHLORIDE 117* 113*  --  111*  --  107 111* 111*   CHLORIDE, VENOUS  --   --   --   --  110*  --   --   --    CO2 17.3* 16.3*  --  17.5*  --  19.5* 19.6* 18.5*   BUN 34* 37*  --  43*  --  44* 45* 41*   CREATININE 2.10* 2.07*  --  2.32*  --  2.40* 2.40* 2.14*   GLUCOSE 243* 174*  --  311*  --  371* 264* 269*   GLUCOSE, ARTERIAL  --   --   --   --  340*  --   --   --    CALCIUM 7.9* 7.8*  --  8.1*  --  8.2* 8.0* 8.0*   PHOSPHORUS 3.0 3.4  --   --   --   --   --   --    TOTAL PROTEIN 4.4*  --   --  5.4*  --  5.7*  --  5.2*   ALBUMIN 1.6*  --   --  1.8*  --  2.1*  --  1.8*   GLOBULIN 2.8  --   --  3.6  --  3.6  --  3.4         Assessment & Plan   Assessment / Plan     Active Hospital Problems:  Active Hospital Problems    Diagnosis    • **Pneumonia        Impression:  Altered mental status  Hepatic encephalopathy  Alcoholic cirrhosis s/p TIPS May 2023  Decompensated liver cirrhosis with ascites and anasarca  Type 2 diabetes with hyperglycemia  Chronic thrombocytopenia secondary to hepatic cirrhosis  Chronic kidney disease stage III  Ongoing tobacco use  Hypothyroidism  Hypoalbuminemia  Hypertension    Plan:  -Patient is currently on room air  -Aspiration precautions, keep head of bed elevated  -NG tube replaced, continue current dose of lactulose and rifaximin  -Continue Rocephin, s/p paracentesis, culture NG TD, likely not SBP would consider de-escalating to Unasyn  -Continue tube feeds per  dietitian  -Will increase SSI to moderate for optimal glucose control, A1c 9.5  -Continue Aldactone, Corgard  -Will add daily p.o. Lasix 40 mg  -Continue CIWA protocol  -ok to use Precedex  -Continue thiamine, multivitamin folic acid  -Continue Synthroid  -CT scan with pneumocephalus, evaluated by neurosurgery  -Liver ultrasound reviewed, consistent with cirrhotic morphology  -Trend renal function, monitor replace electrolytes    Peptic ulcer prophylaxis Pepcid  DVT prophylaxis: Subcutaneous heparin    Medical DVT prophylaxis orders are present.    CODE STATUS:   Code Status (Patient has no pulse and is not breathing): CPR (Attempt to Resuscitate)  Medical Interventions (Patient has pulse or is breathing): Full Support    Patient is critically ill with encephalopathy on Precedex in the setting of decompensated liver cirrhosis. IDr. Rashida, spent 22 minutes of critical care time. Total Critical Care time spent: 32 minutes. This included personally reviewing all pertinent labs, imaging, microbiology and documentation. Also discussing the case with the patient and any available family, the admitting physician and any available ancillary staff.   Electronically signed by Rashida Sommers MD, 03/16/24, 12:16 PM EDT.    Gabby HARRISON APRN, spent 10 minutes critical care time.  Electronically signed by ANTOINETTE Lema, 03/15/24, 10:42 AM EDT.

## 2024-03-16 NOTE — PROGRESS NOTES
Nutrition Services    Patient Name: Simon Peters  YOB: 1970  MRN: [id number]  Admission date: 3/13/2024    PROGRESS NOTE      Encounter Information: EN Follow Up       PO Diet: NPO Diet NPO Type: Strict NPO   PO Supplements: NPO   PO Intake:  NPO       Current nutrition support: Diabetisource at 20 ml/hr, advance by 10 ml q4hrs to goal of 75 ml/hr   q4h     Provides: 1980 kcal, 99 g pro, 1953 ml fluids       Estimated Needs: 2130 kcal, 85 g pro, 2130 ml fluids       Nutrition support review: TF off at my visit. NGT had to be replaced today. Last documented at 50 ml/hr       Labs (reviewed below): Blood glucoses elevated - adjustments to insulin per MD         GI Function:  Last BM on 3/15, pt has stool softeners ordered PRN        Nutrition Intervention Updates: Continue to advance TF to goal rate.      Results from last 7 days   Lab Units 03/16/24  0258 03/15/24  0552 03/14/24  0332 03/13/24  2139 03/13/24  2137   SODIUM mmol/L 141 138 138  --  136   SODIUM, VENOUS   --   --   --    < >  --    POTASSIUM mmol/L 4.7 4.6 4.9  --  5.1   POTASSIUM, VENOUS   --   --   --    < >  --    CHLORIDE mmol/L 117* 113* 111*  --  107   CHLORIDE, VENOUS   --   --   --    < >  --    CO2 mmol/L 17.3* 16.3* 17.5*  --  19.5*   BUN mg/dL 34* 37* 43*  --  44*   CREATININE mg/dL 2.10* 2.07* 2.32*  --  2.40*   CALCIUM mg/dL 7.9* 7.8* 8.1*  --  8.2*   BILIRUBIN mg/dL 1.3*  --  1.7*  --  1.8*   ALK PHOS U/L 164*  --  249*  --  264*   ALT (SGPT) U/L 19  --  23  --  26   AST (SGOT) U/L 32  --  45*  --  52*   GLUCOSE mg/dL 243* 174* 311*  --  371*   GLUCOSE, ARTERIAL   --   --   --    < >  --     < > = values in this interval not displayed.     Results from last 7 days   Lab Units 03/16/24  0258 03/15/24  0552 03/14/24  1551 03/14/24  0332   MAGNESIUM mg/dL 2.3 1.8  --  1.8   PHOSPHORUS mg/dL 3.0 3.4   < >  --    HEMOGLOBIN g/dL 9.0* 9.9*   < > 10.0*   HEMATOCRIT % 26.9* 29.8*   < > 31.2*    < > = values in this interval  not displayed.     SARS-CoV-2, SRINIVASA   Date Value Ref Range Status   04/26/2022 Not Detected Not Detected Final     Lab Results   Component Value Date    HGBA1C 9.50 (H) 03/14/2024       RD to follow up per protocol.    Electronically signed by:  Clau Segundo RD  03/16/24 09:25 EDT

## 2024-03-17 ENCOUNTER — APPOINTMENT (OUTPATIENT)
Dept: GENERAL RADIOLOGY | Facility: HOSPITAL | Age: 54
DRG: 441 | End: 2024-03-17
Payer: COMMERCIAL

## 2024-03-17 LAB
ALBUMIN SERPL-MCNC: 1.9 G/DL (ref 3.5–5.2)
ALBUMIN/GLOB SERPL: 0.6 G/DL
ALP SERPL-CCNC: 161 U/L (ref 39–117)
ALT SERPL W P-5'-P-CCNC: 22 U/L (ref 1–41)
AMMONIA BLD-SCNC: 97 UMOL/L (ref 16–60)
ANION GAP SERPL CALCULATED.3IONS-SCNC: 8.5 MMOL/L (ref 5–15)
AST SERPL-CCNC: 39 U/L (ref 1–40)
BACTERIA FLD CULT: NORMAL
BASOPHILS # BLD AUTO: 0.12 10*3/MM3 (ref 0–0.2)
BASOPHILS NFR BLD AUTO: 1.2 % (ref 0–1.5)
BILIRUB SERPL-MCNC: 1.5 MG/DL (ref 0–1.2)
BUN SERPL-MCNC: 27 MG/DL (ref 6–20)
BUN/CREAT SERPL: 13.2 (ref 7–25)
CALCIUM SPEC-SCNC: 8.4 MG/DL (ref 8.6–10.5)
CHLORIDE SERPL-SCNC: 117 MMOL/L (ref 98–107)
CO2 SERPL-SCNC: 18.5 MMOL/L (ref 22–29)
CREAT SERPL-MCNC: 2.04 MG/DL (ref 0.76–1.27)
DEPRECATED RDW RBC AUTO: 53.1 FL (ref 37–54)
EGFRCR SERPLBLD CKD-EPI 2021: 38.3 ML/MIN/1.73
EOSINOPHIL # BLD AUTO: 0.57 10*3/MM3 (ref 0–0.4)
EOSINOPHIL NFR BLD AUTO: 5.9 % (ref 0.3–6.2)
ERYTHROCYTE [DISTWIDTH] IN BLOOD BY AUTOMATED COUNT: 16 % (ref 12.3–15.4)
GLOBULIN UR ELPH-MCNC: 3.3 GM/DL
GLUCOSE BLDC GLUCOMTR-MCNC: 145 MG/DL (ref 70–99)
GLUCOSE BLDC GLUCOMTR-MCNC: 236 MG/DL (ref 70–99)
GLUCOSE BLDC GLUCOMTR-MCNC: 258 MG/DL (ref 70–99)
GLUCOSE SERPL-MCNC: 148 MG/DL (ref 65–99)
GRAM STN SPEC: NORMAL
HCT VFR BLD AUTO: 30 % (ref 37.5–51)
HGB BLD-MCNC: 9.7 G/DL (ref 13–17.7)
IMM GRANULOCYTES # BLD AUTO: 0.03 10*3/MM3 (ref 0–0.05)
IMM GRANULOCYTES NFR BLD AUTO: 0.3 % (ref 0–0.5)
LYMPHOCYTES # BLD AUTO: 1.36 10*3/MM3 (ref 0.7–3.1)
LYMPHOCYTES NFR BLD AUTO: 14.1 % (ref 19.6–45.3)
MAGNESIUM SERPL-MCNC: 2 MG/DL (ref 1.6–2.6)
MCH RBC QN AUTO: 29.6 PG (ref 26.6–33)
MCHC RBC AUTO-ENTMCNC: 32.3 G/DL (ref 31.5–35.7)
MCV RBC AUTO: 91.5 FL (ref 79–97)
MONOCYTES # BLD AUTO: 0.79 10*3/MM3 (ref 0.1–0.9)
MONOCYTES NFR BLD AUTO: 8.2 % (ref 5–12)
NEUTROPHILS NFR BLD AUTO: 6.78 10*3/MM3 (ref 1.7–7)
NEUTROPHILS NFR BLD AUTO: 70.3 % (ref 42.7–76)
NRBC BLD AUTO-RTO: 0 /100 WBC (ref 0–0.2)
PHOSPHATE SERPL-MCNC: 3.3 MG/DL (ref 2.5–4.5)
PLATELET # BLD AUTO: 133 10*3/MM3 (ref 140–450)
PMV BLD AUTO: 9.8 FL (ref 6–12)
POTASSIUM SERPL-SCNC: 4.2 MMOL/L (ref 3.5–5.2)
PROT SERPL-MCNC: 5.2 G/DL (ref 6–8.5)
RBC # BLD AUTO: 3.28 10*6/MM3 (ref 4.14–5.8)
SODIUM SERPL-SCNC: 144 MMOL/L (ref 136–145)
WBC NRBC COR # BLD AUTO: 9.65 10*3/MM3 (ref 3.4–10.8)

## 2024-03-17 PROCEDURE — 25010000002 LORAZEPAM PER 2 MG: Performed by: INTERNAL MEDICINE

## 2024-03-17 PROCEDURE — 25010000002 CEFTRIAXONE PER 250 MG: Performed by: NURSE PRACTITIONER

## 2024-03-17 PROCEDURE — 85025 COMPLETE CBC W/AUTO DIFF WBC: CPT | Performed by: INTERNAL MEDICINE

## 2024-03-17 PROCEDURE — 82948 REAGENT STRIP/BLOOD GLUCOSE: CPT

## 2024-03-17 PROCEDURE — 63710000001 INSULIN REGULAR HUMAN PER 5 UNITS: Performed by: NURSE PRACTITIONER

## 2024-03-17 PROCEDURE — 84100 ASSAY OF PHOSPHORUS: CPT | Performed by: INTERNAL MEDICINE

## 2024-03-17 PROCEDURE — 25010000002 THIAMINE PER 100 MG: Performed by: INTERNAL MEDICINE

## 2024-03-17 PROCEDURE — 82140 ASSAY OF AMMONIA: CPT | Performed by: INTERNAL MEDICINE

## 2024-03-17 PROCEDURE — 99291 CRITICAL CARE FIRST HOUR: CPT | Performed by: STUDENT IN AN ORGANIZED HEALTH CARE EDUCATION/TRAINING PROGRAM

## 2024-03-17 PROCEDURE — 99233 SBSQ HOSP IP/OBS HIGH 50: CPT | Performed by: INTERNAL MEDICINE

## 2024-03-17 PROCEDURE — 25010000002 HALOPERIDOL LACTATE PER 5 MG: Performed by: INTERNAL MEDICINE

## 2024-03-17 PROCEDURE — 83735 ASSAY OF MAGNESIUM: CPT | Performed by: INTERNAL MEDICINE

## 2024-03-17 PROCEDURE — 74018 RADEX ABDOMEN 1 VIEW: CPT

## 2024-03-17 PROCEDURE — 25010000002 HEPARIN (PORCINE) PER 1000 UNITS: Performed by: FAMILY MEDICINE

## 2024-03-17 PROCEDURE — 80053 COMPREHEN METABOLIC PANEL: CPT | Performed by: INTERNAL MEDICINE

## 2024-03-17 RX ORDER — LACTULOSE 10 G/15ML
30 SOLUTION ORAL EVERY 6 HOURS
Status: DISCONTINUED | OUTPATIENT
Start: 2024-03-17 | End: 2024-03-19

## 2024-03-17 RX ORDER — FLUOXETINE HYDROCHLORIDE 20 MG/1
40 CAPSULE ORAL DAILY
Status: DISCONTINUED | OUTPATIENT
Start: 2024-03-17 | End: 2024-03-20

## 2024-03-17 RX ORDER — HYDRALAZINE HYDROCHLORIDE 20 MG/ML
10 INJECTION INTRAMUSCULAR; INTRAVENOUS EVERY 4 HOURS PRN
Status: DISCONTINUED | OUTPATIENT
Start: 2024-03-17 | End: 2024-03-23 | Stop reason: HOSPADM

## 2024-03-17 RX ADMIN — Medication 10 ML: at 08:19

## 2024-03-17 RX ADMIN — FOLIC ACID 1 MG: 1 TABLET ORAL at 08:18

## 2024-03-17 RX ADMIN — RIFAXIMIN 550 MG: 550 TABLET ORAL at 21:52

## 2024-03-17 RX ADMIN — THIAMINE HYDROCHLORIDE 200 MG: 100 INJECTION, SOLUTION INTRAMUSCULAR; INTRAVENOUS at 05:17

## 2024-03-17 RX ADMIN — THIAMINE HYDROCHLORIDE 200 MG: 100 INJECTION, SOLUTION INTRAMUSCULAR; INTRAVENOUS at 14:35

## 2024-03-17 RX ADMIN — Medication 10 ML: at 20:32

## 2024-03-17 RX ADMIN — FUROSEMIDE 40 MG: 40 TABLET ORAL at 08:18

## 2024-03-17 RX ADMIN — HEPARIN SODIUM 5000 UNITS: 5000 INJECTION INTRAVENOUS; SUBCUTANEOUS at 08:18

## 2024-03-17 RX ADMIN — THIAMINE HYDROCHLORIDE 200 MG: 100 INJECTION, SOLUTION INTRAMUSCULAR; INTRAVENOUS at 22:35

## 2024-03-17 RX ADMIN — INSULIN HUMAN 4 UNITS: 100 INJECTION, SOLUTION PARENTERAL at 12:15

## 2024-03-17 RX ADMIN — LACTULOSE 30 G: 20 SOLUTION ORAL at 08:18

## 2024-03-17 RX ADMIN — LACTULOSE 30 G: 20 SOLUTION ORAL at 14:35

## 2024-03-17 RX ADMIN — FAMOTIDINE 20 MG: 20 TABLET ORAL at 08:18

## 2024-03-17 RX ADMIN — INSULIN HUMAN 6 UNITS: 100 INJECTION, SOLUTION PARENTERAL at 18:31

## 2024-03-17 RX ADMIN — RIFAXIMIN 550 MG: 550 TABLET ORAL at 08:18

## 2024-03-17 RX ADMIN — HALOPERIDOL LACTATE 2 MG: 5 INJECTION, SOLUTION INTRAMUSCULAR at 21:20

## 2024-03-17 RX ADMIN — NADOLOL 20 MG: 20 TABLET ORAL at 08:18

## 2024-03-17 RX ADMIN — FLUOXETINE HYDROCHLORIDE 40 MG: 20 CAPSULE ORAL at 12:15

## 2024-03-17 RX ADMIN — HEPARIN SODIUM 5000 UNITS: 5000 INJECTION INTRAVENOUS; SUBCUTANEOUS at 21:52

## 2024-03-17 RX ADMIN — HALOPERIDOL LACTATE 2 MG: 5 INJECTION, SOLUTION INTRAMUSCULAR at 01:10

## 2024-03-17 RX ADMIN — LORAZEPAM 1 MG: 2 INJECTION INTRAMUSCULAR; INTRAVENOUS at 21:52

## 2024-03-17 RX ADMIN — LORAZEPAM 1 MG: 2 INJECTION INTRAMUSCULAR; INTRAVENOUS at 14:07

## 2024-03-17 RX ADMIN — CEFTRIAXONE SODIUM 2000 MG: 2 INJECTION, POWDER, FOR SOLUTION INTRAMUSCULAR; INTRAVENOUS at 13:17

## 2024-03-17 RX ADMIN — LACTULOSE 30 G: 20 SOLUTION ORAL at 20:32

## 2024-03-17 RX ADMIN — Medication 15 ML: at 08:18

## 2024-03-17 RX ADMIN — CAROSPIR 25 MG: 25 SUSPENSION ORAL at 08:36

## 2024-03-17 NOTE — PROGRESS NOTES
Pulmonary / Critical Care Progress Note      Patient Name: Simon Peters  : 1970  MRN: [id number]  Attending:  Kosta Sampson MD   Date of admission: 3/13/2024    Subjective   Subjective   Patient critically ill with hepatic encephalopathy, altered mental status    Over past 24 hours: Patient remains encephalopathic, receiving lactulose, rifaximin, on antibiotics, on sliding scale    Night, patient pulled NG tube out, received Ativan and Haldol overnight    This morning  Patient still with encephalopathy  Ammonia is 97  FMS in place  Urine output 3400, on room air  Precedex on standby  NG-tube replaced    Review of Systems  Unable to obtain due to patient status      Objective   Objective     Vitals:   Vital signs for last 24 hours:  Temp:  [97.7 °F (36.5 °C)-99.1 °F (37.3 °C)] 98.8 °F (37.1 °C)  Heart Rate:  [70-92] 84  BP: (117-209)/() 117/99    Intake/Output last 3 shifts:  I/O last 3 completed shifts:  In: 468.4 [I.V.:468.4]  Out: 4275 [Urine:4275]  Intake/Output this shift:  I/O this shift:  In: 588 [Other:200; NG/GT:388]  Out: -     Physical Exam     Vital Signs Reviewed   WDWN, Alert, NAD.    HEENT:  PERRL, EOMI.  OP, nares clear  Chest:  good aeration, clear to auscultation bilaterally, tympanic to percussion bilaterally, no work of breathing noted  CV: RRR, no MGR, pulses 2+, equal.  Abd:  Soft, NT, ND, + BS, no HSM  EXT:  no clubbing, no cyanosis, no edema  Neuro:  A&Ox0, CN grossly intact, no focal deficits.  Patient is somnolent, grimaces to pain, no focal deficits   skin: No rashes or lesions noted      Result Review    Result Review:  I have personally reviewed the results from the time of this admission to 3/17/2024 11:01 EDT and agree with these findings:  [x]  Laboratory  [x]  Microbiology  [x]  Radiology  []  EKG/Telemetry   []  Cardiology/Vascular   []  Pathology  [x]  Old records  []  Other:  Most notable findings include:       Lab 24  0328 24  0258 03/15/24  0552  03/14/24  1551 03/14/24  0332 03/13/24 2139 03/13/24 2137 03/11/24  0444   WBC 9.65 8.01 7.93 5.03 6.01  --  6.77 6.97   HEMOGLOBIN 9.7* 9.0* 9.9* 9.5* 10.0*  --  10.4* 9.2*   HEMATOCRIT 30.0* 26.9* 29.8* 29.6* 31.2*  --  30.8* 27.2*   PLATELETS 133* 113* 124* 119* 125*  --  133* 112*   SODIUM 144 141 138  --  138  --  136 136   SODIUM, VENOUS  --   --   --   --   --  134.0*  --   --    POTASSIUM 4.2 4.7 4.6  --  4.9  --  5.1 4.9   POTASSIUM, VENOUS  --   --   --   --   --  5.2*  --   --    CHLORIDE 117* 117* 113*  --  111*  --  107 111*   CHLORIDE, VENOUS  --   --   --   --   --  110*  --   --    CO2 18.5* 17.3* 16.3*  --  17.5*  --  19.5* 19.6*   BUN 27* 34* 37*  --  43*  --  44* 45*   CREATININE 2.04* 2.10* 2.07*  --  2.32*  --  2.40* 2.40*   GLUCOSE 148* 243* 174*  --  311*  --  371* 264*   GLUCOSE, ARTERIAL  --   --   --   --   --  340*  --   --    CALCIUM 8.4* 7.9* 7.8*  --  8.1*  --  8.2* 8.0*   PHOSPHORUS 3.3 3.0 3.4  --   --   --   --   --    TOTAL PROTEIN 5.2* 4.4*  --   --  5.4*  --  5.7*  --    ALBUMIN 1.9* 1.6*  --   --  1.8*  --  2.1*  --    GLOBULIN 3.3 2.8  --   --  3.6  --  3.6  --          Assessment & Plan   Assessment / Plan     Active Hospital Problems:  Active Hospital Problems    Diagnosis    • **Pneumonia        Impression:  Altered mental status  Hepatic encephalopathy  Alcoholic cirrhosis s/p TIPS May 2023  Decompensated liver cirrhosis with ascites and anasarca  Type 2 diabetes with hyperglycemia  Chronic thrombocytopenia secondary to hepatic cirrhosis  Chronic kidney disease stage III  Ongoing tobacco use  Hypothyroidism  Hypoalbuminemia  Hypertension    Plan:  -Patient is currently on room air  -Aspiration precautions, keep head of bed elevated  -NG tube replaced, increase lactulose 30 mg 4 times daily  -Continue rifaximin  -Continue Rocephin, s/p paracentesis, culture NG TD, likely not SBP would consider de-escalating to Unasyn  -Continue tube feeds per dietitian  -Continue SSI,  glucose optimal, A1c 9.5  -Continue Aldactone, Corgard  -Continue daily p.o. Lasix 40 mg  -Continue CIWA protocol  -ok to use Precedex  -Continue thiamine, multivitamin folic acid  -Continue Synthroid  -CT scan with pneumocephalus, evaluated by neurosurgery  -Liver ultrasound reviewed, consistent with cirrhotic morphology  -Trend renal function, monitor replace electrolytes    Peptic ulcer prophylaxis Pepcid  DVT prophylaxis: Subcutaneous heparin    Medical DVT prophylaxis orders are present.    CODE STATUS:   Code Status (Patient has no pulse and is not breathing): CPR (Attempt to Resuscitate)  Medical Interventions (Patient has pulse or is breathing): Full Support    Patient is critically ill with hepatic encephalopathy in the setting of alcoholic cirrhosis, on Precedex. I, Dr. Rashida Sommers, spent 22 minutes of critical care time. Total Critical Care time spent: 32 minutes. This included personally reviewing all pertinent labs, imaging, microbiology and documentation. Also discussing the case with the patient and any available family, the admitting physician and any available ancillary staff.   Electronically signed by Rashida Somemrs MD, 03/17/24, 11:58 AM EDT.    I, ANTOINETTE Nolan, spent 10 minutes critical care time.  Electronically signed by ANTOINETTE Lema, 03/17/24, 11:04 AM EDT.

## 2024-03-17 NOTE — PROGRESS NOTES
[facility]   Hospitalist Progress Note  Date: 3/17/2024  Patient Name: Simon Peters  : 1970  MRN: [id number]  Date of admission: 3/13/2024      Subjective   Subjective     Chief Complaint: Altered mental status    Summary:   Patient is a 53-year-old male with past medical history of cirrhosis, renal failure, diabetes mellitus, hypertension who presents with altered mental status.  Patient was recently discharged home after prolonged hospitalization.  Patient has a history of hepatic encephalopathy. Here in the ED he is and not coherent enough to give a review of systems. He is asking people to close his eggs and is unaware of self time or place. His workup here has shown a newly developed left infrahilar pneumonia. This coupled with slightly worsened renal functions and a greatly elevated ammonia of 138 has the ED physician asking that the patient be admitted for further evaluation.   Transfer to unit overnight because of agitation as started on Precedex and restraints.  S/p bedside paracentesis by intensivist no evidence of SBP.  Repeat CT head without evidence of pneumocephalus.    Interval Followup:   Vital signs stable on room air.  Patient very sleepy.  Received Ativan and Haldol last night for agitation.  Patient remains confused and lethargic.  Not following vocal commands    CIWA protocol Dc'd  Ammonia trending down  Patient pulled his NG tube.  Tolerating tube feed well with new NG tube in.  Previously was cleared by speech therapy.  Remains in restraints   rectal tube due to diarrhea from lactulose.  Good urine output via Todd catheter    Review of Systems   All systems were reviewed and negative except for: Unable to obtain as patient sleepy    Objective   Objective     Vitals:   Temp:  [98.1 °F (36.7 °C)-99.1 °F (37.3 °C)] 98.6 °F (37 °C)  Heart Rate:  [70-92] 86  BP: (117-209)/() 174/100  Physical Exam      Constitutional:  Well-developed and well-nourished.  No acute distress.       HENT:  Head:  Normocephalic and atraumatic.  Mouth:  Moist mucous membranes.    Eyes:  Conjunctivae and EOM are normal. No scleral icterus.    Neck:  Neck supple.  No JVD present.    Cardiovascular:  Normal rate, regular rhythm and normal heart sounds with no murmur.  Pulmonary/Chest:  No respiratory distress, no wheezes, no crackles, with normal breath sounds and good air movement.  Abdominal:  Soft. No distension and no tenderness.   Musculoskeletal:  No tenderness, and no deformity.  No red or swollen joints anywhere.    Neurological: Unable to obtain  Skin:  Skin is warm and dry. No rash noted. No pallor.   Peripheral vascular:  No clubbing, no cyanosis, no edema.  Psychiatric: Unable to examine        Result Review    Result Review:  I have personally reviewed the results for the past 24 hours and agree with these findings:  [x]  Laboratory  [x]  Microbiology  [x]  Radiology  [x]  EKG/Telemetry normal sinus rhythm 69.  QT 0.49  []  Cardiology/Vascular   []  Pathology  [x]  Old records  [x]  Other: Medications    Assessment & Plan   Assessment / Plan     Assessment:  Left infrahilar healthcare associate pneumonia.  Unspecified bacterial pathogen  Acute metabolic encephalopathy.    Recurrent hepatic encephalopathy.  History of alcoholic cirrhosis status post TIPS  Hypothyroidism.  New diagnosis.  Diabetes mellitus.  Most recent hemoglobin A1c of 8.8%.  Uncontrolled hyperglycemia  CKD 3B.  Baseline creatinine 2-2.3.  Stable  Pneumocephalus.Likely iatrogenic.  Intravenous gas right Superior ophthalmic vein and bilateral cavernous veins.  Resolved  Hypertensive urgency  Frequent falls and generalized weakness.    Peripheral neuropathy likely diabetic and alcoholic causing above.  Noncompliance with medication including insulin.  Anasarca with ascites and chronic scrotal swelling.  S/p 1.6 L paracentesis March 14 by intensivist.  No SBP  Chronic anemia and thrombocytopenia from cirrhosis.  Hematuria and pyuria  without bacteriuria.  Asymptomatic gallstones.  Hypoalbuminemia from liver disease.  Tobacco use disorder.     Plan:  Check EEG  Will get MRI of the brain if mental status does not improve  May need neuroconsult.  Of Precedex  Of CIWA protocol.  Use Ativan and Haldol as needed for agitation and restlessness  Continue restraints  Sliding-scale insulin as per intensivist  IV Rocephin.  Appreciate neurosurgery input.  Pneumocephalus likely iatrogenic.  Repeat CT scan head with resolution of pneumocephalus.  Synthroid.  Tube feed as per dietitian  Continue lactulose titrate for 3-4 soft BM, continue Xifaxan.  Trend ammonia level.  Declining  Thiamine and folic acid  Appreciate intensivist input.  S/p paracentesis.  No evidence of SBP  Resume appropriate home medications  If needed  patient can be transferred out of the unit    Discussed plan with RN.    DVT prophylaxis:  Medical DVT prophylaxis orders are present.  Heparin        CODE STATUS:   Code Status (Patient has no pulse and is not breathing): CPR (Attempt to Resuscitate)  Medical Interventions (Patient has pulse or is breathing): Full Support      Part of this note may be an electronic transcription/translation of spoken language to printed text using the Dragon Dictation System.         Electronically signed by Kosta Sampson MD, 03/16/24, 4:36 PM EDT.

## 2024-03-18 ENCOUNTER — APPOINTMENT (OUTPATIENT)
Dept: MRI IMAGING | Facility: HOSPITAL | Age: 54
DRG: 441 | End: 2024-03-18
Payer: COMMERCIAL

## 2024-03-18 ENCOUNTER — APPOINTMENT (OUTPATIENT)
Dept: NEUROLOGY | Facility: HOSPITAL | Age: 54
DRG: 441 | End: 2024-03-18
Payer: COMMERCIAL

## 2024-03-18 LAB
ALBUMIN SERPL-MCNC: 1.8 G/DL (ref 3.5–5.2)
ALBUMIN/GLOB SERPL: 0.5 G/DL
ALP SERPL-CCNC: 219 U/L (ref 39–117)
ALT SERPL W P-5'-P-CCNC: 22 U/L (ref 1–41)
ANION GAP SERPL CALCULATED.3IONS-SCNC: 9.4 MMOL/L (ref 5–15)
AST SERPL-CCNC: 56 U/L (ref 1–40)
BACTERIA SPEC AEROBE CULT: NORMAL
BACTERIA SPEC AEROBE CULT: NORMAL
BILIRUB SERPL-MCNC: 1.3 MG/DL (ref 0–1.2)
BUN SERPL-MCNC: 33 MG/DL (ref 6–20)
BUN/CREAT SERPL: 14.9 (ref 7–25)
CALCIUM SPEC-SCNC: 8.6 MG/DL (ref 8.6–10.5)
CHLORIDE SERPL-SCNC: 119 MMOL/L (ref 98–107)
CO2 SERPL-SCNC: 16.6 MMOL/L (ref 22–29)
CREAT SERPL-MCNC: 2.21 MG/DL (ref 0.76–1.27)
CYTO UR: NORMAL
EGFRCR SERPLBLD CKD-EPI 2021: 34.8 ML/MIN/1.73
GLOBULIN UR ELPH-MCNC: 3.3 GM/DL
GLUCOSE BLDC GLUCOMTR-MCNC: 172 MG/DL (ref 70–99)
GLUCOSE BLDC GLUCOMTR-MCNC: 219 MG/DL (ref 70–99)
GLUCOSE BLDC GLUCOMTR-MCNC: 246 MG/DL (ref 70–99)
GLUCOSE BLDC GLUCOMTR-MCNC: 258 MG/DL (ref 70–99)
GLUCOSE BLDC GLUCOMTR-MCNC: 278 MG/DL (ref 70–99)
GLUCOSE BLDC GLUCOMTR-MCNC: 279 MG/DL (ref 70–99)
GLUCOSE SERPL-MCNC: 259 MG/DL (ref 65–99)
LAB AP CASE REPORT: NORMAL
LAB AP CLINICAL INFORMATION: NORMAL
MAGNESIUM SERPL-MCNC: 2.2 MG/DL (ref 1.6–2.6)
PATH REPORT.FINAL DX SPEC: NORMAL
PATH REPORT.GROSS SPEC: NORMAL
PHOSPHATE SERPL-MCNC: 3.1 MG/DL (ref 2.5–4.5)
POTASSIUM SERPL-SCNC: 4.5 MMOL/L (ref 3.5–5.2)
PROT SERPL-MCNC: 5.1 G/DL (ref 6–8.5)
SODIUM SERPL-SCNC: 145 MMOL/L (ref 136–145)
WHOLE BLOOD HOLD SPECIMEN: NORMAL

## 2024-03-18 PROCEDURE — 95816 EEG AWAKE AND DROWSY: CPT

## 2024-03-18 PROCEDURE — 63710000001 INSULIN DETEMIR PER 5 UNITS: Performed by: NURSE PRACTITIONER

## 2024-03-18 PROCEDURE — 25010000002 THIAMINE PER 100 MG: Performed by: INTERNAL MEDICINE

## 2024-03-18 PROCEDURE — 25010000002 HYDRALAZINE PER 20 MG: Performed by: INTERNAL MEDICINE

## 2024-03-18 PROCEDURE — 25010000002 HEPARIN (PORCINE) PER 1000 UNITS: Performed by: FAMILY MEDICINE

## 2024-03-18 PROCEDURE — 84100 ASSAY OF PHOSPHORUS: CPT | Performed by: INTERNAL MEDICINE

## 2024-03-18 PROCEDURE — 63710000001 INSULIN REGULAR HUMAN PER 5 UNITS: Performed by: NURSE PRACTITIONER

## 2024-03-18 PROCEDURE — 99291 CRITICAL CARE FIRST HOUR: CPT | Performed by: INTERNAL MEDICINE

## 2024-03-18 PROCEDURE — 25010000002 CEFTRIAXONE PER 250 MG: Performed by: NURSE PRACTITIONER

## 2024-03-18 PROCEDURE — 82948 REAGENT STRIP/BLOOD GLUCOSE: CPT | Performed by: INTERNAL MEDICINE

## 2024-03-18 PROCEDURE — 25010000002 MIDAZOLAM PER 1MG: Performed by: INTERNAL MEDICINE

## 2024-03-18 PROCEDURE — 25010000002 LORAZEPAM PER 2 MG: Performed by: INTERNAL MEDICINE

## 2024-03-18 PROCEDURE — 83735 ASSAY OF MAGNESIUM: CPT | Performed by: INTERNAL MEDICINE

## 2024-03-18 PROCEDURE — 70551 MRI BRAIN STEM W/O DYE: CPT

## 2024-03-18 PROCEDURE — 80053 COMPREHEN METABOLIC PANEL: CPT | Performed by: INTERNAL MEDICINE

## 2024-03-18 PROCEDURE — 25010000002 HALOPERIDOL LACTATE PER 5 MG: Performed by: INTERNAL MEDICINE

## 2024-03-18 PROCEDURE — 82948 REAGENT STRIP/BLOOD GLUCOSE: CPT | Performed by: NURSE PRACTITIONER

## 2024-03-18 PROCEDURE — 99232 SBSQ HOSP IP/OBS MODERATE 35: CPT | Performed by: INTERNAL MEDICINE

## 2024-03-18 PROCEDURE — 82948 REAGENT STRIP/BLOOD GLUCOSE: CPT

## 2024-03-18 RX ORDER — MIDAZOLAM HYDROCHLORIDE 2 MG/2ML
2 INJECTION, SOLUTION INTRAMUSCULAR; INTRAVENOUS
Status: COMPLETED | OUTPATIENT
Start: 2024-03-18 | End: 2024-03-18

## 2024-03-18 RX ORDER — MIDAZOLAM HYDROCHLORIDE 2 MG/2ML
2 INJECTION, SOLUTION INTRAMUSCULAR; INTRAVENOUS
Status: DISCONTINUED | OUTPATIENT
Start: 2024-03-19 | End: 2024-03-18

## 2024-03-18 RX ADMIN — LACTULOSE 30 G: 20 SOLUTION ORAL at 23:00

## 2024-03-18 RX ADMIN — RIFAXIMIN 550 MG: 550 TABLET ORAL at 08:21

## 2024-03-18 RX ADMIN — LORAZEPAM 1 MG: 2 INJECTION INTRAMUSCULAR; INTRAVENOUS at 19:31

## 2024-03-18 RX ADMIN — INSULIN HUMAN 2 UNITS: 100 INJECTION, SOLUTION PARENTERAL at 23:58

## 2024-03-18 RX ADMIN — RIFAXIMIN 550 MG: 550 TABLET ORAL at 23:00

## 2024-03-18 RX ADMIN — INSULIN HUMAN 4 UNITS: 100 INJECTION, SOLUTION PARENTERAL at 17:11

## 2024-03-18 RX ADMIN — FOLIC ACID 1 MG: 1 TABLET ORAL at 08:21

## 2024-03-18 RX ADMIN — FUROSEMIDE 40 MG: 40 TABLET ORAL at 08:21

## 2024-03-18 RX ADMIN — Medication 10 ML: at 08:21

## 2024-03-18 RX ADMIN — HEPARIN SODIUM 5000 UNITS: 5000 INJECTION INTRAVENOUS; SUBCUTANEOUS at 22:59

## 2024-03-18 RX ADMIN — LORAZEPAM 1 MG: 2 INJECTION INTRAMUSCULAR; INTRAVENOUS at 12:12

## 2024-03-18 RX ADMIN — LEVOTHYROXINE SODIUM 50 MCG: 50 TABLET ORAL at 06:06

## 2024-03-18 RX ADMIN — LACTULOSE 30 G: 20 SOLUTION ORAL at 02:11

## 2024-03-18 RX ADMIN — THIAMINE HYDROCHLORIDE 200 MG: 100 INJECTION, SOLUTION INTRAMUSCULAR; INTRAVENOUS at 06:06

## 2024-03-18 RX ADMIN — Medication 10 ML: at 23:00

## 2024-03-18 RX ADMIN — INSULIN HUMAN 6 UNITS: 100 INJECTION, SOLUTION PARENTERAL at 12:12

## 2024-03-18 RX ADMIN — LORAZEPAM 1 MG: 2 INJECTION INTRAMUSCULAR; INTRAVENOUS at 03:01

## 2024-03-18 RX ADMIN — MIDAZOLAM HYDROCHLORIDE 2 MG: 1 INJECTION, SOLUTION INTRAMUSCULAR; INTRAVENOUS at 21:33

## 2024-03-18 RX ADMIN — INSULIN HUMAN 6 UNITS: 100 INJECTION, SOLUTION PARENTERAL at 00:11

## 2024-03-18 RX ADMIN — NADOLOL 20 MG: 20 TABLET ORAL at 08:21

## 2024-03-18 RX ADMIN — HEPARIN SODIUM 5000 UNITS: 5000 INJECTION INTRAVENOUS; SUBCUTANEOUS at 08:20

## 2024-03-18 RX ADMIN — INSULIN HUMAN 4 UNITS: 100 INJECTION, SOLUTION PARENTERAL at 06:11

## 2024-03-18 RX ADMIN — HALOPERIDOL LACTATE 2 MG: 5 INJECTION, SOLUTION INTRAMUSCULAR at 12:54

## 2024-03-18 RX ADMIN — CEFTRIAXONE SODIUM 2000 MG: 2 INJECTION, POWDER, FOR SOLUTION INTRAMUSCULAR; INTRAVENOUS at 13:54

## 2024-03-18 RX ADMIN — THIAMINE HYDROCHLORIDE 200 MG: 100 INJECTION, SOLUTION INTRAMUSCULAR; INTRAVENOUS at 23:00

## 2024-03-18 RX ADMIN — FAMOTIDINE 20 MG: 20 TABLET ORAL at 08:21

## 2024-03-18 RX ADMIN — HALOPERIDOL LACTATE 2 MG: 5 INJECTION, SOLUTION INTRAMUSCULAR at 04:15

## 2024-03-18 RX ADMIN — Medication 15 ML: at 08:20

## 2024-03-18 RX ADMIN — LACTULOSE 30 G: 20 SOLUTION ORAL at 15:02

## 2024-03-18 RX ADMIN — THIAMINE HYDROCHLORIDE 200 MG: 100 INJECTION, SOLUTION INTRAMUSCULAR; INTRAVENOUS at 15:03

## 2024-03-18 RX ADMIN — LACTULOSE 30 G: 20 SOLUTION ORAL at 08:20

## 2024-03-18 RX ADMIN — HYDRALAZINE HYDROCHLORIDE 10 MG: 20 INJECTION, SOLUTION INTRAMUSCULAR; INTRAVENOUS at 05:10

## 2024-03-18 RX ADMIN — CAROSPIR 25 MG: 25 SUSPENSION ORAL at 08:20

## 2024-03-18 RX ADMIN — FLUOXETINE HYDROCHLORIDE 40 MG: 20 CAPSULE ORAL at 08:21

## 2024-03-18 RX ADMIN — INSULIN DETEMIR 5 UNITS: 100 INJECTION, SOLUTION SUBCUTANEOUS at 12:12

## 2024-03-18 NOTE — CONSULTS
03/18/24 1014   Spiritual Care   Spiritual Care Source  initiative   Spiritual Care Visit Type other (see comments)  (Interdisciplinary Rounding)   Receptivity to Spiritual Care other (see comments)  (pt is currently confused.  updated on POC by medical team)   At time of visit pt is In critical care

## 2024-03-18 NOTE — PROGRESS NOTES
[facility]   Hospitalist Progress Note  Date: 3/18/2024  Patient Name: Simon Peters  : 1970  MRN: [id number]  Date of admission: 3/13/2024      Subjective   Subjective     Chief Complaint: Altered mental status    Summary:   Patient is a 53-year-old male with past medical history of cirrhosis, renal failure, diabetes mellitus, hypertension who presents with altered mental status.  Patient was recently discharged home after prolonged hospitalization.  Patient has a history of hepatic encephalopathy. Here in the ED he is and not coherent enough to give a review of systems. He is asking people to close his eggs and is unaware of self time or place. His workup here has shown a newly developed left infrahilar pneumonia. This coupled with slightly worsened renal functions and a greatly elevated ammonia of 138 has the ED physician asking that the patient be admitted for further evaluation.   Transfer to unit overnight because of agitation as started on Precedex and restraints.  S/p bedside paracentesis by intensivist no evidence of SBP.  Repeat CT head without evidence of pneumocephalus.    Interval Followup:   Vital signs stable on room air.  Patient very sleepy.  Received Ativan and Haldol last night for agitation.  Patient remains confused and lethargic.  Not following vocal commands.  Ativan and Haldol not working to calm him down  Patient got EEG but was not cooperative   CIWA protocol Dc'd  Ammonia trending down    Tolerating tube feed well with new NG tube in.  Previously was cleared by speech therapy.  Remains in restraints   rectal tube out.    Good urine output via Todd catheter    Review of Systems   All systems were reviewed and negative except for: Unable to obtain as patient sleepy    Objective   Objective     Vitals:   Temp:  [98.6 °F (37 °C)-99.7 °F (37.6 °C)] 98.8 °F (37.1 °C)  Heart Rate:  [87-98] 87  BP: (155-180)/() 178/99  Physical Exam      Constitutional:  Well-developed and  well-nourished.  No acute distress.      HENT:  Head:  Normocephalic and atraumatic.  Mouth:  Moist mucous membranes.    Eyes:  Conjunctivae and EOM are normal. No scleral icterus.    Neck:  Neck supple.  No JVD present.    Cardiovascular:  Normal rate, regular rhythm and normal heart sounds with no murmur.  Pulmonary/Chest:  No respiratory distress, no wheezes, no crackles, with normal breath sounds and good air movement.  Abdominal:  Soft. No distension and no tenderness.   Musculoskeletal:  No tenderness, and no deformity.  No red or swollen joints anywhere.    Neurological: Unable to obtain  Skin:  Skin is warm and dry. No rash noted. No pallor.   Peripheral vascular:  No clubbing, no cyanosis, no edema.  Psychiatric: Unable to examine        Result Review    Result Review:  I have personally reviewed the results for the past 24 hours and agree with these findings:  [x]  Laboratory  [x]  Microbiology  [x]  Radiology  [x]  EKG/Telemetry normal sinus rhythm 69.  QT 0.49  []  Cardiology/Vascular   []  Pathology  [x]  Old records  [x]  Other: Medications    Assessment & Plan   Assessment / Plan     Assessment:  Left infrahilar healthcare associate pneumonia.  Unspecified bacterial pathogen  Acute metabolic encephalopathy.    Recurrent hepatic encephalopathy.  History of alcoholic cirrhosis status post TIPS  Hypothyroidism.  New diagnosis.  Diabetes mellitus.  Most recent hemoglobin A1c of 8.8%.  Uncontrolled hyperglycemia  CKD 3B.  Baseline creatinine 2-2.3.  Stable  Pneumocephalus.Likely iatrogenic.  Intravenous gas right Superior ophthalmic vein and bilateral cavernous veins.  Resolved  Hypertensive urgency  Frequent falls and generalized weakness.    Peripheral neuropathy likely diabetic and alcoholic causing above.  Noncompliance with medication including insulin.  Anasarca with ascites and chronic scrotal swelling.  S/p 1.6 L paracentesis March 14 by intensivist.  No SBP  Chronic anemia and thrombocytopenia  from cirrhosis.  Hematuria and pyuria without bacteriuria.  Asymptomatic gallstones.  Hypoalbuminemia from liver disease.  Tobacco use disorder.     Plan:  Check EEG  MRI of the brain  May need neuroconsult.  Off Precedex  Of CIWA protocol.  Use Ativan and Haldol as needed for agitation and restlessness  Continue restraints  Sliding-scale insulin as per intensivist  IV Rocephin.  Appreciate neurosurgery input.  Pneumocephalus likely iatrogenic.  Repeat CT scan head with resolution of pneumocephalus.  Synthroid.  Tube feed as per dietitian  Continue lactulose titrate for 3-4 soft BM, continue Xifaxan.  Trend ammonia level.  Declining  Home Neurontin on hold  Thiamine and folic acid  Appreciate intensivist input.  S/p paracentesis.  No evidence of SBP  Resume appropriate home medications  If needed  patient can be transferred out of the unit    Discussed plan with RN.    DVT prophylaxis:  Medical DVT prophylaxis orders are present.  Heparin        CODE STATUS:   Code Status (Patient has no pulse and is not breathing): CPR (Attempt to Resuscitate)  Medical Interventions (Patient has pulse or is breathing): Full Support      Part of this note may be an electronic transcription/translation of spoken language to printed text using the Dragon Dictation System.           Electronically signed by Kosta Sampson MD, 03/18/24, 7:44 PM EDT.

## 2024-03-18 NOTE — PLAN OF CARE
Goal Outcome Evaluation:              Outcome Evaluation: Frequent BM's due to Lactulose admin, attempt to place FMS but patient became more restless and aggitated- removed. PRN ativan and haldol administered with minimal results. 750mL urine output

## 2024-03-18 NOTE — PROGRESS NOTES
Pulmonary / Critical Care Progress Note      Patient Name: Simon Peters  : 1970  MRN: [id number]  Attending:  Kosta Sampson MD   Date of admission: 3/13/2024    Subjective   Subjective   Patient critically ill with hepatic encephalopathy, altered mental status  History limited due to encephalopathy  Does fabienne    Received IV Haldol for agitation overnight      Review of Systems  Unable to obtain due to patient status      Objective   Objective     Vitals:   Vital signs for last 24 hours:  Temp:  [98.2 °F (36.8 °C)-99.7 °F (37.6 °C)] 99.5 °F (37.5 °C)  Heart Rate:  [77-98] 98  BP: (142-180)/() 171/106    Intake/Output last 3 shifts:  I/O last 3 completed shifts:  In: 1223 [I.V.:575; Other:260; NG/GT:388]  Out: 3850 [Urine:3050; Stool:800]  Intake/Output this shift:  No intake/output data recorded.    Physical Exam   Chronically ill appearance  NG tube in place  Is obtunded  In restraints      Result Review    Result Review:  I have personally reviewed the results from the time of this admission to 3/18/2024 11:48 EDT and agree with these findings:  [x]  Laboratory  [x]  Microbiology  [x]  Radiology  []  EKG/Telemetry   []  Cardiology/Vascular   []  Pathology  [x]  Old records  []  Other:  Most notable findings include:       Lab 24  0322 24  0328 24  0258 03/15/24  0552 24  1551 24  0332 24  2139 24   WBC  --  9.65 8.01 7.93 5.03 6.01  --  6.77   HEMOGLOBIN  --  9.7* 9.0* 9.9* 9.5* 10.0*  --  10.4*   HEMATOCRIT  --  30.0* 26.9* 29.8* 29.6* 31.2*  --  30.8*   PLATELETS  --  133* 113* 124* 119* 125*  --  133*   SODIUM 145 144 141 138  --  138  --  136   SODIUM, VENOUS  --   --   --   --   --   --  134.0*  --    POTASSIUM 4.5 4.2 4.7 4.6  --  4.9  --  5.1   POTASSIUM, VENOUS  --   --   --   --   --   --  5.2*  --    CHLORIDE 119* 117* 117* 113*  --  111*  --  107   CHLORIDE, VENOUS  --   --   --   --   --   --  110*  --    CO2 16.6* 18.5* 17.3* 16.3*   --  17.5*  --  19.5*   BUN 33* 27* 34* 37*  --  43*  --  44*   CREATININE 2.21* 2.04* 2.10* 2.07*  --  2.32*  --  2.40*   GLUCOSE 259* 148* 243* 174*  --  311*  --  371*   GLUCOSE, ARTERIAL  --   --   --   --   --   --  340*  --    CALCIUM 8.6 8.4* 7.9* 7.8*  --  8.1*  --  8.2*   PHOSPHORUS 3.1 3.3 3.0 3.4  --   --   --   --    TOTAL PROTEIN 5.1* 5.2* 4.4*  --   --  5.4*  --  5.7*   ALBUMIN 1.8* 1.9* 1.6*  --   --  1.8*  --  2.1*   GLOBULIN 3.3 3.3 2.8  --   --  3.6  --  3.6         Assessment & Plan   Assessment / Plan     Active Hospital Problems:  Active Hospital Problems    Diagnosis    • **Pneumonia        Impression:  Altered mental status secondary to hepatic encephalopathy  Alcoholic cirrhosis status post TIPS  Decompensated liver cirrhosis with ascites and anasarca  Type 2 diabetes with hyperglycemia  Chronic thrombocytopenia secondary to cirrhosis  Stage III CKD  Hypothyroidism  Hypertension      Plan:  Continue rifaximin  Continue lactulose  Dose increased to 4 times daily  Continue Rocephin  Add Levemir  Continue Aldactone  Continue nadolol  As needed Haldol  Continue thiamine multivitamin and folic acid  Continue Synthroid    -As needed hydralazine for hypertension    Peptic ulcer prophylaxis Pepcid  DVT prophylaxis: Subcutaneous heparin    Medical DVT prophylaxis orders are present.    CODE STATUS:   Code Status (Patient has no pulse and is not breathing): CPR (Attempt to Resuscitate)  Medical Interventions (Patient has pulse or is breathing): Full Support    Patient is critically ill with hepatic encephalopathy in the setting of alcoholic cirrhosis, on Precedex. I, personally reviewing all pertinent labs, imaging, microbiology and documentation. Also discussing the case with the patient and any available family, the admitting physician and any available ancillary staff.   Total critical care time prior service 40 minutes  I Dr. Derrick Barnett I spent a total of 30 minutes of critical care time caring  for this patient    I, ANTOINETTE Nolan, spent 10 minutes critical care time.  Electronically signed by ANTOINETTE Lema, 03/18/24, 11:52 AM EDT.    Electronically signed by Derrick Barnett DO, 03/18/24, 3:59 PM EDT.

## 2024-03-18 NOTE — PROGRESS NOTES
Nutrition Services    Patient Name: Simon Peters  YOB: 1970  MRN: [id number]  Admission date: 3/13/2024    PROGRESS NOTE      Encounter Information: EN Follow Up       PO Diet: NPO Diet NPO Type: Strict NPO   PO Supplements: NPO   PO Intake:  NPO       Current nutrition support: Diabetisource at 20 ml/hr, advance by 10 ml q4hrs to goal of 75 ml/hr   q4h     Provides: 1980 kcal, 99 g pro, 1953 ml fluids       Estimated Needs: 2130 kcal, 85 g pro, 2130 ml fluids       Nutrition support review: Pt at goal rate. No intolerances noted.        Labs (reviewed below): Hyperglycemia--receiving insulin       GI Function:  Last BM on 3/18, receiving lactulose 2/2 elevated ammonia       Nutrition Intervention Updates: Continue current nutrition plan of care.      Results from last 7 days   Lab Units 03/18/24 0322 03/17/24 0328 03/16/24  0258   SODIUM mmol/L 145 144 141   POTASSIUM mmol/L 4.5 4.2 4.7   CHLORIDE mmol/L 119* 117* 117*   CO2 mmol/L 16.6* 18.5* 17.3*   BUN mg/dL 33* 27* 34*   CREATININE mg/dL 2.21* 2.04* 2.10*   CALCIUM mg/dL 8.6 8.4* 7.9*   BILIRUBIN mg/dL 1.3* 1.5* 1.3*   ALK PHOS U/L 219* 161* 164*   ALT (SGPT) U/L 22 22 19   AST (SGOT) U/L 56* 39 32   GLUCOSE mg/dL 259* 148* 243*     Results from last 7 days   Lab Units 03/18/24 0322 03/17/24 0328 03/16/24  0258   MAGNESIUM mg/dL 2.2 2.0 2.3   PHOSPHORUS mg/dL 3.1 3.3 3.0   HEMOGLOBIN g/dL  --  9.7* 9.0*   HEMATOCRIT %  --  30.0* 26.9*     SARS-CoV-2, SRINIVASA   Date Value Ref Range Status   04/26/2022 Not Detected Not Detected Final     Lab Results   Component Value Date    HGBA1C 9.50 (H) 03/14/2024       RD to follow up per protocol.    Electronically signed by:  Makayla Valle RD  03/18/24 10:18 EDT

## 2024-03-19 LAB
AMMONIA BLD-SCNC: 43 UMOL/L (ref 16–60)
ANION GAP SERPL CALCULATED.3IONS-SCNC: 7.5 MMOL/L (ref 5–15)
BACTERIA SPEC ANAEROBE CULT: NORMAL
BUN SERPL-MCNC: 40 MG/DL (ref 6–20)
BUN/CREAT SERPL: 19.4 (ref 7–25)
CALCIUM SPEC-SCNC: 9.1 MG/DL (ref 8.6–10.5)
CHLORIDE SERPL-SCNC: 121 MMOL/L (ref 98–107)
CO2 SERPL-SCNC: 20.5 MMOL/L (ref 22–29)
CREAT SERPL-MCNC: 2.06 MG/DL (ref 0.76–1.27)
DEPRECATED RDW RBC AUTO: 52.2 FL (ref 37–54)
EGFRCR SERPLBLD CKD-EPI 2021: 37.8 ML/MIN/1.73
ERYTHROCYTE [DISTWIDTH] IN BLOOD BY AUTOMATED COUNT: 16.3 % (ref 12.3–15.4)
GLUCOSE BLDC GLUCOMTR-MCNC: 199 MG/DL (ref 70–99)
GLUCOSE BLDC GLUCOMTR-MCNC: 203 MG/DL (ref 70–99)
GLUCOSE BLDC GLUCOMTR-MCNC: 245 MG/DL (ref 70–99)
GLUCOSE SERPL-MCNC: 200 MG/DL (ref 65–99)
HCT VFR BLD AUTO: 29 % (ref 37.5–51)
HGB BLD-MCNC: 9.7 G/DL (ref 13–17.7)
M PNEUMO DNA SPEC QL NAA+PROBE: NEGATIVE
MAGNESIUM SERPL-MCNC: 2.2 MG/DL (ref 1.6–2.6)
MCH RBC QN AUTO: 30.3 PG (ref 26.6–33)
MCHC RBC AUTO-ENTMCNC: 33.4 G/DL (ref 31.5–35.7)
MCV RBC AUTO: 90.6 FL (ref 79–97)
PHOSPHATE SERPL-MCNC: 3.3 MG/DL (ref 2.5–4.5)
PLATELET # BLD AUTO: 130 10*3/MM3 (ref 140–450)
PMV BLD AUTO: 10.3 FL (ref 6–12)
POTASSIUM SERPL-SCNC: 4.5 MMOL/L (ref 3.5–5.2)
RBC # BLD AUTO: 3.2 10*6/MM3 (ref 4.14–5.8)
SODIUM SERPL-SCNC: 149 MMOL/L (ref 136–145)
WBC NRBC COR # BLD AUTO: 14.3 10*3/MM3 (ref 3.4–10.8)

## 2024-03-19 PROCEDURE — 84100 ASSAY OF PHOSPHORUS: CPT | Performed by: NURSE PRACTITIONER

## 2024-03-19 PROCEDURE — 82948 REAGENT STRIP/BLOOD GLUCOSE: CPT

## 2024-03-19 PROCEDURE — 99291 CRITICAL CARE FIRST HOUR: CPT | Performed by: INTERNAL MEDICINE

## 2024-03-19 PROCEDURE — 85027 COMPLETE CBC AUTOMATED: CPT | Performed by: NURSE PRACTITIONER

## 2024-03-19 PROCEDURE — 63710000001 INSULIN REGULAR HUMAN PER 5 UNITS: Performed by: NURSE PRACTITIONER

## 2024-03-19 PROCEDURE — 83735 ASSAY OF MAGNESIUM: CPT | Performed by: NURSE PRACTITIONER

## 2024-03-19 PROCEDURE — 63710000001 INSULIN DETEMIR PER 5 UNITS: Performed by: NURSE PRACTITIONER

## 2024-03-19 PROCEDURE — 25010000002 HEPARIN (PORCINE) PER 1000 UNITS: Performed by: FAMILY MEDICINE

## 2024-03-19 PROCEDURE — 99233 SBSQ HOSP IP/OBS HIGH 50: CPT | Performed by: INTERNAL MEDICINE

## 2024-03-19 PROCEDURE — 25010000002 HYDRALAZINE PER 20 MG: Performed by: INTERNAL MEDICINE

## 2024-03-19 PROCEDURE — 80048 BASIC METABOLIC PNL TOTAL CA: CPT | Performed by: NURSE PRACTITIONER

## 2024-03-19 PROCEDURE — 82140 ASSAY OF AMMONIA: CPT | Performed by: STUDENT IN AN ORGANIZED HEALTH CARE EDUCATION/TRAINING PROGRAM

## 2024-03-19 RX ORDER — AMLODIPINE BESYLATE 5 MG/1
5 TABLET ORAL
Status: DISCONTINUED | OUTPATIENT
Start: 2024-03-19 | End: 2024-03-20

## 2024-03-19 RX ORDER — AMLODIPINE BESYLATE 5 MG/1
5 TABLET ORAL
Status: DISCONTINUED | OUTPATIENT
Start: 2024-03-19 | End: 2024-03-19

## 2024-03-19 RX ORDER — LACTULOSE 10 G/15ML
30 SOLUTION ORAL 3 TIMES DAILY
Status: DISCONTINUED | OUTPATIENT
Start: 2024-03-19 | End: 2024-03-20

## 2024-03-19 RX ADMIN — FUROSEMIDE 40 MG: 40 TABLET ORAL at 09:09

## 2024-03-19 RX ADMIN — HEPARIN SODIUM 5000 UNITS: 5000 INJECTION INTRAVENOUS; SUBCUTANEOUS at 09:09

## 2024-03-19 RX ADMIN — Medication 10 ML: at 20:49

## 2024-03-19 RX ADMIN — INSULIN HUMAN 4 UNITS: 100 INJECTION, SOLUTION PARENTERAL at 11:43

## 2024-03-19 RX ADMIN — LEVOTHYROXINE SODIUM 50 MCG: 50 TABLET ORAL at 05:33

## 2024-03-19 RX ADMIN — CAROSPIR 25 MG: 25 SUSPENSION ORAL at 09:08

## 2024-03-19 RX ADMIN — HEPARIN SODIUM 5000 UNITS: 5000 INJECTION INTRAVENOUS; SUBCUTANEOUS at 20:50

## 2024-03-19 RX ADMIN — AMLODIPINE BESYLATE 5 MG: 5 TABLET ORAL at 11:16

## 2024-03-19 RX ADMIN — NADOLOL 20 MG: 20 TABLET ORAL at 09:09

## 2024-03-19 RX ADMIN — Medication 10 ML: at 09:10

## 2024-03-19 RX ADMIN — HYDRALAZINE HYDROCHLORIDE 10 MG: 20 INJECTION, SOLUTION INTRAMUSCULAR; INTRAVENOUS at 20:52

## 2024-03-19 RX ADMIN — Medication 15 ML: at 09:08

## 2024-03-19 RX ADMIN — FAMOTIDINE 20 MG: 20 TABLET ORAL at 09:09

## 2024-03-19 RX ADMIN — RIFAXIMIN 550 MG: 550 TABLET ORAL at 20:50

## 2024-03-19 RX ADMIN — Medication 10 ML: at 09:11

## 2024-03-19 RX ADMIN — INSULIN HUMAN 4 UNITS: 100 INJECTION, SOLUTION PARENTERAL at 05:35

## 2024-03-19 RX ADMIN — FLUOXETINE HYDROCHLORIDE 40 MG: 20 CAPSULE ORAL at 09:08

## 2024-03-19 RX ADMIN — LACTULOSE 30 G: 20 SOLUTION ORAL at 15:16

## 2024-03-19 RX ADMIN — RIFAXIMIN 550 MG: 550 TABLET ORAL at 09:09

## 2024-03-19 RX ADMIN — Medication 10 ML: at 20:50

## 2024-03-19 RX ADMIN — Medication 100 MG: at 09:09

## 2024-03-19 RX ADMIN — INSULIN DETEMIR 5 UNITS: 100 INJECTION, SOLUTION SUBCUTANEOUS at 09:09

## 2024-03-19 RX ADMIN — INSULIN HUMAN 2 UNITS: 100 INJECTION, SOLUTION PARENTERAL at 19:09

## 2024-03-19 RX ADMIN — FOLIC ACID 1 MG: 1 TABLET ORAL at 09:09

## 2024-03-19 NOTE — PLAN OF CARE
"Goal Outcome Evaluation:      Patient responds to pain. TF FW has been adjusted 120 Q4H. Patient will open eyes and responds appropriately to situation but doesn't follow commands. Mother and step father visited today, mother was with patient from last d/c until EMS picked patient up again and is nearly certain he has not had drink. Over course of three days patient begin to get confused, \"forget\" how to walk and feed self. Patient had fallen several times over those three days per mom. When patient couldn't get up on his own, mom called EMS.                                         "

## 2024-03-19 NOTE — PROGRESS NOTES
[facility]   Hospitalist Progress Note  Date: 3/19/2024  Patient Name: Simon Peters  : 1970  MRN: [id number]  Date of admission: 3/13/2024      Subjective   Subjective     Chief Complaint: Follow up for altered mental status    Summary: 53-year-old male with history of alcoholic cirrhosis with history of TIPS and recurrent hepatic encephalopathy, type 2 diabetes, hypertension, CKD stage III who presented with altered mental status after being found down by family.  WBC WNL.  UA benign.  Pro-Alfredo 0.43.  CT head reported pneumocephalus and cavernous sinus as well as ophthalmic vein.  Neurosurgery consulted; no intervention needed at this time.  Ammonia 138.  Initially admitted to telemetry bed but developed increased agitation, requiring IV Ativan and transferred to ICU.  Underwent paracentesis, 1.6 L removed, fluid studies negative for SBP.  Cultures no growth to date.    Interval Followup:   Alert and oriented x 0  Remains in bilateral wrist restraint  Tolerating tube feeds at 75 cc/h  Having multiple bowel movement      Review of Systems  UTO 2/2 AMS      Objective   Objective     Vitals:   Temp:  [97.3 °F (36.3 °C)-99.7 °F (37.6 °C)] 97.3 °F (36.3 °C)  Heart Rate:  [69-87] 76  Resp:  [18] 18  BP: (147-173)/() 164/91  Physical Exam    Constitutional: Chronically ill appearing male, resting in bed, confused, and bilateral wrist restraint   HENT: NCAT, NG tube   Respiratory: Clear, nonlabored   Cardiovascular: RRR, no murmurs, no edema   Gastrointestinal: Nondistended, soft   Neurologic: Alert and orient x 0, moving all 4 extremities spontaneous   Skin: Extremities warm, no rashes   : +martin catheter    Result Review    Result Review:  I have personally reviewed the following over the last 24 hours (07:00 to 07:00) and agree with the following findings  [x]  Laboratory  CBC          3/16/2024    02:58 3/17/2024    03:28 3/19/2024    02:54   CBC   WBC 8.01  9.65  14.30    RBC 2.98  3.28  3.20     Hemoglobin 9.0  9.7  9.7    Hematocrit 26.9  30.0  29.0    MCV 90.3  91.5  90.6    MCH 30.2  29.6  30.3    MCHC 33.5  32.3  33.4    RDW 16.2  16.0  16.3    Platelets 113  133  130      CMP          3/17/2024    03:28 3/18/2024    03:22 3/19/2024    02:54   CMP   Glucose 148  259  200    BUN 27  33  40    Creatinine 2.04  2.21  2.06    EGFR 38.3  34.8  37.8    Sodium 144  145  149    Potassium 4.2  4.5  4.5    Chloride 117  119  121    Calcium 8.4  8.6  9.1    Total Protein 5.2  5.1     Albumin 1.9  1.8     Globulin 3.3  3.3     Total Bilirubin 1.5  1.3     Alkaline Phosphatase 161  219     AST (SGOT) 39  56     ALT (SGPT) 22  22     Albumin/Globulin Ratio 0.6  0.5     BUN/Creatinine Ratio 13.2  14.9  19.4    Anion Gap 8.5  9.4  7.5      [x]  Microbiology  Blood cultures no growth  Ascitic fluid culture no growth  []  Radiology  [x]  EKG/Telemetry monitor personally reviewed: NSR  []  Cardiology/Vascular   []  Pathology  []  Old records  [x]  Other:    Intake/Output Summary (Last 24 hours) at 3/19/2024 1740  Last data filed at 3/19/2024 1651  Gross per 24 hour   Intake 711 ml   Output 1500 ml   Net -789 ml         Assessment & Plan   Assessment / Plan     Assessment/Plan:  Metabolic/hepatic encephalopathy  Decompensated liver cirrhosis with ascites and anasarca  History of TIPS May 2023  Type 2 diabetes with hyperglycemia  Hypertensive urgency  Hypernatremia  Chronic anemia  Chronic thrombocytopenia 2/2 cirrhosis  Chronic kidney disease stage IIIb  Tobacco use disorder  Hx HTN  Hx hypothyroidism         Remains encephalopathic. MRI nondiagnostic due to motion artifact  Ammonia normalized.  Having multiple bowel movements daily.  Decrease lactulose to 30 g three times daily.  Continue rifaximin 550 mg every 12 hours  Renew bilateral upper extremity wrist restraints  Increase Levemir to 10 units daily.  Continue moderate dose SSI/Accu-Cheks every 6 hours.  Goal glucose 140-180.   Pressure elevated.  Started on  amlodipine 5 mg daily  Creatinine improving.  Nonoliguric. Lasix held due to hypernatremia.  Continue spironolactone 25 mg daily.  Continue Todd catheter.  Trend renal function and electrolytes  Continue nadolol 20 mg daily for esophageal varices prophylaxis  DIMASWA scores downtrending.  Score 4 this morning.  Continue as needed Ativan every 4 hours per PERRI's protocol  Completed high-dose IV thiamine.  Continue thiamine 100 mg daily  Continue multivitamin and folic acid  Continue Synthroid 50 mcg daily  Nutrition: Tube feeds; free water adjusted.  Per dietitian  DVT ppx: Subcu heparin 5000 units every 12 hours  GI ppx: Pepcid twice daily    Discussed plan with Dr Barnett and RN.    DVT prophylaxis:  Medical DVT prophylaxis orders are present.        CODE STATUS:   Code Status (Patient has no pulse and is not breathing): CPR (Attempt to Resuscitate)  Medical Interventions (Patient has pulse or is breathing): Full Support        Electronically signed by Grady Clifford DO, 03/19/24, 5:37 PM EDT.

## 2024-03-19 NOTE — PROGRESS NOTES
Pulmonary / Critical Care Progress Note      Patient Name: Simon Peters  : 1970  MRN: [id number]  Attending:  Grady Clifford DO   Date of admission: 3/13/2024    Subjective   Subjective   Patient critically ill with hepatic encephalopathy, altered mental status    Patient remains encephalopathic  MRI was nondiagnostic    Objective   Objective     Vitals:   Vital signs for last 24 hours:  Temp:  [97.9 °F (36.6 °C)-99.7 °F (37.6 °C)] 97.9 °F (36.6 °C)  Heart Rate:  [77-91] 77  Resp:  [18] 18  BP: (162-178)/() 170/95    Intake/Output last 3 shifts:  I/O last 3 completed shifts:  In: 2972 [Other:745; NG/GT:2227]  Out: 2850 [Urine:2450; Stool:400]    Physical Exam   Obtunded  Ill-appearing  NG tube in place  In restraints    Result Review    Result Review:  I have personally reviewed the results from the time of this admission to 3/19/2024 10:59 EDT and agree with these findings:  [x]  Laboratory  [x]  Microbiology  [x]  Radiology  []  EKG/Telemetry   []  Cardiology/Vascular   []  Pathology  [x]  Old records  []  Other:  Most notable findings include:   .    Assessment & Plan   Assessment / Plan     Active Hospital Problems:  Active Hospital Problems    Diagnosis     **Pneumonia         Impression:  Altered mental status secondary to hepatic encephalopathy  Alcoholic cirrhosis status post TIPS  Decompensated liver cirrhosis with ascites and anasarca  Type 2 diabetes with hyperglycemia  Chronic thrombocytopenia secondary to cirrhosis  Stage III CKD  Hypothyroidism  Hypertension    Plan:  Still very encephalopathic  -Continue lactulose and rifaximin at current dose  -Stop p.o. Lasix due to increased sodium  -Free water increased per NG  -Continue tube feeds  -Start Norvasc due to hypertension MRI reviewed which was nondiagnostic due to patient movement  -Continue Aldactone, nadolol  -Continue SSI, ROTHMAN glucose optimal  -Continue thiamine multivitamin and folic acid  -Continue Synthroid    Peptic ulcer  prophylaxis Pepcid  DVT prophylaxis: Subcutaneous heparin    Medical DVT prophylaxis orders are present.    CODE STATUS:   Code Status (Patient has no pulse and is not breathing): CPR (Attempt to Resuscitate)  Medical Interventions (Patient has pulse or is breathing): Full Support    Patient is critically ill with hepatic encephalopathy in the setting of alcoholic cirrhosis, on Precedex. I, personally reviewing all pertinent labs, imaging, microbiology and documentation. Also discussing the case with the patient and any available family, the admitting physician and any available ancillary staff.       I, ANTOINETTE Nolan, spent 10 minutes critical care time.  Electronically signed by ANTOINETTE Lema, 03/19/24, 11:02 AM EDT.    Total critical care time prior service 40 minutes  I Dr. Derrick Barnett I spent a total of 30 minutes of critical care time caring for this patient  Electronically signed by Derrick Barnett DO, 03/18/24, 3:59 PM EDT.

## 2024-03-19 NOTE — PLAN OF CARE
Goal Outcome Evaluation:              Outcome Evaluation: Freq. BM's, aggitated/restless throughout the night. MRI attempted. Ativan and Versed admin with minimal results. 500ml urine output. Patient scoots down bed and tosses and turns in the bed freq.

## 2024-03-19 NOTE — PROGRESS NOTES
Nutrition Services    Patient Name: Simon Peters  YOB: 1970  MRN: [id number]  Admission date: 3/13/2024    PROGRESS NOTE      Encounter Information: EN Follow Up       PO Diet: NPO Diet NPO Type: Strict NPO   PO Supplements: NPO   PO Intake:  NPO       Current nutrition support: Diabetisource at 75 ml/hr   q4h     Provides: 1980 kcal, 99 g pro, 2073 ml fluids       Estimated Needs: 2130 kcal, 85 g pro, 2130 ml fluids       Nutrition support review: No intolerances noted.        Labs (reviewed below): Hyperglycemia--receiving insulin. Na+ 149       GI Function:  Last BM on 3/19, receiving lactulose 2/2 elevated ammonia       Nutrition Intervention Updates: FWF increased to 120 ml q4h (720 ml/day) 2/2 hypernatremia. Will continue to monitor.      Results from last 7 days   Lab Units 03/19/24  0254 03/18/24 0322 03/17/24 0328 03/16/24  0258   SODIUM mmol/L 149* 145 144 141   POTASSIUM mmol/L 4.5 4.5 4.2 4.7   CHLORIDE mmol/L 121* 119* 117* 117*   CO2 mmol/L 20.5* 16.6* 18.5* 17.3*   BUN mg/dL 40* 33* 27* 34*   CREATININE mg/dL 2.06* 2.21* 2.04* 2.10*   CALCIUM mg/dL 9.1 8.6 8.4* 7.9*   BILIRUBIN mg/dL  --  1.3* 1.5* 1.3*   ALK PHOS U/L  --  219* 161* 164*   ALT (SGPT) U/L  --  22 22 19   AST (SGOT) U/L  --  56* 39 32   GLUCOSE mg/dL 200* 259* 148* 243*     Results from last 7 days   Lab Units 03/19/24  0254 03/18/24 0322 03/17/24  0328   MAGNESIUM mg/dL 2.2 2.2 2.0   PHOSPHORUS mg/dL 3.3 3.1 3.3   HEMOGLOBIN g/dL 9.7*  --  9.7*   HEMATOCRIT % 29.0*  --  30.0*     SARS-CoV-2, SRINIVASA   Date Value Ref Range Status   04/26/2022 Not Detected Not Detected Final     Lab Results   Component Value Date    HGBA1C 9.50 (H) 03/14/2024       RD to follow up per protocol.    Electronically signed by:  Makayla Valle RD  03/19/24 08:41 EDT

## 2024-03-20 LAB
ALBUMIN SERPL-MCNC: 1.9 G/DL (ref 3.5–5.2)
ANION GAP SERPL CALCULATED.3IONS-SCNC: 7.6 MMOL/L (ref 5–15)
ANION GAP SERPL CALCULATED.3IONS-SCNC: 9.8 MMOL/L (ref 5–15)
BUN SERPL-MCNC: 50 MG/DL (ref 6–20)
BUN SERPL-MCNC: 53 MG/DL (ref 6–20)
BUN/CREAT SERPL: 22.3 (ref 7–25)
BUN/CREAT SERPL: 24.7 (ref 7–25)
CALCIUM SPEC-SCNC: 8.8 MG/DL (ref 8.6–10.5)
CALCIUM SPEC-SCNC: 8.9 MG/DL (ref 8.6–10.5)
CHLORIDE SERPL-SCNC: 120 MMOL/L (ref 98–107)
CHLORIDE SERPL-SCNC: 122 MMOL/L (ref 98–107)
CO2 SERPL-SCNC: 20.2 MMOL/L (ref 22–29)
CO2 SERPL-SCNC: 20.4 MMOL/L (ref 22–29)
CREAT SERPL-MCNC: 2.15 MG/DL (ref 0.76–1.27)
CREAT SERPL-MCNC: 2.24 MG/DL (ref 0.76–1.27)
DEPRECATED RDW RBC AUTO: 52.2 FL (ref 37–54)
EGFRCR SERPLBLD CKD-EPI 2021: 34.2 ML/MIN/1.73
EGFRCR SERPLBLD CKD-EPI 2021: 35.9 ML/MIN/1.73
ERYTHROCYTE [DISTWIDTH] IN BLOOD BY AUTOMATED COUNT: 16.4 % (ref 12.3–15.4)
GLUCOSE BLDC GLUCOMTR-MCNC: 169 MG/DL (ref 70–99)
GLUCOSE BLDC GLUCOMTR-MCNC: 228 MG/DL (ref 70–99)
GLUCOSE BLDC GLUCOMTR-MCNC: 228 MG/DL (ref 70–99)
GLUCOSE BLDC GLUCOMTR-MCNC: 246 MG/DL (ref 70–99)
GLUCOSE SERPL-MCNC: 219 MG/DL (ref 65–99)
GLUCOSE SERPL-MCNC: 237 MG/DL (ref 65–99)
HCT VFR BLD AUTO: 30.5 % (ref 37.5–51)
HGB BLD-MCNC: 10 G/DL (ref 13–17.7)
MAGNESIUM SERPL-MCNC: 2.2 MG/DL (ref 1.6–2.6)
MCH RBC QN AUTO: 29.8 PG (ref 26.6–33)
MCHC RBC AUTO-ENTMCNC: 32.8 G/DL (ref 31.5–35.7)
MCV RBC AUTO: 90.8 FL (ref 79–97)
PHOSPHATE SERPL-MCNC: 2.8 MG/DL (ref 2.5–4.5)
PHOSPHATE SERPL-MCNC: 3.1 MG/DL (ref 2.5–4.5)
PLATELET # BLD AUTO: 155 10*3/MM3 (ref 140–450)
PMV BLD AUTO: 10.2 FL (ref 6–12)
POTASSIUM SERPL-SCNC: 4.4 MMOL/L (ref 3.5–5.2)
POTASSIUM SERPL-SCNC: 4.6 MMOL/L (ref 3.5–5.2)
RBC # BLD AUTO: 3.36 10*6/MM3 (ref 4.14–5.8)
SODIUM SERPL-SCNC: 150 MMOL/L (ref 136–145)
SODIUM SERPL-SCNC: 150 MMOL/L (ref 136–145)
WBC NRBC COR # BLD AUTO: 16.08 10*3/MM3 (ref 3.4–10.8)

## 2024-03-20 PROCEDURE — 63710000001 INSULIN DETEMIR PER 5 UNITS: Performed by: INTERNAL MEDICINE

## 2024-03-20 PROCEDURE — 85027 COMPLETE CBC AUTOMATED: CPT | Performed by: NURSE PRACTITIONER

## 2024-03-20 PROCEDURE — 99233 SBSQ HOSP IP/OBS HIGH 50: CPT | Performed by: INTERNAL MEDICINE

## 2024-03-20 PROCEDURE — 82948 REAGENT STRIP/BLOOD GLUCOSE: CPT

## 2024-03-20 PROCEDURE — 63710000001 INSULIN REGULAR HUMAN PER 5 UNITS: Performed by: NURSE PRACTITIONER

## 2024-03-20 PROCEDURE — 99232 SBSQ HOSP IP/OBS MODERATE 35: CPT | Performed by: INTERNAL MEDICINE

## 2024-03-20 PROCEDURE — 25010000002 HYDRALAZINE PER 20 MG: Performed by: INTERNAL MEDICINE

## 2024-03-20 PROCEDURE — 80048 BASIC METABOLIC PNL TOTAL CA: CPT | Performed by: NURSE PRACTITIONER

## 2024-03-20 PROCEDURE — 25010000002 HEPARIN (PORCINE) PER 1000 UNITS: Performed by: FAMILY MEDICINE

## 2024-03-20 PROCEDURE — 83735 ASSAY OF MAGNESIUM: CPT | Performed by: NURSE PRACTITIONER

## 2024-03-20 PROCEDURE — 82948 REAGENT STRIP/BLOOD GLUCOSE: CPT | Performed by: INTERNAL MEDICINE

## 2024-03-20 PROCEDURE — 80069 RENAL FUNCTION PANEL: CPT | Performed by: PHYSICIAN ASSISTANT

## 2024-03-20 PROCEDURE — 84100 ASSAY OF PHOSPHORUS: CPT | Performed by: NURSE PRACTITIONER

## 2024-03-20 PROCEDURE — 92526 ORAL FUNCTION THERAPY: CPT

## 2024-03-20 RX ORDER — POLYETHYLENE GLYCOL 3350 17 G/17G
17 POWDER, FOR SOLUTION ORAL DAILY PRN
Status: DISCONTINUED | OUTPATIENT
Start: 2024-03-20 | End: 2024-03-23 | Stop reason: HOSPADM

## 2024-03-20 RX ORDER — SPIRONOLACTONE 25 MG/1
25 TABLET ORAL DAILY
Status: DISCONTINUED | OUTPATIENT
Start: 2024-03-21 | End: 2024-03-23 | Stop reason: HOSPADM

## 2024-03-20 RX ORDER — BISACODYL 10 MG
10 SUPPOSITORY, RECTAL RECTAL DAILY PRN
Status: DISCONTINUED | OUTPATIENT
Start: 2024-03-20 | End: 2024-03-23 | Stop reason: HOSPADM

## 2024-03-20 RX ORDER — MULTIVIT AND MINERALS-FERROUS GLUCONATE 9 MG IRON/15 ML ORAL LIQUID 9 MG/15 ML
15 LIQUID (ML) ORAL DAILY
Status: DISCONTINUED | OUTPATIENT
Start: 2024-03-21 | End: 2024-03-20 | Stop reason: CLARIF

## 2024-03-20 RX ORDER — MULTIPLE VITAMINS W/ MINERALS TAB 9MG-400MCG
1 TAB ORAL DAILY
Status: DISCONTINUED | OUTPATIENT
Start: 2024-03-21 | End: 2024-03-23 | Stop reason: HOSPADM

## 2024-03-20 RX ORDER — NADOLOL 20 MG/1
20 TABLET ORAL
Status: DISCONTINUED | OUTPATIENT
Start: 2024-03-21 | End: 2024-03-23 | Stop reason: HOSPADM

## 2024-03-20 RX ORDER — LACTULOSE 10 G/15ML
30 SOLUTION ORAL 2 TIMES DAILY
Status: DISCONTINUED | OUTPATIENT
Start: 2024-03-20 | End: 2024-03-20

## 2024-03-20 RX ORDER — LACTULOSE 10 G/15ML
30 SOLUTION ORAL 2 TIMES DAILY
Status: DISCONTINUED | OUTPATIENT
Start: 2024-03-21 | End: 2024-03-23 | Stop reason: HOSPADM

## 2024-03-20 RX ORDER — FAMOTIDINE 20 MG/1
20 TABLET, FILM COATED ORAL DAILY
Status: DISCONTINUED | OUTPATIENT
Start: 2024-03-21 | End: 2024-03-23 | Stop reason: HOSPADM

## 2024-03-20 RX ORDER — FLUOXETINE 10 MG/1
40 CAPSULE ORAL DAILY
Status: DISCONTINUED | OUTPATIENT
Start: 2024-03-21 | End: 2024-03-23 | Stop reason: HOSPADM

## 2024-03-20 RX ORDER — AMOXICILLIN 250 MG
2 CAPSULE ORAL 2 TIMES DAILY PRN
Status: DISCONTINUED | OUTPATIENT
Start: 2024-03-20 | End: 2024-03-23 | Stop reason: HOSPADM

## 2024-03-20 RX ORDER — FOLIC ACID 1 MG/1
1 TABLET ORAL DAILY
Status: DISCONTINUED | OUTPATIENT
Start: 2024-03-21 | End: 2024-03-23 | Stop reason: HOSPADM

## 2024-03-20 RX ORDER — LEVOTHYROXINE SODIUM 0.05 MG/1
50 TABLET ORAL
Status: DISCONTINUED | OUTPATIENT
Start: 2024-03-21 | End: 2024-03-23 | Stop reason: HOSPADM

## 2024-03-20 RX ORDER — AMLODIPINE BESYLATE 5 MG/1
5 TABLET ORAL
Status: DISCONTINUED | OUTPATIENT
Start: 2024-03-21 | End: 2024-03-23 | Stop reason: HOSPADM

## 2024-03-20 RX ADMIN — LACTULOSE 30 G: 20 SOLUTION ORAL at 10:10

## 2024-03-20 RX ADMIN — INSULIN HUMAN 4 UNITS: 100 INJECTION, SOLUTION PARENTERAL at 11:42

## 2024-03-20 RX ADMIN — LEVOTHYROXINE SODIUM 50 MCG: 50 TABLET ORAL at 05:06

## 2024-03-20 RX ADMIN — FOLIC ACID 1 MG: 1 TABLET ORAL at 08:37

## 2024-03-20 RX ADMIN — INSULIN DETEMIR 10 UNITS: 100 INJECTION, SOLUTION SUBCUTANEOUS at 08:37

## 2024-03-20 RX ADMIN — AMLODIPINE BESYLATE 5 MG: 5 TABLET ORAL at 08:37

## 2024-03-20 RX ADMIN — RIFAXIMIN 550 MG: 550 TABLET ORAL at 10:11

## 2024-03-20 RX ADMIN — LACTULOSE 30 G: 20 SOLUTION ORAL at 21:35

## 2024-03-20 RX ADMIN — HYDRALAZINE HYDROCHLORIDE 10 MG: 20 INJECTION, SOLUTION INTRAMUSCULAR; INTRAVENOUS at 05:06

## 2024-03-20 RX ADMIN — Medication 100 MG: at 08:37

## 2024-03-20 RX ADMIN — INSULIN HUMAN 2 UNITS: 100 INJECTION, SOLUTION PARENTERAL at 18:31

## 2024-03-20 RX ADMIN — INSULIN HUMAN 4 UNITS: 100 INJECTION, SOLUTION PARENTERAL at 00:22

## 2024-03-20 RX ADMIN — Medication 10 ML: at 21:23

## 2024-03-20 RX ADMIN — FAMOTIDINE 20 MG: 20 TABLET ORAL at 08:37

## 2024-03-20 RX ADMIN — INSULIN HUMAN 4 UNITS: 100 INJECTION, SOLUTION PARENTERAL at 05:06

## 2024-03-20 RX ADMIN — HEPARIN SODIUM 5000 UNITS: 5000 INJECTION INTRAVENOUS; SUBCUTANEOUS at 21:35

## 2024-03-20 RX ADMIN — Medication 10 ML: at 08:39

## 2024-03-20 RX ADMIN — CAROSPIR 25 MG: 25 SUSPENSION ORAL at 10:10

## 2024-03-20 RX ADMIN — RIFAXIMIN 550 MG: 550 TABLET ORAL at 21:36

## 2024-03-20 RX ADMIN — FLUOXETINE HYDROCHLORIDE 40 MG: 20 CAPSULE ORAL at 08:37

## 2024-03-20 RX ADMIN — NADOLOL 20 MG: 20 TABLET ORAL at 10:11

## 2024-03-20 RX ADMIN — HYDRALAZINE HYDROCHLORIDE 10 MG: 20 INJECTION, SOLUTION INTRAMUSCULAR; INTRAVENOUS at 00:53

## 2024-03-20 RX ADMIN — Medication 15 ML: at 10:10

## 2024-03-20 RX ADMIN — HEPARIN SODIUM 5000 UNITS: 5000 INJECTION INTRAVENOUS; SUBCUTANEOUS at 08:37

## 2024-03-20 RX ADMIN — HYDRALAZINE HYDROCHLORIDE 10 MG: 20 INJECTION, SOLUTION INTRAMUSCULAR; INTRAVENOUS at 21:36

## 2024-03-20 RX ADMIN — Medication 10 ML: at 08:38

## 2024-03-20 NOTE — PROGRESS NOTES
Nutrition Services    Patient Name: Simon Peters  YOB: 1970  MRN: [id number]  Admission date: 3/13/2024    PROGRESS NOTE      Encounter Information: EN Follow Up       PO Diet: NPO Diet NPO Type: Strict NPO   PO Supplements: NPO   PO Intake:  NPO       Current nutrition support: Diabetisource at 75 ml/hr   q3h     Provides: 1980 kcal, 99 g pro, 2553 ml fluids       Estimated Needs: 2130 kcal, 85 g pro, 2130 ml fluids       Nutrition support review: Pt at goal rate. No intolerances noted.        Labs (reviewed below): Hyperglycemia--receiving insulin. Na+ 150       GI Function:  Last BM on 3/20, receiving lactulose 2/2 elevated ammonia       Nutrition Intervention Updates: FWF increased to 150 ml q3h (1200 ml) 2/2 hypernatremia. RD will continue to monitor.      Results from last 7 days   Lab Units 03/20/24  0254 03/19/24  0254 03/18/24  0322 03/17/24  0328 03/16/24  0258   SODIUM mmol/L 150* 149* 145 144 141   POTASSIUM mmol/L 4.6 4.5 4.5 4.2 4.7   CHLORIDE mmol/L 120* 121* 119* 117* 117*   CO2 mmol/L 20.2* 20.5* 16.6* 18.5* 17.3*   BUN mg/dL 50* 40* 33* 27* 34*   CREATININE mg/dL 2.24* 2.06* 2.21* 2.04* 2.10*   CALCIUM mg/dL 8.9 9.1 8.6 8.4* 7.9*   BILIRUBIN mg/dL  --   --  1.3* 1.5* 1.3*   ALK PHOS U/L  --   --  219* 161* 164*   ALT (SGPT) U/L  --   --  22 22 19   AST (SGOT) U/L  --   --  56* 39 32   GLUCOSE mg/dL 237* 200* 259* 148* 243*     Results from last 7 days   Lab Units 03/20/24  0254 03/19/24  0254 03/18/24  0322   MAGNESIUM mg/dL 2.2 2.2 2.2   PHOSPHORUS mg/dL 3.1 3.3 3.1   HEMOGLOBIN g/dL 10.0* 9.7*  --    HEMATOCRIT % 30.5* 29.0*  --      SARS-CoV-2, SRINIVASA   Date Value Ref Range Status   04/26/2022 Not Detected Not Detected Final     Lab Results   Component Value Date    HGBA1C 9.50 (H) 03/14/2024       RD to follow up per protocol.    Electronically signed by:  Makayla Valle RD  03/20/24 09:08 EDT

## 2024-03-20 NOTE — PLAN OF CARE
Goal Outcome Evaluation:         Pt is alert and stable. Very vocal this shift with near-constant moaning, yelling, and talking. Is able to follow commands and able to make demands of staff but does not answer questions and is uncooperative at times. BP elevated necessitating PRN hydralazine. Multiple BM's this shift.

## 2024-03-20 NOTE — THERAPY TREATMENT NOTE
Acute Care - Speech Language Pathology   Swallow Treatment Note PERLITA Funk     Patient Name: Simon Peters  : 1970  MRN: [id number]  Today's Date: 3/20/2024               Admit Date: 3/13/2024    Visit Dx:     ICD-10-CM ICD-9-CM   1. Hepatic encephalopathy  K76.82 572.2   2. Oropharyngeal dysphagia  R13.12 787.22     Patient Active Problem List   Diagnosis    Hypertension    Anxiety    Elevated serum glucose    Diabetes mellitus    Elevated liver enzymes    Encounter for screening for malignant neoplasm of colon    Weakness    Pneumonia     Past Medical History:   Diagnosis Date    Abnormal LFTs     Anxiety     Back pain     Diabetes mellitus     Hypertension     Kidney failure     Liver failure     Rash      Past Surgical History:   Procedure Laterality Date    TIPS PROCEDURE           SPEECH PATHOLOGY DYSPHAGIA TREATMENT    Subjective/Behavioral Observations: Awake, alert, cooperative.  Has been frequently yelling out.  Calm and compliant this session.      Day/time of Treatment: 3/20/2024      Current Diet: NG      Current Strategies: N/A      Treatment received: Dysphagia therapy to address swallow function through exercises and education of strategies.      Results of treatment: Patient assisted for positioning, patient did self-correct position.  Sips of thin liquid by straw with minimal cueing, 120 cc with no overt clinical signs or symptoms of aspiration noted.  One quarter serving of pudding with patient occasionally holding bolus.  Bites of franklyn cracker with patient demonstrating chewing, again occasionally holding bolus.  Did complete swallow with cueing clearing the oral cavity.  Trial with meds x 2 with patient taking capsules with thin water, at times swallowing water with caps remaining in mouth, did eventually clear with cues and additional sips of liquid.      Progress toward goals: Adequate      Barriers to Achieving goals: Medical status      Plan of care:/changes in plan: 1.Continue with  NG as primary nutrition.    2. May upgrade to regular diet with thin liquids when patient is alert, cooperative and able to assist in feeding self.   3.May have sips of water by straw with nursing when patient is alert and sitting upright.                                                                                         Plan of Care Reviewed With: patient          EDUCATION  The patient has been educated in the following areas:   Dysphagia (Swallowing Impairment) NPO rationale Modified Diet Instruction.              Time Calculation:    Time Calculation- SLP       Row Name 03/20/24 1021             Time Calculation- SLP    SLP Stop Time 0915  -TB      SLP Received On 03/20/24  -TB         Untimed Charges    03235-HN Treatment Swallow Minutes 40  -TB         Total Minutes    Untimed Charges Total Minutes 40  -TB       Total Minutes 40  -TB                User Key  (r) = Recorded By, (t) = Taken By, (c) = Cosigned By      Initials Name Provider Type    TB Anne Marie Isaac SLP Speech and Language Pathologist                    Therapy Charges for Today       Code Description Service Date Service Provider Modifiers Qty    [id number]  ST TREATMENT SWALLOW 3 3/20/2024 Anne Marie Isaac SLP GN 1                 NUHA Sapp  3/20/2024

## 2024-03-20 NOTE — PROGRESS NOTES
[facility]   Hospitalist Progress Note  Date: 3/20/2024  Patient Name: Simon Peters  : 1970  MRN: [id number]  Date of admission: 3/13/2024      Subjective   Subjective     Chief Complaint: Follow up for altered mental status    Summary: 53-year-old male with history of alcoholic cirrhosis with history of TIPS and recurrent hepatic encephalopathy, type 2 diabetes, hypertension, CKD stage III who presented with altered mental status after being found down by family.  WBC WNL.  UA benign.  Pro-Alfredo 0.43.  CT head reported pneumocephalus and cavernous sinus as well as ophthalmic vein.  Neurosurgery consulted; no intervention needed at this time.  Ammonia 138.  Initially admitted to telemetry bed but developed increased agitation, requiring IV Ativan and transferred to ICU.  Underwent paracentesis, 1.6 L removed, fluid studies negative for SBP.  Cultures no growth to date.    Interval Followup:   Not as lethargic and becoming increasingly agitated  Still requiring wrist restraint  Pulled out NG tube  Multiple bowel movements  Sodium up to 150    Review of Systems  UTO 2/2 AMS      Objective   Objective     Vitals:   Temp:  [96.6 °F (35.9 °C)-99 °F (37.2 °C)] 97.6 °F (36.4 °C)  Heart Rate:  [68-87] 71  Resp:  [12-19] 19  BP: (133-174)/() 149/93  Physical Exam    Constitutional: Chronically ill appearing male, resting in bed, confused, and bilateral wrist restraint   HENT: NCAT, NG tube   Respiratory: Clear, nonlabored   Cardiovascular: RRR, no murmurs, no edema   Gastrointestinal: Nondistended, soft   Neurologic: Alert and orient x 0, moving all 4 extremities spontaneous   Skin: Extremities warm, no rashes   : +martin catheter    Result Review    Result Review:  I have personally reviewed the following over the last 24 hours (07:00 to 07:00) and agree with the following findings  [x]  Laboratory  CBC          3/17/2024    03:28 3/19/2024    02:54 3/20/2024    02:54   CBC   WBC 9.65  14.30  16.08    RBC  3.28  3.20  3.36    Hemoglobin 9.7  9.7  10.0    Hematocrit 30.0  29.0  30.5    MCV 91.5  90.6  90.8    MCH 29.6  30.3  29.8    MCHC 32.3  33.4  32.8    RDW 16.0  16.3  16.4    Platelets 133  130  155      CMP          3/18/2024    03:22 3/19/2024    02:54 3/20/2024    02:54 3/20/2024    15:46   CMP   Glucose 259  200  237  219    BUN 33  40  50  53    Creatinine 2.21  2.06  2.24  2.15    EGFR 34.8  37.8  34.2  35.9    Sodium 145  149  150  150    Potassium 4.5  4.5  4.6  4.4    Chloride 119  121  120  122    Calcium 8.6  9.1  8.9  8.8    Total Protein 5.1       Albumin 1.8    1.9    Globulin 3.3       Total Bilirubin 1.3       Alkaline Phosphatase 219       AST (SGOT) 56       ALT (SGPT) 22       Albumin/Globulin Ratio 0.5       BUN/Creatinine Ratio 14.9  19.4  22.3  24.7    Anion Gap 9.4  7.5  9.8  7.6      [x]  Microbiology  Blood cultures no growth  Ascitic fluid culture no growth  []  Radiology  [x]  EKG/Telemetry monitor personally reviewed: NSR/sinus tachycardia  []  Cardiology/Vascular   []  Pathology  []  Old records  [x]  Other:    Intake/Output Summary (Last 24 hours) at 3/20/2024 1852  Last data filed at 3/20/2024 0600  Gross per 24 hour   Intake 1474 ml   Output 600 ml   Net 874 ml         Assessment & Plan   Assessment / Plan     Assessment/Plan:  Metabolic/hepatic encephalopathy  Decompensated liver cirrhosis with ascites and anasarca  History of TIPS May 2023  Type 2 diabetes with hyperglycemia  Hypertensive urgency  Hypernatremia  Chronic anemia  Leukocytosis  Chronic thrombocytopenia 2/2 cirrhosis  Chronic kidney disease stage IIIb  Tobacco use disorder  Hx HTN  Hx hypothyroidism         Mentation slowly improving but remains quite agitated.  Ammonia normalized.  Having multiple bowel movements daily.  Further decrease lactulose to 30 g two times daily.  Continue rifaximin 550 mg every 12 hours  Renew bilateral upper extremity wrist restraints  Blood sugar trend improving.  Continue Levemir 10  units daily.  Continue moderate dose SSI/Accu-Cheks every 6 hours.  Goal glucose 140-180.   Creatinine improving.  Nonoliguric. Lasix held due to hypernatremia.  Continue spironolactone 25 mg daily.  Continue Todd catheter.  Trend renal function and electrolytes  Continue nadolol 20 mg daily for esophageal varices prophylaxis  Continue amlodipine 5 mg daily  CIWA score 6 this morning.  Continue as needed Ativan every 4 hours per CIWA protocol  Completed high-dose IV thiamine.  Continue thiamine 100 mg daily  Continue multivitamin and folic acid  Continue Synthroid 50 mcg daily  White count up to 16,000.  No fevers.  All previous cultures remain negative.  Continue to monitor white count  Pulled out nasogastric tube later in the afternoon.  Order to place Cortrak.   Nutrition: Resume tube feeds once Cortrak in place  DVT ppx: Subcu heparin 5000 units every 12 hours  GI ppx: Pepcid twice daily  Transfer to telemetry bed    Discussed plan with Dr Barnett and RN.    DVT prophylaxis:  Medical DVT prophylaxis orders are present.        CODE STATUS:   Code Status (Patient has no pulse and is not breathing): CPR (Attempt to Resuscitate)  Medical Interventions (Patient has pulse or is breathing): Full Support    Electronically signed by Grady Clifford DO, 03/20/24, 6:57 PM EDT.

## 2024-03-20 NOTE — PROGRESS NOTES
Pulmonary / Critical Care Progress Note      Patient Name: Simon Peters  : 1970  MRN: [id number]  Attending:  Grady Clifford DO   Date of admission: 3/13/2024    Subjective   Subjective   Patient critically ill with hepatic encephalopathy, altered mental status    Patient remains encephalopathic  MRI was nondiagnostic    Objective   Objective     Vitals:   Vital signs for last 24 hours:  Temp:  [96.6 °F (35.9 °C)-99 °F (37.2 °C)] 97.6 °F (36.4 °C)  Heart Rate:  [71-87] 75  Resp:  [12-19] 19  BP: (133-174)/() 166/98    Intake/Output last 3 shifts:  I/O last 3 completed shifts:  In: 2185 [Other:1124; NG/GT:1061]  Out: 2100 [Urine:2100]    Physical Exam   Obtunded  Ill-appearing  NG tube in place  In restraints    Result Review    Result Review:  I have personally reviewed the results from the time of this admission to 3/20/2024 16:28 EDT and agree with these findings:  [x]  Laboratory  [x]  Microbiology  [x]  Radiology  []  EKG/Telemetry   []  Cardiology/Vascular   []  Pathology  [x]  Old records  []  Other:  Most notable findings include:   .    Assessment & Plan   Assessment / Plan     Active Hospital Problems:  Active Hospital Problems    Diagnosis     **Pneumonia         Impression:  Altered mental status secondary to hepatic encephalopathy  Alcoholic cirrhosis status post TIPS  Decompensated liver cirrhosis with ascites and anasarca  Type 2 diabetes with hyperglycemia  Chronic thrombocytopenia secondary to cirrhosis  Stage III CKD  Hypothyroidism  Hypertension    Plan  Mental status improving  Will decrease lactulose to twice daily due to diarrhea  Continue Xifaxan  Continue tube feeds  -Start Norvasc due to hypertension MRI reviewed which was nondiagnostic due to patient movement  -Continue Aldactone, nadolol  -Continue SSI, ROTHMAN glucose optimal  -Continue thiamine multivitamin and folic acid  -Continue Synthroid    Peptic ulcer prophylaxis Pepcid  DVT prophylaxis: Subcutaneous heparin    Transfer  patient out of ICU    Medical DVT prophylaxis orders are present.    CODE STATUS:   Code Status (Patient has no pulse and is not breathing): CPR (Attempt to Resuscitate)  Medical Interventions (Patient has pulse or is breathing): Full Support      Electronically signed by Derrick Barnett DO, 03/20/24, 4:28 PM EDT.

## 2024-03-21 LAB
ALBUMIN SERPL-MCNC: 1.9 G/DL (ref 3.5–5.2)
ALBUMIN/GLOB SERPL: 0.6 G/DL
ALP SERPL-CCNC: 235 U/L (ref 39–117)
ALT SERPL W P-5'-P-CCNC: 28 U/L (ref 1–41)
ANION GAP SERPL CALCULATED.3IONS-SCNC: 9.5 MMOL/L (ref 5–15)
AST SERPL-CCNC: 61 U/L (ref 1–40)
BILIRUB SERPL-MCNC: 1.6 MG/DL (ref 0–1.2)
BUN SERPL-MCNC: 53 MG/DL (ref 6–20)
BUN/CREAT SERPL: 26.6 (ref 7–25)
CALCIUM SPEC-SCNC: 8.4 MG/DL (ref 8.6–10.5)
CHLORIDE SERPL-SCNC: 117 MMOL/L (ref 98–107)
CO2 SERPL-SCNC: 19.5 MMOL/L (ref 22–29)
CREAT SERPL-MCNC: 1.99 MG/DL (ref 0.76–1.27)
D-LACTATE SERPL-SCNC: 1.6 MMOL/L (ref 0.5–2)
DEPRECATED RDW RBC AUTO: 56.5 FL (ref 37–54)
EGFRCR SERPLBLD CKD-EPI 2021: 39.4 ML/MIN/1.73
ERYTHROCYTE [DISTWIDTH] IN BLOOD BY AUTOMATED COUNT: 17.2 % (ref 12.3–15.4)
FUNGUS WND CULT: NORMAL
GLOBULIN UR ELPH-MCNC: 3.4 GM/DL
GLUCOSE BLDC GLUCOMTR-MCNC: 147 MG/DL (ref 70–99)
GLUCOSE BLDC GLUCOMTR-MCNC: 222 MG/DL (ref 70–99)
GLUCOSE BLDC GLUCOMTR-MCNC: 231 MG/DL (ref 70–99)
GLUCOSE BLDC GLUCOMTR-MCNC: 83 MG/DL (ref 70–99)
GLUCOSE SERPL-MCNC: 166 MG/DL (ref 65–99)
HCT VFR BLD AUTO: 30 % (ref 37.5–51)
HGB BLD-MCNC: 9.7 G/DL (ref 13–17.7)
MAGNESIUM SERPL-MCNC: 2.4 MG/DL (ref 1.6–2.6)
MCH RBC QN AUTO: 29.9 PG (ref 26.6–33)
MCHC RBC AUTO-ENTMCNC: 32.3 G/DL (ref 31.5–35.7)
MCV RBC AUTO: 92.6 FL (ref 79–97)
PHOSPHATE SERPL-MCNC: 3.7 MG/DL (ref 2.5–4.5)
PLATELET # BLD AUTO: 138 10*3/MM3 (ref 140–450)
PMV BLD AUTO: 11 FL (ref 6–12)
POTASSIUM SERPL-SCNC: 4.4 MMOL/L (ref 3.5–5.2)
PROT SERPL-MCNC: 5.3 G/DL (ref 6–8.5)
RBC # BLD AUTO: 3.24 10*6/MM3 (ref 4.14–5.8)
SODIUM SERPL-SCNC: 146 MMOL/L (ref 136–145)
WBC NRBC COR # BLD AUTO: 14.24 10*3/MM3 (ref 3.4–10.8)

## 2024-03-21 PROCEDURE — 85027 COMPLETE CBC AUTOMATED: CPT | Performed by: PHYSICIAN ASSISTANT

## 2024-03-21 PROCEDURE — 25010000002 HALOPERIDOL LACTATE PER 5 MG: Performed by: INTERNAL MEDICINE

## 2024-03-21 PROCEDURE — 83735 ASSAY OF MAGNESIUM: CPT | Performed by: PHYSICIAN ASSISTANT

## 2024-03-21 PROCEDURE — 25010000002 HEPARIN (PORCINE) PER 1000 UNITS: Performed by: FAMILY MEDICINE

## 2024-03-21 PROCEDURE — 63710000001 INSULIN REGULAR HUMAN PER 5 UNITS: Performed by: NURSE PRACTITIONER

## 2024-03-21 PROCEDURE — 82948 REAGENT STRIP/BLOOD GLUCOSE: CPT | Performed by: INTERNAL MEDICINE

## 2024-03-21 PROCEDURE — 63710000001 INSULIN DETEMIR PER 5 UNITS: Performed by: INTERNAL MEDICINE

## 2024-03-21 PROCEDURE — 84100 ASSAY OF PHOSPHORUS: CPT | Performed by: PHYSICIAN ASSISTANT

## 2024-03-21 PROCEDURE — 80053 COMPREHEN METABOLIC PANEL: CPT | Performed by: PHYSICIAN ASSISTANT

## 2024-03-21 PROCEDURE — 99233 SBSQ HOSP IP/OBS HIGH 50: CPT | Performed by: STUDENT IN AN ORGANIZED HEALTH CARE EDUCATION/TRAINING PROGRAM

## 2024-03-21 PROCEDURE — 99232 SBSQ HOSP IP/OBS MODERATE 35: CPT | Performed by: INTERNAL MEDICINE

## 2024-03-21 PROCEDURE — 83605 ASSAY OF LACTIC ACID: CPT | Performed by: PHYSICIAN ASSISTANT

## 2024-03-21 PROCEDURE — 82948 REAGENT STRIP/BLOOD GLUCOSE: CPT

## 2024-03-21 RX ADMIN — RIFAXIMIN 550 MG: 550 TABLET ORAL at 21:09

## 2024-03-21 RX ADMIN — LACTULOSE 30 G: 20 SOLUTION ORAL at 21:09

## 2024-03-21 RX ADMIN — Medication 10 ML: at 21:10

## 2024-03-21 RX ADMIN — FOLIC ACID 1 MG: 1 TABLET ORAL at 09:58

## 2024-03-21 RX ADMIN — INSULIN HUMAN 4 UNITS: 100 INJECTION, SOLUTION PARENTERAL at 17:14

## 2024-03-21 RX ADMIN — Medication 100 MG: at 09:55

## 2024-03-21 RX ADMIN — RIFAXIMIN 550 MG: 550 TABLET ORAL at 09:56

## 2024-03-21 RX ADMIN — HEPARIN SODIUM 5000 UNITS: 5000 INJECTION INTRAVENOUS; SUBCUTANEOUS at 09:57

## 2024-03-21 RX ADMIN — SPIRONOLACTONE 25 MG: 25 TABLET ORAL at 09:56

## 2024-03-21 RX ADMIN — FLUOXETINE HYDROCHLORIDE 40 MG: 20 CAPSULE ORAL at 09:56

## 2024-03-21 RX ADMIN — LACTULOSE 30 G: 20 SOLUTION ORAL at 09:56

## 2024-03-21 RX ADMIN — Medication 10 ML: at 09:59

## 2024-03-21 RX ADMIN — Medication 10 ML: at 09:58

## 2024-03-21 RX ADMIN — INSULIN HUMAN 4 UNITS: 100 INJECTION, SOLUTION PARENTERAL at 12:33

## 2024-03-21 RX ADMIN — LEVOTHYROXINE SODIUM 50 MCG: 50 TABLET ORAL at 06:11

## 2024-03-21 RX ADMIN — FAMOTIDINE 20 MG: 20 TABLET ORAL at 09:56

## 2024-03-21 RX ADMIN — INSULIN DETEMIR 10 UNITS: 100 INJECTION, SOLUTION SUBCUTANEOUS at 09:56

## 2024-03-21 RX ADMIN — HALOPERIDOL LACTATE 2 MG: 5 INJECTION, SOLUTION INTRAMUSCULAR at 21:10

## 2024-03-21 RX ADMIN — Medication 10 ML: at 21:42

## 2024-03-21 RX ADMIN — Medication 1 TABLET: at 09:55

## 2024-03-21 RX ADMIN — AMLODIPINE BESYLATE 5 MG: 5 TABLET ORAL at 09:56

## 2024-03-21 RX ADMIN — HEPARIN SODIUM 5000 UNITS: 5000 INJECTION INTRAVENOUS; SUBCUTANEOUS at 21:09

## 2024-03-21 RX ADMIN — NADOLOL 20 MG: 20 TABLET ORAL at 09:55

## 2024-03-21 NOTE — SIGNIFICANT NOTE
03/21/24 0700   Physical Therapy Time and Intention   Session Not Performed other (see comments)  (Restraints)

## 2024-03-21 NOTE — CONSULTS
"Nutrition Services    Patient Name: Simon Peters  YOB: 1970  MRN: [id number]  Admission date: 3/13/2024      CLINICAL NUTRITION ASSESSMENT      Reason for Assessment  Follow Up     H&P:  Past Medical History:   Diagnosis Date    Abnormal LFTs     Anxiety     Back pain     Diabetes mellitus     Hypertension     Kidney failure     Liver failure     Rash         Current Problems:   Active Hospital Problems    Diagnosis     **Pneumonia         Nutrition/Diet History         Narrative   Pt transferred out of CCU. NGT removed and diet ordered. 50% intake documented since yesterday. Assisted pt w/ brkfst set up. Pt reporting he is hungry. Pt is agreeable to ONS to promote adequate nutrient intake.     Pt has has significant wt loss x 2 months. -4L since admission accounting for ~9#. NFPE finds mild muscle loss in temporal region and clavicle, moderate fat loss in orbital region.     RD will continue to monitor per protocol.     Anthropometrics        Current Height, Weight Height: 175.3 cm (69.02\")  Weight: 96.2 kg (212 lb 1.3 oz)   Current BMI Body mass index is 31.3 kg/m².   BMI Classification Obese Class I   % %    Adjusted Body Weight (ABW)    Weight Hx  Wt Readings from Last 30 Encounters:   03/14/24 0144 96.2 kg (212 lb 1.3 oz)   03/13/24 2117 94.9 kg (209 lb 3.5 oz)   03/11/24 0900 97.1 kg (214 lb 1.1 oz)   03/10/24 0554 97 kg (213 lb 13.5 oz)   03/07/24 0708 96.1 kg (211 lb 13.8 oz)   03/06/24 0600 95.1 kg (209 lb 10.5 oz)   03/05/24 0700 95.3 kg (210 lb 1.6 oz)   03/04/24 0220 98.8 kg (217 lb 13 oz)   01/06/24 1353 105 kg (231 lb 14.8 oz)   01/01/24 1124 105 kg (232 lb 2.3 oz)   07/29/23 1140 92 kg (202 lb 13.2 oz)   11/03/20 1437 92 kg (202 lb 12.8 oz)   07/08/19 0901 99.8 kg (220 lb)   09/25/18 0920 97.4 kg (214 lb 11.2 oz)   08/02/18 0802 103 kg (226 lb)   07/27/18 0804 104 kg (228 lb 6.4 oz)   03/08/18 1055 99.8 kg (220 lb)   06/30/17 1013 103 kg (226 lb)   01/10/17 1127 107 kg (236 lb) "   05/06/16 1609 107 kg (236 lb)          Wt Change Observation -8.2% x 2 months--clinically significant  -4L since admission (~9#)     Estimated/Assessed Needs  Estimated Needs based on: Ideal Body Weight       Energy Requirements 30 kcal/kg    EST Needs (kcal/day) 2130 kcal/day        Protein Requirements 1.2 g/kg    EST Daily Needs (g/day) 85 g/day        Fluid Requirements 1 ml/kcal    Estimated Needs (mL/day) 2130 ml/day      Labs/Medications         Pertinent Labs Reviewed.   Results from last 7 days   Lab Units 03/21/24 0301 03/20/24  1546 03/20/24  0254 03/19/24  0254 03/18/24  0322 03/17/24  0328   SODIUM mmol/L 146* 150* 150*   < > 145 144   POTASSIUM mmol/L 4.4 4.4 4.6   < > 4.5 4.2   CHLORIDE mmol/L 117* 122* 120*   < > 119* 117*   CO2 mmol/L 19.5* 20.4* 20.2*   < > 16.6* 18.5*   BUN mg/dL 53* 53* 50*   < > 33* 27*   CREATININE mg/dL 1.99* 2.15* 2.24*   < > 2.21* 2.04*   CALCIUM mg/dL 8.4* 8.8 8.9   < > 8.6 8.4*   BILIRUBIN mg/dL 1.6*  --   --   --  1.3* 1.5*   ALK PHOS U/L 235*  --   --   --  219* 161*   ALT (SGPT) U/L 28  --   --   --  22 22   AST (SGOT) U/L 61*  --   --   --  56* 39   GLUCOSE mg/dL 166* 219* 237*   < > 259* 148*    < > = values in this interval not displayed.     Results from last 7 days   Lab Units 03/21/24 0301 03/20/24  1546 03/20/24 0254 03/19/24  0254   MAGNESIUM mg/dL 2.4  --  2.2 2.2   PHOSPHORUS mg/dL 3.7   < > 3.1 3.3   HEMOGLOBIN g/dL 9.7*  --  10.0* 9.7*   HEMATOCRIT % 30.0*  --  30.5* 29.0*    < > = values in this interval not displayed.     SARS-CoV-2, SRINIVASA   Date Value Ref Range Status   04/26/2022 Not Detected Not Detected Final     Lab Results   Component Value Date    HGBA1C 9.50 (H) 03/14/2024         Pertinent Medications Reviewed.     Malnutrition Severity Assessment              Nutrition Diagnosis         Nutrition Dx Problem 1 Inadequate oral Intake related to decreased ability to consume sufficient energy as evidenced by  50% of meals documented     Nutrition  Intervention           Current Nutrition Orders & Evaluation of Intake       Current PO Diet Diet: Cardiac, Diabetic; Healthy Heart (2-3 Na+); Consistent Carbohydrate; Fluid Consistency: Thin (IDDSI 0)   Supplement No active supplement orders           Nutrition Intervention/Prescription        Boost GC TID   +570 kcal, 48 g pro        Medical Nutrition Therapy/Nutrition Education          Learner     Readiness Patient  Acceptance     Method     Response Explanation  Verbalizes understanding     Monitor/Evaluation        Monitor Per protocol, PO intake, Supplement intake, Weight, POC/GOC     Nutrition Discharge Plan         No nutrition discharge needs identified at this time     Electronically signed by:  Makayla Valle RD  03/21/24 09:33 EDT

## 2024-03-21 NOTE — PLAN OF CARE
Goal Outcome Evaluation:         Pt alert, interactive, VSS. Has transfer orders. Now on diet. 5+ BM's this shift.

## 2024-03-21 NOTE — PLAN OF CARE
Goal Outcome Evaluation:  Plan of Care Reviewed With: patient. Received pt from CCU this morning. Pt is alert with intermittent confusion noted. F/C D/C'd per MD order. NSR on the cardiac monitor.

## 2024-03-21 NOTE — PROGRESS NOTES
[facility]   Hospitalist Progress Note  Date: 3/21/2024  Patient Name: Simon Peters  : 1970  MRN: [id number]  Date of admission: 3/13/2024      Subjective   Subjective     Chief Complaint: Follow up for altered mental status    Summary: 53-year-old male with history of alcoholic cirrhosis with history of TIPS and recurrent hepatic encephalopathy, type 2 diabetes, hypertension, CKD stage III who presented with altered mental status after being found down by family.  WBC WNL.  UA benign.  Pro-Alrfedo 0.43.  CT head reported pneumocephalus and cavernous sinus as well as ophthalmic vein.  Neurosurgery consulted; no intervention needed at this time.  Ammonia 138.  Initially admitted to telemetry bed but developed increased agitation, requiring IV Ativan and transferred to ICU.  Underwent paracentesis, 1.6 L removed, fluid studies negative for SBP.  Cultures no growth to date.    Interval Followup:   Mildly confused but mentation overall much better  Tolerating meds and diet  Wants wrist restraints removed  Wants Martin removed  Wants to go home    Review of Systems  Respiratory:  No Cough, No Dyspnea  Gastrointestinal:  No Nausea, No Vomiting, No abdominal pain        Objective   Objective     Vitals:   Temp:  [96.4 °F (35.8 °C)-97.6 °F (36.4 °C)] 97.3 °F (36.3 °C)  Heart Rate:  [58-75] 62  Resp:  [12-18] 12  BP: (115-171)/() 145/80  Physical Exam    Constitutional: Resting in bed, conversant, in restraints, NAD   HENT: NCAT, NG tube   Respiratory: Clear, nonlabored   Cardiovascular: RRR, no murmurs, no edema   Gastrointestinal: Nondistended, soft   Neurologic: Alert and orient x 0, moving all 4 extremities spontaneous   Skin: Extremities warm, no rashes   : +martin catheter    Result Review    Result Review:  I have personally reviewed the following over the last 24 hours (07:00 to 07:00) and agree with the following findings  [x]  Laboratory  CBC          3/19/2024    02:54 3/20/2024    02:54 3/21/2024     03:01   CBC   WBC 14.30  16.08  14.24    RBC 3.20  3.36  3.24    Hemoglobin 9.7  10.0  9.7    Hematocrit 29.0  30.5  30.0    MCV 90.6  90.8  92.6    MCH 30.3  29.8  29.9    MCHC 33.4  32.8  32.3    RDW 16.3  16.4  17.2    Platelets 130  155  138      CMP          3/19/2024    02:54 3/20/2024    02:54 3/20/2024    15:46 3/21/2024    03:01   CMP   Glucose 200  237  219  166    BUN 40  50  53  53    Creatinine 2.06  2.24  2.15  1.99    EGFR 37.8  34.2  35.9  39.4    Sodium 149  150  150  146    Potassium 4.5  4.6  4.4  4.4    Chloride 121  120  122  117    Calcium 9.1  8.9  8.8  8.4    Total Protein    5.3    Albumin   1.9  1.9    Globulin    3.4    Total Bilirubin    1.6    Alkaline Phosphatase    235    AST (SGOT)    61    ALT (SGPT)    28    Albumin/Globulin Ratio    0.6    BUN/Creatinine Ratio 19.4  22.3  24.7  26.6    Anion Gap 7.5  9.8  7.6  9.5      [x]  Microbiology  Blood cultures no growth  Ascitic fluid culture no growth  []  Radiology  [x]  EKG/Telemetry monitor personally reviewed: NSR/sinus tachycardia  []  Cardiology/Vascular   []  Pathology  []  Old records  [x]  Other:    Intake/Output Summary (Last 24 hours) at 3/21/2024 1454  Last data filed at 3/21/2024 1242  Gross per 24 hour   Intake 2520 ml   Output 950 ml   Net 1570 ml         Assessment & Plan   Assessment / Plan     Assessment/Plan:  Metabolic/hepatic encephalopathy  Decompensated liver cirrhosis with ascites and anasarca  History of TIPS May 2023  Type 2 diabetes with hyperglycemia  Hypertensive urgency  Hypernatremia  Chronic anemia  Leukocytosis  Chronic thrombocytopenia 2/2 cirrhosis  Chronic kidney disease stage IIIb  Tobacco use disorder  Hx HTN  Hx hypothyroidism         Mentation improving. Ammonia normalized.  Having multiple bowel movements daily. Continue lactulose to 30 g two times daily.  Continue rifaximin 550 mg every 12 hours  Discontinue wrist restraints.  Fall precautions and bed alarm on  Discontinue Todd catheter.   Perform voiding trial   Blood sugar control reasonable.  Continue Levemir 10 units daily.  Continue moderate dose SSI/Accu-Cheks every 6 hours.  Goal glucose 140-180.   Creatinine improving.  Nonoliguric. Lasix held due to hypernatremia.  Sodium level downtrending.  Continue spironolactone 25 mg daily.  Trend renal function and electrolytes  Continue nadolol 20 mg daily for esophageal varices prophylaxis  Continue amlodipine 5 mg daily  Discontinue as needed Ativan  Completed high-dose IV thiamine.  Continue thiamine 100 mg daily  Continue multivitamin and folic acid  Continue Synthroid 50 mcg daily  White count downtrending.  No fevers.  All previous cultures remain negative.  Continue to monitor white count  Nutrition: Diabetic/heart healthy diet  DVT ppx: Subcu heparin 5000 units every 12 hours  GI ppx: Pepcid twice daily    Discussed plan with RN.    DVT prophylaxis:  Medical DVT prophylaxis orders are present.        CODE STATUS:   Code Status (Patient has no pulse and is not breathing): CPR (Attempt to Resuscitate)  Medical Interventions (Patient has pulse or is breathing): Full Support    Electronically signed by Grady Clifford DO, 03/21/24, 2:54 PM EDT.

## 2024-03-21 NOTE — PROGRESS NOTES
Pulmonary / Critical Care Progress Note      Patient Name: Simon Peters  : 1970  MRN: [id number]  Attending:  Grady Clifford DO   Date of admission: 3/13/2024    Subjective   Subjective   Patient critically ill with hepatic encephalopathy, altered mental status    Over the past 24H  Awake, answers some questions  Continues with confusion, doesn't know year or date  MRI was nondiagnostic    Objective   Objective     Vitals:   Vital signs for last 24 hours:  Temp:  [96.4 °F (35.8 °C)-97.6 °F (36.4 °C)] 97.4 °F (36.3 °C)  Heart Rate:  [58-78] 64  Resp:  [12-18] 14  BP: (115-171)/() 129/81    Intake/Output last 3 shifts:  I/O last 3 completed shifts:  In: 2280 [P.O.:2280]  Out: 1550 [Urine:1550]    Physical Exam     Ill-appearing, NAD  Tracking staff around room  NG tube in place  In restraints    Result Review    Result Review:  I have personally reviewed the results from the time of this admission to 3/21/2024 09:43 EDT and agree with these findings:  [x]  Laboratory  [x]  Microbiology  [x]  Radiology  []  EKG/Telemetry   []  Cardiology/Vascular   []  Pathology  [x]  Old records  []  Other:  Most notable findings include:   .    Assessment & Plan   Assessment / Plan     Active Hospital Problems:  Active Hospital Problems    Diagnosis     **Pneumonia         Impression:  Altered mental status secondary to hepatic encephalopathy  Alcoholic cirrhosis status post TIPS  Decompensated liver cirrhosis with ascites and anasarca  Type 2 diabetes with hyperglycemia  Chronic thrombocytopenia secondary to cirrhosis  Stage III CKD  Hypothyroidism  Hypertension    Plan  Mental status improving  Continue Xifaxan and Lactulose   Cortrak in place.  Dietitian on board.  Continue tube feeds  Continue Norvasc due to hypertension MRI reviewed which was nondiagnostic due to patient movement  Continue Aldactone, nadolol  Continue SSI, ROTHMAN glucose optimal  Continue thiamine multivitamin and folic acid  Continue  Synthroid  Peptic ulcer prophylaxis Pepcid  DVT prophylaxis: Subcutaneous heparin      Medical DVT prophylaxis orders are present.    CODE STATUS:   Code Status (Patient has no pulse and is not breathing): CPR (Attempt to Resuscitate)  Medical Interventions (Patient has pulse or is breathing): Full Support    Electronically signed by Rashida Sommers MD, 03/21/24, 1:30 PM EDT.  Electronically signed by KVNG Call, 03/21/24, 10:31 AM EDT.

## 2024-03-22 LAB
ALBUMIN SERPL-MCNC: 1.4 G/DL (ref 3.5–5.2)
ALBUMIN/GLOB SERPL: 0.5 G/DL
ALP SERPL-CCNC: 239 U/L (ref 39–117)
ALT SERPL W P-5'-P-CCNC: 24 U/L (ref 1–41)
ANION GAP SERPL CALCULATED.3IONS-SCNC: 10.6 MMOL/L (ref 5–15)
ANION GAP SERPL CALCULATED.3IONS-SCNC: 9.2 MMOL/L (ref 5–15)
AST SERPL-CCNC: 52 U/L (ref 1–40)
BILIRUB SERPL-MCNC: 1.2 MG/DL (ref 0–1.2)
BUN SERPL-MCNC: 57 MG/DL (ref 6–20)
BUN SERPL-MCNC: 60 MG/DL (ref 6–20)
BUN/CREAT SERPL: 24.9 (ref 7–25)
BUN/CREAT SERPL: 25.9 (ref 7–25)
CALCIUM SPEC-SCNC: 7.5 MG/DL (ref 8.6–10.5)
CALCIUM SPEC-SCNC: 7.5 MG/DL (ref 8.6–10.5)
CHLORIDE SERPL-SCNC: 105 MMOL/L (ref 98–107)
CHLORIDE SERPL-SCNC: 106 MMOL/L (ref 98–107)
CO2 SERPL-SCNC: 17.4 MMOL/L (ref 22–29)
CO2 SERPL-SCNC: 18.8 MMOL/L (ref 22–29)
CREAT SERPL-MCNC: 2.29 MG/DL (ref 0.76–1.27)
CREAT SERPL-MCNC: 2.32 MG/DL (ref 0.76–1.27)
DEPRECATED RDW RBC AUTO: 55.3 FL (ref 37–54)
EGFRCR SERPLBLD CKD-EPI 2021: 32.8 ML/MIN/1.73
EGFRCR SERPLBLD CKD-EPI 2021: 33.3 ML/MIN/1.73
ERYTHROCYTE [DISTWIDTH] IN BLOOD BY AUTOMATED COUNT: 16.9 % (ref 12.3–15.4)
GLOBULIN UR ELPH-MCNC: 2.9 GM/DL
GLUCOSE BLDC GLUCOMTR-MCNC: 187 MG/DL (ref 70–99)
GLUCOSE BLDC GLUCOMTR-MCNC: 211 MG/DL (ref 70–99)
GLUCOSE BLDC GLUCOMTR-MCNC: 227 MG/DL (ref 70–99)
GLUCOSE BLDC GLUCOMTR-MCNC: 250 MG/DL (ref 70–99)
GLUCOSE BLDC GLUCOMTR-MCNC: 546 MG/DL (ref 70–99)
GLUCOSE BLDC GLUCOMTR-MCNC: 579 MG/DL (ref 70–99)
GLUCOSE SERPL-MCNC: 252 MG/DL (ref 65–99)
GLUCOSE SERPL-MCNC: 284 MG/DL (ref 65–99)
HCT VFR BLD AUTO: 28.9 % (ref 37.5–51)
HGB BLD-MCNC: 9.3 G/DL (ref 13–17.7)
MCH RBC QN AUTO: 30 PG (ref 26.6–33)
MCHC RBC AUTO-ENTMCNC: 32.2 G/DL (ref 31.5–35.7)
MCV RBC AUTO: 93.2 FL (ref 79–97)
PLATELET # BLD AUTO: 135 10*3/MM3 (ref 140–450)
PMV BLD AUTO: 10.7 FL (ref 6–12)
POTASSIUM SERPL-SCNC: 4.3 MMOL/L (ref 3.5–5.2)
POTASSIUM SERPL-SCNC: 4.6 MMOL/L (ref 3.5–5.2)
PROT SERPL-MCNC: 4.3 G/DL (ref 6–8.5)
RBC # BLD AUTO: 3.1 10*6/MM3 (ref 4.14–5.8)
SODIUM SERPL-SCNC: 133 MMOL/L (ref 136–145)
SODIUM SERPL-SCNC: 134 MMOL/L (ref 136–145)
WBC NRBC COR # BLD AUTO: 11.08 10*3/MM3 (ref 3.4–10.8)

## 2024-03-22 PROCEDURE — 25010000002 HEPARIN (PORCINE) PER 1000 UNITS: Performed by: FAMILY MEDICINE

## 2024-03-22 PROCEDURE — 99232 SBSQ HOSP IP/OBS MODERATE 35: CPT | Performed by: INTERNAL MEDICINE

## 2024-03-22 PROCEDURE — 92526 ORAL FUNCTION THERAPY: CPT

## 2024-03-22 PROCEDURE — 97161 PT EVAL LOW COMPLEX 20 MIN: CPT

## 2024-03-22 PROCEDURE — 63710000001 INSULIN DETEMIR PER 5 UNITS: Performed by: INTERNAL MEDICINE

## 2024-03-22 PROCEDURE — 85027 COMPLETE CBC AUTOMATED: CPT | Performed by: INTERNAL MEDICINE

## 2024-03-22 PROCEDURE — 80053 COMPREHEN METABOLIC PANEL: CPT | Performed by: INTERNAL MEDICINE

## 2024-03-22 PROCEDURE — 82948 REAGENT STRIP/BLOOD GLUCOSE: CPT

## 2024-03-22 PROCEDURE — 63710000001 INSULIN REGULAR HUMAN PER 5 UNITS: Performed by: NURSE PRACTITIONER

## 2024-03-22 PROCEDURE — 82948 REAGENT STRIP/BLOOD GLUCOSE: CPT | Performed by: INTERNAL MEDICINE

## 2024-03-22 RX ADMIN — LEVOTHYROXINE SODIUM 50 MCG: 50 TABLET ORAL at 05:32

## 2024-03-22 RX ADMIN — INSULIN HUMAN 4 UNITS: 100 INJECTION, SOLUTION PARENTERAL at 12:34

## 2024-03-22 RX ADMIN — Medication 100 MG: at 08:39

## 2024-03-22 RX ADMIN — INSULIN HUMAN 4 UNITS: 100 INJECTION, SOLUTION PARENTERAL at 00:26

## 2024-03-22 RX ADMIN — INSULIN HUMAN 6 UNITS: 100 INJECTION, SOLUTION PARENTERAL at 05:32

## 2024-03-22 RX ADMIN — HEPARIN SODIUM 5000 UNITS: 5000 INJECTION INTRAVENOUS; SUBCUTANEOUS at 08:40

## 2024-03-22 RX ADMIN — Medication 10 ML: at 21:48

## 2024-03-22 RX ADMIN — INSULIN HUMAN 9 UNITS: 100 INJECTION, SOLUTION PARENTERAL at 17:34

## 2024-03-22 RX ADMIN — HEPARIN SODIUM 5000 UNITS: 5000 INJECTION INTRAVENOUS; SUBCUTANEOUS at 21:48

## 2024-03-22 RX ADMIN — Medication 1 TABLET: at 08:39

## 2024-03-22 RX ADMIN — SPIRONOLACTONE 25 MG: 25 TABLET ORAL at 08:39

## 2024-03-22 RX ADMIN — FOLIC ACID 1 MG: 1 TABLET ORAL at 08:40

## 2024-03-22 RX ADMIN — LACTULOSE 30 G: 20 SOLUTION ORAL at 21:48

## 2024-03-22 RX ADMIN — RIFAXIMIN 550 MG: 550 TABLET ORAL at 21:48

## 2024-03-22 RX ADMIN — FLUOXETINE HYDROCHLORIDE 40 MG: 20 CAPSULE ORAL at 08:40

## 2024-03-22 RX ADMIN — Medication 10 ML: at 08:50

## 2024-03-22 RX ADMIN — Medication 10 ML: at 08:49

## 2024-03-22 RX ADMIN — LACTULOSE 30 G: 20 SOLUTION ORAL at 08:40

## 2024-03-22 RX ADMIN — INSULIN DETEMIR 10 UNITS: 100 INJECTION, SOLUTION SUBCUTANEOUS at 08:41

## 2024-03-22 RX ADMIN — AMLODIPINE BESYLATE 5 MG: 5 TABLET ORAL at 08:39

## 2024-03-22 RX ADMIN — INSULIN DETEMIR 10 UNITS: 100 INJECTION, SOLUTION SUBCUTANEOUS at 21:48

## 2024-03-22 RX ADMIN — NADOLOL 20 MG: 20 TABLET ORAL at 08:40

## 2024-03-22 RX ADMIN — RIFAXIMIN 550 MG: 550 TABLET ORAL at 08:40

## 2024-03-22 RX ADMIN — FAMOTIDINE 20 MG: 20 TABLET ORAL at 08:39

## 2024-03-22 NOTE — PROGRESS NOTES
[facility]   Hospitalist Progress Note  Date: 3/22/2024  Patient Name: Simon Peters  : 1970  MRN: [id number]  Date of admission: 3/13/2024      Subjective   Subjective     Chief Complaint: Follow up for altered mental status    Summary: 53-year-old male with history of alcoholic cirrhosis with history of TIPS and recurrent hepatic encephalopathy, type 2 diabetes, hypertension, CKD stage III who presented with altered mental status after being found down by family.  WBC WNL.  UA benign.  Pro-Alfredo 0.43.  CT head reported pneumocephalus and cavernous sinus as well as ophthalmic vein.  Neurosurgery consulted; no intervention needed at this time.  Ammonia 138.  Initially admitted to telemetry bed but developed increased agitation, requiring IV Ativan and transferred to ICU.  Underwent paracentesis, 1.6 L removed, fluid studies negative for SBP.  Cultures no growth to date.    Interval Followup:   No major events overnight  Vital stable  Voided following martin  removal  No abdominal pain  Tolerating oral intakes  Got up to chair with PT    Review of Systems  Respiratory:  No Cough, No Dyspnea  Gastrointestinal:  No Nausea, No Vomiting, No abdominal pain        Objective   Objective     Vitals:   Temp:  [97.2 °F (36.2 °C)-98.2 °F (36.8 °C)] 97.3 °F (36.3 °C)  Heart Rate:  [60-67] 63  Resp:  [12-16] 16  BP: (107-122)/(55-71) 118/67  Physical Exam    Constitutional: Resting in bed,  NAD   HENT: NCAT   Respiratory: Clear, nonlabored   Cardiovascular: RRR, no murmurs, no edema   Gastrointestinal: Nondistended, soft   Neurologic: Alert and oriented x 3, moving all extremities, speech clear   Skin: Extremities warm, no rashes    Result Review    Result Review:  I have personally reviewed the following over the last 24 hours (07:00 to 07:00) and agree with the following findings  [x]  Laboratory  CBC          3/20/2024    02:54 3/21/2024    03:01 3/22/2024    04:06   CBC   WBC 16.08  14.24  11.08    RBC 3.36  3.24   3.10    Hemoglobin 10.0  9.7  9.3    Hematocrit 30.5  30.0  28.9    MCV 90.8  92.6  93.2    MCH 29.8  29.9  30.0    MCHC 32.8  32.3  32.2    RDW 16.4  17.2  16.9    Platelets 155  138  135      CMP          3/20/2024    02:54 3/20/2024    15:46 3/21/2024    03:01 3/22/2024    04:06   CMP   Glucose 237  219  166  252    BUN 50  53  53  57    Creatinine 2.24  2.15  1.99  2.29    EGFR 34.2  35.9  39.4  33.3    Sodium 150  150  146  134    Potassium 4.6  4.4  4.4  4.3    Chloride 120  122  117  106    Calcium 8.9  8.8  8.4  7.5    Total Protein   5.3  4.3    Albumin  1.9  1.9  1.4    Globulin   3.4  2.9    Total Bilirubin   1.6  1.2    Alkaline Phosphatase   235  239    AST (SGOT)   61  52    ALT (SGPT)   28  24    Albumin/Globulin Ratio   0.6  0.5    BUN/Creatinine Ratio 22.3  24.7  26.6  24.9    Anion Gap 9.8  7.6  9.5  10.6      [x]  Microbiology  Blood cultures no growth  Ascitic fluid culture no growth  []  Radiology  [x]  EKG/Telemetry monitor personally reviewed: NSR/sinus tachycardia  []  Cardiology/Vascular   []  Pathology  []  Old records  [x]  Other:    Intake/Output Summary (Last 24 hours) at 3/22/2024 1518  Last data filed at 3/22/2024 1000  Gross per 24 hour   Intake 360 ml   Output 200 ml   Net 160 ml         Assessment & Plan   Assessment / Plan     Assessment/Plan:  Metabolic/hepatic encephalopathy  Decompensated liver cirrhosis with ascites and anasarca  History of TIPS May 2023  Type 2 diabetes with hyperglycemia  Hypertensive urgency  Hypernatremia  Chronic anemia  Leukocytosis  Chronic thrombocytopenia 2/2 cirrhosis  Chronic kidney disease stage IIIb  Tobacco use disorder  Hx HTN  Hx hypothyroidism         Mentation much improved.  Out of wrist restraints and following commands.  Got up to chair with physical therapy.  Avoid any sedating medication/narcotics  Continue lactulose 30 g two times daily.  Continue rifaximin 550 mg every 12 hours  Increase Levemir to 10 units q12 hours.  Continue moderate  dose SSI/Accu-Cheks every 6 hours.  Goal glucose 140-180.   Creatinine increasing but EGFR near baseline. Strict I's and O's not accurately recorded. Lasix had been held due to hypernatremia.  Sodium now 134.  Appears euvolemic we will continue to hold Lasix continue spironolactone 25 mg daily.  Trend renal function and electrolytes  Continue nadolol 20 mg daily for esophageal varices prophylaxis  Continue amlodipine 5 mg daily  Completed high-dose IV thiamine.  Continue thiamine 100 mg daily  Continue multivitamin and folic acid  Continue Synthroid 50 mcg daily  White count downtrending.  No fevers.  All previous cultures remain negative.  Continue to monitor white count  Got up to chair with PT assistance  Passed voiding trial  Nutrition: Diabetic/heart healthy diet  DVT ppx: Subcu heparin 5000 units every 12 hours  GI ppx: Pepcid twice daily  Discontinue telemetry.  Transfer to [facility]    Discussed plan with RN.    DVT prophylaxis:  Medical DVT prophylaxis orders are present.        CODE STATUS:   Code Status (Patient has no pulse and is not breathing): CPR (Attempt to Resuscitate)  Medical Interventions (Patient has pulse or is breathing): Full Support    Electronically signed by Grady Clifford DO, 03/22/24, 3:18 PM EDT.

## 2024-03-22 NOTE — THERAPY EVALUATION
Acute Care - Physical Therapy Initial Evaluation   [facility]     Patient Name: Simon Peters  : 1970  MRN: [id number]  Today's Date: 3/22/2024      Visit Dx:     ICD-10-CM ICD-9-CM   1. Hepatic encephalopathy  K76.82 572.2   2. Oropharyngeal dysphagia  R13.12 787.22   3. Difficulty walking  R26.2 719.7     Patient Active Problem List   Diagnosis    Hypertension    Anxiety    Elevated serum glucose    Diabetes mellitus    Elevated liver enzymes    Encounter for screening for malignant neoplasm of colon    Weakness    Pneumonia     Past Medical History:   Diagnosis Date    Abnormal LFTs     Anxiety     Back pain     Diabetes mellitus     Hypertension     Kidney failure     Liver failure     Rash      Past Surgical History:   Procedure Laterality Date    TIPS PROCEDURE       PT Assessment (Last 12 Hours)       PT Evaluation and Treatment       Row Name 24 1200          Physical Therapy Time and Intention    Subjective Information no complaints  -DP     Document Type evaluation  -DP     Mode of Treatment individual therapy;physical therapy  -DP     Patient Effort good  -DP       Row Name 24 1200          General Information    Patient Profile Reviewed yes  -DP     Patient Observations alert;cooperative  -DP     Prior Level of Function independent:;gait;transfer;bed mobility;ADL's  -DP     Equipment Currently Used at Home none  -DP     Existing Precautions/Restrictions fall  -DP     Barriers to Rehab none identified  -DP       Row Name 24 1200          Living Environment    Current Living Arrangements home  -DP     Home Accessibility stairs to enter home  -DP     People in Home alone  -DP     Primary Care Provided by self  -DP       Row Name 24 1200          Home Main Entrance    Number of Stairs, Main Entrance three  -DP       Row Name 24 1200          Cognition    Orientation Status (Cognition) oriented to;person;place  -DP     Follows Commands (Cognition) follows one-step commands   -DP       Row Name 03/22/24 1200          Range of Motion (ROM)    Range of Motion bilateral lower extremities;ROM is WFL  -DP       Row Name 03/22/24 1200          Strength Comprehensive (MMT)    General Manual Muscle Testing (MMT) Assessment lower extremity strength deficits identified  -DP     Comment, General Manual Muscle Testing (MMT) Assessment BLE: 4-/5  -DP       Row Name 03/22/24 1200          Bed Mobility    Bed Mobility supine-sit-supine  -DP     Supine-Sit-Supine [place] (Bed Mobility) minimum assist (75% patient effort)  -DP       Row Name 03/22/24 1200          Transfers    Transfers sit-stand transfer  -DP       Row Name 03/22/24 1200          Sit-Stand Transfer    Sit-Stand [place] (Transfers) minimum assist (75% patient effort)  -DP     Comment, (Sit-Stand Transfer) Pt tends to fall backwards  -DP       Row Name 03/22/24 1200          Gait/Stairs (Locomotion)    Gait/Stairs Locomotion gait/ambulation assistive device  -DP     [place] Level (Gait) contact guard  -DP     Assistive Device (Gait) walker, front-wheeled  -DP     Patient was able to Ambulate yes  -DP     Distance in Feet (Gait) 60  x 2  -DP       Row Name 03/22/24 1200          Balance    Balance Assessment standing dynamic balance  -DP     Dynamic Standing Balance minimal assist  -DP     Position/Device Used, Standing Balance supported;walker, rolling  -DP       Row Name 03/22/24 1200          Plan of Care Review    Plan of Care Reviewed With patient  -DP     Outcome Evaluation Pt presents with decreased strength, transfers and functional mobility. Will benefit from inpatient PT services and continued PT services upon discharge.  -DP       Row Name 03/22/24 1200          Therapy Assessment/Plan (PT)    Rehab Potential (PT) good, to achieve stated therapy goals  -DP     Criteria for Skilled Interventions Met (PT) yes;meets criteria  -DP     Therapy Frequency (PT) daily  -DP     Predicted Duration of Therapy Intervention  (PT) 10 days  -DP     Problem List (PT) problems related to;mobility  -DP     Activity Limitations Related to Problem List (PT) unable to transfer safely;unable to ambulate safely  -DP       Row Name 03/22/24 1200          PT Evaluation Complexity    History, PT Evaluation Complexity no personal factors and/or comorbidities  -DP     Examination of Body Systems (PT Eval Complexity) total of 4 or more elements  -DP     Clinical Presentation (PT Evaluation Complexity) stable  -DP     Clinical Decision Making (PT Evaluation Complexity) low complexity  -DP     Overall Complexity (PT Evaluation Complexity) low complexity  -DP       Row Name 03/22/24 1200          Physical Therapy Goals    Transfer Goal Selection (PT) transfer, PT goal 1  -DP     Gait Training Goal Selection (PT) gait training, PT goal 1  -DP       Row Name 03/22/24 1200          Transfer Goal 1 (PT)    Activity/Assistive Device (Transfer Goal 1, PT) sit-to-stand/stand-to-sit  -DP     [place] Level/Cues Needed (Transfer Goal 1, PT) supervision required  -DP     Time Frame (Transfer Goal 1, PT) 10 days  -DP       Row Name 03/22/24 1200          Gait Training Goal 1 (PT)    Activity/Assistive Device (Gait Training Goal 1, PT) assistive device use;walker, rolling  -DP     [place] Level (Gait Training Goal 1, PT) supervision required  -DP     Distance (Gait Training Goal 1, PT) 200  -DP     Time Frame (Gait Training Goal 1, PT) 10 days  -DP               User Key  (r) = Recorded By, (t) = Taken By, (c) = Cosigned By      Initials Name Provider Type    DP Alla Woo, PT Physical Therapist                      PT Recommendation and Plan  Anticipated Discharge Disposition (PT): sub acute care setting  Planned Therapy Interventions (PT): balance training, bed mobility training, gait training, strengthening, transfer training  Therapy Frequency (PT): daily  Plan of Care Reviewed With: patient  Outcome Evaluation: Pt presents with decreased strength,  transfers and functional mobility. Will benefit from inpatient PT services and continued PT services upon discharge.   Outcome Measures       Row Name 03/22/24 1200             How much help from another person do you currently need...    Turning from your back to your side while in flat bed without using bedrails? 3  -DP      Moving from lying on back to sitting on the side of a flat bed without bedrails? 3  -DP      Moving to and from a bed to a chair (including a wheelchair)? 3  -DP      Standing up from a chair using your arms (e.g., wheelchair, bedside chair)? 3  -DP      Climbing 3-5 steps with a railing? 2  -DP      To walk in hospital room? 3  -DP      AM-PAC 6 Clicks Score (PT) 17  -DP      Highest Level of Mobility Goal 5 --> Static standing  -DP         Functional Assessment    Outcome Measure Options AM-PAC 6 Clicks Basic Mobility (PT)  -DP                User Key  (r) = Recorded By, (t) = Taken By, (c) = Cosigned By      Initials Name Provider Type    Alla Serna, PT Physical Therapist                     Time Calculation:    PT Charges       Row Name 03/22/24 1207             Time Calculation    PT Received On 03/22/24  -DP      PT Goal Re-Cert Due Date 03/31/24  -DP         Untimed Charges    PT Eval/Re-eval Minutes 40  -DP         Total Minutes    Untimed Charges Total Minutes 40  -DP       Total Minutes 40  -DP                User Key  (r) = Recorded By, (t) = Taken By, (c) = Cosigned By      Initials Name Provider Type    Alla Serna, PT Physical Therapist                      PT G-Codes  Outcome Measure Options: AM-PAC 6 Clicks Basic Mobility (PT)  AM-PAC 6 Clicks Score (PT): 17    Alla Woo, PT  3/22/2024

## 2024-03-22 NOTE — PROGRESS NOTES
Pulmonary / Critical Care Progress Note      Patient Name: Simon Peters  : 1970  MRN: [id number]  Attending:  Grady Clifford DO   Date of admission: 3/13/2024    Subjective   Subjective   Follow-up for hepatic encephalopathy, altered mental status    Over the past 24H, transferred out of ICU.  Core track removed.  Continues on Xifaxan and lactulose.    No acute events overnight.    This morning,  Sitting up on side of bed  Eating breakfast.  Tolerating diet.  Remains on room air  Alert and oriented  Answering questions appropriately  Denies dyspnea or cough  No fever or chills      Objective   Objective     Vitals:   Vital signs for last 24 hours:  Temp:  [97.2 °F (36.2 °C)-98.1 °F (36.7 °C)] 98.1 °F (36.7 °C)  Heart Rate:  [60-67] 65  Resp:  [12-16] 14  BP: (107-145)/(55-80) 113/55      Physical Exam   Vital Signs Reviewed   General: WDWN, Alert, NAD, sitting up on side of bed.    Chest:  good aeration, clear to auscultation bilaterally, no work of breathing noted on room air  CV: regular rate and rhythm regular  EXT:  no clubbing, no cyanosis, no edema  Neuro:  A&Ox3, moving all 4 extremities spontaneously  Skin: No rashes or lesions noted    Result Review    Result Review:  I have personally reviewed the results from the time of this admission to 3/22/2024 06:59 EDT and agree with these findings:  [x]  Laboratory  [x]  Microbiology  [x]  Radiology  []  EKG/Telemetry   []  Cardiology/Vascular   []  Pathology  [x]  Old records  []  Other:  Most notable findings include:         Lab 24  0406 24  0301 24  1546 24  0254 24  0254 24  0322 24  0328 24  0258   WBC 11.08* 14.24*  --  16.08* 14.30*  --  9.65 8.01   HEMOGLOBIN 9.3* 9.7*  --  10.0* 9.7*  --  9.7* 9.0*   HEMATOCRIT 28.9* 30.0*  --  30.5* 29.0*  --  30.0* 26.9*   PLATELETS 135* 138*  --  155 130*  --  133* 113*   SODIUM 134* 146* 150* 150* 149* 145 144 141   POTASSIUM 4.3 4.4 4.4 4.6 4.5 4.5 4.2 4.7    CHLORIDE 106 117* 122* 120* 121* 119* 117* 117*   CO2 17.4* 19.5* 20.4* 20.2* 20.5* 16.6* 18.5* 17.3*   BUN 57* 53* 53* 50* 40* 33* 27* 34*   CREATININE 2.29* 1.99* 2.15* 2.24* 2.06* 2.21* 2.04* 2.10*   GLUCOSE 252* 166* 219* 237* 200* 259* 148* 243*   CALCIUM 7.5* 8.4* 8.8 8.9 9.1 8.6 8.4* 7.9*   PHOSPHORUS  --  3.7 2.8 3.1 3.3 3.1 3.3 3.0   TOTAL PROTEIN 4.3* 5.3*  --   --   --  5.1* 5.2* 4.4*   ALBUMIN 1.4* 1.9* 1.9*  --   --  1.8* 1.9* 1.6*   GLOBULIN 2.9 3.4  --   --   --  3.3 3.3 2.8     Assessment & Plan   Assessment / Plan     Active Hospital Problems:  Active Hospital Problems    Diagnosis     **Pneumonia       Impression:    Altered mental status secondary to hepatic encephalopathy  Alcoholic cirrhosis status post TIPS  Decompensated liver cirrhosis with ascites and anasarca  Type 2 diabetes with hyperglycemia  Chronic thrombocytopenia secondary to cirrhosis  Stage III CKD  Hypothyroidism  Hypertension    Plan  Mentation is improving.  Continue rifaximin and lactulose.  Cortrak is discontinued and he is taking oral diet.  Continue Norvasc.  Continue with Aldactone and nadolol.  Continue sliding scale insulin.  Multivitamin thiamine and folate.    Unless otherwise needed, pulmonary will sign off at this time. Please call with any questions or concerns.      Medical DVT prophylaxis orders are present.    CODE STATUS:   Code Status (Patient has no pulse and is not breathing): CPR (Attempt to Resuscitate)  Medical Interventions (Patient has pulse or is breathing): Full Support    I have reviewed labs, imaging, pertinent clinical data and provider notes.   I have discussed with bedside nurse and primary service.     Electronically signed by Ed Alvares MD, 03/22/24, 7:00 AM EDT.  Electronically signed by ANTOINETTE Ron, 03/22/24, 2:16 PM EDT.    This visit was performed by BOTH a physician and an APC. I personally evaluated and examined the patient. I performed all aspects of MDM as documented. , I  have reviewed and confirmed the accuracy of the patient's history as documented in this note., and I have reexamined the patient and the results are consistent with the previously documented exam. I have updated the documentation as necessary.     Electronically signed by Ed Alvares MD, 03/22/24, 3:31 PM EDT.

## 2024-03-22 NOTE — PLAN OF CARE
Goal Outcome Evaluation:  Plan of Care Reviewed With: patient        Progress: improving  Outcome Evaluation: VSS, blood glucose covered per order, tolerating po, multiple bm's, restless but cooperative, haldol given times 1

## 2024-03-22 NOTE — PLAN OF CARE
Goal Outcome Evaluation:  Plan of Care Reviewed With: patient   Pt has been more alert and oriented to person and place. Up to the chair with PT this shift. Blood glucose 579-546 at dinner time stat BMP obtained per MD order Glucose on lab is 284,MD stated he will change the patients insulin regimen.Pt is off of the cardiac monitor per MD orders and will transfer to room 4024, Report called to 4NT

## 2024-03-22 NOTE — PLAN OF CARE
Goal Outcome Evaluation:  Plan of Care Reviewed With: patient           Outcome Evaluation: Pt presents with decreased strength, transfers and functional mobility. Will benefit from inpatient PT services and continued PT services upon discharge.      Anticipated Discharge Disposition (PT): sub acute care setting

## 2024-03-22 NOTE — SIGNIFICANT NOTE
Wound Eval / Progress Noted    [facility]     Patient Name: Simon Peters  : 1970  MRN: [id number]  Today's Date: 3/22/2024                 Admit Date: 3/13/2024    Visit Dx:    ICD-10-CM ICD-9-CM   1. Hepatic encephalopathy  K76.82 572.2   2. Oropharyngeal dysphagia  R13.12 787.22   3. Difficulty walking  R26.2 719.7         Pneumonia        Past Medical History:   Diagnosis Date    Abnormal LFTs     Anxiety     Back pain     Diabetes mellitus     Hypertension     Kidney failure     Liver failure     Rash         Past Surgical History:   Procedure Laterality Date    TIPS PROCEDURE           Physical Assessment:         24 1005   Skin   Skin WDL X;characteristics;color   Skin Color/Characteristics shiny   Skin Temperature warm   Skin Moisture moist   Skin Integrity other (see comments)  (moisture and skin irritatation to perineum, posterior penis)       Wound Check / Follow-up:  Patient seen today for wound follow-up. Patient is lying in bed, awake, alert but disoriented. He is restless and minimally agitated. He is requesting to get up out of bed to go to the bathroom for a BM, however Primary RN states patient has not been steady on his feet, therefore bedpan is recommended.    Patient placed on Bedpan and positioned for bowel movement. During positioning, maceration, redness and irritation noted to perineum and posterior scrotum. Penile irritation previously noted is resolved, however improved hygiene is needed to area. Recommending to implement skin protection and skin hygiene regimen    Impression: Skin irritation, maceration to perineum and posterior scrotum    Short term goals:  Regain skin integrity. Topical treatments and skin care.     Dara Wallace RN    3/22/2024    13:17 EDT

## 2024-03-22 NOTE — NURSING NOTE
Checked patients blood sugar it was 579, rechecked and it was 546. Notified Dr Clifford.  New order received for stat BMP.Basic Metabolic Panel, Glucose resulted at 284. MD stated that he will be making a change to the patients insulin regimen.

## 2024-03-22 NOTE — THERAPY TREATMENT NOTE
Acute Care - Speech Language Pathology   Swallow Treatment Note [facility]     Patient Name: Simon Peters  : 1970  MRN: [id number]  Today's Date: 3/22/2024               Admit Date: 3/13/2024    Visit Dx:     ICD-10-CM ICD-9-CM   1. Hepatic encephalopathy  K76.82 572.2   2. Oropharyngeal dysphagia  R13.12 787.22     Patient Active Problem List   Diagnosis    Hypertension    Anxiety    Elevated serum glucose    Diabetes mellitus    Elevated liver enzymes    Encounter for screening for malignant neoplasm of colon    Weakness    Pneumonia     Past Medical History:   Diagnosis Date    Abnormal LFTs     Anxiety     Back pain     Diabetes mellitus     Hypertension     Kidney failure     Liver failure     Rash      Past Surgical History:   Procedure Laterality Date    TIPS PROCEDURE         SPEECH PATHOLOGY DYSPHAGIA TREATMENT     Subjective/Behavioral Observations: Awake, alert, cooperative.  Sitting up in bed feeding self     Day/time of Treatment: 3/22/2024        Current Diet: Regular solids, thin liquids     Current Strategies: Assist with set up, patient to feed self.  Alternate small bites and small sips at a slow rate.        Treatment received: Dysphagia therapy to address swallow function through exercises and education of strategies.        Results of treatment: Patient with p.o. intake of 80% of a.m. meal.  Patient taking sips of of thin liquids with no overt clinical signs or symptoms of aspiration.  Patient took medications all at once whole with water.    progress toward goals: Adequate        Barriers to Achieving goals: Medical status        Plan of care:/changes in plan: Discharge direct speech pathology services.  Patient demonstrating functional swallow.  Continue with current diet with regular and thin.  Patient to be sitting fully upright for all p.o. intake.                                                                                    Plan of Care Reviewed With:  patient          EDUCATION  The patient has been educated in the following areas:   Modified Diet Instruction.              Time Calculation:    Time Calculation- SLP       Row Name 03/22/24 0952             Time Calculation- SLP    SLP Stop Time 0900  -TB      SLP Received On 03/22/24  -TB         Untimed Charges    64146-TN Treatment Swallow Minutes 40  -TB         Total Minutes    Untimed Charges Total Minutes 40  -TB       Total Minutes 40  -TB                User Key  (r) = Recorded By, (t) = Taken By, (c) = Cosigned By      Initials Name Provider Type    TB Anne Marie Isaac SLP Speech and Language Pathologist                    Therapy Charges for Today       Code Description Service Date Service Provider Modifiers Qty    [id number] HC ST TREATMENT SWALLOW 3 3/22/2024 Anne Marie Isaac SLP GN 1                 NUHA Sapp  3/22/2024

## 2024-03-23 ENCOUNTER — HOSPITAL ENCOUNTER (EMERGENCY)
Facility: HOSPITAL | Age: 54
Discharge: HOME | End: 2024-03-23
Attending: EMERGENCY MEDICINE
Payer: COMMERCIAL

## 2024-03-23 ENCOUNTER — READMISSION MANAGEMENT (OUTPATIENT)
Dept: CALL CENTER | Facility: HOSPITAL | Age: 54
End: 2024-03-23
Payer: COMMERCIAL

## 2024-03-23 VITALS
BODY MASS INDEX: 31.41 KG/M2 | HEART RATE: 65 BPM | TEMPERATURE: 97.9 F | HEIGHT: 69 IN | DIASTOLIC BLOOD PRESSURE: 75 MMHG | WEIGHT: 212.08 LBS | SYSTOLIC BLOOD PRESSURE: 130 MMHG | RESPIRATION RATE: 18 BRPM | OXYGEN SATURATION: 100 %

## 2024-03-23 VITALS
HEART RATE: 66 BPM | HEIGHT: 69 IN | RESPIRATION RATE: 18 BRPM | WEIGHT: 214.73 LBS | DIASTOLIC BLOOD PRESSURE: 88 MMHG | SYSTOLIC BLOOD PRESSURE: 145 MMHG | BODY MASS INDEX: 31.8 KG/M2 | TEMPERATURE: 97.6 F | OXYGEN SATURATION: 100 %
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
## 2024-03-23 LAB
ALBUMIN SERPL-MCNC: 1.8 G/DL (ref 3.5–5.2)
ALBUMIN/GLOB SERPL: 0.6 G/DL
ALP SERPL-CCNC: 275 U/L (ref 39–117)
ALT SERPL W P-5'-P-CCNC: 29 U/L (ref 1–41)
ANION GAP SERPL CALCULATED.3IONS-SCNC: 8.1 MMOL/L (ref 5–15)
AST SERPL-CCNC: 65 U/L (ref 1–40)
BILIRUB SERPL-MCNC: 1.2 MG/DL (ref 0–1.2)
BUN SERPL-MCNC: 57 MG/DL (ref 6–20)
BUN/CREAT SERPL: 24.5 (ref 7–25)
CALCIUM SPEC-SCNC: 7.9 MG/DL (ref 8.6–10.5)
CHLORIDE SERPL-SCNC: 109 MMOL/L (ref 98–107)
CO2 SERPL-SCNC: 15.9 MMOL/L (ref 22–29)
CREAT SERPL-MCNC: 2.33 MG/DL (ref 0.76–1.27)
DEPRECATED RDW RBC AUTO: 53.1 FL (ref 37–54)
EGFRCR SERPLBLD CKD-EPI 2021: 32.6 ML/MIN/1.73
ERYTHROCYTE [DISTWIDTH] IN BLOOD BY AUTOMATED COUNT: 17.2 % (ref 12.3–15.4)
GLOBULIN UR ELPH-MCNC: 3 GM/DL
GLUCOSE BLDC GLUCOMTR-MCNC: 229 MG/DL (ref 70–99)
GLUCOSE BLDC GLUCOMTR-MCNC: 291 MG/DL (ref 70–99)
GLUCOSE SERPL-MCNC: 243 MG/DL (ref 65–99)
HCT VFR BLD AUTO: 27.5 % (ref 37.5–51)
HGB BLD-MCNC: 9.1 G/DL (ref 13–17.7)
MCH RBC QN AUTO: 30 PG (ref 26.6–33)
MCHC RBC AUTO-ENTMCNC: 33.1 G/DL (ref 31.5–35.7)
MCV RBC AUTO: 90.8 FL (ref 79–97)
PLATELET # BLD AUTO: 118 10*3/MM3 (ref 140–450)
PMV BLD AUTO: 11.1 FL (ref 6–12)
POTASSIUM SERPL-SCNC: 5 MMOL/L (ref 3.5–5.2)
PROT SERPL-MCNC: 4.8 G/DL (ref 6–8.5)
RBC # BLD AUTO: 3.03 10*6/MM3 (ref 4.14–5.8)
SODIUM SERPL-SCNC: 133 MMOL/L (ref 136–145)
WBC NRBC COR # BLD AUTO: 11.8 10*3/MM3 (ref 3.4–10.8)

## 2024-03-23 PROCEDURE — 99232 SBSQ HOSP IP/OBS MODERATE 35: CPT | Performed by: INTERNAL MEDICINE

## 2024-03-23 PROCEDURE — 63710000001 INSULIN REGULAR HUMAN PER 5 UNITS: Performed by: NURSE PRACTITIONER

## 2024-03-23 PROCEDURE — 80053 COMPREHEN METABOLIC PANEL: CPT | Performed by: INTERNAL MEDICINE

## 2024-03-23 PROCEDURE — 82948 REAGENT STRIP/BLOOD GLUCOSE: CPT | Performed by: INTERNAL MEDICINE

## 2024-03-23 PROCEDURE — 99282 EMERGENCY DEPT VISIT SF MDM: CPT

## 2024-03-23 PROCEDURE — 63710000001 INSULIN DETEMIR PER 5 UNITS: Performed by: INTERNAL MEDICINE

## 2024-03-23 PROCEDURE — 85027 COMPLETE CBC AUTOMATED: CPT | Performed by: INTERNAL MEDICINE

## 2024-03-23 PROCEDURE — 25010000002 HEPARIN (PORCINE) PER 1000 UNITS: Performed by: FAMILY MEDICINE

## 2024-03-23 RX ORDER — GLIPIZIDE 5 MG/1
5 TABLET ORAL
Qty: 60 TABLET | Refills: 0 | Status: SHIPPED | OUTPATIENT
Start: 2024-03-23 | End: 2024-04-22

## 2024-03-23 RX ORDER — LACTULOSE 10 G/15ML
20 SOLUTION ORAL 3 TIMES DAILY
Qty: 946 ML | Refills: 2 | Status: SHIPPED | OUTPATIENT
Start: 2024-03-23 | End: 2024-04-24

## 2024-03-23 RX ORDER — CHOLECALCIFEROL (VITAMIN D3) 125 MCG
10 CAPSULE ORAL NIGHTLY PRN
Status: DISCONTINUED | OUTPATIENT
Start: 2024-03-23 | End: 2024-03-23 | Stop reason: HOSPADM

## 2024-03-23 RX ORDER — FUROSEMIDE 40 MG/1
40 TABLET ORAL DAILY
Status: DISCONTINUED | OUTPATIENT
Start: 2024-03-23 | End: 2024-03-23 | Stop reason: HOSPADM

## 2024-03-23 RX ORDER — LEVOTHYROXINE SODIUM 0.05 MG/1
50 TABLET ORAL
Qty: 30 TABLET | Refills: 0 | Status: SHIPPED | OUTPATIENT
Start: 2024-03-24 | End: 2024-04-23

## 2024-03-23 RX ADMIN — Medication 10 MG: at 02:47

## 2024-03-23 RX ADMIN — Medication 10 ML: at 09:24

## 2024-03-23 RX ADMIN — FLUOXETINE HYDROCHLORIDE 40 MG: 20 CAPSULE ORAL at 09:23

## 2024-03-23 RX ADMIN — INSULIN HUMAN 6 UNITS: 100 INJECTION, SOLUTION PARENTERAL at 00:13

## 2024-03-23 RX ADMIN — LEVOTHYROXINE SODIUM 50 MCG: 50 TABLET ORAL at 06:12

## 2024-03-23 RX ADMIN — HEPARIN SODIUM 5000 UNITS: 5000 INJECTION INTRAVENOUS; SUBCUTANEOUS at 09:24

## 2024-03-23 RX ADMIN — Medication 1 TABLET: at 09:24

## 2024-03-23 RX ADMIN — FUROSEMIDE 40 MG: 40 TABLET ORAL at 09:33

## 2024-03-23 RX ADMIN — LACTULOSE 30 G: 20 SOLUTION ORAL at 09:23

## 2024-03-23 RX ADMIN — INSULIN DETEMIR 15 UNITS: 100 INJECTION, SOLUTION SUBCUTANEOUS at 09:34

## 2024-03-23 RX ADMIN — SPIRONOLACTONE 25 MG: 25 TABLET ORAL at 09:24

## 2024-03-23 RX ADMIN — Medication 100 MG: at 09:24

## 2024-03-23 RX ADMIN — FOLIC ACID 1 MG: 1 TABLET ORAL at 09:24

## 2024-03-23 RX ADMIN — FAMOTIDINE 20 MG: 20 TABLET ORAL at 09:24

## 2024-03-23 RX ADMIN — AMLODIPINE BESYLATE 5 MG: 5 TABLET ORAL at 09:24

## 2024-03-23 RX ADMIN — INSULIN HUMAN 4 UNITS: 100 INJECTION, SOLUTION PARENTERAL at 06:12

## 2024-03-23 NOTE — OUTREACH NOTE
Prep Survey      Flowsheet Row Responses   [facility] facility patient discharged from? [facility]   Is LACE score < 7 ? No   Eligibility Not Eligible   What are the reasons patient is not eligible? Other  [left AMA]   Does the patient have one of the following disease processes/diagnoses(primary or secondary)? Other   Prep survey completed? Yes            Cynthia RICHARD - Registered Nurse

## 2024-03-23 NOTE — DISCHARGE SUMMARY
[facility]        HOSPITALIST  DISCHARGE SUMMARY           LEFT AGAINST MEDICAL ADVICE  Patient Name: Simon Peters  : 1970  MRN: [id number]    Date of Admission: 3/13/2024  Date of Discharge:  24  Primary Care Physician: Elizabeth Woo MD    Consults       No orders found from 2024 to 3/14/2024.            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 male  with history of alcoholic cirrhosis with history of TIPS and recurrent hepatic encephalopathy, type 2 diabetes, hypertension, CKD stage III who presented with altered mental status after being found down by family.  He just been discharged home from the hospital on 3/11 after a week. WBC WNL. UA benign. Pro-Alfredo 0.43. CT head reported pneumocephalus and cavernous sinus as well as ophthalmic vein. Neurosurgery consulted; no intervention needed at this time. Ammonia 138. Initially admitted to telemetry bed but developed increased agitation, requiring IV Ativan and transferred to ICU.Underwent paracentesis, 1.6 L removed, fluid studies negative for SBP and antibiotics discontinued.  Placed on aggressive lactulose regimen along with rifaximin.  Received high-dose IV thiamine.  Mentation gradually improved.  He was restarted on the majority of his chronic medications.  Diabetes managed with long-acting and sliding scale insulin.  Had been working toward rehab placement over the weekend however patient stated he no longer wanted to go.  He initially stated he would go home with his brother and discharge and discharge was planned however  brother was in [place] stated unable to pick him up.  Discharge canceled due to unsafe disposition.  Patient then left AGAINST MEDICAL ADVICE.    Discharge Details        Discharge Medications        New Medications        Instructions Start Date   glipizide 5 MG tablet  Commonly known as: Glucotrol   5 mg, Oral, 2 Times Daily Before Meals      levothyroxine 50 MCG tablet  Commonly known as: SYNTHROID, LEVOTHROID   50 mcg, Oral, Every Early Morning   Start Date: March 24, 2024     riFAXIMin 550 MG tablet  Commonly known as: XIFAXAN   550 mg, Oral, Every 12 Hours Scheduled             Continue These Medications        Instructions Start Date   famotidine 20 MG tablet  Commonly known as: PEPCID   20 mg, Oral, 2 Times Daily Before Meals      FLUoxetine 40 MG capsule  Commonly known as: PROzac   40 mg, Oral, Daily      folic acid 1 MG tablet  Commonly known as: FOLVITE   1 mg, Oral, Daily      furosemide 40 MG tablet  Commonly known as: LASIX   40 mg, Oral, Daily      gabapentin 100 MG capsule  Commonly known as: NEURONTIN   100 mg, Oral, 3 Times Daily      hydrOXYzine pamoate 25 MG capsule  Commonly known as: VISTARIL   25 mg, Oral, 4 Times Daily PRN      lactulose 10 GM/15ML solution  Commonly known as: CHRONULAC   20 g, Oral, 3 Times Daily      multivitamin with minerals tablet tablet   1 tablet, Oral, Daily      nadolol 20 MG tablet  Commonly known as: CORGARD   20 mg, Oral, Every 24 Hours Scheduled      spironolactone 25 MG tablet  Commonly known as: ALDACTONE   25 mg, Oral, Daily      thiamine 100 MG tablet  Commonly known as: VITAMIN B1   100 mg, Oral, Daily             Stop These Medications      metFORMIN  MG 24 hr tablet  Commonly known as: GLUCOPHAGE-XR              No Known Allergies    Discharge Disposition:  Home or Self Care    Diet:  Hospital:No active diet order      Discharge Activity:   Activity Instructions       Activity as Tolerated              CODE STATUS:  Code Status and Medical  Interventions:   Ordered at: 03/13/24 9023     Code Status (Patient has no pulse and is not breathing):    CPR (Attempt to Resuscitate)     Medical Interventions (Patient has pulse or is breathing):    Full Support         No future appointments.    Additional Instructions for the Follow-ups that You Need to Schedule       Discharge Follow-up with PCP   As directed       Currently Documented PCP:    Elizabeth Woo MD    PCP Phone Number:    [phone]     Follow Up Details: 1 week                Pertinent  and/or Most Recent Results     PROCEDURES:   Paracentesis    LAB RESULTS:      Lab 03/23/24  0603 03/22/24  0406 03/21/24  0806 03/21/24  0301 03/20/24  0254 03/19/24  0254 03/17/24  0328   WBC 11.80* 11.08*  --  14.24* 16.08* 14.30* 9.65   HEMOGLOBIN 9.1* 9.3*  --  9.7* 10.0* 9.7* 9.7*   HEMATOCRIT 27.5* 28.9*  --  30.0* 30.5* 29.0* 30.0*   PLATELETS 118* 135*  --  138* 155 130* 133*   NEUTROS ABS  --   --   --   --   --   --  6.78   IMMATURE GRANS (ABS)  --   --   --   --   --   --  0.03   LYMPHS ABS  --   --   --   --   --   --  1.36   MONOS ABS  --   --   --   --   --   --  0.79   EOS ABS  --   --   --   --   --   --  0.57*   MCV 90.8 93.2  --  92.6 90.8 90.6 91.5   LACTATE  --   --  1.6  --   --   --   --          Lab 03/23/24  0603 03/22/24  1718 03/22/24  0406 03/21/24  0301 03/20/24  1546 03/20/24  0254 03/19/24  0254 03/18/24  0322 03/17/24  0328   SODIUM 133* 133* 134* 146* 150* 150* 149* 145 144   POTASSIUM 5.0 4.6 4.3 4.4 4.4 4.6 4.5 4.5 4.2   CHLORIDE 109* 105 106 117* 122* 120* 121* 119* 117*   CO2 15.9* 18.8* 17.4* 19.5* 20.4* 20.2* 20.5* 16.6* 18.5*   ANION GAP 8.1 9.2 10.6 9.5 7.6 9.8 7.5 9.4 8.5   BUN 57* 60* 57* 53* 53* 50* 40* 33* 27*   CREATININE 2.33* 2.32* 2.29* 1.99* 2.15* 2.24* 2.06* 2.21* 2.04*   EGFR 32.6* 32.8* 33.3* 39.4* 35.9* 34.2* 37.8* 34.8* 38.3*   GLUCOSE 243* 284* 252* 166* 219* 237* 200* 259* 148*   CALCIUM 7.9* 7.5* 7.5* 8.4* 8.8 8.9 9.1 8.6 8.4*   MAGNESIUM  --   --   --   2.4  --  2.2 2.2 2.2 2.0   PHOSPHORUS  --   --   --  3.7 2.8 3.1 3.3 3.1 3.3         Lab 03/23/24  0603 03/22/24  0406 03/21/24  0301 03/20/24  1546 03/18/24  0322 03/17/24  0328   TOTAL PROTEIN 4.8* 4.3* 5.3*  --  5.1* 5.2*   ALBUMIN 1.8* 1.4* 1.9* 1.9* 1.8* 1.9*   GLOBULIN 3.0 2.9 3.4  --  3.3 3.3   ALT (SGPT) 29 24 28  --  22 22   AST (SGOT) 65* 52* 61*  --  56* 39   BILIRUBIN 1.2 1.2 1.6*  --  1.3* 1.5*   ALK PHOS 275* 239* 235*  --  219* 161*                     Brief Urine Lab Results  (Last result in the past 365 days)        Color   Clarity   Blood   Leuk Est   Nitrite   Protein   CREAT   Urine HCG        03/13/24 2154 Yellow   Clear   Large (3+)   Negative   Negative   >=300 mg/dL (3+)                 Microbiology Results (last 10 days)       Procedure Component Value - Date/Time    Body Fluid Culture - Body Fluid, Peritoneum [[id number]] Collected: 03/14/24 1653    Lab Status: Final result Specimen: Body Fluid from Peritoneum Updated: 03/17/24 0731     Body Fluid Culture No growth at 3 days     Gram Stain No organisms seen    Anaerobic Culture - Body Fluid, Peritoneum [[id number]]  (Normal) Collected: 03/14/24 1653    Lab Status: Final result Specimen: Body Fluid from Peritoneum Updated: 03/19/24 0704     Anaerobic Culture No anaerobes isolated at 5 days    Fungus Culture - Peritoneal Fluid, Peritoneum [[id number]] Collected: 03/14/24 1653    Lab Status: Preliminary result Specimen: Peritoneal Fluid from Peritoneum Updated: 03/21/24 1715     Fungus Culture No fungus isolated at 1 week    Mycoplasma Pneumoniae PCR - Swab, Nasopharynx [[id number]] Collected: 03/14/24 0216    Lab Status: Final result Specimen: Swab from Nasopharynx Updated: 03/19/24 0908     Mycoplasma pneumo by PCR Negative     Comment: No Mycoplasma pneumoniae DNA detected.  This test was developed and its performance characteristics determined  by [facility].  It has not been cleared or approved by the Food and Drug  Administration.  The FDA has  determined that such clearance or  approval is not necessary.       Narrative:      Performed at:  [address]  [id number]  : Herberth Lyons MD, Phone:  [phone]    MRSA Screen, PCR (Inpatient) - Swab, Nares [[id number]]  (Normal) Collected: 03/14/24 0216    Lab Status: Final result Specimen: Swab from Nares Updated: 03/14/24 0350     MRSA PCR No MRSA Detected    Narrative:      The negative predictive value of this diagnostic test is high and should only be used to consider de-escalating anti-MRSA therapy. A positive result may indicate colonization with MRSA and must be correlated clinically.    Blood Culture - Blood, Arm, Left [[id number]]  (Normal) Collected: 03/13/24 2153    Lab Status: Final result Specimen: Blood from Arm, Left Updated: 03/18/24 2215     Blood Culture No growth at 5 days    Blood Culture - Blood, Arm, Right [[id number]]  (Normal) Collected: 03/13/24 2153    Lab Status: Final result Specimen: Blood from Arm, Right Updated: 03/18/24 2215     Blood Culture No growth at 5 days            MRI Brain Without Contrast    Result Date: 3/18/2024    Heavily motion degraded exam that is essentially nondiagnostic.  No gross acute findings within the limits of the exam.  Consider repeat exam when clinically feasible.      ADIS RICHARDSON MD       Electronically Signed and Approved By: ADIS RICHARDSON MD on 3/18/2024 at 23:15             CT Head Without Contrast    Result Date: 3/16/2024    No acute intracranial abnormality is identified.     VERA GUNN MD       Electronically Signed and Approved By: VERA GUNN MD on 3/16/2024 at 16:26             XR Abdomen KUB    Result Date: 3/16/2024    Tip of the enteric tube terminates in the gastric fundus.     VERA GUNN MD       Electronically Signed and Approved By: VERA GUNN MD on 3/16/2024 at 8:55                           Labs Pending at Discharge:  Pending Labs       Order Current  Status    Fungus Culture - Peritoneal Fluid, Peritoneum Preliminary result            Electronically signed by Grady Clifford DO, 03/23/24, 4:18 PM EDT.

## 2024-03-23 NOTE — ED PROVIDER NOTES
Time: 12:05 PM EDT  Date of encounter:  3/23/2024  Independent Historian/Clinical History and Information was obtained by:   Patient    History is limited by: N/A    Chief Complaint: Fall      History of Present Illness:  Patient is a 53 y.o. year old male who presents to the emergency department for evaluation of fall.  He states he could not make down to the bottom of the hill after leaving the hospital today and fell.  He has no complaints from the fall.  States he just left AMA from the hospital just prior to arrival here.  States he was in the hospital for mental status change.  He was initially told he may be going home today but then they stated he needed to stay for couple more days.  He states that this time he will do what ever we want him to do.    HPI    Patient Care Team  Primary Care Provider: Elizabeth Woo MD    Past Medical History:     No Known Allergies  Past Medical History:   Diagnosis Date    Abnormal LFTs     Anxiety     Back pain     Diabetes mellitus     Hypertension     Kidney failure     Liver failure     Rash      Past Surgical History:   Procedure Laterality Date    TIPS PROCEDURE       Family History   Problem Relation Age of Onset    Diabetes Father     Heart disease Father     Hypertension Father     Diabetes type II Father     Stroke Father     Diabetes Brother        Home Medications:  Prior to Admission medications    Medication Sig Start Date End Date Taking? Authorizing Provider   famotidine (PEPCID) 20 MG tablet Take 1 tablet by mouth 2 (Two) Times a Day Before Meals for 30 days. 3/11/24 4/10/24  Seth Aguilar MD   FLUoxetine (PROzac) 40 MG capsule Take 1 capsule by mouth Daily for 30 days. 3/12/24 4/11/24  Seth Aguilar MD   folic acid (FOLVITE) 1 MG tablet Take 1 tablet by mouth Daily for 30 days. 3/12/24 4/11/24  Seth Aguilar MD   furosemide (LASIX) 40 MG tablet Take 1 tablet by mouth Daily for 30 days. 3/12/24 4/11/24  Seth Aguilar MD   gabapentin (NEURONTIN) 100 MG  capsule Take 1 capsule by mouth 3 (Three) Times a Day for 30 days. 3/11/24 4/10/24  Seth Aguilar MD   glipizide (Glucotrol) 5 MG tablet Take 1 tablet by mouth 2 (Two) Times a Day Before Meals for 30 days. 3/23/24 4/22/24  Grady Clifford DO   hydrOXYzine pamoate (VISTARIL) 25 MG capsule Take 1 capsule by mouth 4 (Four) Times a Day As Needed for Itching or Anxiety for up to 30 days. 3/11/24 4/10/24  Seth Aguilar MD   lactulose (CHRONULAC) 10 GM/15ML solution Take 30 mL by mouth 3 (Three) Times a Day for 32 days. 3/23/24 4/24/24  Grady Clifford DO   levothyroxine (SYNTHROID, LEVOTHROID) 50 MCG tablet Take 1 tablet by mouth Every Morning for 30 days. 3/24/24 4/23/24  Grady Clifford DO   multivitamin with minerals tablet tablet Take 1 tablet by mouth Daily for 30 days. 3/12/24 4/11/24  Seth Aguilar MD   nadolol (CORGARD) 20 MG tablet Take 1 tablet by mouth Daily for 30 days. 3/12/24 4/11/24  Seth Aguilar MD   riFAXIMin (XIFAXAN) 550 MG tablet Take 1 tablet by mouth Every 12 (Twelve) Hours for 60 doses. Indications: Impaired Brain Function due to Liver Disease 3/23/24 4/22/24  Grady Clifford DO   spironolactone (ALDACTONE) 25 MG tablet Take 1 tablet by mouth Daily for 30 days. 3/12/24 4/11/24  Seth Aguilar MD   thiamine (VITAMIN B1) 100 MG tablet Take 1 tablet by mouth Daily for 30 days. 3/12/24 4/11/24  Seth Aguilar MD   lactulose (CHRONULAC) 10 GM/15ML solution Take 30 mL by mouth 3 (Three) Times a Day for 30 days. 3/11/24 3/23/24  Seth Aguilar MD   metFORMIN ER (GLUCOPHAGE-XR) 500 MG 24 hr tablet Take 2 tablets by mouth Daily With Breakfast. 3/11/24 3/23/24  Provider, MD Yareli        Social History:   Social History     Tobacco Use    Smoking status: Every Day     Current packs/day: 0.50     Average packs/day: 0.5 packs/day for 39.2 years (19.6 ttl pk-yrs)     Types: Cigarettes     Start date: 1985    Smokeless tobacco: Never   Vaping Use    Vaping status: Never Used   Substance Use Topics    Alcohol use: Not  "Currently     Comment: daily          sober since 10/19/2020    Drug use: No         Review of Systems:  Review of Systems   Neurological:  Positive for weakness (generalized).        Physical Exam:  /88   Pulse 66   Temp 97.6 °F (36.4 °C) (Oral)   Resp 18   Ht 175.3 cm (69\")   Wt 97.4 kg (214 lb 11.7 oz)   SpO2 100%   BMI 31.71 kg/m²     Physical Exam  Vitals and nursing note reviewed.   Constitutional:       Appearance: He is ill-appearing.      Comments: Chronically ill-appearing   HENT:      Head: Normocephalic and atraumatic.   Eyes:      General: No scleral icterus.  Cardiovascular:      Rate and Rhythm: Normal rate and regular rhythm.      Heart sounds: Normal heart sounds.   Pulmonary:      Effort: Pulmonary effort is normal.      Breath sounds: Normal breath sounds.   Abdominal:      General: There is distension.      Palpations: Abdomen is soft.      Tenderness: There is no abdominal tenderness.      Comments: Slight distention of the abdomen.  There are bruises noted along the abdomen but no significant tenderness.   Musculoskeletal:         General: Normal range of motion.      Cervical back: Normal range of motion.      Right lower leg: Edema present.      Left lower leg: Edema present.      Comments: Multiple abrasions and contusions noted on extremities.   Skin:     Findings: No rash.   Neurological:      General: No focal deficit present.      Mental Status: He is alert and oriented to person, place, and time.                  Procedures:  Procedures      Medical Decision Making:      Comorbidities that affect care:    Cirrhosis, hypertension, diabetes    External Notes reviewed:    Reviewed recent admission      The following orders were placed and all results were independently analyzed by me:  Orders Placed This Encounter   Procedures    Inpatient Hospitalist Consult       Medications Given in the Emergency Department:  Medications - No data to display     ED Course:     "     Labs:    Lab Results (last 24 hours)       Procedure Component Value Units Date/Time    POC Glucose Once [[id number]]  (Abnormal) Collected: 03/22/24 1227    Specimen: Blood Updated: 03/22/24 1229     Glucose 227 mg/dL      Comment: Serial Number: [id number]:  [id number]       POC Glucose Once [[id number]]  (Abnormal) Collected: 03/22/24 1701    Specimen: Blood Updated: 03/22/24 1703     Glucose 579 mg/dL      Comment: Serial Number: [id number]:  [id number]       POC Glucose Once [[id number]]  (Abnormal) Collected: 03/22/24 1709    Specimen: Blood Updated: 03/22/24 1711     Glucose 546 mg/dL      Comment: Serial Number: [id number]:  [id number]       Basic Metabolic Panel [[id number]]  (Abnormal) Collected: 03/22/24 1718    Specimen: Blood from Arm, Left Updated: 03/22/24 1743     Glucose 284 mg/dL      BUN 60 mg/dL      Creatinine 2.32 mg/dL      Sodium 133 mmol/L      Potassium 4.6 mmol/L      Chloride 105 mmol/L      CO2 18.8 mmol/L      Calcium 7.5 mg/dL      BUN/Creatinine Ratio 25.9     Anion Gap 9.2 mmol/L      eGFR 32.8 mL/min/1.73     Narrative:      GFR Normal >60  Chronic Kidney Disease <60  Kidney Failure <15      POC Glucose Finger Q6H [[id number]]  (Abnormal) Collected: 03/23/24 0001    Specimen: Blood from Finger Updated: 03/23/24 0001     Glucose 291 mg/dL      Comment: Serial Number: [id number]:  [id number]       CBC (No Diff) [[id number]]  (Abnormal) Collected: 03/23/24 0603    Specimen: Blood Updated: 03/23/24 0617     WBC 11.80 10*3/mm3      RBC 3.03 10*6/mm3      Hemoglobin 9.1 g/dL      Hematocrit 27.5 %      MCV 90.8 fL      MCH 30.0 pg      MCHC 33.1 g/dL      RDW 17.2 %      RDW-SD 53.1 fl      MPV 11.1 fL      Platelets 118 10*3/mm3     Comprehensive Metabolic Panel [[id number]]  (Abnormal) Collected: 03/23/24 0603    Specimen: Blood Updated: 03/23/24 0650     Glucose 243 mg/dL      BUN 57 mg/dL      Creatinine 2.33 mg/dL      Sodium 133 mmol/L      Potassium 5.0 mmol/L       Chloride 109 mmol/L      CO2 15.9 mmol/L      Calcium 7.9 mg/dL      Total Protein 4.8 g/dL      Albumin 1.8 g/dL      ALT (SGPT) 29 U/L      AST (SGOT) 65 U/L      Alkaline Phosphatase 275 U/L      Total Bilirubin 1.2 mg/dL      Globulin 3.0 gm/dL      A/G Ratio 0.6 g/dL      BUN/Creatinine Ratio 24.5     Anion Gap 8.1 mmol/L      eGFR 32.6 mL/min/1.73     Narrative:      GFR Normal >60  Chronic Kidney Disease <60  Kidney Failure <15      POC Glucose Finger Q6H [[id number]]  (Abnormal) Collected: 03/23/24 0607    Specimen: Blood from Finger Updated: 03/23/24 0610     Glucose 229 mg/dL      Comment: Serial Number: [id number]:  [id number]                Imaging:    No Radiology Exams Resulted Within Past 24 Hours      Differential Diagnosis and Discussion:    Metabolic: Differential diagnosis includes but is not limited to hypertension, hyperglycemia, hyperkalemia, hypocalcemia, metabolic acidosis, hypokalemia, hypoglycemia, malnutrition, hypothyroidism, hyperthyroidism, and adrenal insufficiency.   Weakness: Based on the patient's history, signs, and symptoms, the diffential diagnosis includes but is not limited to meningitis, stroke, sepsis, subarachnoid hemorrhage, intracranial bleeding, encephalitis, acute uti, dehydration, MS, myasthenia gravis, Guillan Onia, migraine variant, neuromuscular disorders vertigo, electrolyte imbalance, and metabolic disorders.        MDM     Patient is a 53-year-old male who presents with a fall.  He just left the hospital AMA.  He states that he made it to the bottom of the hill and then fell.  He has no complaints from the fall.  He states he does not want to be back in the hospital at this time and prefers to go home.  I did discuss with him about readmitting him and he declines at this time.  Will DC the patient has the patient follow-up as an outpatient.  Should he have new or worsening symptoms needs return to the emergency room.  He is alert and oriented x 3 and can  make his own decisions.  Will DC.          Patient Care Considerations:          Consultants/Shared Management Plan:    Spoke with Dr Clifford.  He stated the patient just left AMA.  They were going to try to place him in a rehab facility on Monday.  If the patient is willing to stay he will readmit them.  If he does not want to stay then he will just need to follow-up as an outpatient.    Social Determinants of Health:    Patient is independent, reliable, and has access to care.       Disposition and Care Coordination:    Discharged: I considered escalation of care by admitting this patient to the hospital, however patient declines    I have explained the patient´s condition, diagnoses and treatment plan based on the information available to me at this time. I have answered questions and addressed any concerns. The patient has a good  understanding of the patient´s diagnosis, condition, and treatment plan as can be expected at this point. The vital signs have been stable. The patient´s condition is stable and appropriate for discharge from the emergency department.      The patient will pursue further outpatient evaluation with the primary care physician or other designated or consulting physician as outlined in the discharge instructions. They are agreeable to this plan of care and follow-up instructions have been explained in detail. The patient has received these instructions in written format and have expressed an understanding of the discharge instructions. The patient is aware that any significant change in condition or worsening of symptoms should prompt an immediate return to this or the closest emergency department or call to 911.      Final diagnoses:   Fall, initial encounter        ED Disposition       ED Disposition   Discharge    Condition   Stable    Comment   --               This medical record created using voice recognition software.             Albino Irving MD  03/23/24 3423

## 2024-03-23 NOTE — PROGRESS NOTES
Pulmonary / Critical Care Progress Note      Patient Name: Simon Peters  : 1970  MRN: [id number]  Attending:  Grday Clifford DO   Date of admission: 3/13/2024    Subjective   Subjective   Follow-up for hepatic encephalopathy, altered mental status    Over the past 24H,  Continues on Xifaxan and lactulose. On oral diet.     No acute events overnight.    This morning,  Sitting up on side of bed  Eating breakfast.  Tolerating diet.  Remains on room air  Alert and oriented  Answering questions appropriately  Denies dyspnea or cough  No fever or chills      Objective   Objective     Vitals:   Vital signs for last 24 hours:  Temp:  [97.3 °F (36.3 °C)-99.1 °F (37.3 °C)] 99.1 °F (37.3 °C)  Heart Rate:  [63-75] 75  Resp:  [16-18] 18  BP: (115-135)/(53-71) 135/71      Physical Exam   Vital Signs Reviewed   General: WDWN, Alert, NAD, sitting up on side of bed.    Chest:  good aeration, clear to auscultation bilaterally, no work of breathing noted on room air  CV: regular rate and rhythm regular  EXT:  no clubbing, no cyanosis, no edema  Neuro:  A&Ox3, moving all 4 extremities spontaneously  Skin: No rashes or lesions noted    Result Review    Result Review:  I have personally reviewed the results from the time of this admission to 3/23/2024 07:16 EDT and agree with these findings:  [x]  Laboratory  [x]  Microbiology  [x]  Radiology  []  EKG/Telemetry   []  Cardiology/Vascular   []  Pathology  [x]  Old records  []  Other:  Most notable findings include:         Lab 24  0603 24  1718 24  0406 24  0301 24  1546 24  0254 24  0254 24  0322 24  0328   WBC 11.80*  --  11.08* 14.24*  --  16.08* 14.30*  --  9.65   HEMOGLOBIN 9.1*  --  9.3* 9.7*  --  10.0* 9.7*  --  9.7*   HEMATOCRIT 27.5*  --  28.9* 30.0*  --  30.5* 29.0*  --  30.0*   PLATELETS 118*  --  135* 138*  --  155 130*  --  133*   SODIUM 133* 133* 134* 146* 150* 150* 149* 145 144   POTASSIUM 5.0 4.6 4.3 4.4 4.4 4.6  4.5 4.5 4.2   CHLORIDE 109* 105 106 117* 122* 120* 121* 119* 117*   CO2 15.9* 18.8* 17.4* 19.5* 20.4* 20.2* 20.5* 16.6* 18.5*   BUN 57* 60* 57* 53* 53* 50* 40* 33* 27*   CREATININE 2.33* 2.32* 2.29* 1.99* 2.15* 2.24* 2.06* 2.21* 2.04*   GLUCOSE 243* 284* 252* 166* 219* 237* 200* 259* 148*   CALCIUM 7.9* 7.5* 7.5* 8.4* 8.8 8.9 9.1 8.6 8.4*   PHOSPHORUS  --   --   --  3.7 2.8 3.1 3.3 3.1 3.3   TOTAL PROTEIN 4.8*  --  4.3* 5.3*  --   --   --  5.1* 5.2*   ALBUMIN 1.8*  --  1.4* 1.9* 1.9*  --   --  1.8* 1.9*   GLOBULIN 3.0  --  2.9 3.4  --   --   --  3.3 3.3     Assessment & Plan   Assessment / Plan     Active Hospital Problems:  Active Hospital Problems    Diagnosis     **Pneumonia       Impression:    Altered mental status secondary to hepatic encephalopathy  Alcoholic cirrhosis status post TIPS  Decompensated liver cirrhosis with ascites and anasarca  Type 2 diabetes with hyperglycemia  Chronic thrombocytopenia secondary to cirrhosis  Stage III CKD  Hypothyroidism  Hypertension    Plan  Mentation is better.  Continue rifaximin and lactulose.  Cortrak is discontinued and he is taking oral diet.  Continue Norvasc.  Continue with Aldactone and nadolol.  Continue sliding scale insulin.  Multivitamin thiamine and folate.    Unless otherwise needed, pulmonary will sign off at this time. Please call with any questions or concerns.      Medical DVT prophylaxis orders are present.    CODE STATUS:   Code Status (Patient has no pulse and is not breathing): CPR (Attempt to Resuscitate)  Medical Interventions (Patient has pulse or is breathing): Full Support    I have reviewed labs, imaging, pertinent clinical data and provider notes.   I have discussed with bedside nurse and primary service.     Electronically signed by Ed Alvares MD, 03/23/24, 7:16 AM EDT.

## 2024-03-23 NOTE — NURSING NOTE
Spoke with patient's brother Tevin to come and pick patient up due to dc orders. Tevin explained that the patient is not back to his normal level or orientation, and that if he came to pick him up, the patient would not be coming to stay with him, he would be going home to be by himself. Tevin explained that he was in the process of getting POA for the patient and that he would like to get placement for him because he is unable to live alone. Contacted Dr. Clifford about the situation. Hoping to meet or talk to  Monday.

## 2024-03-23 NOTE — PLAN OF CARE
Goal Outcome Evaluation:           Progress: improving  Outcome Evaluation: patient is alert and oriented x3 and on room air. transfer this shift. transfer skin assessment performed with Camelia Lucas RN. patient has excoriation on his scrotum and bottom. pictures uploaded and skin care provided. pt had c/o insomnia this shift. [facility] Madrigal, notified and prn melatonin given. blood glucose monitored and treated per mar. no other issues at this time. continuing plan of care.

## 2024-03-28 LAB — FUNGUS WND CULT: NORMAL

## 2024-04-04 LAB — FUNGUS WND CULT: NORMAL

## 2024-04-11 LAB — FUNGUS WND CULT: NORMAL
